# Patient Record
Sex: MALE | Race: WHITE | NOT HISPANIC OR LATINO | Employment: OTHER | URBAN - METROPOLITAN AREA
[De-identification: names, ages, dates, MRNs, and addresses within clinical notes are randomized per-mention and may not be internally consistent; named-entity substitution may affect disease eponyms.]

---

## 2017-05-22 ENCOUNTER — RECORDS - HEALTHEAST (OUTPATIENT)
Dept: LAB | Facility: CLINIC | Age: 77
End: 2017-05-22

## 2017-05-22 LAB
CHOLEST SERPL-MCNC: 201 MG/DL
FASTING STATUS PATIENT QL REPORTED: ABNORMAL
HDLC SERPL-MCNC: 40 MG/DL
LDLC SERPL CALC-MCNC: 111 MG/DL
TRIGL SERPL-MCNC: 251 MG/DL

## 2017-08-10 ENCOUNTER — ANESTHESIA - HEALTHEAST (OUTPATIENT)
Dept: SURGERY | Facility: HOSPITAL | Age: 77
End: 2017-08-10

## 2017-08-10 ASSESSMENT — MIFFLIN-ST. JEOR: SCORE: 1750.45

## 2017-08-11 ENCOUNTER — SURGERY - HEALTHEAST (OUTPATIENT)
Dept: SURGERY | Facility: HOSPITAL | Age: 77
End: 2017-08-11

## 2017-09-18 ENCOUNTER — RECORDS - HEALTHEAST (OUTPATIENT)
Dept: ADMINISTRATIVE | Facility: OTHER | Age: 77
End: 2017-09-18

## 2017-09-25 ENCOUNTER — HOSPITAL ENCOUNTER (OUTPATIENT)
Dept: MRI IMAGING | Facility: CLINIC | Age: 77
Discharge: HOME | End: 2017-09-25
Attending: INTERNAL MEDICINE

## 2017-09-25 DIAGNOSIS — R10.13 EPIGASTRIC PAIN: ICD-10-CM

## 2018-01-29 ENCOUNTER — RECORDS - HEALTHEAST (OUTPATIENT)
Dept: LAB | Facility: CLINIC | Age: 78
End: 2018-01-29

## 2018-01-29 LAB
ANION GAP SERPL CALCULATED.3IONS-SCNC: 10 MMOL/L (ref 5–18)
BUN SERPL-MCNC: 26 MG/DL (ref 8–28)
CALCIUM SERPL-MCNC: 9.8 MG/DL (ref 8.5–10.5)
CHLORIDE BLD-SCNC: 98 MMOL/L (ref 98–107)
CO2 SERPL-SCNC: 26 MMOL/L (ref 22–31)
CREAT SERPL-MCNC: 1.03 MG/DL (ref 0.7–1.3)
GFR SERPL CREATININE-BSD FRML MDRD: >60 ML/MIN/1.73M2
GLUCOSE BLD-MCNC: 168 MG/DL (ref 70–125)
HGB BLD-MCNC: 13.7 G/DL (ref 14–18)
POTASSIUM BLD-SCNC: 4.5 MMOL/L (ref 3.5–5)
SODIUM SERPL-SCNC: 134 MMOL/L (ref 136–145)

## 2018-03-06 ENCOUNTER — RECORDS - HEALTHEAST (OUTPATIENT)
Dept: ADMINISTRATIVE | Facility: OTHER | Age: 78
End: 2018-03-06

## 2018-03-07 ENCOUNTER — HOSPITAL ENCOUNTER (OUTPATIENT)
Dept: RADIOLOGY | Facility: CLINIC | Age: 78
Discharge: HOME | End: 2018-03-07
Attending: INTERNAL MEDICINE

## 2018-03-07 DIAGNOSIS — K86.89 DILATED PANCREATIC DUCT: ICD-10-CM

## 2018-03-12 ENCOUNTER — RECORDS - HEALTHEAST (OUTPATIENT)
Dept: ADMINISTRATIVE | Facility: OTHER | Age: 78
End: 2018-03-12

## 2018-03-13 ENCOUNTER — HOSPITAL ENCOUNTER (OUTPATIENT)
Dept: RADIOLOGY | Facility: CLINIC | Age: 78
Discharge: HOME | End: 2018-03-13
Attending: INTERNAL MEDICINE

## 2018-03-13 DIAGNOSIS — K85.90 ACUTE PANCREATITIS: ICD-10-CM

## 2018-06-07 ENCOUNTER — AMBULATORY - HEALTHEAST (OUTPATIENT)
Dept: PALLIATIVE MEDICINE | Facility: OTHER | Age: 78
End: 2018-06-07

## 2018-06-07 DIAGNOSIS — G89.29 CHRONIC ABDOMINAL PAIN: ICD-10-CM

## 2018-06-07 DIAGNOSIS — R10.9 CHRONIC ABDOMINAL PAIN: ICD-10-CM

## 2018-07-10 ENCOUNTER — RECORDS - HEALTHEAST (OUTPATIENT)
Dept: ADMINISTRATIVE | Facility: OTHER | Age: 78
End: 2018-07-10

## 2018-07-10 ENCOUNTER — HOSPITAL ENCOUNTER (OUTPATIENT)
Dept: PALLIATIVE MEDICINE | Facility: OTHER | Age: 78
Discharge: HOME | End: 2018-07-10
Attending: INTERNAL MEDICINE

## 2018-07-10 DIAGNOSIS — G89.4 CHRONIC PAIN SYNDROME: ICD-10-CM

## 2018-07-10 DIAGNOSIS — R10.31 RLQ ABDOMINAL PAIN: ICD-10-CM

## 2018-07-10 ASSESSMENT — MIFFLIN-ST. JEOR: SCORE: 1750.45

## 2018-07-24 ENCOUNTER — HOSPITAL ENCOUNTER (OUTPATIENT)
Dept: PALLIATIVE MEDICINE | Facility: OTHER | Age: 78
Discharge: HOME | End: 2018-07-24
Attending: NURSE PRACTITIONER

## 2018-07-24 DIAGNOSIS — M54.41 CHRONIC BILATERAL LOW BACK PAIN WITH BILATERAL SCIATICA: ICD-10-CM

## 2018-07-24 DIAGNOSIS — R10.84 ABDOMINAL PAIN, GENERALIZED: ICD-10-CM

## 2018-07-24 DIAGNOSIS — G89.4 CHRONIC PAIN SYNDROME: ICD-10-CM

## 2018-07-24 DIAGNOSIS — M54.42 CHRONIC BILATERAL LOW BACK PAIN WITH BILATERAL SCIATICA: ICD-10-CM

## 2018-07-24 DIAGNOSIS — G89.29 CHRONIC BILATERAL LOW BACK PAIN WITH BILATERAL SCIATICA: ICD-10-CM

## 2018-07-24 ASSESSMENT — MIFFLIN-ST. JEOR: SCORE: 1750.45

## 2018-08-21 ENCOUNTER — COMMUNICATION - HEALTHEAST (OUTPATIENT)
Dept: PALLIATIVE MEDICINE | Facility: OTHER | Age: 78
End: 2018-08-21

## 2018-08-21 ENCOUNTER — HOSPITAL ENCOUNTER (OUTPATIENT)
Dept: PALLIATIVE MEDICINE | Facility: OTHER | Age: 78
Discharge: HOME | End: 2018-08-21
Attending: NURSE PRACTITIONER

## 2018-08-21 DIAGNOSIS — F11.20 CONTINUOUS OPIOID DEPENDENCE (H): ICD-10-CM

## 2018-08-21 DIAGNOSIS — G89.29 CHRONIC BILATERAL LOW BACK PAIN WITH BILATERAL SCIATICA: ICD-10-CM

## 2018-08-21 DIAGNOSIS — M54.42 CHRONIC BILATERAL LOW BACK PAIN WITH BILATERAL SCIATICA: ICD-10-CM

## 2018-08-21 DIAGNOSIS — M54.41 CHRONIC BILATERAL LOW BACK PAIN WITH BILATERAL SCIATICA: ICD-10-CM

## 2018-08-21 DIAGNOSIS — R10.84 ABDOMINAL PAIN, GENERALIZED: ICD-10-CM

## 2018-08-21 DIAGNOSIS — D49.0 INTRADUCTAL PAPILLARY MUCINOUS NEOPLASM OF PANCREAS: ICD-10-CM

## 2018-08-21 DIAGNOSIS — G89.4 CHRONIC PAIN SYNDROME: ICD-10-CM

## 2018-08-21 ASSESSMENT — MIFFLIN-ST. JEOR: SCORE: 1750.45

## 2018-08-24 ENCOUNTER — COMMUNICATION - HEALTHEAST (OUTPATIENT)
Dept: PALLIATIVE MEDICINE | Facility: OTHER | Age: 78
End: 2018-08-24

## 2018-09-19 ENCOUNTER — COMMUNICATION - HEALTHEAST (OUTPATIENT)
Dept: PALLIATIVE MEDICINE | Facility: OTHER | Age: 78
End: 2018-09-19

## 2018-09-19 DIAGNOSIS — R10.84 ABDOMINAL PAIN, GENERALIZED: ICD-10-CM

## 2018-09-19 DIAGNOSIS — M54.42 CHRONIC BILATERAL LOW BACK PAIN WITH BILATERAL SCIATICA: ICD-10-CM

## 2018-09-19 DIAGNOSIS — M54.41 CHRONIC BILATERAL LOW BACK PAIN WITH BILATERAL SCIATICA: ICD-10-CM

## 2018-09-19 DIAGNOSIS — G89.4 CHRONIC PAIN SYNDROME: ICD-10-CM

## 2018-09-19 DIAGNOSIS — G89.29 CHRONIC BILATERAL LOW BACK PAIN WITH BILATERAL SCIATICA: ICD-10-CM

## 2018-09-25 ENCOUNTER — HOSPITAL ENCOUNTER (OUTPATIENT)
Dept: PALLIATIVE MEDICINE | Facility: OTHER | Age: 78
Discharge: HOME | End: 2018-09-25
Attending: NURSE PRACTITIONER

## 2018-09-25 DIAGNOSIS — G89.4 CHRONIC PAIN SYNDROME: ICD-10-CM

## 2018-09-25 DIAGNOSIS — R10.84 ABDOMINAL PAIN, GENERALIZED: ICD-10-CM

## 2018-09-25 DIAGNOSIS — M54.42 CHRONIC BILATERAL LOW BACK PAIN WITH BILATERAL SCIATICA: ICD-10-CM

## 2018-09-25 DIAGNOSIS — G89.29 CHRONIC BILATERAL LOW BACK PAIN WITH BILATERAL SCIATICA: ICD-10-CM

## 2018-09-25 DIAGNOSIS — M54.41 CHRONIC BILATERAL LOW BACK PAIN WITH BILATERAL SCIATICA: ICD-10-CM

## 2018-09-25 ASSESSMENT — MIFFLIN-ST. JEOR: SCORE: 1750.45

## 2018-10-05 ENCOUNTER — COMMUNICATION - HEALTHEAST (OUTPATIENT)
Dept: PALLIATIVE MEDICINE | Facility: OTHER | Age: 78
End: 2018-10-05

## 2018-10-09 ENCOUNTER — HOSPITAL ENCOUNTER (OUTPATIENT)
Dept: PALLIATIVE MEDICINE | Facility: OTHER | Age: 78
Discharge: HOME | End: 2018-10-09
Attending: NURSE PRACTITIONER

## 2018-10-09 DIAGNOSIS — G89.29 CHRONIC BILATERAL LOW BACK PAIN WITH BILATERAL SCIATICA: ICD-10-CM

## 2018-10-09 DIAGNOSIS — M54.41 CHRONIC BILATERAL LOW BACK PAIN WITH BILATERAL SCIATICA: ICD-10-CM

## 2018-10-09 DIAGNOSIS — R10.84 ABDOMINAL PAIN, GENERALIZED: ICD-10-CM

## 2018-10-09 DIAGNOSIS — G89.4 CHRONIC PAIN SYNDROME: ICD-10-CM

## 2018-10-09 DIAGNOSIS — M54.42 CHRONIC BILATERAL LOW BACK PAIN WITH BILATERAL SCIATICA: ICD-10-CM

## 2018-10-09 ASSESSMENT — MIFFLIN-ST. JEOR: SCORE: 1750.45

## 2018-10-29 ENCOUNTER — COMMUNICATION - HEALTHEAST (OUTPATIENT)
Dept: PALLIATIVE MEDICINE | Facility: OTHER | Age: 78
End: 2018-10-29

## 2018-10-30 ENCOUNTER — COMMUNICATION - HEALTHEAST (OUTPATIENT)
Dept: PALLIATIVE MEDICINE | Facility: OTHER | Age: 78
End: 2018-10-30

## 2018-11-05 ENCOUNTER — COMMUNICATION - HEALTHEAST (OUTPATIENT)
Dept: PALLIATIVE MEDICINE | Facility: OTHER | Age: 78
End: 2018-11-05

## 2018-11-06 ENCOUNTER — COMMUNICATION - HEALTHEAST (OUTPATIENT)
Dept: PALLIATIVE MEDICINE | Facility: OTHER | Age: 78
End: 2018-11-06

## 2018-11-07 ENCOUNTER — HOSPITAL ENCOUNTER (OUTPATIENT)
Dept: PALLIATIVE MEDICINE | Facility: OTHER | Age: 78
Discharge: HOME | End: 2018-11-07
Attending: NURSE PRACTITIONER

## 2018-11-07 DIAGNOSIS — G89.4 CHRONIC PAIN SYNDROME: ICD-10-CM

## 2018-11-07 DIAGNOSIS — M54.41 CHRONIC BILATERAL LOW BACK PAIN WITH BILATERAL SCIATICA: ICD-10-CM

## 2018-11-07 DIAGNOSIS — R10.84 ABDOMINAL PAIN, GENERALIZED: ICD-10-CM

## 2018-11-07 DIAGNOSIS — G89.29 CHRONIC BILATERAL LOW BACK PAIN WITH BILATERAL SCIATICA: ICD-10-CM

## 2018-11-07 DIAGNOSIS — M54.42 CHRONIC BILATERAL LOW BACK PAIN WITH BILATERAL SCIATICA: ICD-10-CM

## 2018-11-07 ASSESSMENT — MIFFLIN-ST. JEOR: SCORE: 1750.45

## 2018-11-08 ENCOUNTER — COMMUNICATION - HEALTHEAST (OUTPATIENT)
Dept: PALLIATIVE MEDICINE | Facility: OTHER | Age: 78
End: 2018-11-08

## 2018-11-13 ENCOUNTER — COMMUNICATION - HEALTHEAST (OUTPATIENT)
Dept: PALLIATIVE MEDICINE | Facility: OTHER | Age: 78
End: 2018-11-13

## 2018-11-19 ENCOUNTER — COMMUNICATION - HEALTHEAST (OUTPATIENT)
Dept: PALLIATIVE MEDICINE | Facility: OTHER | Age: 78
End: 2018-11-19

## 2018-11-19 DIAGNOSIS — M54.42 CHRONIC BILATERAL LOW BACK PAIN WITH BILATERAL SCIATICA: ICD-10-CM

## 2018-11-19 DIAGNOSIS — R10.84 ABDOMINAL PAIN, GENERALIZED: ICD-10-CM

## 2018-11-19 DIAGNOSIS — M54.41 CHRONIC BILATERAL LOW BACK PAIN WITH BILATERAL SCIATICA: ICD-10-CM

## 2018-11-19 DIAGNOSIS — G89.4 CHRONIC PAIN SYNDROME: ICD-10-CM

## 2018-11-19 DIAGNOSIS — G89.29 CHRONIC BILATERAL LOW BACK PAIN WITH BILATERAL SCIATICA: ICD-10-CM

## 2018-11-29 ENCOUNTER — COMMUNICATION - HEALTHEAST (OUTPATIENT)
Dept: TELEHEALTH | Facility: CLINIC | Age: 78
End: 2018-11-29

## 2018-12-03 ENCOUNTER — COMMUNICATION - HEALTHEAST (OUTPATIENT)
Dept: PALLIATIVE MEDICINE | Facility: OTHER | Age: 78
End: 2018-12-03

## 2018-12-07 ENCOUNTER — HOSPITAL ENCOUNTER (OUTPATIENT)
Dept: PALLIATIVE MEDICINE | Facility: OTHER | Age: 78
Discharge: HOME | End: 2018-12-07
Attending: NURSE PRACTITIONER

## 2018-12-07 DIAGNOSIS — R11.0 NAUSEA: ICD-10-CM

## 2018-12-07 DIAGNOSIS — G89.29 CHRONIC BILATERAL LOW BACK PAIN WITH BILATERAL SCIATICA: ICD-10-CM

## 2018-12-07 DIAGNOSIS — M54.41 CHRONIC BILATERAL LOW BACK PAIN WITH BILATERAL SCIATICA: ICD-10-CM

## 2018-12-07 DIAGNOSIS — R10.84 ABDOMINAL PAIN, GENERALIZED: ICD-10-CM

## 2018-12-07 DIAGNOSIS — M54.42 CHRONIC BILATERAL LOW BACK PAIN WITH BILATERAL SCIATICA: ICD-10-CM

## 2018-12-07 DIAGNOSIS — G89.4 CHRONIC PAIN SYNDROME: ICD-10-CM

## 2018-12-07 ASSESSMENT — MIFFLIN-ST. JEOR: SCORE: 1750.45

## 2018-12-27 ENCOUNTER — COMMUNICATION - HEALTHEAST (OUTPATIENT)
Dept: TELEHEALTH | Facility: CLINIC | Age: 78
End: 2018-12-27

## 2018-12-28 ENCOUNTER — COMMUNICATION - HEALTHEAST (OUTPATIENT)
Dept: PALLIATIVE MEDICINE | Facility: OTHER | Age: 78
End: 2018-12-28

## 2019-01-15 ENCOUNTER — COMMUNICATION - HEALTHEAST (OUTPATIENT)
Dept: TELEHEALTH | Facility: CLINIC | Age: 79
End: 2019-01-15

## 2019-01-18 ENCOUNTER — COMMUNICATION - HEALTHEAST (OUTPATIENT)
Dept: TELEHEALTH | Facility: CLINIC | Age: 79
End: 2019-01-18

## 2019-01-21 ENCOUNTER — COMMUNICATION - HEALTHEAST (OUTPATIENT)
Dept: PALLIATIVE MEDICINE | Facility: OTHER | Age: 79
End: 2019-01-21

## 2019-01-23 ENCOUNTER — COMMUNICATION - HEALTHEAST (OUTPATIENT)
Dept: TELEHEALTH | Facility: CLINIC | Age: 79
End: 2019-01-23

## 2019-01-25 ENCOUNTER — COMMUNICATION - HEALTHEAST (OUTPATIENT)
Dept: PALLIATIVE MEDICINE | Facility: OTHER | Age: 79
End: 2019-01-25

## 2019-01-25 DIAGNOSIS — M54.41 CHRONIC BILATERAL LOW BACK PAIN WITH BILATERAL SCIATICA: ICD-10-CM

## 2019-01-25 DIAGNOSIS — G89.29 CHRONIC BILATERAL LOW BACK PAIN WITH BILATERAL SCIATICA: ICD-10-CM

## 2019-01-25 DIAGNOSIS — M54.42 CHRONIC BILATERAL LOW BACK PAIN WITH BILATERAL SCIATICA: ICD-10-CM

## 2019-01-25 DIAGNOSIS — R10.84 ABDOMINAL PAIN, GENERALIZED: ICD-10-CM

## 2019-01-25 DIAGNOSIS — G89.4 CHRONIC PAIN SYNDROME: ICD-10-CM

## 2019-01-30 ENCOUNTER — COMMUNICATION - HEALTHEAST (OUTPATIENT)
Dept: PALLIATIVE MEDICINE | Facility: OTHER | Age: 79
End: 2019-01-30

## 2019-01-30 DIAGNOSIS — G89.4 CHRONIC PAIN SYNDROME: ICD-10-CM

## 2019-01-31 ENCOUNTER — RECORDS - HEALTHEAST (OUTPATIENT)
Dept: LAB | Facility: CLINIC | Age: 79
End: 2019-01-31

## 2019-01-31 LAB
ALBUMIN SERPL-MCNC: 3.9 G/DL (ref 3.5–5)
ALP SERPL-CCNC: 68 U/L (ref 45–120)
ALT SERPL W P-5'-P-CCNC: 23 U/L (ref 0–45)
ANION GAP SERPL CALCULATED.3IONS-SCNC: 11 MMOL/L (ref 5–18)
AST SERPL W P-5'-P-CCNC: 18 U/L (ref 0–40)
BILIRUB SERPL-MCNC: 0.4 MG/DL (ref 0–1)
BUN SERPL-MCNC: 28 MG/DL (ref 8–28)
CALCIUM SERPL-MCNC: 9.8 MG/DL (ref 8.5–10.5)
CHLORIDE BLD-SCNC: 98 MMOL/L (ref 98–107)
CHOLEST SERPL-MCNC: 190 MG/DL
CO2 SERPL-SCNC: 26 MMOL/L (ref 22–31)
CREAT SERPL-MCNC: 1.15 MG/DL (ref 0.7–1.3)
FASTING STATUS PATIENT QL REPORTED: ABNORMAL
GFR SERPL CREATININE-BSD FRML MDRD: >60 ML/MIN/1.73M2
GLUCOSE BLD-MCNC: 99 MG/DL (ref 70–125)
HDLC SERPL-MCNC: 39 MG/DL
LDLC SERPL CALC-MCNC: 87 MG/DL
LDLC SERPL CALC-MCNC: ABNORMAL MG/DL
POTASSIUM BLD-SCNC: 5 MMOL/L (ref 3.5–5)
PROT SERPL-MCNC: 7.4 G/DL (ref 6–8)
SODIUM SERPL-SCNC: 135 MMOL/L (ref 136–145)
TRIGL SERPL-MCNC: 506 MG/DL

## 2019-02-01 ENCOUNTER — COMMUNICATION - HEALTHEAST (OUTPATIENT)
Dept: PALLIATIVE MEDICINE | Facility: OTHER | Age: 79
End: 2019-02-01

## 2019-02-04 ENCOUNTER — COMMUNICATION - HEALTHEAST (OUTPATIENT)
Dept: TELEHEALTH | Facility: CLINIC | Age: 79
End: 2019-02-04

## 2019-02-05 ENCOUNTER — RECORDS - HEALTHEAST (OUTPATIENT)
Dept: LAB | Facility: CLINIC | Age: 79
End: 2019-02-05

## 2019-02-05 ENCOUNTER — HOSPITAL ENCOUNTER (OUTPATIENT)
Dept: PALLIATIVE MEDICINE | Facility: OTHER | Age: 79
Discharge: HOME | End: 2019-02-05
Attending: NURSE PRACTITIONER

## 2019-02-05 DIAGNOSIS — M54.41 CHRONIC BILATERAL LOW BACK PAIN WITH BILATERAL SCIATICA: ICD-10-CM

## 2019-02-05 DIAGNOSIS — G89.29 CHRONIC BILATERAL LOW BACK PAIN WITH BILATERAL SCIATICA: ICD-10-CM

## 2019-02-05 DIAGNOSIS — R10.84 ABDOMINAL PAIN, GENERALIZED: ICD-10-CM

## 2019-02-05 DIAGNOSIS — G89.4 CHRONIC PAIN SYNDROME: ICD-10-CM

## 2019-02-05 DIAGNOSIS — M54.42 CHRONIC BILATERAL LOW BACK PAIN WITH BILATERAL SCIATICA: ICD-10-CM

## 2019-02-05 LAB — TSH SERPL DL<=0.005 MIU/L-ACNC: 1.06 UIU/ML (ref 0.3–5)

## 2019-02-05 ASSESSMENT — MIFFLIN-ST. JEOR: SCORE: 1750.45

## 2019-03-05 ENCOUNTER — COMMUNICATION - HEALTHEAST (OUTPATIENT)
Dept: PALLIATIVE MEDICINE | Facility: OTHER | Age: 79
End: 2019-03-05

## 2019-03-05 DIAGNOSIS — G89.29 CHRONIC BILATERAL LOW BACK PAIN WITH BILATERAL SCIATICA: ICD-10-CM

## 2019-03-05 DIAGNOSIS — R10.84 ABDOMINAL PAIN, GENERALIZED: ICD-10-CM

## 2019-03-05 DIAGNOSIS — M54.41 CHRONIC BILATERAL LOW BACK PAIN WITH BILATERAL SCIATICA: ICD-10-CM

## 2019-03-05 DIAGNOSIS — M54.42 CHRONIC BILATERAL LOW BACK PAIN WITH BILATERAL SCIATICA: ICD-10-CM

## 2019-03-05 DIAGNOSIS — G89.4 CHRONIC PAIN SYNDROME: ICD-10-CM

## 2019-03-06 ENCOUNTER — RECORDS - HEALTHEAST (OUTPATIENT)
Dept: LAB | Facility: CLINIC | Age: 79
End: 2019-03-06

## 2019-03-06 LAB
ANION GAP SERPL CALCULATED.3IONS-SCNC: 8 MMOL/L (ref 5–18)
BUN SERPL-MCNC: 28 MG/DL (ref 8–28)
CALCIUM SERPL-MCNC: 10.1 MG/DL (ref 8.5–10.5)
CHLORIDE BLD-SCNC: 100 MMOL/L (ref 98–107)
CO2 SERPL-SCNC: 28 MMOL/L (ref 22–31)
CREAT SERPL-MCNC: 1.25 MG/DL (ref 0.7–1.3)
GFR SERPL CREATININE-BSD FRML MDRD: 56 ML/MIN/1.73M2
GLUCOSE BLD-MCNC: 103 MG/DL (ref 70–125)
POTASSIUM BLD-SCNC: 4.2 MMOL/L (ref 3.5–5)
SODIUM SERPL-SCNC: 136 MMOL/L (ref 136–145)

## 2019-03-26 ENCOUNTER — HOSPITAL ENCOUNTER (OUTPATIENT)
Dept: PALLIATIVE MEDICINE | Facility: OTHER | Age: 79
Discharge: HOME | End: 2019-03-26
Attending: NURSE PRACTITIONER

## 2019-03-26 DIAGNOSIS — R10.84 ABDOMINAL PAIN, GENERALIZED: ICD-10-CM

## 2019-03-26 DIAGNOSIS — M54.42 CHRONIC BILATERAL LOW BACK PAIN WITH BILATERAL SCIATICA: ICD-10-CM

## 2019-03-26 DIAGNOSIS — M54.41 CHRONIC BILATERAL LOW BACK PAIN WITH BILATERAL SCIATICA: ICD-10-CM

## 2019-03-26 DIAGNOSIS — G89.29 CHRONIC BILATERAL LOW BACK PAIN WITH BILATERAL SCIATICA: ICD-10-CM

## 2019-03-26 DIAGNOSIS — G89.4 CHRONIC PAIN SYNDROME: ICD-10-CM

## 2019-03-26 ASSESSMENT — MIFFLIN-ST. JEOR: SCORE: 1750.45

## 2019-04-19 ENCOUNTER — COMMUNICATION - HEALTHEAST (OUTPATIENT)
Dept: PALLIATIVE MEDICINE | Facility: OTHER | Age: 79
End: 2019-04-19

## 2019-05-07 ENCOUNTER — HOSPITAL ENCOUNTER (OUTPATIENT)
Dept: PALLIATIVE MEDICINE | Facility: OTHER | Age: 79
Discharge: HOME | End: 2019-05-07
Attending: NURSE PRACTITIONER

## 2019-05-07 DIAGNOSIS — G89.4 CHRONIC PAIN SYNDROME: ICD-10-CM

## 2019-05-07 DIAGNOSIS — R10.84 ABDOMINAL PAIN, GENERALIZED: ICD-10-CM

## 2019-05-07 DIAGNOSIS — G89.29 CHRONIC BILATERAL LOW BACK PAIN WITH BILATERAL SCIATICA: ICD-10-CM

## 2019-05-07 DIAGNOSIS — M54.41 CHRONIC BILATERAL LOW BACK PAIN WITH BILATERAL SCIATICA: ICD-10-CM

## 2019-05-07 DIAGNOSIS — M54.42 CHRONIC BILATERAL LOW BACK PAIN WITH BILATERAL SCIATICA: ICD-10-CM

## 2019-05-07 DIAGNOSIS — K86.89 PANCREATIC PAIN: ICD-10-CM

## 2019-05-07 ASSESSMENT — MIFFLIN-ST. JEOR: SCORE: 1768.59

## 2019-05-20 ENCOUNTER — COMMUNICATION - HEALTHEAST (OUTPATIENT)
Dept: PALLIATIVE MEDICINE | Facility: OTHER | Age: 79
End: 2019-05-20

## 2019-05-21 ENCOUNTER — HOSPITAL ENCOUNTER (OUTPATIENT)
Dept: PALLIATIVE MEDICINE | Facility: OTHER | Age: 79
Discharge: HOME | End: 2019-05-21
Attending: PAIN MEDICINE | Admitting: PAIN MEDICINE

## 2019-05-21 DIAGNOSIS — G89.29 CHRONIC BILATERAL LOW BACK PAIN WITH BILATERAL SCIATICA: ICD-10-CM

## 2019-05-21 DIAGNOSIS — M54.41 CHRONIC BILATERAL LOW BACK PAIN WITH BILATERAL SCIATICA: ICD-10-CM

## 2019-05-21 DIAGNOSIS — G89.4 CHRONIC PAIN SYNDROME: ICD-10-CM

## 2019-05-21 DIAGNOSIS — K86.89 PANCREATIC PAIN: ICD-10-CM

## 2019-05-21 DIAGNOSIS — R10.84 ABDOMINAL PAIN, GENERALIZED: ICD-10-CM

## 2019-05-21 DIAGNOSIS — M54.42 CHRONIC BILATERAL LOW BACK PAIN WITH BILATERAL SCIATICA: ICD-10-CM

## 2019-05-21 ASSESSMENT — MIFFLIN-ST. JEOR: SCORE: 1768.59

## 2019-05-22 ENCOUNTER — COMMUNICATION - HEALTHEAST (OUTPATIENT)
Dept: PALLIATIVE MEDICINE | Facility: OTHER | Age: 79
End: 2019-05-22

## 2019-05-24 ENCOUNTER — HOSPITAL ENCOUNTER (OUTPATIENT)
Dept: PALLIATIVE MEDICINE | Facility: OTHER | Age: 79
Discharge: HOME | End: 2019-05-24
Attending: NURSE PRACTITIONER

## 2019-05-24 DIAGNOSIS — G89.4 CHRONIC PAIN SYNDROME: ICD-10-CM

## 2019-05-24 DIAGNOSIS — R10.84 ABDOMINAL PAIN, GENERALIZED: ICD-10-CM

## 2019-05-24 DIAGNOSIS — M54.42 CHRONIC BILATERAL LOW BACK PAIN WITH BILATERAL SCIATICA: ICD-10-CM

## 2019-05-24 DIAGNOSIS — G89.29 CHRONIC BILATERAL LOW BACK PAIN WITH BILATERAL SCIATICA: ICD-10-CM

## 2019-05-24 DIAGNOSIS — M54.41 CHRONIC BILATERAL LOW BACK PAIN WITH BILATERAL SCIATICA: ICD-10-CM

## 2019-05-24 ASSESSMENT — MIFFLIN-ST. JEOR: SCORE: 1768.59

## 2019-05-29 ENCOUNTER — COMMUNICATION - HEALTHEAST (OUTPATIENT)
Dept: PALLIATIVE MEDICINE | Facility: OTHER | Age: 79
End: 2019-05-29

## 2019-05-30 ENCOUNTER — COMMUNICATION - HEALTHEAST (OUTPATIENT)
Dept: PALLIATIVE MEDICINE | Facility: OTHER | Age: 79
End: 2019-05-30

## 2019-05-30 DIAGNOSIS — G89.4 CHRONIC PAIN SYNDROME: ICD-10-CM

## 2019-05-30 DIAGNOSIS — M54.41 CHRONIC BILATERAL LOW BACK PAIN WITH BILATERAL SCIATICA: ICD-10-CM

## 2019-05-30 DIAGNOSIS — R10.84 ABDOMINAL PAIN, GENERALIZED: ICD-10-CM

## 2019-05-30 DIAGNOSIS — M54.42 CHRONIC BILATERAL LOW BACK PAIN WITH BILATERAL SCIATICA: ICD-10-CM

## 2019-05-30 DIAGNOSIS — G89.29 CHRONIC BILATERAL LOW BACK PAIN WITH BILATERAL SCIATICA: ICD-10-CM

## 2019-06-06 ENCOUNTER — HOSPITAL ENCOUNTER (OUTPATIENT)
Dept: PALLIATIVE MEDICINE | Facility: OTHER | Age: 79
Discharge: HOME | End: 2019-06-06
Attending: SOCIAL WORKER

## 2019-06-06 DIAGNOSIS — F43.23 ADJUSTMENT DISORDER WITH MIXED ANXIETY AND DEPRESSED MOOD: ICD-10-CM

## 2019-06-06 DIAGNOSIS — G89.4 CHRONIC PAIN SYNDROME: ICD-10-CM

## 2019-06-12 ENCOUNTER — COMMUNICATION - HEALTHEAST (OUTPATIENT)
Dept: PALLIATIVE MEDICINE | Facility: OTHER | Age: 79
End: 2019-06-12

## 2019-06-21 ENCOUNTER — HOSPITAL ENCOUNTER (OUTPATIENT)
Dept: PALLIATIVE MEDICINE | Facility: OTHER | Age: 79
Discharge: HOME | End: 2019-06-21
Attending: NURSE PRACTITIONER

## 2019-06-21 DIAGNOSIS — M54.41 CHRONIC BILATERAL LOW BACK PAIN WITH BILATERAL SCIATICA: ICD-10-CM

## 2019-06-21 DIAGNOSIS — G89.4 CHRONIC PAIN SYNDROME: ICD-10-CM

## 2019-06-21 DIAGNOSIS — R10.84 ABDOMINAL PAIN, GENERALIZED: ICD-10-CM

## 2019-06-21 DIAGNOSIS — M54.42 CHRONIC BILATERAL LOW BACK PAIN WITH BILATERAL SCIATICA: ICD-10-CM

## 2019-06-21 DIAGNOSIS — G89.29 CHRONIC BILATERAL LOW BACK PAIN WITH BILATERAL SCIATICA: ICD-10-CM

## 2019-06-21 ASSESSMENT — MIFFLIN-ST. JEOR: SCORE: 1745.91

## 2019-06-24 ENCOUNTER — COMMUNICATION - HEALTHEAST (OUTPATIENT)
Dept: PALLIATIVE MEDICINE | Facility: OTHER | Age: 79
End: 2019-06-24

## 2019-06-27 ENCOUNTER — HOSPITAL ENCOUNTER (OUTPATIENT)
Dept: PALLIATIVE MEDICINE | Facility: OTHER | Age: 79
Discharge: HOME | End: 2019-06-27

## 2019-06-27 DIAGNOSIS — R10.11 RUQ ABDOMINAL PAIN: ICD-10-CM

## 2019-06-27 DIAGNOSIS — G89.4 CHRONIC PAIN SYNDROME: ICD-10-CM

## 2019-07-22 ENCOUNTER — HOSPITAL ENCOUNTER (OUTPATIENT)
Dept: PALLIATIVE MEDICINE | Facility: OTHER | Age: 79
Discharge: HOME | End: 2019-07-22
Attending: NURSE PRACTITIONER

## 2019-07-22 DIAGNOSIS — M54.42 CHRONIC BILATERAL LOW BACK PAIN WITH BILATERAL SCIATICA: ICD-10-CM

## 2019-07-22 DIAGNOSIS — G89.4 CHRONIC PAIN SYNDROME: ICD-10-CM

## 2019-07-22 DIAGNOSIS — M54.41 CHRONIC BILATERAL LOW BACK PAIN WITH BILATERAL SCIATICA: ICD-10-CM

## 2019-07-22 DIAGNOSIS — R10.84 ABDOMINAL PAIN, GENERALIZED: ICD-10-CM

## 2019-07-22 DIAGNOSIS — G89.29 CHRONIC BILATERAL LOW BACK PAIN WITH BILATERAL SCIATICA: ICD-10-CM

## 2019-07-22 ASSESSMENT — MIFFLIN-ST. JEOR: SCORE: 1745.91

## 2019-07-24 LAB

## 2019-08-01 ENCOUNTER — RECORDS - HEALTHEAST (OUTPATIENT)
Dept: LAB | Facility: CLINIC | Age: 79
End: 2019-08-01

## 2019-08-01 LAB
ANION GAP SERPL CALCULATED.3IONS-SCNC: 11 MMOL/L (ref 5–18)
BUN SERPL-MCNC: 28 MG/DL (ref 8–28)
CALCIUM SERPL-MCNC: 10.1 MG/DL (ref 8.5–10.5)
CHLORIDE BLD-SCNC: 102 MMOL/L (ref 98–107)
CO2 SERPL-SCNC: 26 MMOL/L (ref 22–31)
CREAT SERPL-MCNC: 1.46 MG/DL (ref 0.7–1.3)
GFR SERPL CREATININE-BSD FRML MDRD: 47 ML/MIN/1.73M2
GLUCOSE BLD-MCNC: 120 MG/DL (ref 70–125)
POTASSIUM BLD-SCNC: 4.3 MMOL/L (ref 3.5–5)
SODIUM SERPL-SCNC: 139 MMOL/L (ref 136–145)

## 2019-08-16 ENCOUNTER — COMMUNICATION - HEALTHEAST (OUTPATIENT)
Dept: PALLIATIVE MEDICINE | Facility: OTHER | Age: 79
End: 2019-08-16

## 2019-08-20 ENCOUNTER — COMMUNICATION - HEALTHEAST (OUTPATIENT)
Dept: PALLIATIVE MEDICINE | Facility: OTHER | Age: 79
End: 2019-08-20

## 2019-08-20 ENCOUNTER — COMMUNICATION - HEALTHEAST (OUTPATIENT)
Dept: PALLIATIVE MEDICINE | Facility: CLINIC | Age: 79
End: 2019-08-20

## 2019-08-20 ENCOUNTER — RECORDS - HEALTHEAST (OUTPATIENT)
Dept: LAB | Facility: CLINIC | Age: 79
End: 2019-08-20

## 2019-08-20 ENCOUNTER — HOSPITAL ENCOUNTER (OUTPATIENT)
Dept: PALLIATIVE MEDICINE | Facility: OTHER | Age: 79
Discharge: HOME | End: 2019-08-20

## 2019-08-20 DIAGNOSIS — M54.41 CHRONIC BILATERAL LOW BACK PAIN WITH BILATERAL SCIATICA: ICD-10-CM

## 2019-08-20 DIAGNOSIS — R10.84 ABDOMINAL PAIN, GENERALIZED: ICD-10-CM

## 2019-08-20 DIAGNOSIS — G89.4 CHRONIC PAIN SYNDROME: ICD-10-CM

## 2019-08-20 DIAGNOSIS — M54.42 CHRONIC BILATERAL LOW BACK PAIN WITH BILATERAL SCIATICA: ICD-10-CM

## 2019-08-20 DIAGNOSIS — G89.29 CHRONIC BILATERAL LOW BACK PAIN WITH BILATERAL SCIATICA: ICD-10-CM

## 2019-08-20 DIAGNOSIS — R10.11 RUQ ABDOMINAL PAIN: ICD-10-CM

## 2019-08-20 LAB
ANION GAP SERPL CALCULATED.3IONS-SCNC: 10 MMOL/L (ref 5–18)
BUN SERPL-MCNC: 26 MG/DL (ref 8–28)
CALCIUM SERPL-MCNC: 9.6 MG/DL (ref 8.5–10.5)
CHLORIDE BLD-SCNC: 101 MMOL/L (ref 98–107)
CO2 SERPL-SCNC: 26 MMOL/L (ref 22–31)
CREAT SERPL-MCNC: 1.22 MG/DL (ref 0.7–1.3)
GFR SERPL CREATININE-BSD FRML MDRD: 57 ML/MIN/1.73M2
GLUCOSE BLD-MCNC: 168 MG/DL (ref 70–125)
POTASSIUM BLD-SCNC: 4.4 MMOL/L (ref 3.5–5)
SODIUM SERPL-SCNC: 137 MMOL/L (ref 136–145)

## 2019-08-21 ENCOUNTER — HOSPITAL ENCOUNTER (OUTPATIENT)
Dept: PALLIATIVE MEDICINE | Facility: OTHER | Age: 79
Discharge: HOME | End: 2019-08-21
Attending: NURSE PRACTITIONER

## 2019-08-21 DIAGNOSIS — G89.4 CHRONIC PAIN SYNDROME: ICD-10-CM

## 2019-08-21 DIAGNOSIS — M54.42 CHRONIC BILATERAL LOW BACK PAIN WITH BILATERAL SCIATICA: ICD-10-CM

## 2019-08-21 DIAGNOSIS — G89.29 CHRONIC BILATERAL LOW BACK PAIN WITH BILATERAL SCIATICA: ICD-10-CM

## 2019-08-21 DIAGNOSIS — R10.84 ABDOMINAL PAIN, GENERALIZED: ICD-10-CM

## 2019-08-21 DIAGNOSIS — M54.41 CHRONIC BILATERAL LOW BACK PAIN WITH BILATERAL SCIATICA: ICD-10-CM

## 2019-08-21 ASSESSMENT — MIFFLIN-ST. JEOR: SCORE: 1745.91

## 2019-08-23 ENCOUNTER — RECORDS - HEALTHEAST (OUTPATIENT)
Dept: ADMINISTRATIVE | Facility: OTHER | Age: 79
End: 2019-08-23

## 2019-08-26 ENCOUNTER — HOSPITAL ENCOUNTER (OUTPATIENT)
Dept: CT IMAGING | Facility: CLINIC | Age: 79
Discharge: HOME | End: 2019-08-26
Attending: INTERNAL MEDICINE

## 2019-08-26 DIAGNOSIS — R10.11 POSTPRANDIAL ABDOMINAL PAIN IN RIGHT UPPER QUADRANT: ICD-10-CM

## 2019-08-28 ENCOUNTER — RECORDS - HEALTHEAST (OUTPATIENT)
Dept: LAB | Facility: CLINIC | Age: 79
End: 2019-08-28

## 2019-08-29 LAB — HBA1C MFR BLD: 6.6 % (ref 4.2–6.1)

## 2019-09-16 ENCOUNTER — COMMUNICATION - HEALTHEAST (OUTPATIENT)
Dept: PALLIATIVE MEDICINE | Facility: OTHER | Age: 79
End: 2019-09-16

## 2019-09-16 DIAGNOSIS — M54.42 CHRONIC BILATERAL LOW BACK PAIN WITH BILATERAL SCIATICA: ICD-10-CM

## 2019-09-16 DIAGNOSIS — M54.41 CHRONIC BILATERAL LOW BACK PAIN WITH BILATERAL SCIATICA: ICD-10-CM

## 2019-09-16 DIAGNOSIS — R10.84 ABDOMINAL PAIN, GENERALIZED: ICD-10-CM

## 2019-09-16 DIAGNOSIS — G89.4 CHRONIC PAIN SYNDROME: ICD-10-CM

## 2019-09-16 DIAGNOSIS — G89.29 CHRONIC BILATERAL LOW BACK PAIN WITH BILATERAL SCIATICA: ICD-10-CM

## 2019-09-26 ENCOUNTER — AMBULATORY - HEALTHEAST (OUTPATIENT)
Dept: PALLIATIVE MEDICINE | Facility: OTHER | Age: 79
End: 2019-09-26

## 2019-10-03 ENCOUNTER — HOSPITAL ENCOUNTER (OUTPATIENT)
Dept: PALLIATIVE MEDICINE | Facility: OTHER | Age: 79
Discharge: HOME | End: 2019-10-03
Attending: NURSE PRACTITIONER

## 2019-10-03 DIAGNOSIS — R10.84 ABDOMINAL PAIN, GENERALIZED: ICD-10-CM

## 2019-10-03 DIAGNOSIS — G89.4 CHRONIC PAIN SYNDROME: ICD-10-CM

## 2019-10-03 DIAGNOSIS — M54.42 CHRONIC BILATERAL LOW BACK PAIN WITH BILATERAL SCIATICA: ICD-10-CM

## 2019-10-03 DIAGNOSIS — M54.41 CHRONIC BILATERAL LOW BACK PAIN WITH BILATERAL SCIATICA: ICD-10-CM

## 2019-10-03 DIAGNOSIS — G89.29 CHRONIC BILATERAL LOW BACK PAIN WITH BILATERAL SCIATICA: ICD-10-CM

## 2019-10-03 ASSESSMENT — MIFFLIN-ST. JEOR: SCORE: 1774.04

## 2019-10-18 ENCOUNTER — COMMUNICATION - HEALTHEAST (OUTPATIENT)
Dept: PALLIATIVE MEDICINE | Facility: OTHER | Age: 79
End: 2019-10-18

## 2019-10-18 DIAGNOSIS — G89.29 CHRONIC BILATERAL LOW BACK PAIN WITH BILATERAL SCIATICA: ICD-10-CM

## 2019-10-18 DIAGNOSIS — G89.4 CHRONIC PAIN SYNDROME: ICD-10-CM

## 2019-10-18 DIAGNOSIS — R10.84 ABDOMINAL PAIN, GENERALIZED: ICD-10-CM

## 2019-10-18 DIAGNOSIS — M54.42 CHRONIC BILATERAL LOW BACK PAIN WITH BILATERAL SCIATICA: ICD-10-CM

## 2019-10-18 DIAGNOSIS — M54.41 CHRONIC BILATERAL LOW BACK PAIN WITH BILATERAL SCIATICA: ICD-10-CM

## 2019-10-21 ENCOUNTER — COMMUNICATION - HEALTHEAST (OUTPATIENT)
Dept: PALLIATIVE MEDICINE | Facility: OTHER | Age: 79
End: 2019-10-21

## 2019-10-21 DIAGNOSIS — M54.41 CHRONIC BILATERAL LOW BACK PAIN WITH BILATERAL SCIATICA: ICD-10-CM

## 2019-10-21 DIAGNOSIS — G89.29 CHRONIC BILATERAL LOW BACK PAIN WITH BILATERAL SCIATICA: ICD-10-CM

## 2019-10-21 DIAGNOSIS — G89.4 CHRONIC PAIN SYNDROME: ICD-10-CM

## 2019-10-21 DIAGNOSIS — M54.42 CHRONIC BILATERAL LOW BACK PAIN WITH BILATERAL SCIATICA: ICD-10-CM

## 2019-10-21 DIAGNOSIS — R10.84 ABDOMINAL PAIN, GENERALIZED: ICD-10-CM

## 2019-11-01 ENCOUNTER — COMMUNICATION - HEALTHEAST (OUTPATIENT)
Dept: PALLIATIVE MEDICINE | Facility: OTHER | Age: 79
End: 2019-11-01

## 2019-11-12 ENCOUNTER — COMMUNICATION - HEALTHEAST (OUTPATIENT)
Dept: PALLIATIVE MEDICINE | Facility: OTHER | Age: 79
End: 2019-11-12

## 2019-11-20 ENCOUNTER — HOSPITAL ENCOUNTER (OUTPATIENT)
Dept: PALLIATIVE MEDICINE | Facility: OTHER | Age: 79
Discharge: HOME | End: 2019-11-20
Attending: NURSE PRACTITIONER

## 2019-11-20 DIAGNOSIS — G89.29 CHRONIC BILATERAL LOW BACK PAIN WITH BILATERAL SCIATICA: ICD-10-CM

## 2019-11-20 DIAGNOSIS — G89.4 CHRONIC PAIN SYNDROME: ICD-10-CM

## 2019-11-20 DIAGNOSIS — R10.84 ABDOMINAL PAIN, GENERALIZED: ICD-10-CM

## 2019-11-20 DIAGNOSIS — M54.41 CHRONIC BILATERAL LOW BACK PAIN WITH BILATERAL SCIATICA: ICD-10-CM

## 2019-11-20 DIAGNOSIS — M54.42 CHRONIC BILATERAL LOW BACK PAIN WITH BILATERAL SCIATICA: ICD-10-CM

## 2019-11-20 ASSESSMENT — MIFFLIN-ST. JEOR: SCORE: 1779.93

## 2019-11-26 ENCOUNTER — TRANSFERRED RECORDS (OUTPATIENT)
Dept: HEALTH INFORMATION MANAGEMENT | Facility: CLINIC | Age: 79
End: 2019-11-26
Payer: COMMERCIAL

## 2019-12-09 ENCOUNTER — COMMUNICATION - HEALTHEAST (OUTPATIENT)
Dept: PALLIATIVE MEDICINE | Facility: OTHER | Age: 79
End: 2019-12-09

## 2019-12-09 DIAGNOSIS — G89.4 CHRONIC PAIN SYNDROME: ICD-10-CM

## 2019-12-09 DIAGNOSIS — M54.42 CHRONIC BILATERAL LOW BACK PAIN WITH BILATERAL SCIATICA: ICD-10-CM

## 2019-12-09 DIAGNOSIS — M54.41 CHRONIC BILATERAL LOW BACK PAIN WITH BILATERAL SCIATICA: ICD-10-CM

## 2019-12-09 DIAGNOSIS — G89.29 CHRONIC BILATERAL LOW BACK PAIN WITH BILATERAL SCIATICA: ICD-10-CM

## 2019-12-09 DIAGNOSIS — R10.84 ABDOMINAL PAIN, GENERALIZED: ICD-10-CM

## 2019-12-16 ENCOUNTER — RECORDS - HEALTHEAST (OUTPATIENT)
Dept: LAB | Facility: CLINIC | Age: 79
End: 2019-12-16

## 2019-12-16 LAB
ALBUMIN SERPL-MCNC: 3.9 G/DL (ref 3.5–5)
ALP SERPL-CCNC: 71 U/L (ref 45–120)
ALT SERPL W P-5'-P-CCNC: 18 U/L (ref 0–45)
ANION GAP SERPL CALCULATED.3IONS-SCNC: 9 MMOL/L (ref 5–18)
AST SERPL W P-5'-P-CCNC: 17 U/L (ref 0–40)
BILIRUB SERPL-MCNC: 0.5 MG/DL (ref 0–1)
BUN SERPL-MCNC: 28 MG/DL (ref 8–28)
CALCIUM SERPL-MCNC: 9.7 MG/DL (ref 8.5–10.5)
CHLORIDE BLD-SCNC: 101 MMOL/L (ref 98–107)
CHOLEST SERPL-MCNC: 178 MG/DL
CO2 SERPL-SCNC: 27 MMOL/L (ref 22–31)
CREAT SERPL-MCNC: 1.32 MG/DL (ref 0.7–1.3)
FASTING STATUS PATIENT QL REPORTED: ABNORMAL
GFR SERPL CREATININE-BSD FRML MDRD: 52 ML/MIN/1.73M2
GLUCOSE BLD-MCNC: 103 MG/DL (ref 70–125)
HDLC SERPL-MCNC: 39 MG/DL
LDLC SERPL CALC-MCNC: 93 MG/DL
POTASSIUM BLD-SCNC: 4.8 MMOL/L (ref 3.5–5)
PROT SERPL-MCNC: 7 G/DL (ref 6–8)
SODIUM SERPL-SCNC: 137 MMOL/L (ref 136–145)
TRIGL SERPL-MCNC: 231 MG/DL

## 2019-12-31 ENCOUNTER — COMMUNICATION - HEALTHEAST (OUTPATIENT)
Dept: PALLIATIVE MEDICINE | Facility: OTHER | Age: 79
End: 2019-12-31

## 2019-12-31 DIAGNOSIS — M54.41 CHRONIC BILATERAL LOW BACK PAIN WITH BILATERAL SCIATICA: ICD-10-CM

## 2019-12-31 DIAGNOSIS — R10.84 ABDOMINAL PAIN, GENERALIZED: ICD-10-CM

## 2019-12-31 DIAGNOSIS — M54.42 CHRONIC BILATERAL LOW BACK PAIN WITH BILATERAL SCIATICA: ICD-10-CM

## 2019-12-31 DIAGNOSIS — G89.4 CHRONIC PAIN SYNDROME: ICD-10-CM

## 2019-12-31 DIAGNOSIS — G89.29 CHRONIC BILATERAL LOW BACK PAIN WITH BILATERAL SCIATICA: ICD-10-CM

## 2020-01-13 ENCOUNTER — TRANSFERRED RECORDS (OUTPATIENT)
Dept: HEALTH INFORMATION MANAGEMENT | Facility: CLINIC | Age: 80
End: 2020-01-13
Payer: COMMERCIAL

## 2020-01-14 ENCOUNTER — TRANSFERRED RECORDS (OUTPATIENT)
Dept: HEALTH INFORMATION MANAGEMENT | Facility: CLINIC | Age: 80
End: 2020-01-14
Payer: COMMERCIAL

## 2020-01-15 ENCOUNTER — HOSPITAL ENCOUNTER (OUTPATIENT)
Dept: PALLIATIVE MEDICINE | Facility: OTHER | Age: 80
Discharge: HOME | End: 2020-01-15
Attending: NURSE PRACTITIONER

## 2020-01-15 DIAGNOSIS — G89.29 CHRONIC BILATERAL LOW BACK PAIN WITH BILATERAL SCIATICA: ICD-10-CM

## 2020-01-15 DIAGNOSIS — M54.41 CHRONIC BILATERAL LOW BACK PAIN WITH BILATERAL SCIATICA: ICD-10-CM

## 2020-01-15 DIAGNOSIS — M54.42 CHRONIC BILATERAL LOW BACK PAIN WITH BILATERAL SCIATICA: ICD-10-CM

## 2020-01-15 DIAGNOSIS — G89.4 CHRONIC PAIN SYNDROME: ICD-10-CM

## 2020-01-15 DIAGNOSIS — R10.84 ABDOMINAL PAIN, GENERALIZED: ICD-10-CM

## 2020-01-15 ASSESSMENT — MIFFLIN-ST. JEOR: SCORE: 1764.96

## 2020-01-24 ENCOUNTER — RECORDS - HEALTHEAST (OUTPATIENT)
Dept: LAB | Facility: CLINIC | Age: 80
End: 2020-01-24

## 2020-01-24 LAB
ANION GAP SERPL CALCULATED.3IONS-SCNC: 9 MMOL/L (ref 5–18)
BUN SERPL-MCNC: 22 MG/DL (ref 8–28)
CALCIUM SERPL-MCNC: 9.8 MG/DL (ref 8.5–10.5)
CHLORIDE BLD-SCNC: 101 MMOL/L (ref 98–107)
CO2 SERPL-SCNC: 26 MMOL/L (ref 22–31)
CREAT SERPL-MCNC: 1.1 MG/DL (ref 0.7–1.3)
GFR SERPL CREATININE-BSD FRML MDRD: >60 ML/MIN/1.73M2
GLUCOSE BLD-MCNC: 95 MG/DL (ref 70–125)
POTASSIUM BLD-SCNC: 4.7 MMOL/L (ref 3.5–5)
SODIUM SERPL-SCNC: 136 MMOL/L (ref 136–145)

## 2020-01-27 ENCOUNTER — COMMUNICATION - HEALTHEAST (OUTPATIENT)
Dept: PALLIATIVE MEDICINE | Facility: OTHER | Age: 80
End: 2020-01-27

## 2020-01-29 ENCOUNTER — COMMUNICATION - HEALTHEAST (OUTPATIENT)
Dept: PALLIATIVE MEDICINE | Facility: OTHER | Age: 80
End: 2020-01-29

## 2020-01-29 DIAGNOSIS — M54.41 CHRONIC BILATERAL LOW BACK PAIN WITH BILATERAL SCIATICA: ICD-10-CM

## 2020-01-29 DIAGNOSIS — G89.29 CHRONIC BILATERAL LOW BACK PAIN WITH BILATERAL SCIATICA: ICD-10-CM

## 2020-01-29 DIAGNOSIS — M54.42 CHRONIC BILATERAL LOW BACK PAIN WITH BILATERAL SCIATICA: ICD-10-CM

## 2020-01-29 DIAGNOSIS — G89.4 CHRONIC PAIN SYNDROME: ICD-10-CM

## 2020-01-29 DIAGNOSIS — R10.84 ABDOMINAL PAIN, GENERALIZED: ICD-10-CM

## 2020-02-24 ENCOUNTER — COMMUNICATION - HEALTHEAST (OUTPATIENT)
Dept: PALLIATIVE MEDICINE | Facility: OTHER | Age: 80
End: 2020-02-24

## 2020-02-25 ENCOUNTER — COMMUNICATION - HEALTHEAST (OUTPATIENT)
Dept: PALLIATIVE MEDICINE | Facility: OTHER | Age: 80
End: 2020-02-25

## 2020-02-25 DIAGNOSIS — M54.41 CHRONIC BILATERAL LOW BACK PAIN WITH BILATERAL SCIATICA: ICD-10-CM

## 2020-02-25 DIAGNOSIS — G89.29 CHRONIC BILATERAL LOW BACK PAIN WITH BILATERAL SCIATICA: ICD-10-CM

## 2020-02-25 DIAGNOSIS — G89.4 CHRONIC PAIN SYNDROME: ICD-10-CM

## 2020-02-25 DIAGNOSIS — M54.42 CHRONIC BILATERAL LOW BACK PAIN WITH BILATERAL SCIATICA: ICD-10-CM

## 2020-02-25 DIAGNOSIS — R10.84 ABDOMINAL PAIN, GENERALIZED: ICD-10-CM

## 2020-03-06 ENCOUNTER — COMMUNICATION - HEALTHEAST (OUTPATIENT)
Dept: PALLIATIVE MEDICINE | Facility: OTHER | Age: 80
End: 2020-03-06

## 2020-03-11 ENCOUNTER — HOSPITAL ENCOUNTER (OUTPATIENT)
Dept: PALLIATIVE MEDICINE | Facility: OTHER | Age: 80
Discharge: HOME | End: 2020-03-11
Attending: NURSE PRACTITIONER

## 2020-03-11 DIAGNOSIS — M54.42 CHRONIC BILATERAL LOW BACK PAIN WITH BILATERAL SCIATICA: ICD-10-CM

## 2020-03-11 DIAGNOSIS — G89.29 CHRONIC BILATERAL LOW BACK PAIN WITH BILATERAL SCIATICA: ICD-10-CM

## 2020-03-11 DIAGNOSIS — G89.4 CHRONIC PAIN SYNDROME: ICD-10-CM

## 2020-03-11 DIAGNOSIS — R10.84 ABDOMINAL PAIN, GENERALIZED: ICD-10-CM

## 2020-03-11 DIAGNOSIS — M54.41 CHRONIC BILATERAL LOW BACK PAIN WITH BILATERAL SCIATICA: ICD-10-CM

## 2020-03-11 ASSESSMENT — MIFFLIN-ST. JEOR: SCORE: 1777.67

## 2020-04-06 ENCOUNTER — COMMUNICATION - HEALTHEAST (OUTPATIENT)
Dept: PALLIATIVE MEDICINE | Facility: OTHER | Age: 80
End: 2020-04-06

## 2020-04-06 DIAGNOSIS — G89.4 CHRONIC PAIN SYNDROME: ICD-10-CM

## 2020-04-06 DIAGNOSIS — M54.41 CHRONIC BILATERAL LOW BACK PAIN WITH BILATERAL SCIATICA: ICD-10-CM

## 2020-04-06 DIAGNOSIS — G89.29 CHRONIC BILATERAL LOW BACK PAIN WITH BILATERAL SCIATICA: ICD-10-CM

## 2020-04-06 DIAGNOSIS — M54.42 CHRONIC BILATERAL LOW BACK PAIN WITH BILATERAL SCIATICA: ICD-10-CM

## 2020-04-06 DIAGNOSIS — R10.84 ABDOMINAL PAIN, GENERALIZED: ICD-10-CM

## 2020-04-20 ENCOUNTER — COMMUNICATION - HEALTHEAST (OUTPATIENT)
Dept: PALLIATIVE MEDICINE | Facility: OTHER | Age: 80
End: 2020-04-20

## 2020-04-20 DIAGNOSIS — M54.42 CHRONIC BILATERAL LOW BACK PAIN WITH BILATERAL SCIATICA: ICD-10-CM

## 2020-04-20 DIAGNOSIS — R10.84 ABDOMINAL PAIN, GENERALIZED: ICD-10-CM

## 2020-04-20 DIAGNOSIS — M54.41 CHRONIC BILATERAL LOW BACK PAIN WITH BILATERAL SCIATICA: ICD-10-CM

## 2020-04-20 DIAGNOSIS — G89.4 CHRONIC PAIN SYNDROME: ICD-10-CM

## 2020-04-20 DIAGNOSIS — G89.29 CHRONIC BILATERAL LOW BACK PAIN WITH BILATERAL SCIATICA: ICD-10-CM

## 2020-04-21 ENCOUNTER — COMMUNICATION - HEALTHEAST (OUTPATIENT)
Dept: PALLIATIVE MEDICINE | Facility: OTHER | Age: 80
End: 2020-04-21

## 2020-04-21 DIAGNOSIS — M54.42 CHRONIC BILATERAL LOW BACK PAIN WITH BILATERAL SCIATICA: ICD-10-CM

## 2020-04-21 DIAGNOSIS — G89.4 CHRONIC PAIN SYNDROME: ICD-10-CM

## 2020-04-21 DIAGNOSIS — M54.41 CHRONIC BILATERAL LOW BACK PAIN WITH BILATERAL SCIATICA: ICD-10-CM

## 2020-04-21 DIAGNOSIS — R10.84 ABDOMINAL PAIN, GENERALIZED: ICD-10-CM

## 2020-04-21 DIAGNOSIS — G89.29 CHRONIC BILATERAL LOW BACK PAIN WITH BILATERAL SCIATICA: ICD-10-CM

## 2020-04-22 ENCOUNTER — TRANSFERRED RECORDS (OUTPATIENT)
Dept: HEALTH INFORMATION MANAGEMENT | Facility: CLINIC | Age: 80
End: 2020-04-22
Payer: COMMERCIAL

## 2020-04-23 ENCOUNTER — TRANSFERRED RECORDS (OUTPATIENT)
Dept: HEALTH INFORMATION MANAGEMENT | Facility: CLINIC | Age: 80
End: 2020-04-23
Payer: COMMERCIAL

## 2020-04-28 ENCOUNTER — COMMUNICATION - HEALTHEAST (OUTPATIENT)
Dept: PALLIATIVE MEDICINE | Facility: OTHER | Age: 80
End: 2020-04-28

## 2020-04-29 ENCOUNTER — TRANSFERRED RECORDS (OUTPATIENT)
Dept: HEALTH INFORMATION MANAGEMENT | Facility: CLINIC | Age: 80
End: 2020-04-29
Payer: COMMERCIAL

## 2020-04-29 ENCOUNTER — AMBULATORY - HEALTHEAST (OUTPATIENT)
Dept: PALLIATIVE MEDICINE | Facility: OTHER | Age: 80
End: 2020-04-29

## 2020-04-29 DIAGNOSIS — R10.84 ABDOMINAL PAIN, GENERALIZED: ICD-10-CM

## 2020-04-29 DIAGNOSIS — M54.42 CHRONIC BILATERAL LOW BACK PAIN WITH BILATERAL SCIATICA: ICD-10-CM

## 2020-04-29 DIAGNOSIS — M54.41 CHRONIC BILATERAL LOW BACK PAIN WITH BILATERAL SCIATICA: ICD-10-CM

## 2020-04-29 DIAGNOSIS — G89.29 CHRONIC BILATERAL LOW BACK PAIN WITH BILATERAL SCIATICA: ICD-10-CM

## 2020-04-29 DIAGNOSIS — G89.4 CHRONIC PAIN SYNDROME: ICD-10-CM

## 2020-04-30 ENCOUNTER — HOSPITAL ENCOUNTER (OUTPATIENT)
Dept: PALLIATIVE MEDICINE | Facility: OTHER | Age: 80
Discharge: HOME | End: 2020-04-30
Attending: NURSE PRACTITIONER

## 2020-04-30 ENCOUNTER — TRANSFERRED RECORDS (OUTPATIENT)
Dept: HEALTH INFORMATION MANAGEMENT | Facility: CLINIC | Age: 80
End: 2020-04-30
Payer: COMMERCIAL

## 2020-04-30 DIAGNOSIS — M54.41 CHRONIC BILATERAL LOW BACK PAIN WITH BILATERAL SCIATICA: ICD-10-CM

## 2020-04-30 DIAGNOSIS — R10.84 ABDOMINAL PAIN, GENERALIZED: ICD-10-CM

## 2020-04-30 DIAGNOSIS — G89.4 CHRONIC PAIN SYNDROME: ICD-10-CM

## 2020-04-30 DIAGNOSIS — G89.29 CHRONIC BILATERAL LOW BACK PAIN WITH BILATERAL SCIATICA: ICD-10-CM

## 2020-04-30 DIAGNOSIS — M54.42 CHRONIC BILATERAL LOW BACK PAIN WITH BILATERAL SCIATICA: ICD-10-CM

## 2020-05-29 ENCOUNTER — RECORDS - HEALTHEAST (OUTPATIENT)
Dept: RADIOLOGY | Facility: CLINIC | Age: 80
End: 2020-05-29

## 2020-05-29 ENCOUNTER — COMMUNICATION - HEALTHEAST (OUTPATIENT)
Dept: PALLIATIVE MEDICINE | Facility: OTHER | Age: 80
End: 2020-05-29

## 2020-05-29 ENCOUNTER — RECORDS - HEALTHEAST (OUTPATIENT)
Dept: ADMINISTRATIVE | Facility: OTHER | Age: 80
End: 2020-05-29

## 2020-05-29 ENCOUNTER — AMBULATORY - HEALTHEAST (OUTPATIENT)
Dept: NEUROSURGERY | Facility: CLINIC | Age: 80
End: 2020-05-29

## 2020-05-29 DIAGNOSIS — R10.84 ABDOMINAL PAIN, GENERALIZED: ICD-10-CM

## 2020-05-29 DIAGNOSIS — M54.41 CHRONIC BILATERAL LOW BACK PAIN WITH BILATERAL SCIATICA: ICD-10-CM

## 2020-05-29 DIAGNOSIS — M54.42 CHRONIC BILATERAL LOW BACK PAIN WITH BILATERAL SCIATICA: ICD-10-CM

## 2020-05-29 DIAGNOSIS — G89.29 CHRONIC BILATERAL LOW BACK PAIN WITH BILATERAL SCIATICA: ICD-10-CM

## 2020-05-29 DIAGNOSIS — G89.4 CHRONIC PAIN SYNDROME: ICD-10-CM

## 2020-06-01 ENCOUNTER — OFFICE VISIT - HEALTHEAST (OUTPATIENT)
Dept: NEUROSURGERY | Facility: CLINIC | Age: 80
End: 2020-06-01

## 2020-06-01 DIAGNOSIS — G89.29 CHRONIC BILATERAL LOW BACK PAIN WITHOUT SCIATICA: ICD-10-CM

## 2020-06-01 DIAGNOSIS — M47.24 OSTEOARTHRITIS OF SPINE WITH RADICULOPATHY, THORACIC REGION: ICD-10-CM

## 2020-06-01 DIAGNOSIS — M54.50 CHRONIC BILATERAL LOW BACK PAIN WITHOUT SCIATICA: ICD-10-CM

## 2020-06-01 ASSESSMENT — MIFFLIN-ST. JEOR: SCORE: 1745.91

## 2020-06-05 ENCOUNTER — OFFICE VISIT - HEALTHEAST (OUTPATIENT)
Dept: PHYSICAL THERAPY | Facility: REHABILITATION | Age: 80
End: 2020-06-05

## 2020-06-05 DIAGNOSIS — M54.42 CHRONIC MIDLINE LOW BACK PAIN WITH BILATERAL SCIATICA: ICD-10-CM

## 2020-06-05 DIAGNOSIS — M53.86 DECREASED ROM OF LUMBAR SPINE: ICD-10-CM

## 2020-06-05 DIAGNOSIS — G89.29 CHRONIC MIDLINE LOW BACK PAIN WITH BILATERAL SCIATICA: ICD-10-CM

## 2020-06-05 DIAGNOSIS — M54.41 CHRONIC MIDLINE LOW BACK PAIN WITH BILATERAL SCIATICA: ICD-10-CM

## 2020-06-10 ENCOUNTER — HOSPITAL ENCOUNTER (OUTPATIENT)
Dept: RADIOLOGY | Facility: CLINIC | Age: 80
Discharge: HOME | End: 2020-06-10
Attending: SURGERY

## 2020-06-10 ENCOUNTER — HOSPITAL ENCOUNTER (OUTPATIENT)
Dept: CT IMAGING | Facility: CLINIC | Age: 80
Discharge: HOME | End: 2020-06-10
Attending: SURGERY

## 2020-06-10 DIAGNOSIS — M54.50 CHRONIC BILATERAL LOW BACK PAIN WITHOUT SCIATICA: ICD-10-CM

## 2020-06-10 DIAGNOSIS — G89.29 CHRONIC BILATERAL LOW BACK PAIN WITHOUT SCIATICA: ICD-10-CM

## 2020-06-10 DIAGNOSIS — M47.24 OSTEOARTHRITIS OF SPINE WITH RADICULOPATHY, THORACIC REGION: ICD-10-CM

## 2020-06-11 ENCOUNTER — OFFICE VISIT - HEALTHEAST (OUTPATIENT)
Dept: PHYSICAL THERAPY | Facility: REHABILITATION | Age: 80
End: 2020-06-11

## 2020-06-11 DIAGNOSIS — M53.86 DECREASED ROM OF LUMBAR SPINE: ICD-10-CM

## 2020-06-11 DIAGNOSIS — M54.41 CHRONIC MIDLINE LOW BACK PAIN WITH BILATERAL SCIATICA: ICD-10-CM

## 2020-06-11 DIAGNOSIS — G89.29 CHRONIC MIDLINE LOW BACK PAIN WITH BILATERAL SCIATICA: ICD-10-CM

## 2020-06-11 DIAGNOSIS — M54.42 CHRONIC MIDLINE LOW BACK PAIN WITH BILATERAL SCIATICA: ICD-10-CM

## 2020-06-15 ENCOUNTER — OFFICE VISIT - HEALTHEAST (OUTPATIENT)
Dept: NEUROSURGERY | Facility: CLINIC | Age: 80
End: 2020-06-15

## 2020-06-15 ENCOUNTER — COMMUNICATION - HEALTHEAST (OUTPATIENT)
Dept: PALLIATIVE MEDICINE | Facility: OTHER | Age: 80
End: 2020-06-15

## 2020-06-15 DIAGNOSIS — M54.14 THORACIC RADICULOPATHY: ICD-10-CM

## 2020-06-15 DIAGNOSIS — R25.2 LEG CRAMPING: ICD-10-CM

## 2020-06-15 ASSESSMENT — MIFFLIN-ST. JEOR: SCORE: 1754.99

## 2020-06-22 ENCOUNTER — RECORDS - HEALTHEAST (OUTPATIENT)
Dept: ADMINISTRATIVE | Facility: OTHER | Age: 80
End: 2020-06-22

## 2020-06-25 ENCOUNTER — OFFICE VISIT - HEALTHEAST (OUTPATIENT)
Dept: PHYSICAL THERAPY | Facility: REHABILITATION | Age: 80
End: 2020-06-25

## 2020-06-25 DIAGNOSIS — M54.41 CHRONIC MIDLINE LOW BACK PAIN WITH BILATERAL SCIATICA: ICD-10-CM

## 2020-06-25 DIAGNOSIS — M54.42 CHRONIC MIDLINE LOW BACK PAIN WITH BILATERAL SCIATICA: ICD-10-CM

## 2020-06-25 DIAGNOSIS — M53.86 DECREASED ROM OF LUMBAR SPINE: ICD-10-CM

## 2020-06-25 DIAGNOSIS — G89.29 CHRONIC MIDLINE LOW BACK PAIN WITH BILATERAL SCIATICA: ICD-10-CM

## 2020-06-29 ENCOUNTER — RECORDS - HEALTHEAST (OUTPATIENT)
Dept: VASCULAR ULTRASOUND | Facility: CLINIC | Age: 80
End: 2020-06-29

## 2020-06-29 DIAGNOSIS — M41.9 SCOLIOSIS, UNSPECIFIED: ICD-10-CM

## 2020-06-29 DIAGNOSIS — I73.9 PERIPHERAL VASCULAR DISEASE, UNSPECIFIED (H): ICD-10-CM

## 2020-06-30 ENCOUNTER — HOSPITAL ENCOUNTER (OUTPATIENT)
Dept: PALLIATIVE MEDICINE | Facility: OTHER | Age: 80
Discharge: HOME | End: 2020-06-30
Attending: NURSE PRACTITIONER

## 2020-06-30 DIAGNOSIS — G89.29 CHRONIC BILATERAL LOW BACK PAIN WITH BILATERAL SCIATICA: ICD-10-CM

## 2020-06-30 DIAGNOSIS — G89.4 CHRONIC PAIN SYNDROME: ICD-10-CM

## 2020-06-30 DIAGNOSIS — M54.42 CHRONIC BILATERAL LOW BACK PAIN WITH BILATERAL SCIATICA: ICD-10-CM

## 2020-06-30 DIAGNOSIS — R10.84 ABDOMINAL PAIN, GENERALIZED: ICD-10-CM

## 2020-06-30 DIAGNOSIS — M54.41 CHRONIC BILATERAL LOW BACK PAIN WITH BILATERAL SCIATICA: ICD-10-CM

## 2020-07-02 ENCOUNTER — OFFICE VISIT - HEALTHEAST (OUTPATIENT)
Dept: VASCULAR SURGERY | Facility: CLINIC | Age: 80
End: 2020-07-02

## 2020-07-02 DIAGNOSIS — I73.9 PAD (PERIPHERAL ARTERY DISEASE) (H): ICD-10-CM

## 2020-07-02 DIAGNOSIS — I70.213 ATHEROSCLEROSIS OF NATIVE ARTERIES OF EXTREMITIES WITH INTERMITTENT CLAUDICATION, BILATERAL LEGS (H): ICD-10-CM

## 2020-07-02 DIAGNOSIS — I73.9 CLAUDICATION (H): ICD-10-CM

## 2020-07-09 ENCOUNTER — OFFICE VISIT - HEALTHEAST (OUTPATIENT)
Dept: NEUROSURGERY | Facility: CLINIC | Age: 80
End: 2020-07-09

## 2020-07-09 DIAGNOSIS — R52 PAIN: ICD-10-CM

## 2020-07-09 ASSESSMENT — MIFFLIN-ST. JEOR: SCORE: 1754.99

## 2020-07-13 ENCOUNTER — AMBULATORY - HEALTHEAST (OUTPATIENT)
Dept: NEUROSURGERY | Facility: CLINIC | Age: 80
End: 2020-07-13

## 2020-07-13 DIAGNOSIS — M79.661 PAIN OF RIGHT LOWER LEG: ICD-10-CM

## 2020-07-15 ENCOUNTER — COMMUNICATION - HEALTHEAST (OUTPATIENT)
Dept: VASCULAR SURGERY | Facility: CLINIC | Age: 80
End: 2020-07-15

## 2020-07-15 ENCOUNTER — HOSPITAL ENCOUNTER (OUTPATIENT)
Dept: CT IMAGING | Facility: CLINIC | Age: 80
Discharge: HOME | End: 2020-07-15
Attending: INTERNAL MEDICINE

## 2020-07-15 DIAGNOSIS — I73.9 CLAUDICATION (H): ICD-10-CM

## 2020-07-15 DIAGNOSIS — I73.9 PAD (PERIPHERAL ARTERY DISEASE) (H): ICD-10-CM

## 2020-07-15 LAB
CREAT BLD-MCNC: 1.3 MG/DL (ref 0.7–1.3)
GFR SERPL CREATININE-BSD FRML MDRD: 53 ML/MIN/1.73M2

## 2020-07-17 ENCOUNTER — OFFICE VISIT - HEALTHEAST (OUTPATIENT)
Dept: VASCULAR SURGERY | Facility: CLINIC | Age: 80
End: 2020-07-17

## 2020-07-17 ENCOUNTER — AMBULATORY - HEALTHEAST (OUTPATIENT)
Dept: LAB | Facility: CLINIC | Age: 80
End: 2020-07-17

## 2020-07-17 DIAGNOSIS — I73.9 PAD (PERIPHERAL ARTERY DISEASE) (H): ICD-10-CM

## 2020-07-17 DIAGNOSIS — I70.213 ATHEROSCLEROSIS OF NATIVE ARTERIES OF EXTREMITIES WITH INTERMITTENT CLAUDICATION, BILATERAL LEGS (H): ICD-10-CM

## 2020-07-17 DIAGNOSIS — I73.9 CLAUDICATION (H): ICD-10-CM

## 2020-07-17 LAB
CHOLEST SERPL-MCNC: 192 MG/DL
FASTING STATUS PATIENT QL REPORTED: YES
HDLC SERPL-MCNC: 38 MG/DL
LDLC SERPL CALC-MCNC: 89 MG/DL
TRIGL SERPL-MCNC: 326 MG/DL

## 2020-07-17 ASSESSMENT — MIFFLIN-ST. JEOR: SCORE: 1750.9

## 2020-07-20 ENCOUNTER — HOSPITAL ENCOUNTER (OUTPATIENT)
Dept: PHYSICAL MEDICINE AND REHAB | Facility: CLINIC | Age: 80
Discharge: HOME | End: 2020-07-20
Attending: SURGERY

## 2020-07-20 DIAGNOSIS — M47.817 LUMBOSACRAL SPONDYLOSIS WITHOUT MYELOPATHY: ICD-10-CM

## 2020-07-20 DIAGNOSIS — I73.9 PERIPHERAL VASCULAR DISEASE (H): ICD-10-CM

## 2020-07-20 DIAGNOSIS — G89.29 ABDOMINAL PAIN, CHRONIC, RIGHT UPPER QUADRANT: ICD-10-CM

## 2020-07-20 DIAGNOSIS — M79.18 MYOFASCIAL PAIN: ICD-10-CM

## 2020-07-20 DIAGNOSIS — M99.77: ICD-10-CM

## 2020-07-20 DIAGNOSIS — R10.11 ABDOMINAL PAIN, CHRONIC, RIGHT UPPER QUADRANT: ICD-10-CM

## 2020-07-20 ASSESSMENT — MIFFLIN-ST. JEOR: SCORE: 1773.13

## 2020-07-21 ENCOUNTER — AMBULATORY - HEALTHEAST (OUTPATIENT)
Dept: PALLIATIVE MEDICINE | Facility: OTHER | Age: 80
End: 2020-07-21

## 2020-07-21 DIAGNOSIS — R10.84 ABDOMINAL PAIN, GENERALIZED: ICD-10-CM

## 2020-07-21 DIAGNOSIS — R10.11 RUQ ABDOMINAL PAIN: ICD-10-CM

## 2020-07-28 ENCOUNTER — HOSPITAL ENCOUNTER (OUTPATIENT)
Dept: PALLIATIVE MEDICINE | Facility: OTHER | Age: 80
Discharge: HOME | End: 2020-07-28
Attending: NURSE PRACTITIONER

## 2020-07-28 ENCOUNTER — COMMUNICATION - HEALTHEAST (OUTPATIENT)
Dept: PALLIATIVE MEDICINE | Facility: OTHER | Age: 80
End: 2020-07-28

## 2020-07-28 DIAGNOSIS — G89.29 CHRONIC BILATERAL LOW BACK PAIN WITH BILATERAL SCIATICA: ICD-10-CM

## 2020-07-28 DIAGNOSIS — G89.4 CHRONIC PAIN SYNDROME: ICD-10-CM

## 2020-07-28 DIAGNOSIS — M54.41 CHRONIC BILATERAL LOW BACK PAIN WITH BILATERAL SCIATICA: ICD-10-CM

## 2020-07-28 DIAGNOSIS — M54.42 CHRONIC BILATERAL LOW BACK PAIN WITH BILATERAL SCIATICA: ICD-10-CM

## 2020-07-28 DIAGNOSIS — R10.84 ABDOMINAL PAIN, GENERALIZED: ICD-10-CM

## 2020-07-29 ENCOUNTER — AMBULATORY - HEALTHEAST (OUTPATIENT)
Dept: CARDIAC REHAB | Facility: CLINIC | Age: 80
End: 2020-07-29

## 2020-07-29 DIAGNOSIS — I70.213 ATHEROSCLEROSIS OF NATIVE ARTERY OF BOTH LOWER EXTREMITIES WITH INTERMITTENT CLAUDICATION (H): ICD-10-CM

## 2020-08-03 ENCOUNTER — COMMUNICATION - HEALTHEAST (OUTPATIENT)
Dept: VASCULAR SURGERY | Facility: CLINIC | Age: 80
End: 2020-08-03

## 2020-08-04 ENCOUNTER — AMBULATORY - HEALTHEAST (OUTPATIENT)
Dept: CARDIAC REHAB | Facility: CLINIC | Age: 80
End: 2020-08-04

## 2020-08-04 ENCOUNTER — AMBULATORY - HEALTHEAST (OUTPATIENT)
Dept: PALLIATIVE MEDICINE | Facility: OTHER | Age: 80
End: 2020-08-04

## 2020-08-04 DIAGNOSIS — I70.213 ATHEROSCLEROSIS OF NATIVE ARTERY OF BOTH LOWER EXTREMITIES WITH INTERMITTENT CLAUDICATION (H): ICD-10-CM

## 2020-08-04 DIAGNOSIS — M47.24 OSTEOARTHRITIS OF SPINE WITH RADICULOPATHY, THORACIC REGION: ICD-10-CM

## 2020-08-06 ENCOUNTER — RECORDS - HEALTHEAST (OUTPATIENT)
Dept: LAB | Facility: CLINIC | Age: 80
End: 2020-08-06

## 2020-08-06 ENCOUNTER — COMMUNICATION - HEALTHEAST (OUTPATIENT)
Dept: PALLIATIVE MEDICINE | Facility: OTHER | Age: 80
End: 2020-08-06

## 2020-08-07 LAB
ALBUMIN SERPL-MCNC: 3.8 G/DL (ref 3.5–5)
ALP SERPL-CCNC: 71 U/L (ref 45–120)
ALT SERPL W P-5'-P-CCNC: 20 U/L (ref 0–45)
ANION GAP SERPL CALCULATED.3IONS-SCNC: 9 MMOL/L (ref 5–18)
AST SERPL W P-5'-P-CCNC: 28 U/L (ref 0–40)
BILIRUB SERPL-MCNC: 0.3 MG/DL (ref 0–1)
BUN SERPL-MCNC: 25 MG/DL (ref 8–28)
CALCIUM SERPL-MCNC: 9.4 MG/DL (ref 8.5–10.5)
CHLORIDE BLD-SCNC: 100 MMOL/L (ref 98–107)
CO2 SERPL-SCNC: 27 MMOL/L (ref 22–31)
CREAT SERPL-MCNC: 1.2 MG/DL (ref 0.7–1.3)
GFR SERPL CREATININE-BSD FRML MDRD: 58 ML/MIN/1.73M2
GLUCOSE BLD-MCNC: 95 MG/DL (ref 70–125)
LIPASE SERPL-CCNC: 50 U/L (ref 0–52)
POTASSIUM BLD-SCNC: 4.3 MMOL/L (ref 3.5–5)
PROT SERPL-MCNC: 7.1 G/DL (ref 6–8)
SODIUM SERPL-SCNC: 136 MMOL/L (ref 136–145)

## 2020-08-11 ENCOUNTER — AMBULATORY - HEALTHEAST (OUTPATIENT)
Dept: CARDIAC REHAB | Facility: CLINIC | Age: 80
End: 2020-08-11

## 2020-08-11 DIAGNOSIS — I70.213 ATHEROSCLEROSIS OF NATIVE ARTERY OF BOTH LOWER EXTREMITIES WITH INTERMITTENT CLAUDICATION (H): ICD-10-CM

## 2020-08-18 ENCOUNTER — COMMUNICATION - HEALTHEAST (OUTPATIENT)
Dept: VASCULAR SURGERY | Facility: CLINIC | Age: 80
End: 2020-08-18

## 2020-08-24 ENCOUNTER — COMMUNICATION - HEALTHEAST (OUTPATIENT)
Dept: PALLIATIVE MEDICINE | Facility: OTHER | Age: 80
End: 2020-08-24

## 2020-08-25 ENCOUNTER — HOSPITAL ENCOUNTER (OUTPATIENT)
Dept: PALLIATIVE MEDICINE | Facility: OTHER | Age: 80
Discharge: HOME | End: 2020-08-25
Attending: PAIN MEDICINE | Admitting: PAIN MEDICINE

## 2020-08-25 DIAGNOSIS — M47.24 OSTEOARTHRITIS OF SPINE WITH RADICULOPATHY, THORACIC REGION: ICD-10-CM

## 2020-08-25 ASSESSMENT — MIFFLIN-ST. JEOR: SCORE: 1773.13

## 2020-08-26 ENCOUNTER — COMMUNICATION - HEALTHEAST (OUTPATIENT)
Dept: PALLIATIVE MEDICINE | Facility: OTHER | Age: 80
End: 2020-08-26

## 2020-08-31 ENCOUNTER — COMMUNICATION - HEALTHEAST (OUTPATIENT)
Dept: PALLIATIVE MEDICINE | Facility: OTHER | Age: 80
End: 2020-08-31

## 2020-09-01 ENCOUNTER — COMMUNICATION - HEALTHEAST (OUTPATIENT)
Dept: PALLIATIVE MEDICINE | Facility: OTHER | Age: 80
End: 2020-09-01

## 2020-09-01 DIAGNOSIS — M54.42 CHRONIC BILATERAL LOW BACK PAIN WITH BILATERAL SCIATICA: ICD-10-CM

## 2020-09-01 DIAGNOSIS — R10.84 ABDOMINAL PAIN, GENERALIZED: ICD-10-CM

## 2020-09-01 DIAGNOSIS — G89.29 CHRONIC BILATERAL LOW BACK PAIN WITH BILATERAL SCIATICA: ICD-10-CM

## 2020-09-01 DIAGNOSIS — G89.4 CHRONIC PAIN SYNDROME: ICD-10-CM

## 2020-09-01 DIAGNOSIS — M54.41 CHRONIC BILATERAL LOW BACK PAIN WITH BILATERAL SCIATICA: ICD-10-CM

## 2020-09-17 ENCOUNTER — HOSPITAL ENCOUNTER (OUTPATIENT)
Dept: PALLIATIVE MEDICINE | Facility: OTHER | Age: 80
Discharge: HOME | End: 2020-09-17
Attending: NURSE PRACTITIONER

## 2020-09-17 DIAGNOSIS — G89.29 CHRONIC BILATERAL LOW BACK PAIN WITH BILATERAL SCIATICA: ICD-10-CM

## 2020-09-17 DIAGNOSIS — R10.84 ABDOMINAL PAIN, GENERALIZED: ICD-10-CM

## 2020-09-17 DIAGNOSIS — M54.41 CHRONIC BILATERAL LOW BACK PAIN WITH BILATERAL SCIATICA: ICD-10-CM

## 2020-09-17 DIAGNOSIS — G89.4 CHRONIC PAIN SYNDROME: ICD-10-CM

## 2020-09-17 DIAGNOSIS — M54.42 CHRONIC BILATERAL LOW BACK PAIN WITH BILATERAL SCIATICA: ICD-10-CM

## 2020-09-18 ENCOUNTER — RECORDS - HEALTHEAST (OUTPATIENT)
Dept: VASCULAR ULTRASOUND | Facility: CLINIC | Age: 80
End: 2020-09-18

## 2020-09-18 ENCOUNTER — OFFICE VISIT - HEALTHEAST (OUTPATIENT)
Dept: VASCULAR SURGERY | Facility: CLINIC | Age: 80
End: 2020-09-18

## 2020-09-18 DIAGNOSIS — I73.9 CLAUDICATION (H): ICD-10-CM

## 2020-09-18 DIAGNOSIS — I70.213 ATHEROSCLEROSIS OF NATIVE ARTERIES OF EXTREMITIES WITH INTERMITTENT CLAUDICATION, BILATERAL LEGS (H): ICD-10-CM

## 2020-09-18 DIAGNOSIS — Z20.822 COVID-19 RULED OUT: ICD-10-CM

## 2020-09-18 DIAGNOSIS — I73.9 PERIPHERAL VASCULAR DISEASE, UNSPECIFIED (H): ICD-10-CM

## 2020-09-18 DIAGNOSIS — I73.9 PAD (PERIPHERAL ARTERY DISEASE) (H): ICD-10-CM

## 2020-09-20 LAB

## 2020-09-29 ENCOUNTER — RECORDS - HEALTHEAST (OUTPATIENT)
Dept: LAB | Facility: CLINIC | Age: 80
End: 2020-09-29

## 2020-09-29 ENCOUNTER — RECORDS - HEALTHEAST (OUTPATIENT)
Dept: ADMINISTRATIVE | Facility: OTHER | Age: 80
End: 2020-09-29

## 2020-09-29 ENCOUNTER — TRANSFERRED RECORDS (OUTPATIENT)
Dept: HEALTH INFORMATION MANAGEMENT | Facility: CLINIC | Age: 80
End: 2020-09-29
Payer: COMMERCIAL

## 2020-09-29 LAB
ALBUMIN SERPL-MCNC: 3.6 G/DL (ref 3.5–5)
ALP SERPL-CCNC: 69 U/L (ref 45–120)
ALT SERPL W P-5'-P-CCNC: 19 U/L (ref 0–45)
ANION GAP SERPL CALCULATED.3IONS-SCNC: 8 MMOL/L (ref 5–18)
AST SERPL W P-5'-P-CCNC: 18 U/L (ref 0–40)
BILIRUB SERPL-MCNC: 0.3 MG/DL (ref 0–1)
BUN SERPL-MCNC: 27 MG/DL (ref 8–28)
CALCIUM SERPL-MCNC: 9.7 MG/DL (ref 8.5–10.5)
CHLORIDE BLD-SCNC: 102 MMOL/L (ref 98–107)
CHOLEST SERPL-MCNC: 177 MG/DL
CO2 SERPL-SCNC: 27 MMOL/L (ref 22–31)
CREAT SERPL-MCNC: 1.22 MG/DL (ref 0.7–1.3)
FASTING STATUS PATIENT QL REPORTED: ABNORMAL
GFR SERPL CREATININE-BSD FRML MDRD: 57 ML/MIN/1.73M2
GLUCOSE BLD-MCNC: 110 MG/DL (ref 70–125)
HDLC SERPL-MCNC: 35 MG/DL
LDLC SERPL CALC-MCNC: 78 MG/DL
POTASSIUM BLD-SCNC: 4.4 MMOL/L (ref 3.5–5)
PROT SERPL-MCNC: 6.8 G/DL (ref 6–8)
SODIUM SERPL-SCNC: 137 MMOL/L (ref 136–145)
TRIGL SERPL-MCNC: 320 MG/DL

## 2020-10-03 ENCOUNTER — AMBULATORY - HEALTHEAST (OUTPATIENT)
Dept: FAMILY MEDICINE | Facility: CLINIC | Age: 80
End: 2020-10-03

## 2020-10-03 DIAGNOSIS — Z20.822 COVID-19 RULED OUT: ICD-10-CM

## 2020-10-05 ENCOUNTER — COMMUNICATION - HEALTHEAST (OUTPATIENT)
Dept: SCHEDULING | Facility: CLINIC | Age: 80
End: 2020-10-05

## 2020-10-07 ENCOUNTER — HOSPITAL ENCOUNTER (OUTPATIENT)
Dept: INTERVENTIONAL RADIOLOGY/VASCULAR | Facility: CLINIC | Age: 80
Discharge: HOME | End: 2020-10-07
Attending: SURGERY | Admitting: SURGERY

## 2020-10-07 DIAGNOSIS — I73.9 PAD (PERIPHERAL ARTERY DISEASE) (H): ICD-10-CM

## 2020-10-07 DIAGNOSIS — I73.9 CLAUDICATION (H): ICD-10-CM

## 2020-10-07 LAB
CREAT SERPL-MCNC: 1.42 MG/DL (ref 0.7–1.3)
GFR SERPL CREATININE-BSD FRML MDRD: 48 ML/MIN/1.73M2
HGB BLD-MCNC: 12.6 G/DL (ref 14–18)
INR PPP: 1.03 (ref 0.9–1.1)
PLATELET # BLD AUTO: 268 THOU/UL (ref 140–440)
POTASSIUM BLD-SCNC: 4.2 MMOL/L (ref 3.5–5)

## 2020-10-07 ASSESSMENT — MIFFLIN-ST. JEOR: SCORE: 1751.81

## 2020-10-17 ENCOUNTER — COMMUNICATION - HEALTHEAST (OUTPATIENT)
Dept: PALLIATIVE MEDICINE | Facility: OTHER | Age: 80
End: 2020-10-17

## 2020-10-17 DIAGNOSIS — G89.4 CHRONIC PAIN SYNDROME: ICD-10-CM

## 2020-10-17 DIAGNOSIS — M54.41 CHRONIC BILATERAL LOW BACK PAIN WITH BILATERAL SCIATICA: ICD-10-CM

## 2020-10-17 DIAGNOSIS — G89.29 CHRONIC BILATERAL LOW BACK PAIN WITH BILATERAL SCIATICA: ICD-10-CM

## 2020-10-17 DIAGNOSIS — M54.42 CHRONIC BILATERAL LOW BACK PAIN WITH BILATERAL SCIATICA: ICD-10-CM

## 2020-10-17 DIAGNOSIS — R10.84 ABDOMINAL PAIN, GENERALIZED: ICD-10-CM

## 2020-10-26 ENCOUNTER — COMMUNICATION - HEALTHEAST (OUTPATIENT)
Dept: PALLIATIVE MEDICINE | Facility: OTHER | Age: 80
End: 2020-10-26

## 2020-10-26 DIAGNOSIS — M54.42 CHRONIC BILATERAL LOW BACK PAIN WITH BILATERAL SCIATICA: ICD-10-CM

## 2020-10-26 DIAGNOSIS — M54.41 CHRONIC BILATERAL LOW BACK PAIN WITH BILATERAL SCIATICA: ICD-10-CM

## 2020-10-26 DIAGNOSIS — G89.4 CHRONIC PAIN SYNDROME: ICD-10-CM

## 2020-10-26 DIAGNOSIS — G89.29 CHRONIC BILATERAL LOW BACK PAIN WITH BILATERAL SCIATICA: ICD-10-CM

## 2020-10-26 DIAGNOSIS — R10.84 ABDOMINAL PAIN, GENERALIZED: ICD-10-CM

## 2020-10-27 ENCOUNTER — COMMUNICATION - HEALTHEAST (OUTPATIENT)
Dept: PALLIATIVE MEDICINE | Facility: OTHER | Age: 80
End: 2020-10-27

## 2020-10-28 ENCOUNTER — HOSPITAL ENCOUNTER (OUTPATIENT)
Dept: PALLIATIVE MEDICINE | Facility: OTHER | Age: 80
Discharge: HOME | End: 2020-10-28
Attending: PAIN MEDICINE | Admitting: PAIN MEDICINE

## 2020-10-28 DIAGNOSIS — M54.14 THORACIC RADICULOPATHY: ICD-10-CM

## 2020-10-28 DIAGNOSIS — R10.11 RUQ ABDOMINAL PAIN: ICD-10-CM

## 2020-10-28 ASSESSMENT — MIFFLIN-ST. JEOR: SCORE: 1750.45

## 2020-10-29 ENCOUNTER — COMMUNICATION - HEALTHEAST (OUTPATIENT)
Dept: PALLIATIVE MEDICINE | Facility: OTHER | Age: 80
End: 2020-10-29

## 2020-10-30 ENCOUNTER — RECORDS - HEALTHEAST (OUTPATIENT)
Dept: VASCULAR ULTRASOUND | Facility: CLINIC | Age: 80
End: 2020-10-30

## 2020-10-30 ENCOUNTER — OFFICE VISIT - HEALTHEAST (OUTPATIENT)
Dept: VASCULAR SURGERY | Facility: CLINIC | Age: 80
End: 2020-10-30

## 2020-10-30 DIAGNOSIS — I73.9 PAD (PERIPHERAL ARTERY DISEASE) (H): ICD-10-CM

## 2020-10-30 DIAGNOSIS — I73.9 PERIPHERAL VASCULAR DISEASE, UNSPECIFIED (H): ICD-10-CM

## 2020-11-13 ENCOUNTER — HOSPITAL ENCOUNTER (OUTPATIENT)
Dept: PALLIATIVE MEDICINE | Facility: OTHER | Age: 80
Discharge: HOME | End: 2020-11-13
Attending: NURSE PRACTITIONER

## 2020-11-13 DIAGNOSIS — M54.42 CHRONIC BILATERAL LOW BACK PAIN WITH BILATERAL SCIATICA: ICD-10-CM

## 2020-11-13 DIAGNOSIS — G89.4 CHRONIC PAIN SYNDROME: ICD-10-CM

## 2020-11-13 DIAGNOSIS — R10.84 ABDOMINAL PAIN, GENERALIZED: ICD-10-CM

## 2020-11-13 DIAGNOSIS — G89.29 CHRONIC BILATERAL LOW BACK PAIN WITH BILATERAL SCIATICA: ICD-10-CM

## 2020-11-13 DIAGNOSIS — M54.41 CHRONIC BILATERAL LOW BACK PAIN WITH BILATERAL SCIATICA: ICD-10-CM

## 2020-11-13 ASSESSMENT — MIFFLIN-ST. JEOR: SCORE: 1745.91

## 2020-11-15 ENCOUNTER — COMMUNICATION - HEALTHEAST (OUTPATIENT)
Dept: PALLIATIVE MEDICINE | Facility: OTHER | Age: 80
End: 2020-11-15

## 2020-12-14 ENCOUNTER — COMMUNICATION - HEALTHEAST (OUTPATIENT)
Dept: PALLIATIVE MEDICINE | Facility: OTHER | Age: 80
End: 2020-12-14

## 2020-12-22 ENCOUNTER — COMMUNICATION - HEALTHEAST (OUTPATIENT)
Dept: PALLIATIVE MEDICINE | Facility: OTHER | Age: 80
End: 2020-12-22

## 2021-01-11 ENCOUNTER — HOSPITAL ENCOUNTER (OUTPATIENT)
Dept: PALLIATIVE MEDICINE | Facility: OTHER | Age: 81
Discharge: HOME | End: 2021-01-11
Attending: NURSE PRACTITIONER

## 2021-01-11 DIAGNOSIS — M54.42 CHRONIC BILATERAL LOW BACK PAIN WITH BILATERAL SCIATICA: ICD-10-CM

## 2021-01-11 DIAGNOSIS — M54.41 CHRONIC BILATERAL LOW BACK PAIN WITH BILATERAL SCIATICA: ICD-10-CM

## 2021-01-11 DIAGNOSIS — R10.84 ABDOMINAL PAIN, GENERALIZED: ICD-10-CM

## 2021-01-11 DIAGNOSIS — M12.88 OTHER SPECIFIC ARTHROPATHIES, NOT ELSEWHERE CLASSIFIED, OTHER SPECIFIED SITE: ICD-10-CM

## 2021-01-11 DIAGNOSIS — G89.29 CHRONIC BILATERAL LOW BACK PAIN WITH BILATERAL SCIATICA: ICD-10-CM

## 2021-01-11 DIAGNOSIS — G89.4 CHRONIC PAIN SYNDROME: ICD-10-CM

## 2021-01-22 ENCOUNTER — RECORDS - HEALTHEAST (OUTPATIENT)
Dept: LAB | Facility: CLINIC | Age: 81
End: 2021-01-22

## 2021-01-22 LAB
ALBUMIN SERPL-MCNC: 3.9 G/DL (ref 3.5–5)
ALP SERPL-CCNC: 65 U/L (ref 45–120)
ALT SERPL W P-5'-P-CCNC: 19 U/L (ref 0–45)
ANION GAP SERPL CALCULATED.3IONS-SCNC: 10 MMOL/L (ref 5–18)
AST SERPL W P-5'-P-CCNC: 21 U/L (ref 0–40)
BILIRUB SERPL-MCNC: 0.4 MG/DL (ref 0–1)
BUN SERPL-MCNC: 27 MG/DL (ref 8–28)
CALCIUM SERPL-MCNC: 9.4 MG/DL (ref 8.5–10.5)
CHLORIDE BLD-SCNC: 99 MMOL/L (ref 98–107)
CHOLEST SERPL-MCNC: 100 MG/DL
CO2 SERPL-SCNC: 29 MMOL/L (ref 22–31)
CREAT SERPL-MCNC: 1.39 MG/DL (ref 0.7–1.3)
FASTING STATUS PATIENT QL REPORTED: ABNORMAL
GFR SERPL CREATININE-BSD FRML MDRD: 49 ML/MIN/1.73M2
GLUCOSE BLD-MCNC: 118 MG/DL (ref 70–125)
HDLC SERPL-MCNC: 39 MG/DL
LDLC SERPL CALC-MCNC: 27 MG/DL
POTASSIUM BLD-SCNC: 4.1 MMOL/L (ref 3.5–5)
PROT SERPL-MCNC: 6.8 G/DL (ref 6–8)
SODIUM SERPL-SCNC: 138 MMOL/L (ref 136–145)
TRIGL SERPL-MCNC: 171 MG/DL

## 2021-01-29 ENCOUNTER — OFFICE VISIT - HEALTHEAST (OUTPATIENT)
Dept: VASCULAR SURGERY | Facility: CLINIC | Age: 81
End: 2021-01-29

## 2021-01-29 ENCOUNTER — RECORDS - HEALTHEAST (OUTPATIENT)
Dept: VASCULAR ULTRASOUND | Facility: CLINIC | Age: 81
End: 2021-01-29

## 2021-01-29 DIAGNOSIS — I73.9 PAD (PERIPHERAL ARTERY DISEASE) (H): ICD-10-CM

## 2021-01-29 DIAGNOSIS — I73.9 PERIPHERAL VASCULAR DISEASE, UNSPECIFIED (H): ICD-10-CM

## 2021-01-29 DIAGNOSIS — I73.9 CLAUDICATION (H): ICD-10-CM

## 2021-02-09 ENCOUNTER — COMMUNICATION - HEALTHEAST (OUTPATIENT)
Dept: PALLIATIVE MEDICINE | Facility: OTHER | Age: 81
End: 2021-02-09

## 2021-02-09 DIAGNOSIS — M54.42 CHRONIC BILATERAL LOW BACK PAIN WITH BILATERAL SCIATICA: ICD-10-CM

## 2021-02-09 DIAGNOSIS — G89.29 CHRONIC BILATERAL LOW BACK PAIN WITH BILATERAL SCIATICA: ICD-10-CM

## 2021-02-09 DIAGNOSIS — G89.4 CHRONIC PAIN SYNDROME: ICD-10-CM

## 2021-02-09 DIAGNOSIS — R10.84 ABDOMINAL PAIN, GENERALIZED: ICD-10-CM

## 2021-02-09 DIAGNOSIS — M54.41 CHRONIC BILATERAL LOW BACK PAIN WITH BILATERAL SCIATICA: ICD-10-CM

## 2021-02-12 ENCOUNTER — COMMUNICATION - HEALTHEAST (OUTPATIENT)
Dept: PALLIATIVE MEDICINE | Facility: OTHER | Age: 81
End: 2021-02-12

## 2021-02-16 ENCOUNTER — TRANSFERRED RECORDS (OUTPATIENT)
Dept: HEALTH INFORMATION MANAGEMENT | Facility: CLINIC | Age: 81
End: 2021-02-16
Payer: COMMERCIAL

## 2021-02-17 ENCOUNTER — TRANSFERRED RECORDS (OUTPATIENT)
Dept: HEALTH INFORMATION MANAGEMENT | Facility: CLINIC | Age: 81
End: 2021-02-17
Payer: COMMERCIAL

## 2021-02-18 ENCOUNTER — COMMUNICATION - HEALTHEAST (OUTPATIENT)
Dept: PALLIATIVE MEDICINE | Facility: OTHER | Age: 81
End: 2021-02-18

## 2021-02-18 DIAGNOSIS — G89.4 CHRONIC PAIN SYNDROME: ICD-10-CM

## 2021-03-04 ENCOUNTER — COMMUNICATION - HEALTHEAST (OUTPATIENT)
Dept: PALLIATIVE MEDICINE | Facility: OTHER | Age: 81
End: 2021-03-04

## 2021-03-08 ENCOUNTER — COMMUNICATION - HEALTHEAST (OUTPATIENT)
Dept: PALLIATIVE MEDICINE | Facility: OTHER | Age: 81
End: 2021-03-08

## 2021-03-09 ENCOUNTER — HOSPITAL ENCOUNTER (OUTPATIENT)
Dept: PALLIATIVE MEDICINE | Facility: OTHER | Age: 81
Discharge: HOME | End: 2021-03-09
Attending: PAIN MEDICINE | Admitting: PAIN MEDICINE

## 2021-03-09 DIAGNOSIS — M12.88 OTHER SPECIFIC ARTHROPATHIES, NOT ELSEWHERE CLASSIFIED, OTHER SPECIFIED SITE: ICD-10-CM

## 2021-03-09 DIAGNOSIS — G89.4 CHRONIC PAIN SYNDROME: ICD-10-CM

## 2021-03-09 ASSESSMENT — MIFFLIN-ST. JEOR: SCORE: 1745.91

## 2021-03-10 ENCOUNTER — COMMUNICATION - HEALTHEAST (OUTPATIENT)
Dept: PALLIATIVE MEDICINE | Facility: OTHER | Age: 81
End: 2021-03-10

## 2021-03-12 ENCOUNTER — COMMUNICATION - HEALTHEAST (OUTPATIENT)
Dept: PALLIATIVE MEDICINE | Facility: OTHER | Age: 81
End: 2021-03-12

## 2021-03-12 ENCOUNTER — HOSPITAL ENCOUNTER (OUTPATIENT)
Dept: PALLIATIVE MEDICINE | Facility: OTHER | Age: 81
Discharge: HOME | End: 2021-03-12
Attending: NURSE PRACTITIONER

## 2021-03-12 DIAGNOSIS — K90.89 OTHER INTESTINAL MALABSORPTION: ICD-10-CM

## 2021-03-12 DIAGNOSIS — M54.41 CHRONIC BILATERAL LOW BACK PAIN WITH BILATERAL SCIATICA: ICD-10-CM

## 2021-03-12 DIAGNOSIS — R10.84 ABDOMINAL PAIN, GENERALIZED: ICD-10-CM

## 2021-03-12 DIAGNOSIS — M54.42 CHRONIC BILATERAL LOW BACK PAIN WITH BILATERAL SCIATICA: ICD-10-CM

## 2021-03-12 DIAGNOSIS — G89.4 CHRONIC PAIN SYNDROME: ICD-10-CM

## 2021-03-12 DIAGNOSIS — G89.29 CHRONIC BILATERAL LOW BACK PAIN WITH BILATERAL SCIATICA: ICD-10-CM

## 2021-03-18 ENCOUNTER — AMBULATORY - HEALTHEAST (OUTPATIENT)
Dept: LAB | Facility: HOSPITAL | Age: 81
End: 2021-03-18

## 2021-03-18 DIAGNOSIS — E56.9 VITAMIN DEFICIENCY: ICD-10-CM

## 2021-03-18 DIAGNOSIS — G89.4 CHRONIC PAIN SYNDROME: ICD-10-CM

## 2021-03-18 DIAGNOSIS — R10.84 ABDOMINAL PAIN, GENERALIZED: ICD-10-CM

## 2021-03-18 DIAGNOSIS — K90.89 OTHER INTESTINAL MALABSORPTION: ICD-10-CM

## 2021-03-19 LAB — 25(OH)D3 SERPL-MCNC: 25.6 NG/ML (ref 30–80)

## 2021-03-23 ENCOUNTER — COMMUNICATION - HEALTHEAST (OUTPATIENT)
Dept: PALLIATIVE MEDICINE | Facility: OTHER | Age: 81
End: 2021-03-23

## 2021-03-23 LAB
DQA1*: NORMAL
DQA1*LOCUS: NORMAL
DQB1* LOCUS: NORMAL
DQB1*: NORMAL
DRSSO COMMENTS: NORMAL
DRSSO TEST METHOD: NORMAL
GLIADIN IGA SER-ACNC: 1.6 U/ML
GLIADIN IGG SER-ACNC: <0.4 U/ML
IGA SERPL-MCNC: 418 MG/DL (ref 65–400)
TTG IGA SER-ACNC: 0.7 U/ML
TTG IGG SER-ACNC: <0.6 U/ML

## 2021-04-13 ENCOUNTER — HOSPITAL ENCOUNTER (OUTPATIENT)
Dept: PALLIATIVE MEDICINE | Facility: OTHER | Age: 81
Discharge: HOME | End: 2021-04-13
Attending: NURSE PRACTITIONER

## 2021-04-13 DIAGNOSIS — M54.42 CHRONIC BILATERAL LOW BACK PAIN WITH BILATERAL SCIATICA: ICD-10-CM

## 2021-04-13 DIAGNOSIS — G89.29 CHRONIC BILATERAL LOW BACK PAIN WITH BILATERAL SCIATICA: ICD-10-CM

## 2021-04-13 DIAGNOSIS — G89.4 CHRONIC PAIN SYNDROME: ICD-10-CM

## 2021-04-13 DIAGNOSIS — R10.84 ABDOMINAL PAIN, GENERALIZED: ICD-10-CM

## 2021-04-13 DIAGNOSIS — M54.41 CHRONIC BILATERAL LOW BACK PAIN WITH BILATERAL SCIATICA: ICD-10-CM

## 2021-04-23 ENCOUNTER — COMMUNICATION - HEALTHEAST (OUTPATIENT)
Dept: PALLIATIVE MEDICINE | Facility: OTHER | Age: 81
End: 2021-04-23

## 2021-04-26 ENCOUNTER — RECORDS - HEALTHEAST (OUTPATIENT)
Dept: VASCULAR ULTRASOUND | Facility: CLINIC | Age: 81
End: 2021-04-26

## 2021-04-26 ENCOUNTER — OFFICE VISIT - HEALTHEAST (OUTPATIENT)
Dept: VASCULAR SURGERY | Facility: CLINIC | Age: 81
End: 2021-04-26

## 2021-04-26 DIAGNOSIS — I73.9 PERIPHERAL VASCULAR DISEASE, UNSPECIFIED (H): ICD-10-CM

## 2021-04-26 DIAGNOSIS — I73.9 CLAUDICATION (H): ICD-10-CM

## 2021-04-26 DIAGNOSIS — I73.9 PAD (PERIPHERAL ARTERY DISEASE) (H): ICD-10-CM

## 2021-04-26 DIAGNOSIS — I70.213 ATHEROSCLEROSIS OF NATIVE ARTERIES OF EXTREMITIES WITH INTERMITTENT CLAUDICATION, BILATERAL LEGS (H): ICD-10-CM

## 2021-05-10 ENCOUNTER — HOSPITAL ENCOUNTER (OUTPATIENT)
Dept: PALLIATIVE MEDICINE | Facility: OTHER | Age: 81
Discharge: HOME | End: 2021-05-10
Attending: NURSE PRACTITIONER
Payer: COMMERCIAL

## 2021-05-10 DIAGNOSIS — R10.84 ABDOMINAL PAIN, GENERALIZED: ICD-10-CM

## 2021-05-10 DIAGNOSIS — M54.41 CHRONIC BILATERAL LOW BACK PAIN WITH BILATERAL SCIATICA: ICD-10-CM

## 2021-05-10 DIAGNOSIS — M54.42 CHRONIC BILATERAL LOW BACK PAIN WITH BILATERAL SCIATICA: ICD-10-CM

## 2021-05-10 DIAGNOSIS — G89.4 CHRONIC PAIN SYNDROME: ICD-10-CM

## 2021-05-10 DIAGNOSIS — G89.29 CHRONIC BILATERAL LOW BACK PAIN WITH BILATERAL SCIATICA: ICD-10-CM

## 2021-05-11 ENCOUNTER — COMMUNICATION - HEALTHEAST (OUTPATIENT)
Dept: PALLIATIVE MEDICINE | Facility: OTHER | Age: 81
End: 2021-05-11

## 2021-05-11 DIAGNOSIS — G89.4 CHRONIC PAIN SYNDROME: ICD-10-CM

## 2021-05-11 DIAGNOSIS — G89.29 CHRONIC BILATERAL LOW BACK PAIN WITH BILATERAL SCIATICA: ICD-10-CM

## 2021-05-11 DIAGNOSIS — R10.84 ABDOMINAL PAIN, GENERALIZED: ICD-10-CM

## 2021-05-11 DIAGNOSIS — M54.41 CHRONIC BILATERAL LOW BACK PAIN WITH BILATERAL SCIATICA: ICD-10-CM

## 2021-05-11 DIAGNOSIS — M54.42 CHRONIC BILATERAL LOW BACK PAIN WITH BILATERAL SCIATICA: ICD-10-CM

## 2021-05-26 ENCOUNTER — RECORDS - HEALTHEAST (OUTPATIENT)
Dept: ADMINISTRATIVE | Facility: CLINIC | Age: 81
End: 2021-05-26

## 2021-05-26 VITALS
TEMPERATURE: 98 F | RESPIRATION RATE: 18 BRPM | SYSTOLIC BLOOD PRESSURE: 122 MMHG | DIASTOLIC BLOOD PRESSURE: 64 MMHG | HEART RATE: 76 BPM

## 2021-05-27 VITALS — HEART RATE: 64 BPM | SYSTOLIC BLOOD PRESSURE: 140 MMHG | DIASTOLIC BLOOD PRESSURE: 72 MMHG | TEMPERATURE: 97.9 F

## 2021-05-27 VITALS — SYSTOLIC BLOOD PRESSURE: 144 MMHG | HEART RATE: 64 BPM | TEMPERATURE: 97.9 F | DIASTOLIC BLOOD PRESSURE: 70 MMHG

## 2021-05-27 VITALS — TEMPERATURE: 97.7 F | SYSTOLIC BLOOD PRESSURE: 142 MMHG | HEART RATE: 64 BPM | DIASTOLIC BLOOD PRESSURE: 82 MMHG

## 2021-05-27 NOTE — PROGRESS NOTES
Patient presents to the clinic today for a follow up with Nuvia Smith CNP regarding 5-abdominal pain. Patient describes their pain as dull, burning and booring. Her/His function score is 3.

## 2021-05-27 NOTE — PATIENT INSTRUCTIONS - HE
Plan:   Interventions-    Follow up in 4-8 weeks to evaluate the effectiveness of the treatment interventions ordered today.     Always OVL     Refills have been sent to the pharmacy today as discussed for the oxycontin as well as the hydrocodone.     Agree with the 2 day program at the [facility]     Ok to make an appointment for the suboxone with Dr. Colby if you are interested     Ok to make an appointment with Marta or tammy here at the clinic for any concerns about mindfulness techniques or chemical dependency concerns.     Prescription Drug Management will be continued by the [facility]  A narcotic contract was signed by the patient and  Patient is unable to get narcotics from other providers. They will be subject to random UAs.      Orders placed today  Medications that were ordered today   Requested Prescriptions     Signed Prescriptions Disp Refills     HYDROcodone-acetaminophen 5-325 mg per tablet 180 tablet 0     Sig: Take 1-2 tablets by mouth 4 (four) times a day as needed for pain (max of 6 per day).     oxyCODONE (OXYCONTIN) 15 mg 12 hr tablet 60 tablet 0     Sig: Take 1 tablet (15 mg total) by mouth every 12 (twelve) hours.     No orders of the defined types were placed in this encounter.      UDT/SWAB:  Patient required a random Urine Drug Testing, due to the need to comply with [place] Model Policy Guidelines and CDC Guideline for the use of any controlled substances. This is to ensure that patient is compliant with treatment, and monitor for risks such as diversion, abuse, or any other aberrant behaviors. Patient is either being considered for or taking a controlled substance. Unexpected findings will be discussed and treatment decision may be adjusted. Testing is being implemented across the board randomly w/o bias related to age, race, gender, socioeconomic status or Vood[employer] affiliation.    The patient understand todays plan and has their questions answered in regards to  expectations and current treatment plan.     SAFETY REMINDERS  No alcohol while taking controlled substances. Alcohol is not an illegal substance, it is unsafe to use in combination. It is a build up of substances in the body that can be extremely hazardous and may cause respirations to slow to a dangerous rate resulting in hospitalization, brain damage, or death.    Opioid medications have been associated with sharp rise in unintentional overdose and death.  Overdose is a condition characterized by the consumption in excess of a particular drug causing adverse effects. This can happen b/c you are sick, accidentally or intentionally took an extra dose, are on multiple medication that can interact. Someone took your medication and they are not use to the medication.  Symptoms of overdose include:   !breathing slow and shallow, erratic or not at all  !pinpoint pupils, hallucinations  !confusion  !muscle jerks, slack muscles   !extreme sleepiness or loss of alertness   !awake but not able to talk   !face pale or clammy, vomiting, for lighter skinned people, the skin tone turns bluish purple, for darker skinned people, it turns grayish or ashen   If in a situation where overdose is a concern engage the emergency response system (dial 911).    In one study it was noted that 80% of unintentional overdoses occurred in people who were taking a combination of opioids and benzodiazepines.    Do not sell, loan, borrow or share your opioid medication with anyone. Deaths have occurred as a result of this practice. It is illegal and patients are being prosecuted.     Prevent unexpected access/loss of medication: Keep medication locked. Only carry what you need with you.    Nuvia Smith, [facility]  [address]  [place] [address]  Ph: [phone]  Fax: [phone]

## 2021-05-27 NOTE — PROGRESS NOTES
PAIN CLINIC FOLLOW UP PROGRESS NOTE    CC:  Chief Complaint   Patient presents with     Follow-up     abdominal pain       HPI  Vernon Lemus is a 78 y.o. male who presents for evaluation   Chief Complaint   Patient presents with     Follow-up     abdominal pain    that is causing continued pain. Since the last visit the patient denies any trips to the urgent care or ED specifically for their pain. The patient denies any new medications, diagnoses since the last visit. Specific questions indicated the patient wanted addressed today include: how to proceed forward now that his scope is cleared by Dr. Alva       Major issues:  1. Chronic pain syndrome    2. Chronic bilateral low back pain with bilateral sciatica    3. Abdominal pain, generalized        Alleviating factors: Include- medications   Aggravating factors: activity including bending, standing, laying down or lifting  The patient denies using any assistive devices to help with mobilization.  Pain level today: On a scale of 1-10, the patient rates their pain at a 5/10 described as a burning, dull and constant   Function Rating:This seems to affect his life on a daily bases as well as sleep.         Previous Medical History  Social History     Substance and Sexual Activity   Alcohol Use No     Social History     Substance and Sexual Activity   Drug Use No     Social History     Tobacco Use   Smoking Status Former Smoker     Last attempt to quit: 8/10/1986     Years since quittin.6   Smokeless Tobacco Never Used       Pertinent Pain Medications/interventions:  He is currently taking norco up to 6 per day as needed, oxycontin ER 15 mg two times a day.     Previously has tried morphine which led to confusion   Belbuca up to 600 mcg two times a day which helped a lot but unaffordable.   Failed medical cannabis  Percocet- too strong.     Feels that he has tried anti depressants but is unsure of the names. cymbalta is noted to be administered at the ED x  1 tablet    Reports having tried and failed pregablin      Review of Systems:  12 point systems were reviewed with pt as documented on pt health form and the patient denies any new diagnosis or changes in 12 point system review since the last visit.     Physical Exam  Vitals:    03/26/19 1323   BP: 175/85   Patient Site: Left Arm   Patient Position: Sitting   Cuff Size: Adult Regular   Pulse: 66   Weight: 221 lb (100.2 kg)   Height: 6' (1.829 m)     General- patient is alert and oriented, in NAD, well-groomed, well-nourished  Psych- Judgment and insight normal, AOx4, recent and remote memory normal, mood and affect normal  Eyes- pupils are equal and reactive, conjunctiva is clear bilaterally, no ptosis is noted.   Respiratory- breathing is non-labored  Cardiovascular- extremities warm and well perfused, no peripheral edema or varicosities.  Musculoskeletal- gait is normal, extremities with no joint swelling, erythema, or warmth.  Neuro- normal strength, no gait abnormalities, normal sensation to pain, temperature, light touch.  Integumentary- no rashes, dermatitis or discolorations noted throughout, no open wounds noted. Abdominal pain that is generalized    Medications    Current Outpatient Medications:      [START ON 4/12/2019] HYDROcodone-acetaminophen 5-325 mg per tablet, Take 1-2 tablets by mouth 4 (four) times a day as needed for pain (max of 6 per day)., Disp: 180 tablet, Rfl: 0     [START ON 4/7/2019] oxyCODONE (OXYCONTIN) 15 mg 12 hr tablet, Take 1 tablet (15 mg total) by mouth every 12 (twelve) hours., Disp: 60 tablet, Rfl: 0     amLODIPine (NORVASC) 5 MG tablet, Take 5 mg by mouth daily., Disp: , Rfl:      aspirin 81 mg chewable tablet, Chew 81 mg daily., Disp: , Rfl:      latanoprost (XALATAN) 0.005 % ophthalmic solution, Administer 1 drop to the right eye at bedtime., Disp: , Rfl:      MULTIVITAMIN (MULTIPLE VITAMIN ORAL), Take 1 tablet by mouth daily., Disp: , Rfl:      omega-3/dha/epa/fish oil (FISH  OIL-OMEGA-3 FATTY ACIDS) 300-1,000 mg capsule, Take 2 g by mouth daily., Disp: , Rfl:      oxybutynin (DITROPAN) 5 MG tablet, , Disp: , Rfl:      pantoprazole (PROTONIX) 40 MG tablet, Take 40 mg by mouth daily., Disp: , Rfl:      polyethylene glycol (MIRALAX) 17 gram packet, Take 17 g by mouth daily as needed., Disp: , Rfl:      triamterene-hydrochlorothiazide (DYAZIDE) 37.5-25 mg per capsule, Take 1 capsule by mouth every morning., Disp: , Rfl:     Lab:  Last UDS on 7/18 had expected results. Any abnormal results have been discussed with the patient and may change the plan of care. Please see the scanned document from the outside lab under the media tab. This was reviewed with the patient.      Imaging:  No new imaging.      Recent   Dated 3/26/2019 was reviewed with the patient today.   Paper copy reviewed    Assessment:   Vernon Lemus is a 78 y.o. male seen in clinic today for   Chief Complaint   Patient presents with     Follow-up     abdominal pain       Patient presents the clinic today with his spouse in regards to his ongoing care at the pain center.  Patient does note his scope by Dr. Alva was negative for any type of cancer.  At this time the patient presents with his spouse who wants to discuss if the patient still needs opioids and feels that when he takes the opioids he has an altered presentation and is less interactive.  Patient feels at this point that his lack of interaction is due to increased pain and he reports that  Today's discussion did last longer than the allotted clinic time approximately 40 minutes.  Patients spouse feels that the opioids may be increase in the patient's pain I did discuss with her the definition of hyperalgesia that is opioid induced  and currently I do not feel that the patient is exhibiting symptoms related to this. Previously his has responded well to a reduction in opioids by substituting a long acting Belbuca but this was unaffordable. Patient also notes  that as he tries to go without his pain medication his pain gets worse, when he takes the pain medication it gets better. Currently his MME is 75 previously he had gotten down to 31.     Today the extended conversation revolved around the differences between addiction and chronic pain.  Patient has not had any apparent behavior and has been compliant with his plan of care.  Patient spouse would just like him to not be on opioids.  However it has been difficult for the patient to wean himself down without experiencing an increase in his abdominal pain.     I did discuss with the patient the option of Suboxone therapy has a chronic pain treatment for which he would see Dr. Colby for versus following through with Marisela or Carolyn here at the pain center if he feels that he needs to attend chemical dependency treatment.  Currently is a provider I do not see the patient exhibiting addiction type behaviors.     Patient also requested information regards the [facility] for their chemical dependency treatment plan versus the chronic pain treatment plan which is a 2-day course of encourage the patient to follow-up on this with his primary who is already involved with his care in regards to this.     Currently we will continue the current plan of care with the patient in regards to his long-acting opioid and short acting for breakthrough.     Unfortunately due to the multiple failures with antidepressants, pregabalin and other medications in regards to opioid use as well as medical cannabis patient has had a very difficult time ethically struggling with the use of opioids for his chronic pain his family also seems to be struggling in this regard.     Patients current MME is 75  Patient to call clinic for next month's prescription 5 days in advance. Based on the patients response to their previous narcotic therapy and quality of life, which includes their participation in ADLs, I recommend that the narcotics therapy  continue, however the changes listed below will be incorporated into their plan of care.     Patient set goals to   1.Patient set goals to To treat his ongoing chronic pain so he can remain independent. Would like to wean off opioids but pain is escalating when attempts are made.      Plan:   Interventions-    Follow up in 4-8 weeks to evaluate the effectiveness of the treatment interventions ordered today.     Always OVL     Refills have been sent to the pharmacy today as discussed for the OxyContin as well as the hydrocodone. Call for a refill when needed.     Noted the last intervention by Dr. Alva reportedly was normal    Agree with the 2 day program at the [facility] for pain managment    Ok to make an appointment for the suboxone with Dr. Colby if you are interested, this has discussed in the past with Dr. Colby but at the time you were unsure if this is the path you wanted to pursue.     Ok to make an appointment with Marta or Juany here at the clinic for any concerns about mindfulness techniques or chemical dependency concerns.     Prescription Drug Management will be continued by the [facility] center  A narcotic contract was signed by the patient and  Patient is unable to get narcotics from other providers. They will be subject to random UAs.      Orders placed today  Medications that were ordered today   Requested Prescriptions     Signed Prescriptions Disp Refills     HYDROcodone-acetaminophen 5-325 mg per tablet 180 tablet 0     Sig: Take 1-2 tablets by mouth 4 (four) times a day as needed for pain (max of 6 per day).     oxyCODONE (OXYCONTIN) 15 mg 12 hr tablet 60 tablet 0     Sig: Take 1 tablet (15 mg total) by mouth every 12 (twelve) hours.     No orders of the defined types were placed in this encounter.      UDT/SWAB:  Patient required a random Urine Drug Testing, due to the need to comply with [facility] Model Policy Guidelines and CDC Guideline for the use of any controlled substances.  This is to ensure that patient is compliant with treatment, and monitor for risks such as diversion, abuse, or any other aberrant behaviors. Patient is either being considered for or taking a controlled substance. Unexpected findings will be discussed and treatment decision may be adjusted. Testing is being implemented across the board randomly w/o bias related to age, race, gender, socioeconomic status or Church affiliation.    The patient understand todays plan and has their questions answered in regards to expectations and current treatment plan.     SAFETY REMINDERS  No alcohol while taking controlled substances. Alcohol is not an illegal substance, it is unsafe to use in combination. It is a build up of substances in the body that can be extremely hazardous and may cause respirations to slow to a dangerous rate resulting in hospitalization, brain damage, or death.    Opioid medications have been associated with sharp rise in unintentional overdose and death.  Overdose is a condition characterized by the consumption in excess of a particular drug causing adverse effects. This can happen b/c you are sick, accidentally or intentionally took an extra dose, are on multiple medication that can interact. Someone took your medication and they are not use to the medication.  Symptoms of overdose include:   !breathing slow and shallow, erratic or not at all  !pinpoint pupils, hallucinations  !confusion  !muscle jerks, slack muscles   !extreme sleepiness or loss of alertness   !awake but not able to talk   !face pale or clammy, vomiting, for lighter skinned people, the skin tone turns bluish purple, for darker skinned people, it turns grayish or ashen   If in a situation where overdose is a concern engage the emergency response system (dial 911).    In one study it was noted that 80% of unintentional overdoses occurred in people who were taking a combination of opioids and benzodiazepines.    Do not sell, loan, borrow  or share your opioid medication with anyone. Deaths have occurred as a result of this practice. It is illegal and patients are being prosecuted.     Prevent unexpected access/loss of medication: Keep medication locked. Only carry what you need with you.    Approximately 40 minutes was spent providing education in regards to addiction vs chronic pain with the patient and his family.     Nuvia Smith, [facility]  [address]  [place] [address]  Ph: [phone]  Fax: [phone]

## 2021-05-28 NOTE — PROGRESS NOTES
PAIN CLINIC FOLLOW UP PROGRESS NOTE    CC:  Chief Complaint   Patient presents with     Back Pain       HPI  Vernon Lemus is a 78 y.o. male who presents for evaluation   Chief Complaint   Patient presents with     Back Pain    that is causing continued pain. Since the last visit the patient denies any trips to the urgent care or ED specifically for their pain. The patient denies any new medications, diagnoses since the last visit. Specific questions indicated the patient wanted addressed today include: follow up on his ongoing chronic abdominal pain. Patient presents with his wife and daughter and notes that he has attended the [facility] program 2-day.       Major issues:  1. Pancreatic pain    2. Chronic pain syndrome    3. Chronic bilateral low back pain with bilateral sciatica    4. Abdominal pain, generalized        Alleviating factors: Include-  medications, breathing techniques, distracting   Aggravating factors: activity including bending, standing, laying down or lifting,   The patient denies using any assistive devices to help with mobilization.  Pain level today: On a scale of 1-10, the patient rates their pain at a 5/10 described as a burning, dull and constant   Function Rating:This seems to affect his life on a daily bases as well as sleep.     Previous Medical History  Social History     Substance and Sexual Activity   Alcohol Use No     Social History     Substance and Sexual Activity   Drug Use No     Social History     Tobacco Use   Smoking Status Former Smoker     Last attempt to quit: 8/10/1986     Years since quittin.7   Smokeless Tobacco Never Used       Pertinent Pain Medications/interventions:  He is currently taking norco up to 5 per day as needed, oxycontin ER 15 mg two times a day.      Previously has tried morphine which led to confusion   Belbuca up to 600 mcg two times a day which helped a lot but unaffordable.   Failed medical cannabis  Percocet- too strong.      Feels that he has  tried anti depressants but is unsure of the names. cymbalta is noted to be administered at the ED x 1 tablet and did try cymbalta 20 mg two times a day for a short time.     Reports having tried and failed pregablin      Review of Systems:  12 point systems were reviewed with pt as documented on pt health form and the patient denies any new diagnosis or changes in 12 point system review since the last visit. Continued chronic abdominal pain     Physical Exam  Vitals:    05/07/19 0804   BP: 156/76   Patient Site: Right Arm   Patient Position: Sitting   Pulse: 72   Resp: 16   Weight: (!) 225 lb (102.1 kg)   Height: 6' (1.829 m)     General- patient is alert and oriented, in NAD, well-groomed, well-nourished  Psych- Judgment and insight normal, AOx4, recent and remote memory normal, mood and affect normal  Eyes- pupils are equal and reactive, conjunctiva is clear bilaterally, no ptosis is noted.   Respiratory- breathing is non-labored  Cardiovascular- extremities warm and well perfused, no peripheral edema or varicosities.  Musculoskeletal- gait is normal, extremities with no joint swelling, erythema, or warmth.  Neuro- normal strength, no gait abnormalities, normal sensation to pain, temperature, light touch.  Integumentary- no rashes, dermatitis or discolorations noted throughout, no open wounds noted. Continued chronic abdominal pain    Medications    Current Outpatient Medications:      amLODIPine (NORVASC) 5 MG tablet, Take 5 mg by mouth daily., Disp: , Rfl:      aspirin 81 mg chewable tablet, Chew 81 mg daily., Disp: , Rfl:      latanoprost (XALATAN) 0.005 % ophthalmic solution, Administer 1 drop to the right eye at bedtime., Disp: , Rfl:      MULTIVITAMIN (MULTIPLE VITAMIN ORAL), Take 1 tablet by mouth daily., Disp: , Rfl:      omega-3/dha/epa/fish oil (FISH OIL-OMEGA-3 FATTY ACIDS) 300-1,000 mg capsule, Take 2 g by mouth daily., Disp: , Rfl:      oxybutynin (DITROPAN) 5 MG tablet, , Disp: , Rfl:       pantoprazole (PROTONIX) 40 MG tablet, Take 40 mg by mouth daily., Disp: , Rfl:      polyethylene glycol (MIRALAX) 17 gram packet, Take 17 g by mouth daily as needed., Disp: , Rfl:      triamterene-hydrochlorothiazide (DYAZIDE) 37.5-25 mg per capsule, Take 1 capsule by mouth every morning., Disp: , Rfl:      [START ON 5/12/2019] HYDROcodone-acetaminophen 5-325 mg per tablet, Take 1-2 tablets by mouth 4 (four) times a day as needed for pain (max of 5 per day for 2 weeks, 4 per day for 1 week)., Disp: 98 tablet, Rfl: 0     hydrOXYzine HCl (ATARAX) 25 MG tablet, Take 1 tablet (25 mg total) by mouth 3 (three) times a day as needed for anxiety (for withdrawal symptoms)., Disp: 90 tablet, Rfl: 0     oxyCODONE (OXYCONTIN) 15 mg 12 hr tablet, Take 1 tablet (15 mg total) by mouth daily for 7 days., Disp: 7 tablet, Rfl: 0     senna-docusate (SENNOSIDES-DOCUSATE SODIUM) 8.6-50 mg tablet, Take 1 tablet by mouth daily., Disp: , Rfl: 0     tiZANidine (ZANAFLEX) 2 MG tablet, Take 1-2 tablets (2-4 mg total) by mouth every 6 (six) hours as needed (up to 6 per day if needed for withdrawal)., Disp: 180 tablet, Rfl: 0    Lab:  Last UDS on 7/24/2018 had expected results. Any abnormal results have been discussed with the patient and may change the plan of care. Please see the scanned document from the outside lab under the media tab. This was reviewed with the patient.      Imaging:  No new imaging.  3/12/2019- at [facility]  ENDOSONOGRAPHIC FINDING: :  There was no sign of significant endosonographic abnormality in the   ampulla.  There was no sign of significant endosonographic abnormality in the   common bile duct, in the common hepatic duct and in the gallbladder. The   maximum diameter of the ducts were 5 mm.  There was abnormal echogenicity in the visualized portion of the liver.   This area was hyperechoic.  The pancreas showed incomplete divisum, unchanged from previous   examination. The duct of Santorini was 7 mm at the  head. The duct of   Wirsung was 4 mm at the head. The PD in the body was 4 mm 1 mm at the   tail. No nodularity or masses. The pancreatic parenchyma showed   scattered hyperechoic strands, but no other stigmata of chronic   pancreatitis.  No lymphadenopathy seen.    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 No findings of nodularity or mass. Unchanged from previous   examination.    Recommendation:  - Discharge patient to home.  - Resume diet.  - Follow up with Dr. Alva as scheduled.      Recent   Dated 5/7/2019 was reviewed with the patient today.     Paper copy reviewed    Assessment:   Vernon Lemus is a 78 y.o. male seen in clinic today for   Chief Complaint   Patient presents with     Back Pain       Patient presents today for follow up in regards to his ongoing chronic pain, he has recently attended the [place] 2 day pain program and found that breathing techniques have helped him with some of the pain, he is still anxious about living in pain without opioids. Patients current workup is negative for pancreatic duct cancer. Previously he had been given an option for surgical intervention but this was later recanted. His last Endoscopy did reveal a chronic inflammation process but no signs of pancreatitis. He did fail medical cannabis.    Patient is here with his daughter and wife and note that they want him off of the opioids due to a change in his personality, patient reports that it is the pain that makes him defensive. Patient is willing to try to taper but has reservations as he has tried this in the past and his pain increased. Patient does note that the distractions during the day do seem to help him, He does note that the pain is worse at night. Today we discussed a way forward to reduce his use of opioids while incorporating behavioral therapy for anxiety, injections- celiac plexus block for the pancreatic pain and withdrawal  pain medications. Patient agrees to taper starting with the long acting and then the short acting, he would like to go 2 weeks without the use of opioids and then re-evaluate.     Due to the anxiety of being in pain without a treatment is very concerning to the patient. Rightly so. The daughter does report that she feels that the opioids are causing his abdominal pain that started 6 years ago. However patient is on a low dose and likely the opioids are not contributing to this as his pain increases when the opioids are decreased. Opioid hyperalgesia discussed previously with symptoms, education provided.     Today family discussion ensued surrounding details of what is driving the anxiety as well as concerns from patient and daughter. Daughter works in psychology at [employer] and is opposed to opioids for chronic pain, today daughter verbalized she would support the patient in the use of opioids only if it was recommended by the [facility] and his pain was unbearable despite being off of it for 2 weeks completely and if all other non opioid forms failed. There seems to be a deep divide in the family.     Will set forth a tapering schedule today and have him scheudle injections as well as follow up with me and     Patients current MME is 42-62  Patient to call clinic for next month's prescription 5 days in advance. Based on the patients response to their previous narcotic therapy and quality of life, which includes their participation in ADLs, I recommend that the narcotics therapy continue, however the changes listed below will be incorporated into their plan of care.     Patient set goals to   1. To reduce pain and wean off opioids      Plan:   Interventions-    Follow up in 2-3 weeks to evaluate the effectiveness of the treatment interventions ordered today. Scheduling your appointment prior to leaving assists in reduction of running out of medication prior to the next appointment.     Patient taper  schedule   Will have you scheduled with Marta or Juany today prior to leaving to assist with the tapering and anxiety     Will order a celiac plexus block for the pancreas pain- schedule this on your way out.     Will reduce the opioids by reducing a long acting medication by 1 per day for 7 days at a time then short acting    Week 1  1 long acting OxyContin ER 15 mg for 7 days + Norco 5 per day   Week 2- No long acting= continue the Norco 5 per day   Week 3- Norco down to 4 per day- you have enough medication to get to this point- please see Crystal this week.   Week 4- Norco down to 3 per day   Week 5- Norco down to 2 per day   Week 6- norco down to 1 per day   Week 7-Norco down to 1/2 per day for 4 per day, down to 1/2 per day for 2 days. Then stop.     Plan B-  Injections as needed every 2 weeks, behavioral therapy for anxiety. Start with cymbalta 20 mg if needed. We may incorporate visceral massage if needed by PT.     Prescription Drug Management will be continued by the [facility] center  A narcotic contract was signed by the patient and  Patient is unable to get narcotics from other providers. They will be subject to random UAs.     As a reminder- chronic pain is generally stable, if you experience a new injury or new different pain it is expected that you present to the ED or urgent care for evaluation. DO NOT use your chronic pain medications to treat any new or different pain other then what it is intended for. We do not replace any lost or stolen prescriptions or provide early refills for overtaking your medications.       Orders placed today  Medications that were ordered today   Requested Prescriptions     Signed Prescriptions Disp Refills     oxyCODONE (OXYCONTIN) 15 mg 12 hr tablet 7 tablet 0     Sig: Take 1 tablet (15 mg total) by mouth daily for 7 days.     tiZANidine (ZANAFLEX) 2 MG tablet 180 tablet 0     Sig: Take 1-2 tablets (2-4 mg total) by mouth every 6 (six) hours as needed (up to 6  per day if needed for withdrawal).     senna-docusate (SENNOSIDES-DOCUSATE SODIUM) 8.6-50 mg tablet  0     Sig: Take 1 tablet by mouth daily.     hydrOXYzine HCl (ATARAX) 25 MG tablet 90 tablet 0     Sig: Take 1 tablet (25 mg total) by mouth 3 (three) times a day as needed for anxiety (for withdrawal symptoms).     HYDROcodone-acetaminophen 5-325 mg per tablet 98 tablet 0     Sig: Take 1-2 tablets by mouth 4 (four) times a day as needed for pain (max of 5 per day for 2 weeks, 4 per day for 1 week).     No orders of the defined types were placed in this encounter.      UDT/SWAB:  Patient required a random Urine Drug Testing, due to the need to comply with [facility] Model Policy Guidelines and CDC Guideline for the use of any controlled substances. This is to ensure that patient is compliant with treatment, and monitor for risks such as diversion, abuse, or any other aberrant behaviors. Patient is either being considered for or taking a controlled substance. Unexpected findings will be discussed and treatment decision may be adjusted. Testing is being implemented across the board randomly w/o bias related to age, race, gender, socioeconomic status or Muslim affiliation.    The patient understand todays plan and has their questions answered in regards to expectations and current treatment plan.     SAFETY REMINDERS  No alcohol while taking controlled substances. Alcohol is not an illegal substance, it is unsafe to use in combination. It is a build up of substances in the body that can be extremely hazardous and may cause respirations to slow to a dangerous rate resulting in hospitalization, brain damage, or death.    Opioid medications have been associated with sharp rise in unintentional overdose and death.  Overdose is a condition characterized by the consumption in excess of a particular drug causing adverse effects. This can happen b/c you are sick, accidentally or intentionally took an extra dose, are on  multiple medication that can interact. Someone took your medication and they are not use to the medication.  Symptoms of overdose include:   !breathing slow and shallow, erratic or not at all  !pinpoint pupils, hallucinations  !confusion  !muscle jerks, slack muscles   !extreme sleepiness or loss of alertness   !awake but not able to talk   !face pale or clammy, vomiting, for lighter skinned people, the skin tone turns bluish purple, for darker skinned people, it turns grayish or ashen   If in a situation where overdose is a concern engage the emergency response system (dial 911).    In one study it was noted that 80% of unintentional overdoses occurred in people who were taking a combination of opioids and benzodiazepines.    Do not sell, loan, borrow or share your opioid medication with anyone. Deaths have occurred as a result of this practice. It is illegal and patients are being prosecuted.     Prevent unexpected access/loss of medication: Keep medication locked. Only carry what you need with you.    Nuvia Smith, [facility]  [address]  [place] [address]  Ph: [phone]  Fax: [phone]

## 2021-05-28 NOTE — PATIENT INSTRUCTIONS - HE
Plan:   Interventions-    Follow up in 2-3 weeks to evaluate the effectiveness of the treatment interventions ordered today. Scheduling your appointment prior to leaving assists in reduction of running out of medication prior to the next appointment.     Patient taper schedule   Will have you scheduled with Marta or Juany today prior to leaving to assist with the tapering and anxiety     Will order a celiac plexus block for the pancreas pain- schedule this on your way out.     Will reduce the opioids by reducing a long acting medication by 1 per day for 7 days at a time then short acting    Week 1  1 long acting OxyContin ER 15 mg for 7 days + Norco 5 per day   Week 2- No long acting= continue the Norco 5 per day   Week 3- Norco down to 4 per day- you have enough medication to get to this point- please see Crystal this week.   Week 4- Norco down to 3 per day   Week 5- Norco down to 2 per day   Week 6- norco down to 1 per day   Week 7-Norco down to 1/2 per day for 4 per day, down to 1/2 per day for 2 days. Then stop.     Plan B-  Injections as needed every 2 weeks, behavioral therapy for anxiety. Start with cymbalta 20 mg if needed. We may incorporate visceral massage if needed by PT.     Prescription Drug Management will be continued by the [facility] center  A narcotic contract was signed by the patient and  Patient is unable to get narcotics from other providers. They will be subject to random UAs.     As a reminder- chronic pain is generally stable, if you experience a new injury or new different pain it is expected that you present to the ED or urgent care for evaluation. DO NOT use your chronic pain medications to treat any new or different pain other then what it is intended for. We do not replace any lost or stolen prescriptions or provide early refills for overtaking your medications.       Orders placed today  Medications that were ordered today   Requested Prescriptions     Signed Prescriptions  Disp Refills     oxyCODONE (OXYCONTIN) 15 mg 12 hr tablet 7 tablet 0     Sig: Take 1 tablet (15 mg total) by mouth daily for 7 days.     tiZANidine (ZANAFLEX) 2 MG tablet 180 tablet 0     Sig: Take 1-2 tablets (2-4 mg total) by mouth every 6 (six) hours as needed (up to 6 per day if needed for withdrawal).     senna-docusate (SENNOSIDES-DOCUSATE SODIUM) 8.6-50 mg tablet  0     Sig: Take 1 tablet by mouth daily.     hydrOXYzine HCl (ATARAX) 25 MG tablet 90 tablet 0     Sig: Take 1 tablet (25 mg total) by mouth 3 (three) times a day as needed for anxiety (for withdrawal symptoms).     HYDROcodone-acetaminophen 5-325 mg per tablet 98 tablet 0     Sig: Take 1-2 tablets by mouth 4 (four) times a day as needed for pain (max of 5 per day for 2 weeks, 4 per day for 1 week).     No orders of the defined types were placed in this encounter.      UDT/SWAB:  Patient required a random Urine Drug Testing, due to the need to comply with [place] Model Policy Guidelines and CDC Guideline for the use of any controlled substances. This is to ensure that patient is compliant with treatment, and monitor for risks such as diversion, abuse, or any other aberrant behaviors. Patient is either being considered for or taking a controlled substance. Unexpected findings will be discussed and treatment decision may be adjusted. Testing is being implemented across the board randomly w/o bias related to age, race, gender, socioeconomic status or Baptism affiliation.    The patient understand todays plan and has their questions answered in regards to expectations and current treatment plan.     SAFETY REMINDERS  No alcohol while taking controlled substances. Alcohol is not an illegal substance, it is unsafe to use in combination. It is a build up of substances in the body that can be extremely hazardous and may cause respirations to slow to a dangerous rate resulting in hospitalization, brain damage, or death.    Opioid medications have been  associated with sharp rise in unintentional overdose and death.  Overdose is a condition characterized by the consumption in excess of a particular drug causing adverse effects. This can happen b/c you are sick, accidentally or intentionally took an extra dose, are on multiple medication that can interact. Someone took your medication and they are not use to the medication.  Symptoms of overdose include:   !breathing slow and shallow, erratic or not at all  !pinpoint pupils, hallucinations  !confusion  !muscle jerks, slack muscles   !extreme sleepiness or loss of alertness   !awake but not able to talk   !face pale or clammy, vomiting, for lighter skinned people, the skin tone turns bluish purple, for darker skinned people, it turns grayish or ashen   If in a situation where overdose is a concern engage the emergency response system (dial 911).    In one study it was noted that 80% of unintentional overdoses occurred in people who were taking a combination of opioids and benzodiazepines.    Do not sell, loan, borrow or share your opioid medication with anyone. Deaths have occurred as a result of this practice. It is illegal and patients are being prosecuted.     Prevent unexpected access/loss of medication: Keep medication locked. Only carry what you need with you.    Nuvia Smith, [facility]  [address]  [place] [address]  Ph: [phone]  Fax: [phone]

## 2021-05-29 NOTE — PROGRESS NOTES
Brief Diagnostic Assessment    Patient Name: Vernon Lemus  Age:  79 y.o.,    1940    Date: 2019Start Time: 900    Stop Time: 1000    Referring Provider: Nuvia Smith CNP                                                                                    Persons Present: PT, Therapist     Session Type: Patient is presenting for an Individual session.    Recipient's description of symptoms (including reason for referral):    Pt reports stress related to chronic pain and tapering off of opioids medications.  Pt reports family does not understand chronic pain and how he struggles on a regular basis.  Pressure from family to get off of opioids, patient feels that medications allows him to be more active and have a better quality of life.  Reports chronic pain impacts all areas of his life and been trying to cope with it for several years.  Pt interested in psychotherapy to have support and help family understand what he is going through.     Mental Health History (Include review of records, or HEDY to obtain, previous outpatient psychotherapy, hospitalizations, commitments, psychiatry, etc):   Pt reports no hx of mental health.  No hx of psychotherapy or psychiatry.  Pt is not on psychotropic medications.  Pt reports that chronic pain greatly impacts his mood and at times can be very difficult to cope.  PHQ-9 score was 5 indicating mild depression symptoms included (loss of interest, feeling down, low energy, difficulty sleeping, appetite changes).  Reports symptoms are related to pain. Denies suicidal ideation.  PANSI and C-SSRS did not indicate concern.  GEOVANNA-7 score was 3 indicating mild anxiety.  Symptoms included; anxious feeling, worry about many things and irritable.  Reports its somewhat difficult to cope at home and with others.  Reports when pain is high he is more irritable and worries about everything.  Pt has no hx of chemical dependency.  Reports he takes his medications as  prescribed.  CAGE-AID was 2/4. Pt reports he has been on opioids for pain management for several years but is working on tapering per request from his family.  SOAPP-R score was 21 indicating high risk for opioid misuse/abuse.      Mental Status Evaluation:    Grooming: Well groomed  Attire: Appropriate  Age: Appears Stated  Behavior Towards Examiner: Cooperative  Motor Activity: Within normal   Eye Contact: Appropriate  Mood: Anxious  Affect: Congruent w/content of speech  Speech/Language: Within normal  Attention: Within normal  Concentration: Within normal  Thought Process: Within normal  Thought Content: Within noramlWithin normal  Orientation: X 3No Evidence of Impairment  Memory: No Evidence of Impairment  Judgement: No Evidence of Impairment  Estimated Intelligence: Above Average  Demonstrated Insight: Adequate  Fund of Knowledge: adequate  Suicidal Ideation: NO    Cultural influences and impact:(This is more than race or ethnicity , see manual for definition)  Pt is a 79 year old, ,  male  Has 3 adult daughters and 1 adult son  Wife is supportive  Pt is retired- previous work in [email or web address]   Has supportive friend  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      Pt reports stress related to chronic pain and tapering off of opioids medications.  Pt reports family does not understand chronic pain and how he struggles on a regular basis.  Pressure from family to get off of opioids, patient feels that medications allows him to be more active and have a better quality of life.  Reports chronic pain impacts all areas of his life and been trying to cope with it for several years.  Pt interested in psychotherapy to have support and help family  understand what he is going through. He lives with his wife.  He is retired.  He has 3 adult daughters and 1 adult son.  Has supportive friends. No legal issues. Worship background.     Pt reports no hx of mental health.  No hx of psychotherapy or psychiatry.  Pt is not on psychotropic medications.  Pt reports that chronic pain greatly impacts his mood and at times can be very difficult to cope.  PHQ-9 score was 5 indicating mild depression symptoms included (loss of interest, feeling down, low energy, difficulty sleeping, appetite changes).  Reports symptoms are related to pain. Denies suicidal ideation.  PANSI and C-SSRS did not indicate concern.  GEOVANNA-7 score was 3 indicating mild anxiety.  Symptoms included; anxious feeling, worry about many things and irritable.  Reports its somewhat difficult to cope at home and with others.  Reports when pain is high he is more irritable and worries about everything.  Pt has no hx of chemical dependency.  Reports he takes his medications as prescribed.  CAGE-AID was 2/4. Pt reports he has been on opioids for pain management for several years but is working on tapering per request from his family.  SOAPP-R score was 21 indicating high risk for opioid misuse/abuse.      Recommendations (treatment, referrals, services needed).   Pt would benefit from biweekly to monthly psychotherapy to address symptoms of depression/anxiety.  Welcomed his family to attend session as well.  Pt reports he is interested in engaging in psychotherapy.  Pt's age, race, gender, sexual orientation, cultural background and ethnicity was taken into consideration throughout assessment.     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

## 2021-05-29 NOTE — TELEPHONE ENCOUNTER
Cancelled RX at [facility]. Informed patient new RX sent to [facility].

## 2021-05-29 NOTE — PATIENT INSTRUCTIONS - HE
Plan:   Interventions-    Follow up in 4 weeks to evaluate the effectiveness of the treatment interventions ordered today. Scheduling your appointment prior to leaving assists in reduction of running out of medication prior to the next appointment.     If you are receiving medications from the pain clinic, it is your responsibility to call 3-5 days in advance for a refill. The clinic has up to five days to provide a refill for you.     Please bring any opioids or controlled substances in that are prescribed by the clinic for pill counts for every visit.     Ok to resume the use of Norco up to 6 per day at this time     You have made progress on stopping the OxyContin ER at this time     Will stop the tizanidine at this time it increases your neuropathy at night but does help your abdominal pain     Will start baclofen for the abdominal pain start at 1/2 tab at a time as needed up to 3 per day     Will refer you to acupuncture for abdominal pain     Agree with continued appointment with Behavioral health appointment     Previously failed lyrica, amitriptyline and cymbalta, trigger points     You just had the celiac plexus done this week ok to give it a little more time for re-evaluation. It may indicate that it is not nerve pain we are dealing with if it provides you with no relief.     Will touch bases with Dr. Gaviria in regards to medication he may have suggested.     Prescription Drug Management will be continued by the [facility]  A narcotic contract was signed by the patient and  Patient is unable to get narcotics from other providers. They will be subject to random UAs.     As a reminder- chronic pain is generally stable, if you experience a new injury or new different pain it is expected that you present to the ED or urgent care for evaluation. DO NOT use your chronic pain medications to treat any new or different pain other then what it is intended for. We do not replace any lost or stolen  prescriptions or provide early refills for overtaking your medications.        Orders placed today  Medications that were ordered today   Requested Prescriptions     Signed Prescriptions Disp Refills     HYDROcodone-acetaminophen 5-325 mg per tablet 180 tablet 0     Sig: Take 1-2 tablets by mouth 4 (four) times a day as needed for pain (up to 6 per day).     lidocaine (LIDODERM) 5 % 90 patch 1     Sig: Remove & Discard patch within 12 hours or as directed by MD apply to painful areas at this time     baclofen (LIORESAL) 10 MG tablet 90 tablet 0     Sig: Take 0.5-1 tablets (5-10 mg total) by mouth 3 (three) times a day.     Orders Placed This Encounter   Procedures     Ambulatory referral to Acupuncture     Referral Priority:   Routine     Referral Type:   Acupuncture     Referral Reason:   Evaluation and Treatment     Referral Location:    [facility]     Number of Visits Requested:   1       UDT/SWAB:  Patient required a random Urine Drug Testing, due to the need to comply with Federation Model Policy Guidelines and CDC Guideline for the use of any controlled substances. This is to ensure that patient is compliant with treatment, and monitor for risks such as diversion, abuse, or any other aberrant behaviors. Patient is either being considered for or taking a controlled substance. Unexpected findings will be discussed and treatment decision may be adjusted. Testing is being implemented across the board randomly w/o bias related to age, race, gender, socioeconomic status or Sikh affiliation.    The patient understand todays plan and has their questions answered in regards to expectations and current treatment plan.     SAFETY REMINDERS  No alcohol while taking controlled substances. Alcohol is not an illegal substance, it is unsafe to use in combination. It is a build up of substances in the body that can be extremely hazardous and may cause respirations to slow to a dangerous rate  resulting in hospitalization, brain damage, or death.    Opioid medications have been associated with sharp rise in unintentional overdose and death.  Overdose is a condition characterized by the consumption in excess of a particular drug causing adverse effects. This can happen b/c you are sick, accidentally or intentionally took an extra dose, are on multiple medication that can interact. Someone took your medication and they are not use to the medication.  Symptoms of overdose include:   !breathing slow and shallow, erratic or not at all  !pinpoint pupils, hallucinations  !confusion  !muscle jerks, slack muscles   !extreme sleepiness or loss of alertness   !awake but not able to talk   !face pale or clammy, vomiting, for lighter skinned people, the skin tone turns bluish purple, for darker skinned people, it turns grayish or ashen   If in a situation where overdose is a concern engage the emergency response system (dial 911).    In one study it was noted that 80% of unintentional overdoses occurred in people who were taking a combination of opioids and benzodiazepines.    Do not sell, loan, borrow or share your opioid medication with anyone. Deaths have occurred as a result of this practice. It is illegal and patients are being prosecuted.     Prevent unexpected access/loss of medication: Keep medication locked. Only carry what you need with you.    Nuvia Smith, [facility]  [address]  [place] [address]  Ph: [phone]  Fax: [phone]

## 2021-05-29 NOTE — TELEPHONE ENCOUNTER
"Call from [facility]. Wants clarification of Hydrocodone directions. Is supposed to be tapering, but rx does not reflect that. I called the pharmacist back and tried to explain that on last visit.    Ok to resume the use of Norco up to 6 per day at this time, unable to taper due to pain.     Pharmacist was not agreeable to that. She said, \" I can read,the RX,but he is supposed to taper.\"  She also said, that I did not understand, and wanted the provider to call. I told her no, that I am a RN, calling to clarify the current RX. She then abruptly put me on hold. I held for a short time, and then I hung up.     Patient left message this afternoon, that still not able to get Norco filled. He said the Pharmacist is giving him a hard time, and is going to report her, when this is all over. She is a replacement and not easy to deal with.     I spoke again with the Pharmacist, Christine. She is disputing that patient is not due for his refill based on previous rxs that were to take 5 a day and was tapering. I again explained that he had a visit on 5/24, and ok for 6 a day, since unable to taper due to pain. Should have had rx filled on 5/28, so now have withheld rx for 2 days. Christine said she speaks 4 languages and to resume does not make sense. I again asked for her to fill rx based on provider directions. She said needed more documentation explaining increase. I told her would contact patient to find a new pharmacy.     Patient would like sent to [facility] in [place], will cancel at [facility]     "

## 2021-05-29 NOTE — TELEPHONE ENCOUNTER
Prior Authorization Request  Who s requesting:  Nuvia Smith CNP/ Kiana Goff CMA  Pharmacy Name and Location: [facility]  Medication Name: [facility]  Insurance Plan: Appointuit  Insurance Member ID Number:  [id number]  Informed patient that prior authorizations can take up to 10 business days for response:   Yes  Okay to leave a detailed message: Yes

## 2021-05-29 NOTE — PATIENT INSTRUCTIONS - HE
POST PROCEDURE INSTRUCTIONS  PROCEDURE DONE TODAY:CELIAC PLEXUS NERVE BLOCK    Rest today. Resume activity tomorrow as able.  Patient demonstrates the ability to ambulate independently with a steady gait at the time of discharge.      It is not unusual to have a temporary increase in your pain after a procedure. You can ice the area 24-48 hrs. 15 min at a time.      You received a steroid injection. This medication can take 2-7 days to take effect. Some temporary side effects include:    Heartburn    Flushed-red face    Insomnia-trouble sleeping-jitters    Diabetics may see a rise in blood sugar for a few days    Low back or SI joint (sacroiliac) injection: you may experience numbness in one or both legs. Please walk with help. This is temporary and will wear off in a few hours. Do not drive if you are tingling, numbness or weakness in your hands/arms or legs/feet.    Headache:  If you experience a headache after a lumbar epidural injection, lie down and drink fluids (especially caffeine). The headache will go away gradually. If it persists notify our clinic.    Fever: call if fever 100 or over, redness/warmth/swelling over the injection site.    No public hot tubs/pools for a couple days    Physical therapy: check with pain center provider regarding resuming therapy.    Monitor your response to the injection and report this at your next visit.    If you have been referred for a procedure only, please call your referring provider for a follow up.    IF YOU PLAN TO GET A FLU SHOT YOU MUST WAIT 2 WEEKS AFTER THIS INJECTION.      CALL IF ANY QUESTIONS [phone]

## 2021-05-29 NOTE — TELEPHONE ENCOUNTER
Central PA team  [phone]  Pool: HE PA MED ([id number])          PA has been initiated.       PA form completed and faxed insurance via Cover My Meds     Key:  [id number] - PA Case ID: [id number]     Medication:  Lidocaine 5% patches    Insurance:  [employer]        Response will be received via fax and may take up to 5-10 business days depending on plan

## 2021-05-29 NOTE — PATIENT INSTRUCTIONS - HE
Plan:   Interventions-    Follow up in 4-6 weeks to evaluate the effectiveness of the treatment interventions ordered today. Scheduling your appointment prior to leaving assists in reduction of running out of medication prior to the next appointment.     If you are receiving medications from the pain clinic, it is your responsibility to call 3-5 days in advance for a refill. The clinic has up to five days to provide a refill for you.     OVL     Please call the [facility] for refills.    Please bring any opioids or controlled substances in that are prescribed by the clinic for pill counts for every visit.     Add a long acting butrans patch- apply weekly. 5 mcg/ hr    Continue the opioid of norco as you are Up to 6 tabs per day fill date is 6/28/2019    Debo is here for any future appointments for support.     Agree with the acupuncture as you are with Dianna     Stop the baclofen as it is not helping your abdominal pain- indicats that this is not muscle related.     If this does not help will try visceral massage for your internal organs.     Prescription Drug Management will be continued by the [facility]  A narcotic contract was signed by the patient and  Patient is unable to get narcotics from other providers. They will be subject to random UAs.     As a reminder- chronic pain is generally stable, if you experience a new injury or new different pain it is expected that you present to the ED or urgent care for evaluation. DO NOT use your chronic pain medications to treat any new or different pain other then what it is intended for. We do not replace any lost or stolen prescriptions or provide early refills for overtaking your medications.       Orders placed today  Medications that were ordered today   Requested Prescriptions     Signed Prescriptions Disp Refills     senna-docusate (SENNOSIDES-DOCUSATE SODIUM) 8.6-50 mg tablet 30 tablet 2     Sig: Take 1 tablet by mouth daily.      HYDROcodone-acetaminophen 5-325 mg per tablet 168 tablet 0     Sig: Take 1-2 tablets by mouth 4 (four) times a day as needed for pain (up to 6 per day).     buprenorphine (BUTRANS) 5 mcg/hour PTWK patch 4 patch 0     Sig: Place 1 patch on the skin every 7 days.     No orders of the defined types were placed in this encounter.      UDT/SWAB:  Patient required a random Urine Drug Testing, due to the need to comply with Federation Model Policy Guidelines and CDC Guideline for the use of any controlled substances. This is to ensure that patient is compliant with treatment, and monitor for risks such as diversion, abuse, or any other aberrant behaviors. Patient is either being considered for or taking a controlled substance. Unexpected findings will be discussed and treatment decision may be adjusted. Testing is being implemented across the board randomly w/o bias related to age, race, gender, socioeconomic status or Mormon affiliation.    The patient understand todays plan and has their questions answered in regards to expectations and current treatment plan.     SAFETY REMINDERS  No alcohol while taking controlled substances. Alcohol is not an illegal substance, it is unsafe to use in combination. It is a build up of substances in the body that can be extremely hazardous and may cause respirations to slow to a dangerous rate resulting in hospitalization, brain damage, or death.    Opioid medications have been associated with sharp rise in unintentional overdose and death.  Overdose is a condition characterized by the consumption in excess of a particular drug causing adverse effects. This can happen b/c you are sick, accidentally or intentionally took an extra dose, are on multiple medication that can interact. Someone took your medication and they are not use to the medication.  Symptoms of overdose include:   !breathing slow and shallow, erratic or not at all  !pinpoint pupils, hallucinations  !confusion  !muscle  jerks, slack muscles   !extreme sleepiness or loss of alertness   !awake but not able to talk   !face pale or clammy, vomiting, for lighter skinned people, the skin tone turns bluish purple, for darker skinned people, it turns grayish or ashen   If in a situation where overdose is a concern engage the emergency response system (dial 911).    In one study it was noted that 80% of unintentional overdoses occurred in people who were taking a combination of opioids and benzodiazepines.    Do not sell, loan, borrow or share your opioid medication with anyone. Deaths have occurred as a result of this practice. It is illegal and patients are being prosecuted.     Prevent unexpected access/loss of medication: Keep medication locked. Only carry what you need with you.    Nuvia Smith, [facility]  [address]  [place] [address]  Ph: [phone]  Fax: [phone]

## 2021-05-29 NOTE — TELEPHONE ENCOUNTER
New rx for Lidocaine had been sent to [facility], but would not go through, since too early. I had cancelled the lidocaine at [facility] ,per patient request. Will call [facility] AGAIN to make sure cancelled. [facility] will try to run again.  Patient notified of this delay.

## 2021-05-29 NOTE — PROGRESS NOTES
PAIN CLINIC FOLLOW UP PROGRESS NOTE    CC:  Chief Complaint   Patient presents with     Back Pain     Intermittently     Abdominal Pain     Upper       HPI  Vernon Lemus is a 78 y.o. male who presents for evaluation   Chief Complaint   Patient presents with     Back Pain     Intermittently     Abdominal Pain     Upper    that is causing continued pain. Since the last visit the patient denies any trips to the urgent care or ED specifically for their pain. The patient denies any new medications, diagnoses since the last visit. Specific questions indicated the patient wanted addressed today include: to follow up on his ongoing chronica abdominal pain       Major issues:  1. Chronic pain syndrome    2. Chronic bilateral low back pain with bilateral sciatica    3. Abdominal pain, generalized         Alleviating factors: Include-  medications, breathing techniques, distracting   Aggravating factors: activity including bending, standing, laying down or lifting,   The patient denies using any assistive devices to help with mobilization.  Pain level today: On a scale of 1-10, the patient rates their pain at a 5/10 described as a burning, dull and constant  described as boring  Function Rating:This seems to affect his life on a daily bases as well as sleep.       Previous Medical History  Social History     Substance and Sexual Activity   Alcohol Use No     Social History     Substance and Sexual Activity   Drug Use No     Social History     Tobacco Use   Smoking Status Former Smoker     Last attempt to quit: 8/10/1986     Years since quittin.8   Smokeless Tobacco Never Used       Pertinent Pain Medications/interventions:  He is currently taking norco up to 6 per day as needed, he recently repeated the celiac plexus block with Dr. Gaviria and reports no relief as of yet.     Recently stopped oxycontin ER 15 mg two times a day. 2019     Previously has tried morphine which led to confusion   Belbuca up to 600  "mcg two times a day which helped a lot but unaffordable.     Failed medical cannabis  Percocet- too strong.      Reports having tried and failed pregablin- lyrica    TRIED AND FAILED MEDICATIONS:  Cymbalta - hallucinations at 60 mg, no issues with 40 mg  Lyrica - very depressed mood  Nortriptyline- \"spacing out\" and anxiety as well as jitteriness in the morning- however, it did help with sleep and decreased his shooting pains at night.  oxymorphone 5mg ER twice daily ,OxyContin 10 mg twice a day.    Percocet 5-325 mg, max of 3 a day, Protonix 40 mg daily,  Aspirin 81 mg daily,  Maalox 17 grams for constipation, Flaxseed.    Reviewing the records it is noted patient has tried the celiac plexus blocks x 2 and trigger point injections to the local area.      Review of Systems:  12 point systems were reviewed with pt as documented on pt health form and the patient denies any new diagnosis or changes in 12 point system review since the last visit.     Physical Exam  Vitals:    05/24/19 0829   BP: 144/75   Patient Site: Right Arm   Patient Position: Sitting   Pulse: 77   Resp: 16   Weight: (!) 225 lb (102.1 kg)   Height: 6' (1.829 m)     Physical Exam    General- patient is alert and oriented, in NAD, well-groomed, well-nourished  Psych- Judgment and insight normal, AOx4, recent and remote memory normal, mood and affect normal  Eyes- pupils are equal and reactive, conjunctiva is clear bilaterally, no ptosis is noted.   Respiratory- breathing is non-labored  Cardiovascular- extremities warm and well perfused, no peripheral edema or varicosities.  Musculoskeletal- gait is normal, extremities with no joint swelling, erythema, or warmth.  Neuro- normal strength, no gait abnormalities, normal sensation to pain, temperature, light touch.  Integumentary- no rashes, dermatitis or discolorations noted throughout, no open wounds noted. Continued chronic abdominal pain- epigastric and superior the to the " umbilicus      Medications    Current Outpatient Medications:      amLODIPine (NORVASC) 5 MG tablet, Take 5 mg by mouth daily., Disp: , Rfl:      aspirin 81 mg chewable tablet, Chew 81 mg daily., Disp: , Rfl:      latanoprost (XALATAN) 0.005 % ophthalmic solution, Administer 1 drop to the right eye at bedtime., Disp: , Rfl:      MULTIVITAMIN (MULTIPLE VITAMIN ORAL), Take 1 tablet by mouth daily., Disp: , Rfl:      omega-3/dha/epa/fish oil (FISH OIL-OMEGA-3 FATTY ACIDS) 300-1,000 mg capsule, Take 2 g by mouth daily., Disp: , Rfl:      oxybutynin (DITROPAN) 5 MG tablet, , Disp: , Rfl:      pantoprazole (PROTONIX) 40 MG tablet, Take 40 mg by mouth daily., Disp: , Rfl:      polyethylene glycol (MIRALAX) 17 gram packet, Take 17 g by mouth daily as needed., Disp: , Rfl:      senna-docusate (SENNOSIDES-DOCUSATE SODIUM) 8.6-50 mg tablet, Take 1 tablet by mouth daily., Disp: , Rfl: 0     tiZANidine (ZANAFLEX) 2 MG tablet, Take 1-2 tablets (2-4 mg total) by mouth every 6 (six) hours as needed (up to 6 per day if needed for withdrawal)., Disp: 180 tablet, Rfl: 0     triamterene-hydrochlorothiazide (DYAZIDE) 37.5-25 mg per capsule, Take 1 capsule by mouth every morning., Disp: , Rfl:      baclofen (LIORESAL) 10 MG tablet, Take 0.5-1 tablets (5-10 mg total) by mouth 3 (three) times a day., Disp: 90 tablet, Rfl: 0     [START ON 5/28/2019] HYDROcodone-acetaminophen 5-325 mg per tablet, Take 1-2 tablets by mouth 4 (four) times a day as needed for pain (up to 6 per day)., Disp: 180 tablet, Rfl: 0     lidocaine (LIDODERM) 5 %, Remove & Discard patch within 12 hours or as directed by MD apply to painful areas at this time, Disp: 90 patch, Rfl: 1    Lab:  Last UDS on 7/24/2018 had expected results. Any abnormal results have been discussed with the patient and may change the plan of care. Please see the scanned document from the outside lab under the media tab. This was reviewed with the patient.      Imaging:  No new  imaging.      Recent   Dated 5/24/2019 was reviewed with the patient today.   Paper copy reviewed    Assessment:   Vernon Lemus is a 78 y.o. male seen in clinic today for   Chief Complaint   Patient presents with     Back Pain     Intermittently     Abdominal Pain     Upper     They are here for follow up and continued medical management of their pain. Today we reviewed the lab work of the UDT test and the results are expected because of the prescribed narcotics found in the urine drug screen.     I have also reviewed the information obtained from the last visit encounter after the HEDY was signed and have asked any pertintent questions needed from other healthcare providers/family/patient to continue care and formulate a treatment plan.     Patient reports that he has successfully discontinued the use of the OxyContin ER at this time and denies any withdrawal symptoms. Patient also notes that the celiac block did not really help as of yet. He does have an appointment with  coming up. He is looking forward to finding new ideas to assist with managing pain.     Reviewing his EHR extensively he has been dealing with his ongoing chronic abdominal pain that he has not been able to identify the causality for and struggling with taking opioids. Will try acupuncture- referral placed. Currently he would like to stay on his regimen of 6 norco per day, he reports he has been unsuccessful in reducing further at this time. He notes the help him with nighttime pain in his abdomen but does make his neuropathy in his feet and hands worse.  We will try him on baclofen as he has reported the use of Robaxin in the past without success.    Patient does note he has to wear loose pants because the rubbing of his jeans on his abdomen does hurt will trial Lidoderm patches for him to apply as a trial    Future directions may include the use of Vistaril manipulation or physical therapy, the use of Lamictal if needed, returning to  the Belbuca products as this helped him in the past but was unaffordable, continue to taper the Norco as tolerated    Patients current MME is 20  Patient to call clinic for next month's prescription 5 days in advance. Based on the patients response to their previous narcotic therapy and quality of life, which includes their participation in ADLs, I recommend that the narcotics therapy continue, however the changes listed below will be incorporated into their plan of care.     Patient set goals to   1. To treat his ongoing chronic pain without the use of the opioids.     Plan:   Interventions-    Follow up in 4 weeks to evaluate the effectiveness of the treatment interventions ordered today. Scheduling your appointment prior to leaving assists in reduction of running out of medication prior to the next appointment.     If you are receiving medications from the pain clinic, it is your responsibility to call 3-5 days in advance for a refill. The clinic has up to five days to provide a refill for you.     Please bring any opioids or controlled substances in that are prescribed by the clinic for pill counts for every visit.     Ok to resume the use of Norco up to 6 per day at this time     You have made progress on stopping the OxyContin ER at this time     Will stop the tizanidine at this time it increases your neuropathy at night but does help your abdominal pain     Will start baclofen for the abdominal pain start at 1/2 tab at a time as needed up to 3 per day     Will refer you to acupuncture for abdominal pain     Agree with continued appointment with Behavioral health appointment     Previously failed lyrica, amitriptyline and cymbalta- helped only for a few days, trigger points to localized abdominal pain      You just had the celiac plexus done this week ok to give it a little more time for re-evaluation. It may indicate that it is not nerve pain we are dealing with if it provides you with no relief.     Will  touch bases with Dr. Gaviria in regards to medication he may have suggested.     Prescription Drug Management will be continued by the [facility]  A narcotic contract was signed by the patient and  Patient is unable to get narcotics from other providers. They will be subject to random UAs.     As a reminder- chronic pain is generally stable, if you experience a new injury or new different pain it is expected that you present to the ED or urgent care for evaluation. DO NOT use your chronic pain medications to treat any new or different pain other then what it is intended for. We do not replace any lost or stolen prescriptions or provide early refills for overtaking your medications.            Orders placed today  Medications that were ordered today   Requested Prescriptions     Signed Prescriptions Disp Refills     HYDROcodone-acetaminophen 5-325 mg per tablet 180 tablet 0     Sig: Take 1-2 tablets by mouth 4 (four) times a day as needed for pain (up to 6 per day).     lidocaine (LIDODERM) 5 % 90 patch 1     Sig: Remove & Discard patch within 12 hours or as directed by MD apply to painful areas at this time     baclofen (LIORESAL) 10 MG tablet 90 tablet 0     Sig: Take 0.5-1 tablets (5-10 mg total) by mouth 3 (three) times a day.     Orders Placed This Encounter   Procedures     Ambulatory referral to Acupuncture     Referral Priority:   Routine     Referral Type:   Acupuncture     Referral Reason:   Evaluation and Treatment     Referral Location:    [facility] SERV[facility]     Number of Visits Requested:   1       UDT/SWAB:  Patient required a random Urine Drug Testing, due to the need to comply with [place] Model Policy Guidelines and CDC Guideline for the use of any controlled substances. This is to ensure that patient is compliant with treatment, and monitor for risks such as diversion, abuse, or any other aberrant behaviors. Patient is either being considered for or taking a  controlled substance. Unexpected findings will be discussed and treatment decision may be adjusted. Testing is being implemented across the board randomly w/o bias related to age, race, gender, socioeconomic status or Hoah[employer]ism affiliation.    The patient understand todays plan and has their questions answered in regards to expectations and current treatment plan.     SAFETY REMINDERS  No alcohol while taking controlled substances. Alcohol is not an illegal substance, it is unsafe to use in combination. It is a build up of substances in the body that can be extremely hazardous and may cause respirations to slow to a dangerous rate resulting in hospitalization, brain damage, or death.    Opioid medications have been associated with sharp rise in unintentional overdose and death.  Overdose is a condition characterized by the consumption in excess of a particular drug causing adverse effects. This can happen b/c you are sick, accidentally or intentionally took an extra dose, are on multiple medication that can interact. Someone took your medication and they are not use to the medication.  Symptoms of overdose include:   !breathing slow and shallow, erratic or not at all  !pinpoint pupils, hallucinations  !confusion  !muscle jerks, slack muscles   !extreme sleepiness or loss of alertness   !awake but not able to talk   !face pale or clammy, vomiting, for lighter skinned people, the skin tone turns bluish purple, for darker skinned people, it turns grayish or ashen   If in a situation where overdose is a concern engage the emergency response system (dial 911).    In one study it was noted that 80% of unintentional overdoses occurred in people who were taking a combination of opioids and benzodiazepines.    Do not sell, loan, borrow or share your opioid medication with anyone. Deaths have occurred as a result of this practice. It is illegal and patients are being prosecuted.     Prevent unexpected access/loss of  medication: Keep medication locked. Only carry what you need with you.    Nuvia Smith, [facility]  [address]  [place] [address]  Ph: [phone]  Fax: [phone]

## 2021-05-29 NOTE — TELEPHONE ENCOUNTER
Patient would like his Lidocaine 5 % patch sent to [facility]. Does not want to deal with [facility].  Cued for new pharmacy. PA has been done and approved for this med. Nuvia not in clinic today, will send to another provider.

## 2021-05-29 NOTE — TELEPHONE ENCOUNTER
Prior Authorization Request  Who s requesting:  Nuvia Smith CNP/ Kiana Goff CMA  Pharmacy Name and Location: [facility]  Medication Name: Lidocaine 5% patch  Insurance Plan: [employer]  Insurance Member ID Number:  [id number]  Informed patient that prior authorizations can take up to 10 business days for response:   Yes  Okay to leave a detailed message: Yes

## 2021-05-29 NOTE — TELEPHONE ENCOUNTER
Central PA team  [phone]  Pool: RIN PA MED ([id number])          PA has been initiated.       PA form completed and faxed insurance via Cover My Meds     Key:  [id number]      Medication:  Buprenorphine 5MCG/HR weekly patches    Insurance: [employer]         Response will be received via fax and may take up to 5-10 business days depending on plan

## 2021-05-29 NOTE — PROGRESS NOTES
PAIN CLINIC FOLLOW UP PROGRESS NOTE    CC:  Chief Complaint   Patient presents with     Back Pain     Abdominal Pain       HPI  Vernon Lemus is a 79 y.o. male who presents for evaluation   Chief Complaint   Patient presents with     Back Pain     Abdominal Pain    that is causing continued pain. Since the last visit the patient denies any trips to the urgent care or ED specifically for their pain. The patient denies any new medications, diagnoses since the last visit. Specific questions indicated the patient wanted addressed today include: to follow up his ongoing right abdominal pain with any applied pressure, he notes that his night-time pain is worse and wakes him, he notes that he has taken an extra 6 pills since his last appointment due to this.       Major issues:  1. Chronic pain syndrome    2. Chronic bilateral low back pain with bilateral sciatica    3. Abdominal pain, generalized         Alleviating factors: Include-  medications, breathing techniques, distracting   Aggravating factors: activity including bending, standing, laying down or lifting,   The patient denies using any assistive devices to help with mobilization.  Pain level today: On a scale of 1-10, the patient rates their pain at a 5/10 described as a burning, dull and constant  described as boring  Function Rating:This seems to affect his life on a daily bases as well as sleep.     Previous Medical History  Social History     Substance and Sexual Activity   Alcohol Use No     Social History     Substance and Sexual Activity   Drug Use No     Social History     Tobacco Use   Smoking Status Former Smoker     Last attempt to quit: 8/10/1986     Years since quittin.8   Smokeless Tobacco Never Used       Pertinent Pain Medications/interventions:  He is currently taking norco up to 6 per day as needed, he recently repeated the celiac plexus block with Dr. Gaviria and reports no relief as of yet.     Baclofen was not helpful     Recently  "stopped oxycontin ER 15 mg two times a day. 5/24/2019     Previously has tried morphine which led to confusion   Belbuca up to 600 mcg two times a day which helped a lot but unaffordable.      Failed medical cannabis  Percocet- too strong.       Reports having tried and failed pregablin- lyrica    TRIED AND FAILED MEDICATIONS:  Cymbalta - hallucinations at 60 mg, no issues with 40 mg  Lyrica - very depressed mood  Nortriptyline- \"spacing out\" and anxiety as well as jitteriness in the morning- however, it did help with sleep and decreased his shooting pains at night.  oxymorphone 5mg ER twice daily ,OxyContin 10 mg twice a day.    Percocet 5-325 mg, max of 3 a day, Protonix 40 mg daily,  Aspirin 81 mg daily,  Maalox 17 grams for constipation, Flaxseed.     Reviewing the records it is noted patient has tried the celiac plexus blocks x 2 and trigger point injections to the local area.  Review of Systems:  12 point systems were reviewed with pt as documented on pt health form and the patient denies any new diagnosis or changes in 12 point system review since the last visit.     Physical Exam  Vitals:    06/21/19 0921   BP: (!) 152/96   Patient Site: Right Arm   Patient Position: Sitting   Pulse: 71   Resp: 16   Weight: 220 lb (99.8 kg)   Height: 6' (1.829 m)     Physical Exam     General- patient is alert and oriented, in NAD, well-groomed, well-nourished  Psych- Judgment and insight normal, AOx4, recent and remote memory normal, mood and affect normal  Eyes- pupils are equal and reactive, conjunctiva is clear bilaterally, no ptosis is noted.   Respiratory- breathing is non-labored  Cardiovascular- extremities warm and well perfused, no peripheral edema or varicosities.  Musculoskeletal- gait is normal, extremities with no joint swelling, erythema, or warmth.  Neuro- normal strength, no gait abnormalities, normal sensation to pain, temperature, light touch.  Integumentary- no rashes, dermatitis or discolorations noted " throughout, no open wounds noted. Continued chronic abdominal pain- epigastric and superior the to the umbilicus to the right    Medications    Current Outpatient Medications:      amLODIPine (NORVASC) 5 MG tablet, Take 5 mg by mouth daily., Disp: , Rfl:      aspirin 81 mg chewable tablet, Chew 81 mg daily., Disp: , Rfl:      latanoprost (XALATAN) 0.005 % ophthalmic solution, Administer 1 drop to the right eye at bedtime., Disp: , Rfl:      lidocaine (LIDODERM) 5 %, Remove & Discard patch within 12 hours or as directed by MD apply to painful areas at this time, Disp: 90 patch, Rfl: 1     MULTIVITAMIN (MULTIPLE VITAMIN ORAL), Take 1 tablet by mouth daily., Disp: , Rfl:      omega-3/dha/epa/fish oil (FISH OIL-OMEGA-3 FATTY ACIDS) 300-1,000 mg capsule, Take 2 g by mouth daily., Disp: , Rfl:      oxybutynin (DITROPAN) 5 MG tablet, , Disp: , Rfl:      pantoprazole (PROTONIX) 40 MG tablet, Take 40 mg by mouth daily., Disp: , Rfl:      polyethylene glycol (MIRALAX) 17 gram packet, Take 17 g by mouth daily as needed., Disp: , Rfl:      triamterene-hydrochlorothiazide (DYAZIDE) 37.5-25 mg per capsule, Take 1 capsule by mouth every morning., Disp: , Rfl:      buprenorphine (BUTRANS) 5 mcg/hour PTWK patch, Place 1 patch on the skin every 7 days., Disp: 4 patch, Rfl: 0     [START ON 6/28/2019] HYDROcodone-acetaminophen 5-325 mg per tablet, Take 1-2 tablets by mouth 4 (four) times a day as needed for pain (up to 6 per day)., Disp: 168 tablet, Rfl: 0     senna-docusate (SENNOSIDES-DOCUSATE SODIUM) 8.6-50 mg tablet, Take 1 tablet by mouth daily., Disp: 30 tablet, Rfl: 2    Lab:  Last UDS on 7/24/2018 had expected results. Any abnormal results have been discussed with the patient and may change the plan of care. Please see the scanned document from the outside lab under the media tab. This was reviewed with the patient.      Imaging:  No new imaging.      Recent   Dated 6/21/2019 was reviewed with the patient today.     Paper  copy reviewed    Assessment:   Vernon Lemus is a 79 y.o. male seen in clinic today for   Chief Complaint   Patient presents with     Back Pain     Abdominal Pain   . They are here for follow up and continued medical management of their pain. Today we reviewed the lab work of the UDT test and the results are expected because of the prescribed narcotics found in the urine drug screen.     I have also reviewed the information obtained from the last visit encounter after the HEDY was signed and have asked any pertintent questions needed from other healthcare providers/family/patient to continue care and formulate a treatment plan.     Patient presents the pain started follow-up his chronic pain treatment plan.  He does report he took approximately 6 pills over his allotted amount due to the nighttime pain.  Patient has been trying to follow through with recommendations he recently did see Marisela for behavioral health he felt with their meeting was helpful but did not feel that he needed to continue therapy.  Patient does note that his family may benefit from coming in speaking with Marisela at some point however they have not to take them up on his offer as of yet.    Did discuss with the patient today ongoing plans of care he would prefer to be off opioids if possible however every time he tries to wean his pain increases to the point that he is unable to complete any activity other than lying in bed or sleeping.  Abdominal pain gets worse with any pressure when it is applied to his stomach patient does not wear a belt and does not close his pants.  Previously the patient did well on Belbuca however over time the cost incurred too much financial hardship patient was then transition to a long-acting OxyContin.  At this time we will re-introduce a longer acting medication such as Butrans which be applied weekly, due to the need for nighttime coverage.  Patient is reporting the 6 Norco helps but sometimes it is just  not enough.  We will allow the patient to  1 day early for his next refill.  It is also scheduled for acupuncture try to obtain abdominal relief future directions may include visceral massage with physical therapy.    Patient has failed and tried muscle relaxers including baclofen which patient reports does not do much for him aside from making him tired.  Patient is also failed celiac plexus blocks and local trigger point injections, also failed lidocaine topically.     Patient reports that Dr. Alva is looking at ordering a blood flow study to monitor if there is any local flow issues that could be contributing to his pain.    Patients current MME is 24-49  Patient to call clinic for next month's prescription 5 days in advance. Based on the patients response to their previous narcotic therapy and quality of life, which includes their participation in ADLs, I recommend that the narcotics therapy continue, however the changes listed below will be incorporated into their plan of care.     Patient set goals to   1. To reduce his pain or eliminate the need for opioids if possible.     Plan:   Interventions-    Follow up in 4-6 weeks to evaluate the effectiveness of the treatment interventions ordered today. Scheduling your appointment prior to leaving assists in reduction of running out of medication prior to the next appointment.     OVL     If you are receiving medications from the pain clinic, it is your responsibility to call 3-5 days in advance for a refill. The clinic has up to five days to provide a refill for you.     Please call the [facility] for refills.    Please bring any opioids or controlled substances in that are prescribed by the clinic for pill counts for every visit.     Add a long acting butrans patch- apply weekly. 5 mcg/ hr    Continue the opioid of norco as you are Up to 6 tabs per day fill date is 6/28/2019    Debo is here for any future appointments for support.     Agree  with the acupuncture as you are with Dianna     Stop the baclofen as it is not helping your abdominal pain- indicats that this is not muscle related.     If this does not help will try visceral massage for your internal organs.     Prescription Drug Management will be continued by the [facility]  A narcotic contract was signed by the patient and  Patient is unable to get narcotics from other providers. They will be subject to random UAs.     As a reminder- chronic pain is generally stable, if you experience a new injury or new different pain it is expected that you present to the ED or urgent care for evaluation. DO NOT use your chronic pain medications to treat any new or different pain other then what it is intended for. We do not replace any lost or stolen prescriptions or provide early refills for overtaking your medications.       Orders placed today  Medications that were ordered today   Requested Prescriptions     Signed Prescriptions Disp Refills     senna-docusate (SENNOSIDES-DOCUSATE SODIUM) 8.6-50 mg tablet 30 tablet 2     Sig: Take 1 tablet by mouth daily.     HYDROcodone-acetaminophen 5-325 mg per tablet 168 tablet 0     Sig: Take 1-2 tablets by mouth 4 (four) times a day as needed for pain (up to 6 per day).     buprenorphine (BUTRANS) 5 mcg/hour PTWK patch 4 patch 0     Sig: Place 1 patch on the skin every 7 days.     No orders of the defined types were placed in this encounter.      UDT/SWAB:  Patient required a random Urine Drug Testing, due to the need to comply with [place] Model Policy Guidelines and CDC Guideline for the use of any controlled substances. This is to ensure that patient is compliant with treatment, and monitor for risks such as diversion, abuse, or any other aberrant behaviors. Patient is either being considered for or taking a controlled substance. Unexpected findings will be discussed and treatment decision may be adjusted. Testing is being implemented across  the board randomly w/o bias related to age, race, gender, socioeconomic status or [employer] affiliation.    The patient understand todays plan and has their questions answered in regards to expectations and current treatment plan.     SAFETY REMINDERS  No alcohol while taking controlled substances. Alcohol is not an illegal substance, it is unsafe to use in combination. It is a build up of substances in the body that can be extremely hazardous and may cause respirations to slow to a dangerous rate resulting in hospitalization, brain damage, or death.    Opioid medications have been associated with sharp rise in unintentional overdose and death.  Overdose is a condition characterized by the consumption in excess of a particular drug causing adverse effects. This can happen b/c you are sick, accidentally or intentionally took an extra dose, are on multiple medication that can interact. Someone took your medication and they are not use to the medication.  Symptoms of overdose include:   !breathing slow and shallow, erratic or not at all  !pinpoint pupils, hallucinations  !confusion  !muscle jerks, slack muscles   !extreme sleepiness or loss of alertness   !awake but not able to talk   !face pale or clammy, vomiting, for lighter skinned people, the skin tone turns bluish purple, for darker skinned people, it turns grayish or ashen   If in a situation where overdose is a concern engage the emergency response system (dial 911).    In one study it was noted that 80% of unintentional overdoses occurred in people who were taking a combination of opioids and benzodiazepines.    Do not sell, loan, borrow or share your opioid medication with anyone. Deaths have occurred as a result of this practice. It is illegal and patients are being prosecuted.     Prevent unexpected access/loss of medication: Keep medication locked. Only carry what you need with you.    Approximately 40 minutes was spent with the patient in the pain center,  extending clinical visit beyond their scheduled time. Greater then 60% was face to face discussing overall issues related to their ongoing chronic pain as well as the treatments that may benefit them. The plan of care will be adjusted to accommodate the issues discussed, discussing management of their care and follow up that is recommended. 6/21/2019    Nuvia Smith, [facility]  [address]  [place] [address]  Ph: [phone]  Fax: [phone]

## 2021-05-30 NOTE — PROGRESS NOTES
"ACUPUNCTURIST TREATMENT NOTE    Name: Vernon Lemus  :  1940  MRN:  [id number]      Acupuncture Treatment  Patient Type: JN Pain  Intervention Reason: Pain  Pre-session Pain Ratin  Post-session Pain Ratin  Patient complaint:: Patient would like to address chronic abdominal pain. Pain began 6 years ago and has been very steady ever since. He has been told this is caused by chronic pancreatitis. Pain is in RUQ and is tender to the touch. He denies diet affecting his pain level, although reports if he over eats it will increase pain. He is constipated from meds and says this can increase pain as well, but he manages on his own with stool softeners and fruits/veggies in diet. He has daily BMs. Nausea that comes and goes with no apparent trigger. Acid reflux history, on daily meds. Reports if he misses meds he will have acid reflux. He reports the pain gets worse with certain exercises and movements, as well as pressure around his waist. 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 Some low back pain that he manages well with exercise. He sleeps well and naps each afternoon.  Acupuncture (Points):: Liv3, St44, Gb41, Ub62, Ki3, Sp6, Gb34, St36, Si3, Tb5, Li11, Ren14, Ren12, Ren10, Ren6, St25, Sp15, (R) Liv13, Baihui, Du24, Yintang  TCM Pulse: wiry  TCM Tongue: red, yellow coating, bare edges and tip  TCM Diagnosis: Kidney yin deficiency, liver invading stomach, blood stasis w/deficiency  Practitioner Observed: 30 minute treatment. Infrared heat lamp over feet.  Treatment Plan/Follow up: Patient to schedule 4 weekly visits as available according to provider schedule.  Checklist: Progress Note Completed;Consent Reveiwed  Follow up comments: Patient relaxed well during treatment. Stated he felt \"very relaxed\" post treatment.    \"Risks and benefits of acupuncture were discussed with patient. Consent for treatment was given. We thank you for the referral.\"     Dianna Cortés " L.Ac.    Date:  6/27/2019  Time:  1:47 PM

## 2021-05-30 NOTE — PROGRESS NOTES
Patient presents to the clinic today for a follow up with Nuvia Smith CNP regarding UDS/CSA and abdominal pain. Patient describes their pain as 3-constant, dull and burning. Her/His function score is 4.

## 2021-05-30 NOTE — PATIENT INSTRUCTIONS - HE
Plan:   Interventions-    Follow up in 4 weeks to evaluate the effectiveness of the treatment interventions ordered today. Scheduling your appointment prior to leaving assists in reduction of running out of medication prior to the next appointment.     If you are receiving medications from the pain clinic, it is your responsibility to call 3-5 days in advance for a refill. The clinic has up to five days to provide a refill for you.     Please call the [facility] for refills.    Please bring any opioids or controlled substances in that are prescribed by the clinic for pill counts for every visit.     Therapy- PT/OTmay need visceral massage if acupuncture fails.     Behavioral Health has been completed with Marta and is PRN at this time.     Acupuncture- continue this as you are     Will increase the Butrans patch to 10 mcg/ hr- 30 day script   Norco ok to continue up to 6 tabs per day. Ok to  on 7/24/2019    UDT today     Prescription Drug Management will be continued by the [facility]  A narcotic contract was signed by the patient and  Patient is unable to get narcotics from other providers. They will be subject to random UAs.     As a reminder- chronic pain is generally stable, if you experience a new injury or new different pain it is expected that you present to the ED or urgent care for evaluation. DO NOT use your chronic pain medications to treat any new or different pain other then what it is intended for. We do not replace any lost or stolen prescriptions or provide early refills for overtaking your medications.      Orders placed today  Medications that were ordered today   Requested Prescriptions     Signed Prescriptions Disp Refills     HYDROcodone-acetaminophen 5-325 mg per tablet 168 tablet 0     Sig: Take 1-2 tablets by mouth 4 (four) times a day as needed for pain (up to 6 per day).     buprenorphine (BUTRANS) 10 mcg/hour PTWK patch 4 patch 0     Sig: Place 1 patch on  the skin every 7 days.     Orders Placed This Encounter   Procedures     Pain Management Drug Panel, Urine       UDT/SWAB:  Patient required a random Urine Drug Testing, due to the need to comply with [facility] Model Policy Guidelines and CDC Guideline for the use of any controlled substances. This is to ensure that patient is compliant with treatment, and monitor for risks such as diversion, abuse, or any other aberrant behaviors. Patient is either being considered for or taking a controlled substance. Unexpected findings will be discussed and treatment decision may be adjusted. Testing is being implemented across the board randomly w/o bias related to age, race, gender, socioeconomic status or Mormon affiliation.    The patient understand todays plan and has their questions answered in regards to expectations and current treatment plan.     SAFETY REMINDERS  No alcohol while taking controlled substances. Alcohol is not an illegal substance, it is unsafe to use in combination. It is a build up of substances in the body that can be extremely hazardous and may cause respirations to slow to a dangerous rate resulting in hospitalization, brain damage, or death.    Opioid medications have been associated with sharp rise in unintentional overdose and death.  Overdose is a condition characterized by the consumption in excess of a particular drug causing adverse effects. This can happen b/c you are sick, accidentally or intentionally took an extra dose, are on multiple medication that can interact. Someone took your medication and they are not use to the medication.  Symptoms of overdose include:   !breathing slow and shallow, erratic or not at all  !pinpoint pupils, hallucinations  !confusion  !muscle jerks, slack muscles   !extreme sleepiness or loss of alertness   !awake but not able to talk   !face pale or clammy, vomiting, for lighter skinned people, the skin tone turns bluish purple, for darker skinned people, it  turns grayish or ashen   If in a situation where overdose is a concern engage the emergency response system (dial 911).    In one study it was noted that 80% of unintentional overdoses occurred in people who were taking a combination of opioids and benzodiazepines.    Do not sell, loan, borrow or share your opioid medication with anyone. Deaths have occurred as a result of this practice. It is illegal and patients are being prosecuted.     Prevent unexpected access/loss of medication: Keep medication locked. Only carry what you need with you.    Nuvia Smith, [facility]  [address]  [place] [address]  Ph: [phone]  Fax: [phone]

## 2021-05-30 NOTE — PROGRESS NOTES
PAIN CLINIC FOLLOW UP PROGRESS NOTE    CC:  Chief Complaint   Patient presents with     Follow-up     RUQ abdominal pain       HPI  Vernon Lemus is a 79 y.o. male who presents for evaluation   Chief Complaint   Patient presents with     Follow-up     RUQ abdominal pain    that is causing continued pain. Since the last visit the patient denies any trips to the urgent care or ED specifically for their pain. The patient denies any new medications, diagnoses since the last visit. Specific questions indicated the patient wanted addressed today include: to follow up on his ongoing chronic pain as well as discuss his current treatment.       Major issues:  1. Chronic pain syndrome    2. Chronic bilateral low back pain with bilateral sciatica    3. Abdominal pain, generalized        Alleviating factors: Include-  medications, breathing techniques, distracting   Aggravating factors: activity including bending, standing, laying down or lifting,   The patient denies using any assistive devices to help with mobilization.  Pain level today: On a scale of 1-10, the patient rates their pain at a 5/10 described as a burning, dull and constant  described as boring  Function Rating:This seems to affect his life on a daily bases as well as sleep.       Previous Medical History  Social History     Substance and Sexual Activity   Alcohol Use No     Social History     Substance and Sexual Activity   Drug Use No     Social History     Tobacco Use   Smoking Status Former Smoker     Last attempt to quit: 8/10/1986     Years since quittin.9   Smokeless Tobacco Never Used       Pertinent Pain Medications/interventions:  Currently he is taking norco 5/325 mg up to 6 tabs per day as well as Butrans 5 mcg/ hr      He is currently taking norco up to 6 per day as needed, he recently repeated the celiac plexus block with Dr. Gaviria and reports no relief as of yet.      Baclofen was not helpful     Recently stopped oxycontin ER 15 mg  "two times a day. 5/24/2019     Previously has tried morphine which led to confusion   Belbuca up to 600 mcg two times a day which helped a lot but unaffordable.      Failed medical cannabis  Percocet- too strong.       Reports having tried and failed pregablin- lyrica    TRIED AND FAILED MEDICATIONS:  Cymbalta - hallucinations at 60 mg, no issues with 40 mg  Lyrica - very depressed mood  Nortriptyline- \"spacing out\" and anxiety as well as jitteriness in the morning- however, it did help with sleep and decreased his shooting pains at night.  oxymorphone 5mg ER twice daily ,OxyContin 10 mg twice a day.    Percocet 5-325 mg, max of 3 a day, Protonix 40 mg daily,  Aspirin 81 mg daily,  Maalox 17 grams for constipation, Flaxseed.     Reviewing the records it is noted patient has tried the celiac plexus blocks x 2 and trigger point injections to the local area.    Review of Systems:  12 point systems were reviewed with pt as documented on pt health form and the patient denies any new diagnosis or changes in 12 point system review since the last visit.     Physical Exam  Vitals:    07/22/19 0915   BP: 144/71   Patient Site: Left Arm   Patient Position: Sitting   Cuff Size: Adult Large   Pulse: 67   Weight: 220 lb (99.8 kg)   Height: 6' (1.829 m)     General- patient is alert and oriented, in NAD, well-groomed, well-nourished  Psych- Judgment and insight normal, AOx4, recent and remote memory normal, mood and affect normal  Eyes- pupils are equal and reactive, conjunctiva is clear bilaterally, no ptosis is noted.   Respiratory- breathing is non-labored  Cardiovascular- extremities warm and well perfused, no peripheral edema or varicosities.  Musculoskeletal- gait is normal, extremities with no joint swelling, erythema, or warmth.  Neuro- normal strength, no gait abnormalities, normal sensation to pain, temperature, light touch.  Integumentary- no rashes, dermatitis or discolorations noted throughout, no open wounds " noted. Continued chronic abdominal pain- epigastric and superior  to the right of the umbilicus     Medications    Current Outpatient Medications:      [START ON 7/26/2019] HYDROcodone-acetaminophen 5-325 mg per tablet, Take 1-2 tablets by mouth 4 (four) times a day as needed for pain (up to 6 per day)., Disp: 168 tablet, Rfl: 0     amLODIPine (NORVASC) 5 MG tablet, Take 5 mg by mouth daily., Disp: , Rfl:      aspirin 81 mg chewable tablet, Chew 81 mg daily., Disp: , Rfl:      buprenorphine (BUTRANS) 10 mcg/hour PTWK patch, Place 1 patch on the skin every 7 days., Disp: 4 patch, Rfl: 0     latanoprost (XALATAN) 0.005 % ophthalmic solution, Administer 1 drop to the right eye at bedtime., Disp: , Rfl:      lidocaine (LIDODERM) 5 %, Remove & Discard patch within 12 hours or as directed by MD apply to painful areas at this time, Disp: 90 patch, Rfl: 1     MULTIVITAMIN (MULTIPLE VITAMIN ORAL), Take 1 tablet by mouth daily., Disp: , Rfl:      omega-3/dha/epa/fish oil (FISH OIL-OMEGA-3 FATTY ACIDS) 300-1,000 mg capsule, Take 2 g by mouth daily., Disp: , Rfl:      oxybutynin (DITROPAN) 5 MG tablet, , Disp: , Rfl:      pantoprazole (PROTONIX) 40 MG tablet, Take 40 mg by mouth daily., Disp: , Rfl:      polyethylene glycol (MIRALAX) 17 gram packet, Take 17 g by mouth daily as needed., Disp: , Rfl:      senna-docusate (SENNOSIDES-DOCUSATE SODIUM) 8.6-50 mg tablet, Take 1 tablet by mouth daily., Disp: 30 tablet, Rfl: 2     triamterene-hydrochlorothiazide (DYAZIDE) 37.5-25 mg per capsule, Take 1 capsule by mouth every morning., Disp: , Rfl:     Lab:  Last UDS on 7/24/2018 had expected results. Any abnormal results have been discussed with the patient and may change the plan of care. Please see the scanned document from the outside lab under the media tab. This was reviewed with the patient.      Imaging:  No new imaging.      Recent   Dated 7/22/2019 was reviewed with the patient today.   Paper copy reviewed     Assessment:    Vernon Lemus is a 79 y.o. male seen in clinic today for   Chief Complaint   Patient presents with     Follow-up     RUQ abdominal pain   . They are here for follow up and continued medical management of their pain. Today we reviewed the lab work of the UDT test and the results are expected because of the prescribed narcotics found in the urine drug screen.     I have also reviewed the information obtained from the last visit encounter after the HEDY was signed and have asked any pertintent questions needed from other healthcare providers/family/patient to continue care and formulate a treatment plan.     Chronic abdominal pain- patient notes that the acupuncture has helped him be pain free for 1/2 a day after he had the first session.  Recurrent schedule appointments     Opioids- he is currently taking the butrans patch 5 mcg/hr and denies any reactions- right deltoid as well as the short acting norco 5/325 mg up to 6 per day.     Behavioral health- previously visited, notes his family may want to attend at some point, they have not yet done so.     Patient feels supported here at the [facility].   Patients current MME is 20-40  Patient to call clinic for next month's prescription 5 days in advance. Based on the patients response to their previous narcotic therapy and quality of life, which includes their participation in ADLs, I recommend that the narcotics therapy continue, however the changes listed below will be incorporated into their plan of care.     Patient set goals to   1.to continue to treat his ongoing chronic abdominal pian.     Plan:   Interventions-    Follow up in 4 weeks to evaluate the effectiveness of the treatment interventions ordered today. Scheduling your appointment prior to leaving assists in reduction of running out of medication prior to the next appointment.     If you are receiving medications from the pain clinic, it is your responsibility to call 3-5 days in advance  for a refill. The clinic has up to five days to provide a refill for you.     Please call the [facility] for refills.    Please bring any opioids or controlled substances in that are prescribed by the clinic for pill counts for every visit.     Therapy- PT/OTmay need visceral massage if acupuncture fails.     Behavioral Health has been completed with Marta and is PRN at this time.     Acupuncture- continue this as you are     Will increase the Butrans patch to 10 mcg/ hr- 30 day script   Norco ok to continue up to 6 tabs per day. Ok to  on 7/24/2019    UDT today     Prescription Drug Management will be continued by the [facility]  A narcotic contract was signed by the patient and  Patient is unable to get narcotics from other providers. They will be subject to random UAs.     As a reminder- chronic pain is generally stable, if you experience a new injury or new different pain it is expected that you present to the ED or urgent care for evaluation. DO NOT use your chronic pain medications to treat any new or different pain other then what it is intended for. We do not replace any lost or stolen prescriptions or provide early refills for overtaking your medications.      Orders placed today  Medications that were ordered today   Requested Prescriptions     Signed Prescriptions Disp Refills     HYDROcodone-acetaminophen 5-325 mg per tablet 168 tablet 0     Sig: Take 1-2 tablets by mouth 4 (four) times a day as needed for pain (up to 6 per day).     buprenorphine (BUTRANS) 10 mcg/hour PTWK patch 4 patch 0     Sig: Place 1 patch on the skin every 7 days.     Orders Placed This Encounter   Procedures     Pain Management Drug Panel, Urine       UDT/SWAB:  Patient required a random Urine Drug Testing, due to the need to comply with [place] [employer] Policy Guidelines and CDC Guideline for the use of any controlled substances. This is to ensure that patient is compliant with treatment, and  monitor for risks such as diversion, abuse, or any other aberrant behaviors. Patient is either being considered for or taking a controlled substance. Unexpected findings will be discussed and treatment decision may be adjusted. Testing is being implemented across the board randomly w/o bias related to age, race, gender, socioeconomic status or Jain affiliation.    The patient understand todays plan and has their questions answered in regards to expectations and current treatment plan.     SAFETY REMINDERS  No alcohol while taking controlled substances. Alcohol is not an illegal substance, it is unsafe to use in combination. It is a build up of substances in the body that can be extremely hazardous and may cause respirations to slow to a dangerous rate resulting in hospitalization, brain damage, or death.    Opioid medications have been associated with sharp rise in unintentional overdose and death.  Overdose is a condition characterized by the consumption in excess of a particular drug causing adverse effects. This can happen b/c you are sick, accidentally or intentionally took an extra dose, are on multiple medication that can interact. Someone took your medication and they are not use to the medication.  Symptoms of overdose include:   !breathing slow and shallow, erratic or not at all  !pinpoint pupils, hallucinations  !confusion  !muscle jerks, slack muscles   !extreme sleepiness or loss of alertness   !awake but not able to talk   !face pale or clammy, vomiting, for lighter skinned people, the skin tone turns bluish purple, for darker skinned people, it turns grayish or ashen   If in a situation where overdose is a concern engage the emergency response system (dial 911).    In one study it was noted that 80% of unintentional overdoses occurred in people who were taking a combination of opioids and benzodiazepines.    Do not sell, loan, borrow or share your opioid medication with anyone. Deaths have  occurred as a result of this practice. It is illegal and patients are being prosecuted.     Prevent unexpected access/loss of medication: Keep medication locked. Only carry what you need with you.    Nuvia Smith, [facility]  [address]  [place] [address]  Ph: [phone]  Fax: [phone]

## 2021-05-30 NOTE — TELEPHONE ENCOUNTER
Call from board of pharmacy as a follow up to patient complaint from several weeks ago.  Patient had contacted the board of pharmacy regarding an issue with a pharmacist not filling medication according to provider directions.  Reviewed with Qiana at the board of pharmacy, what writer knew to have taken place.  Patient had been out of medications due to pharmacist not feeling the new order was appropriate or in line with previous directions to try to taper.  Reviewed the note from RN who handled this phone call at the time.  Please see previous note regarding this issue .  Board of pharmacy will continue to investigate this issue and call back with any further questions.

## 2021-05-31 VITALS — BODY MASS INDEX: 29.93 KG/M2 | HEIGHT: 72 IN | WEIGHT: 221 LBS

## 2021-05-31 NOTE — PROGRESS NOTES
PAIN CLINIC FOLLOW UP PROGRESS NOTE    CC:  Chief Complaint   Patient presents with     Back Pain     Abdominal Pain       HPI  Vernon Lemus is a 79 y.o. male who presents for evaluation   Chief Complaint   Patient presents with     Back Pain     Abdominal Pain    that is causing continued pain. Since the last visit the patient denies any trips to the urgent care or ED specifically for their pain. The patient denies any new medications, diagnoses since the last visit. Specific questions indicated the patient wanted addressed today include: to follow up on his ongoing chronic pain as well as discuss his plan of care. He notes that the butrans patch works great for about 3-4 days instead of 7 days     Major issues:  1. Chronic pain syndrome    2. Chronic bilateral low back pain with bilateral sciatica    3. Abdominal pain, generalized      Alleviating factors: Include-  medications, breathing techniques, distracting   Aggravating factors: activity including bending, standing, laying down or lifting,   The patient denies using any assistive devices to help with mobilization.  Pain level today: On a scale of 1-10, the patient rates their pain at a 5/10 described as a burning, dull and constant  described as boring  Function Rating:This seems to affect his life on a daily bases as well as sleep.      Previous Medical History  Social History     Substance and Sexual Activity   Alcohol Use No     Social History     Substance and Sexual Activity   Drug Use No     Social History     Tobacco Use   Smoking Status Former Smoker     Last attempt to quit: 8/10/1986     Years since quittin.0   Smokeless Tobacco Never Used       Pertinent Pain Medications/interventions:  Currently he is taking norco 5/325 mg up to 6 tabs per day as well as Butrans 15 mcg/ hr       He is currently taking norco up to 6 per day as needed, he recently repeated the celiac plexus block with Dr. Gaviria and reports no relief as of  "yet.     Acupuncture was helpful initially but is now not effective     Baclofen was not helpful     Recently stopped oxycontin ER 15 mg two times a day. 5/24/2019     Previously has tried morphine which led to confusion   Belbuca up to 600 mcg two times a day which helped a lot but unaffordable.      Failed medical cannabis  Percocet- too strong.       Reports having tried and failed pregablin- lyrica    TRIED AND FAILED MEDICATIONS:  Cymbalta - hallucinations at 60 mg, no issues with 40 mg  Lyrica - very depressed mood  Nortriptyline- \"spacing out\" and anxiety as well as jitteriness in the morning- however, it did help with sleep and decreased his shooting pains at night.  oxymorphone 5mg ER twice daily ,OxyContin 10 mg twice a day.    Percocet 5-325 mg, max of 3 a day, Protonix 40 mg daily,  Aspirin 81 mg daily,  Maalox 17 grams for constipation, Flaxseed.     Reviewing the records it is noted patient has tried the celiac plexus blocks x 2 and trigger point injections to the local area.    Review of Systems:  12 point systems were reviewed with pt as documented on pt health form and the patient denies any new diagnosis or changes in 12 point system review since the last visit.     Physical Exam  Vitals:    08/21/19 0843   BP: 143/74   Patient Site: Right Arm   Patient Position: Sitting   Pulse: 67   Resp: 18   Weight: 220 lb (99.8 kg)   Height: 6' (1.829 m)     General- patient is alert and oriented, in NAD, well-groomed, well-nourished  Psych- Judgment and insight normal, AOx4, recent and remote memory normal, mood and affect normal  Eyes- pupils are equal and reactive, conjunctiva is clear bilaterally, no ptosis is noted.   Respiratory- breathing is non-labored  Cardiovascular- extremities warm and well perfused, no peripheral edema or varicosities.  Musculoskeletal- gait is normal, extremities with no joint swelling, erythema, or warmth.  Neuro- normal strength, no gait abnormalities, normal sensation to pain, " temperature, light touch.  Integumentary- no rashes, dermatitis or discolorations noted throughout, no open wounds noted. Continued chronic abdominal pain- epigastric and superior  to the right of the umbilicus       Medications    Current Outpatient Medications:      amLODIPine (NORVASC) 5 MG tablet, Take 5 mg by mouth daily., Disp: , Rfl:      aspirin 81 mg chewable tablet, Chew 81 mg daily., Disp: , Rfl:      HYDROcodone-acetaminophen 5-325 mg per tablet, Take 1-2 tablets by mouth 4 (four) times a day as needed for pain (up to 6 per day)., Disp: 168 tablet, Rfl: 0     latanoprost (XALATAN) 0.005 % ophthalmic solution, Administer 1 drop to the right eye at bedtime., Disp: , Rfl:      metoprolol tartrate (LOPRESSOR) 100 MG tablet, Take 150 mg by mouth daily., Disp: , Rfl:      MULTIVITAMIN (MULTIPLE VITAMIN ORAL), Take 1 tablet by mouth daily., Disp: , Rfl:      omega-3/dha/epa/fish oil (FISH OIL-OMEGA-3 FATTY ACIDS) 300-1,000 mg capsule, Take 2 g by mouth daily., Disp: , Rfl:      oxybutynin (DITROPAN) 5 MG tablet, , Disp: , Rfl:      pantoprazole (PROTONIX) 40 MG tablet, Take 40 mg by mouth daily., Disp: , Rfl:      triamterene-hydrochlorothiazide (DYAZIDE) 37.5-25 mg per capsule, Take 1 capsule by mouth every morning., Disp: , Rfl:      buprenorphine (BUTRANS) 15 mcg/hour PTWK patch, Place 1 patch on the skin every 7 days., Disp: 4 patch, Rfl: 0     polyethylene glycol (MIRALAX) 17 gram packet, Take 17 g by mouth daily as needed., Disp: , Rfl:      senna-docusate (SENNOSIDES-DOCUSATE SODIUM) 8.6-50 mg tablet, Take 1 tablet by mouth daily., Disp: 30 tablet, Rfl: 2    Lab:  Last UDS on 7/24/2019 had expected results. Any abnormal results have been discussed with the patient and may change the plan of care. Please see the scanned document from the outside lab under the media tab. This was reviewed with the patient.      Imaging:  No new imaging.      Recent   Dated 8/21/2019 was reviewed with the patient today.    Paper copy reviewed     Assessment:   Vernon Lemus is a 79 y.o. male seen in clinic today for   Chief Complaint   Patient presents with     Back Pain     Abdominal Pain   They are here for follow up and continued medical management of their pain. Today we reviewed the lab work of the UDT test and the results are expected because of the prescribed narcotics found in the urine drug screen.     I have also reviewed the information obtained from the last visit encounter after the HEDY was signed and have asked any pertintent questions needed from other healthcare providers/family/patient to continue care and formulate a treatment plan.     Chronic abdominal pain- he has tried acupuncture and notes that Butrans wears off after 3-4 days and he needs to rely more on the norco. He notes the acupuncure is no longer as helpful as it was initially, discussed microcurrent frequency as a consideration with patient, he will do some research on this. Will increase the Butrans today to 15 mcg/hr, patient has reduced his use on the Norco when he does not need it and has 12 extra pills today. His goal is to have 24 remaining pills next month. Will monitor this.     Patients current MME is 38-58  Patient to call clinic for next month's prescription 5 days in advance. Based on the patients response to their previous narcotic therapy and quality of life, which includes their participation in ADLs, I recommend that the narcotics therapy continue, however the changes listed below will be incorporated into their plan of care.     Patient set goals to   1. To continue to treat his ongoing chronic abdominal pain.     Plan:   Interventions-    Follow up in 4-8 weeks to evaluate the effectiveness of the treatment interventions ordered today. Scheduling your appointment prior to leaving assists in reduction of running out of medication prior to the next appointment.     If you are receiving medications from the pain clinic, it is your  responsibility to call 3-5 days in advance for a refill. The clinic has up to five days to provide a refill for you.     Please call the [facility] for refills.    Please bring any opioids or controlled substances in that are prescribed by the clinic for pill counts for every visit.     Will increase the dose of the butrans patch to 15 mcg/ hr     Continue the use of the norco up to 6 per day, you had 12 remaining and have a goal to reduce use of the norco if the pain is better controlled with the patch.     Good luck on the blood flow study.     Acupuncture- ok to continue this as you see fit    Frequency Specific Microcurrent may be beneficial to you. Would have to refer you out for this treatment     As a reminder- chronic pain is generally stable, if you experience a new injury or new different pain it is expected that you present to the ED or urgent care for evaluation. DO NOT use your chronic pain medications to treat any new or different pain other then what it is intended for. We do not replace any lost or stolen prescriptions or provide early refills for overtaking your medications.       Orders placed today  Medications that were ordered today   Requested Prescriptions     Signed Prescriptions Disp Refills     HYDROcodone-acetaminophen 5-325 mg per tablet 168 tablet 0     Sig: Take 1-2 tablets by mouth 4 (four) times a day as needed for pain (up to 6 per day).     buprenorphine (BUTRANS) 15 mcg/hour PTWK patch 4 patch 0     Sig: Place 1 patch on the skin every 7 days.     No orders of the defined types were placed in this encounter.      UDT/SWAB:  Patient required a random Urine Drug Testing, due to the need to comply with [place] Model Policy Guidelines and CDC Guideline for the use of any controlled substances. This is to ensure that patient is compliant with treatment, and monitor for risks such as diversion, abuse, or any other aberrant behaviors. Patient is either being considered for  or taking a controlled substance. Unexpected findings will be discussed and treatment decision may be adjusted. Testing is being implemented across the board randomly w/o bias related to age, race, gender, socioeconomic status or Orthodoxy affiliation.    The patient understand todays plan and has their questions answered in regards to expectations and current treatment plan.     SAFETY REMINDERS  No alcohol while taking controlled substances. Alcohol is not an illegal substance, it is unsafe to use in combination. It is a build up of substances in the body that can be extremely hazardous and may cause respirations to slow to a dangerous rate resulting in hospitalization, brain damage, or death.    Opioid medications have been associated with sharp rise in unintentional overdose and death.  Overdose is a condition characterized by the consumption in excess of a particular drug causing adverse effects. This can happen b/c you are sick, accidentally or intentionally took an extra dose, are on multiple medication that can interact. Someone took your medication and they are not use to the medication.  Symptoms of overdose include:   !breathing slow and shallow, erratic or not at all  !pinpoint pupils, hallucinations  !confusion  !muscle jerks, slack muscles   !extreme sleepiness or loss of alertness   !awake but not able to talk   !face pale or clammy, vomiting, for lighter skinned people, the skin tone turns bluish purple, for darker skinned people, it turns grayish or ashen   If in a situation where overdose is a concern engage the emergency response system (dial 911).    In one study it was noted that 80% of unintentional overdoses occurred in people who were taking a combination of opioids and benzodiazepines.    Do not sell, loan, borrow or share your opioid medication with anyone. Deaths have occurred as a result of this practice. It is illegal and patients are being prosecuted.     Prevent unexpected access/loss  of medication: Keep medication locked. Only carry what you need with you.    Nuvia Smith, [facility]  [address]  [place] [address]  Ph: [phone]  Fax: [phone]

## 2021-05-31 NOTE — PATIENT INSTRUCTIONS - HE
Plan:   Interventions-    Follow up in 4-8 weeks to evaluate the effectiveness of the treatment interventions ordered today. Scheduling your appointment prior to leaving assists in reduction of running out of medication prior to the next appointment.     If you are receiving medications from the pain clinic, it is your responsibility to call 3-5 days in advance for a refill. The clinic has up to five days to provide a refill for you.     Please call the [facility] for refills.    Please bring any opioids or controlled substances in that are prescribed by the clinic for pill counts for every visit.     Will increase the dose of the butrans patch to 15 mcg/ hr     Continue the use of the norco up to 6 per day, you had 12 remaining and have a goal to reduce use of the norco if the pain is better controlled with the patch.     Good luck on the blood flow study.     Acupuncture- ok to continue this as you see fit    Frequency Specific Microcurrent may be beneficial to you. Would have to refer you out for this treatment     As a reminder- chronic pain is generally stable, if you experience a new injury or new different pain it is expected that you present to the ED or urgent care for evaluation. DO NOT use your chronic pain medications to treat any new or different pain other then what it is intended for. We do not replace any lost or stolen prescriptions or provide early refills for overtaking your medications.       Orders placed today  Medications that were ordered today   Requested Prescriptions     Signed Prescriptions Disp Refills     HYDROcodone-acetaminophen 5-325 mg per tablet 168 tablet 0     Sig: Take 1-2 tablets by mouth 4 (four) times a day as needed for pain (up to 6 per day).     buprenorphine (BUTRANS) 15 mcg/hour PTWK patch 4 patch 0     Sig: Place 1 patch on the skin every 7 days.     No orders of the defined types were placed in this encounter.      UDT/SWAB:  Patient required a random  Urine Drug Testing, due to the need to comply with [facility] Model Policy Guidelines and CDC Guideline for the use of any controlled substances. This is to ensure that patient is compliant with treatment, and monitor for risks such as diversion, abuse, or any other aberrant behaviors. Patient is either being considered for or taking a controlled substance. Unexpected findings will be discussed and treatment decision may be adjusted. Testing is being implemented across the board randomly w/o bias related to age, race, gender, socioeconomic status or Christian affiliation.    The patient understand todays plan and has their questions answered in regards to expectations and current treatment plan.     SAFETY REMINDERS  No alcohol while taking controlled substances. Alcohol is not an illegal substance, it is unsafe to use in combination. It is a build up of substances in the body that can be extremely hazardous and may cause respirations to slow to a dangerous rate resulting in hospitalization, brain damage, or death.    Opioid medications have been associated with sharp rise in unintentional overdose and death.  Overdose is a condition characterized by the consumption in excess of a particular drug causing adverse effects. This can happen b/c you are sick, accidentally or intentionally took an extra dose, are on multiple medication that can interact. Someone took your medication and they are not use to the medication.  Symptoms of overdose include:   !breathing slow and shallow, erratic or not at all  !pinpoint pupils, hallucinations  !confusion  !muscle jerks, slack muscles   !extreme sleepiness or loss of alertness   !awake but not able to talk   !face pale or clammy, vomiting, for lighter skinned people, the skin tone turns bluish purple, for darker skinned people, it turns grayish or ashen   If in a situation where overdose is a concern engage the emergency response system (dial 911).    In one study it was noted  that 80% of unintentional overdoses occurred in people who were taking a combination of opioids and benzodiazepines.    Do not sell, loan, borrow or share your opioid medication with anyone. Deaths have occurred as a result of this practice. It is illegal and patients are being prosecuted.     Prevent unexpected access/loss of medication: Keep medication locked. Only carry what you need with you.    Nuvia Smith, [facility]  [address]  [place] [address]  Ph: [phone]  Fax: [phone]

## 2021-05-31 NOTE — TELEPHONE ENCOUNTER
Spoke with patient on the phone.  He will need the butrans patch refill prior to apt tomorrow.  This is due for fill today 8/20.  This RX is dated with end date of 8/21, however patient would be due to change this today.  Hydrocodone 5/325 mg will be addressed at tomorrows visit and patient is aware of this.  Requested Prescriptions     Pending Prescriptions Disp Refills     buprenorphine (BUTRANS) 10 mcg/hour PTWK patch 4 patch 0     Sig: Place 1 patch on the skin every 7 days.     aracelis YE

## 2021-05-31 NOTE — PROGRESS NOTES
Patient presents to the clinic today for a follow up with Nuvia Smith CNP regarding back and neck pain. Patient describes their pain as dull and boring. Her/His function score is 6.

## 2021-05-31 NOTE — PROGRESS NOTES
"ACUPUNCTURIST TREATMENT NOTE    Name: Vernon Lemus  :  1940  MRN:  [id number]      Acupuncture Treatment  Patient Type: JN Pain  Intervention Reason: Pain  Pre-session Pain Ratin  Post-session Pain Ratin  Patient complaint:: Patient reports after last treatment he felt \"significant relief\" the day following acupuncture. RUQ pain 5/10 today. Reports no other changes. He says he feels his butrans patch seems to give him good relief for 3-4 days, but after that \"seems to quit working.\"  Acupuncture (Points):: Liv2, St44, Gb41, Ki3, Sp6, Gb34, St36, Li11, Li4, Tb5, Si6, Baihui, Du24, Yintang, Ren14, Ren12, Ren10, Ren6, St25, Sp15, (R) Liv13  TCM Diagnosis: Kidney yin deficiency, liver invading stomach, blood stasis w/deficiency  Practitioner Observed: 30 minute treatment. Infrared heat lamp over abdomen.  Checklist: Progress Note Completed;Consent Reveiwed  Follow up comments: Patient relaxed well during treatment.    \"Risks and benefits of acupuncture were discussed with patient. Consent for treatment was given. We thank you for the referral.\"     Dianna Cortés L.Ac.    Date:  2019  Time:  12:00 PM    "

## 2021-06-01 VITALS — HEIGHT: 72 IN | WEIGHT: 221 LBS | BODY MASS INDEX: 29.93 KG/M2

## 2021-06-01 VITALS — BODY MASS INDEX: 29.93 KG/M2 | WEIGHT: 221 LBS | HEIGHT: 72 IN

## 2021-06-01 VITALS — WEIGHT: 221 LBS | BODY MASS INDEX: 29.93 KG/M2 | HEIGHT: 72 IN

## 2021-06-01 NOTE — PROGRESS NOTES
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  Pt's file is being closed due to no contact in 3 months.       Reason for discharge:did not schedule follow up     Prognosis at discharge:unable to assess    Recommendations at DA: Pt would benefit from biweekly to monthly psychotherapy to address symptoms of depression/anxiety.  Welcomed his family to attend session as well.  Pt reports he is interested in engaging in psychotherapy.           Marta Beth      9/26/2019  9:04 AM

## 2021-06-02 ENCOUNTER — COMMUNICATION - HEALTHEAST (OUTPATIENT)
Dept: PALLIATIVE MEDICINE | Facility: OTHER | Age: 81
End: 2021-06-02

## 2021-06-02 VITALS — BODY MASS INDEX: 29.93 KG/M2 | WEIGHT: 221 LBS | HEIGHT: 72 IN

## 2021-06-02 VITALS — HEIGHT: 72 IN | WEIGHT: 221 LBS | BODY MASS INDEX: 29.93 KG/M2

## 2021-06-02 VITALS — WEIGHT: 221 LBS | HEIGHT: 72 IN | BODY MASS INDEX: 29.93 KG/M2

## 2021-06-02 DIAGNOSIS — G89.4 CHRONIC PAIN SYNDROME: ICD-10-CM

## 2021-06-02 DIAGNOSIS — M54.42 CHRONIC BILATERAL LOW BACK PAIN WITH BILATERAL SCIATICA: ICD-10-CM

## 2021-06-02 DIAGNOSIS — G89.29 CHRONIC BILATERAL LOW BACK PAIN WITH BILATERAL SCIATICA: ICD-10-CM

## 2021-06-02 DIAGNOSIS — M54.41 CHRONIC BILATERAL LOW BACK PAIN WITH BILATERAL SCIATICA: ICD-10-CM

## 2021-06-02 DIAGNOSIS — R10.84 ABDOMINAL PAIN, GENERALIZED: ICD-10-CM

## 2021-06-02 NOTE — PROGRESS NOTES
PAIN CLINIC FOLLOW UP PROGRESS NOTE    CC:  Chief Complaint   Patient presents with     Abdominal Pain       HPI  Vernon Lemus is a 79 y.o. male who presents for evaluation   Chief Complaint   Patient presents with     Abdominal Pain    that is causing continued pain. Since the last visit the patient denies any trips to the urgent care or ED specifically for their pain. The patient denies any new medications, diagnoses since the last visit. Specific questions indicated the patient wanted addressed today include: right abdominal pain currently he is reporting a mixed emotions due to his sister in law that passed unexpectedly.     Major issues:  1. Chronic pain syndrome    2. Chronic bilateral low back pain with bilateral sciatica    3. Abdominal pain, generalized      Alleviating factors: Include-  medications, breathing techniques, distracting   Aggravating factors: activity including bending, standing, laying down or lifting,   The patient denies using any assistive devices to help with mobilization.  Pain level today: On a scale of 1-10, the patient rates their pain at a 3-4/10 described as a burning, dull and constant  described as boring  Function Rating:This seems to affect his life on a daily bases as well as sleep.    Previous Medical History  Social History     Substance and Sexual Activity   Alcohol Use No     Social History     Substance and Sexual Activity   Drug Use No     Social History     Tobacco Use   Smoking Status Former Smoker     Last attempt to quit: 8/10/1986     Years since quittin.1   Smokeless Tobacco Never Used       Pertinent Pain Medications/interventions:  Currently he is taking norco 5/325 mg up to 6 tabs per day as well as Butrans 15 mcg/ hr       He is currently taking norco up to 6 per day as needed, he recently repeated the celiac plexus block with Dr. Gaviria and reports no relief as of yet.      Acupuncture was helpful initially but is now not effective     Baclofen  "was not helpful     Recently stopped oxycontin ER 15 mg two times a day. 5/24/2019     Previously has tried morphine which led to confusion   Belbuca up to 600 mcg two times a day which helped a lot but unaffordable.      Failed medical cannabis  Percocet- too strong.       Reports having tried and failed pregablin- lyrica    TRIED AND FAILED MEDICATIONS:  Cymbalta - hallucinations at 60 mg, no issues with 40 mg  Lyrica - very depressed mood  Nortriptyline- \"spacing out\" and anxiety as well as jitteriness in the morning- however, it did help with sleep and decreased his shooting pains at night.  oxymorphone 5mg ER twice daily ,OxyContin 10 mg twice a day.    Percocet 5-325 mg, max of 3 a day, Protonix 40 mg daily,  Aspirin 81 mg daily,  Maalox 17 grams for constipation, Flaxseed.     Reviewing the records it is noted patient has tried the celiac plexus blocks x 2 and trigger point injections to the local area.    Review of Systems:  12 point systems were reviewed with pt as documented on pt health form and the patient denies any new diagnosis or changes in 12 point system review since the last visit.     Physical Exam  Vitals:    10/03/19 1250   BP: 117/66   Patient Site: Right Arm   Patient Position: Sitting   Pulse: 65   Resp: 16   Weight: (!) 226 lb 3.2 oz (102.6 kg)   Height: 6' (1.829 m)     General- patient is alert and oriented, in NAD, well-groomed, well-nourished  Psych- Judgment and insight normal, AOx4, recent and remote memory normal, mood and affect normal  Eyes- pupils are equal and reactive, conjunctiva is clear bilaterally, no ptosis is noted.   Respiratory- breathing is non-labored  Cardiovascular- extremities warm and well perfused, no peripheral edema or varicosities.  Musculoskeletal- gait is normal, extremities with no joint swelling, erythema, or warmth.  Neuro- normal strength, no gait abnormalities, normal sensation to pain, temperature, light touch.  Integumentary- no rashes, dermatitis or " discolorations noted throughout, no open wounds noted. Continued chronic abdominal pain- epigastric and superior  to the right of the umbilicus     Medications    Current Outpatient Medications:      amLODIPine (NORVASC) 5 MG tablet, Take 5 mg by mouth daily., Disp: , Rfl:      aspirin 81 mg chewable tablet, Chew 81 mg daily., Disp: , Rfl:      latanoprost (XALATAN) 0.005 % ophthalmic solution, Administer 1 drop to the right eye at bedtime., Disp: , Rfl:      metoprolol tartrate (LOPRESSOR) 100 MG tablet, Take 150 mg by mouth daily., Disp: , Rfl:      MULTIVITAMIN (MULTIPLE VITAMIN ORAL), Take 1 tablet by mouth daily., Disp: , Rfl:      omega-3/dha/epa/fish oil (FISH OIL-OMEGA-3 FATTY ACIDS) 300-1,000 mg capsule, Take 2 g by mouth daily., Disp: , Rfl:      oxybutynin (DITROPAN) 5 MG tablet, , Disp: , Rfl:      pantoprazole (PROTONIX) 40 MG tablet, Take 40 mg by mouth daily., Disp: , Rfl:      polyethylene glycol (MIRALAX) 17 gram packet, Take 17 g by mouth daily as needed., Disp: , Rfl:      triamterene-hydrochlorothiazide (DYAZIDE) 37.5-25 mg per capsule, Take 1 capsule by mouth every morning., Disp: , Rfl:      amoxicillin (AMOXIL) 500 MG capsule, Take 500 mg by mouth 3 (three) times a day., Disp: , Rfl: 1     [START ON 10/17/2019] buprenorphine (BUTRANS) 15 mcg/hour PTWK patch, Place 1 patch on the skin every 7 days., Disp: 4 patch, Rfl: 0     [START ON 10/16/2019] HYDROcodone-acetaminophen 5-325 mg per tablet, Take 1-2 tablets by mouth 4 (four) times a day as needed for pain (up to 6 per day)., Disp: 168 tablet, Rfl: 0     senna-docusate (SENNOSIDES-DOCUSATE SODIUM) 8.6-50 mg tablet, Take 1 tablet by mouth daily., Disp: 30 tablet, Rfl: 2    Lab:  Last UDS on 7/2019 had expected results. Any abnormal results have been discussed with the patient and may change the plan of care. Please see the scanned document from the outside lab under the media tab. This was reviewed with the patient.      Imaging:  LOCATION:  [facility]  DATE/TIME: 8/26/2019 9:42 AM     INDICATION: Postprandial abdominal pain in right upper quadrant. Eval for mesenteric ischemia.  COMPARISON: None.  TECHNIQUE: Helical acquisition through the abdomen and pelvis was performed during the arterial phase of contrast enhancement. 2D and 3D reconstructions were performed by the CT technologist. Dose reduction techniques were used.  CONTRAST: Iohexol (Omni) 75mL     FINDINGS:  ANGIOGRAM ABDOMEN/PELVIS: There is atherosclerotic disease with mixed soft and calcified atheromatous plaque. There is coronary artery disease in the field-of-view. There is estimated less than 50% vessel diameter narrowing at the origin of the celiac   axis related to soft plaque. The splenic artery, common hepatic artery, proper hepatic artery, SMA, FANTA, and renal arteries are normal in caliber and are patent.  The portal venous system is normal in caliber. The larger portal venous system vessels are   patent; the smaller vessels cannot be assessed. The systemic venous structures are normal in caliber.     LUNG BASES: There is a 4 mm left lower lobe nodule (series 7 image 38). There is a 5 mm x 3 mm right lower lobe nodule (image 55).  The right hemidiaphragm is minimally elevated.     ABDOMEN: Normal spleen, pancreas, adrenal glands, liver, gallbladder, kidneys, and ureters. Normal stomach. Normal caliber of the small bowel. Irregularity in the duodenum and pancreatic uncinate process is likely from a duodenal diverticulum. There is   no abdominal lymphadenopathy.      PELVIS: Nondistended urinary bladder. Status post prostatectomy. There is colonic diverticulosis. No diverticulitis. Normal appendix. A penile prosthesis is partially included in the field-of-view. Pelvic surgical clips are noted. There is no pelvic   lymphadenopathy.      MUSCULOSKELETAL: Degenerative osseous changes are present.      IMPRESSION:   CONCLUSION:  1.  There is atherosclerotic disease with soft and  calcified atheromatous plaque.   2.  The major vascular structures of the abdomen and pelvis are normal in caliber and are patent.      Recent   Dated 10/3/2019 was reviewed with the patient today.     Paper copy reviewed     Assessment:   Vernon Lemus is a 79 y.o. male seen in clinic today for   Chief Complaint   Patient presents with     Abdominal Pain     They are here for follow up and continued medical management of their pain. Today we reviewed the lab work of the UDT test and the results are expected because of the prescribed narcotics found in the urine drug screen.     I have also reviewed the information obtained from the last visit encounter after the HEDY was signed and have asked any pertintent questions needed from other healthcare providers/family/patient to continue care and formulate a treatment plan.     Patient notes that he has lost a significant provider in his life and this is causing him more stress and pain, he notes that the acupuncture is note helpful any longer, he has stopped going. He notes that he is doing ok but not able to wean down on his opioids as he had hoped. He does have a new CTA of the abdominal area and vasculature. There is some soft plaque noted in the celiac axis. Currently he is working with Dr. Alva. Lungs also have nodules, patient updated, refer to his PCP and Dr. Alva.     Will continue the pan of care at this time, patient has a fairly large amount of stool and questions of diverticulitis. Bowel regimen discussed, he is scheduled for a colonoscopy.     Patients current MME is 47-67  Patient to call clinic for next month's prescription 5 days in advance. Based on the patients response to their previous narcotic therapy and quality of life, which includes their participation in ADLs, I recommend that the narcotics therapy continue, however the changes listed below will be incorporated into their plan of care.     Patient set goals to   1. To treat his  ongoing chronic pain as well as help him in maintaining his independence.     Plan:   Interventions-    Follow up in 8 weeks to evaluate the effectiveness of the treatment interventions ordered today. Scheduling your appointment prior to leaving assists in reduction of running out of medication prior to the next appointment.     If you are receiving medications from the pain clinic, it is your responsibility to call 3-5 days in advance for a refill. The clinic has up to five days to provide a refill for you.     Please call the [facility] for refills.    Please bring any opioids or controlled substances in that are prescribed by the clinic for pill counts for every visit.     Bowel regimen- 400 mg at bedtime with a glass of water.     Will continue the use of the butrans 15 mcg/hr- ok to fill on 10/17 this will last until 11/16  Continue the use of the norco ok to fill on 10/16 this will last until 11/13    Look into vitamin K2 MKU 4,7 this helps with calcifications.      Ok to continue use of good fats- avocado, olive oil, walnuts. 3 small servings per day.     As a reminder- chronic pain is generally stable, if you experience a new injury or new different pain it is expected that you present to the ED or urgent care for evaluation. DO NOT use your chronic pain medications to treat any new or different pain other then what it is intended for. We do not replace any lost or stolen prescriptions or provide early refills for overtaking your medications.      Orders placed today  Medications that were ordered today   Requested Prescriptions     Signed Prescriptions Disp Refills     buprenorphine (BUTRANS) 15 mcg/hour PTWK patch 4 patch 0     Sig: Place 1 patch on the skin every 7 days.     HYDROcodone-acetaminophen 5-325 mg per tablet 168 tablet 0     Sig: Take 1-2 tablets by mouth 4 (four) times a day as needed for pain (up to 6 per day).     No orders of the defined types were placed in this  encounter.      UDT/SWAB:  Patient required a random Urine Drug Testing, due to the need to comply with [place] Model Policy Guidelines and CDC Guideline for the use of any controlled substances. This is to ensure that patient is compliant with treatment, and monitor for risks such as diversion, abuse, or any other aberrant behaviors. Patient is either being considered for or taking a controlled substance. Unexpected findings will be discussed and treatment decision may be adjusted. Testing is being implemented across the board randomly w/o bias related to age, race, gender, socioeconomic status or Mormonism affiliation.    The patient understand todays plan and has their questions answered in regards to expectations and current treatment plan.     SAFETY REMINDERS  No alcohol while taking controlled substances. Alcohol is not an illegal substance, it is unsafe to use in combination. It is a build up of substances in the body that can be extremely hazardous and may cause respirations to slow to a dangerous rate resulting in hospitalization, brain damage, or death.    Opioid medications have been associated with sharp rise in unintentional overdose and death.  Overdose is a condition characterized by the consumption in excess of a particular drug causing adverse effects. This can happen b/c you are sick, accidentally or intentionally took an extra dose, are on multiple medication that can interact. Someone took your medication and they are not use to the medication.  Symptoms of overdose include:   !breathing slow and shallow, erratic or not at all  !pinpoint pupils, hallucinations  !confusion  !muscle jerks, slack muscles   !extreme sleepiness or loss of alertness   !awake but not able to talk   !face pale or clammy, vomiting, for lighter skinned people, the skin tone turns bluish purple, for darker skinned people, it turns grayish or ashen   If in a situation where overdose is a concern engage the emergency  response system (dial 911).    In one study it was noted that 80% of unintentional overdoses occurred in people who were taking a combination of opioids and benzodiazepines.    Do not sell, loan, borrow or share your opioid medication with anyone. Deaths have occurred as a result of this practice. It is illegal and patients are being prosecuted.     Prevent unexpected access/loss of medication: Keep medication locked. Only carry what you need with you.    Nuvia Smith, [facility]  [address]  [place] [address]  Ph: [phone]  Fax: [phone]

## 2021-06-02 NOTE — TELEPHONE ENCOUNTER
Patient left message on triage line stating that his last batch of Butrans patches were falling off. He contacted the  and they replaced the patches however he needs a new Rx.

## 2021-06-02 NOTE — PROGRESS NOTES
Patient presents to the clinic today for a follow up with Nuvia Smith CNP regarding abdominal pain. Patient describes their pain as dull ache. Her/His function score is 6.

## 2021-06-02 NOTE — TELEPHONE ENCOUNTER
I spoke with the  [facility] Pharmacist Zonia. The patient had filled Butrans on 10/16. The first patch that he applied on 10/17 fell off. He had to use another patch. Patient called the Pharmacy and the . The  told patient would replace a box of 4. The pharmacy would need a new Butrans rx.     By losing one patch. Would need refill only one week early. Pharmacist asked that we send new rx reflecting that early refill. The last rx would have lasted 10/17 until 11/14. 28 days.  Will cue next rx to fill on 11/7, one week early . pharmacy will notify .     It is the decision of the provider if this is ok  to replace early.

## 2021-06-02 NOTE — PATIENT INSTRUCTIONS - HE
Plan:   Interventions-    Follow up in 8 weeks to evaluate the effectiveness of the treatment interventions ordered today. Scheduling your appointment prior to leaving assists in reduction of running out of medication prior to the next appointment.     If you are receiving medications from the pain clinic, it is your responsibility to call 3-5 days in advance for a refill. The clinic has up to five days to provide a refill for you.     Please call the [facility] for refills.    Please bring any opioids or controlled substances in that are prescribed by the clinic for pill counts for every visit.     Bowel regimen- 400 mg at bedtime with a glass of water.     Will continue the use of the butrans 15 mcg/hr- ok to fill on 10/17 this will last until 11/16  Continue the use of the norco ok to fill on 10/16 this will last until 11/13    Look into vitamin K2 MKU 4,7 this helps with calcifications.      Ok to continue use of good fats- avocado, olive oil, walnuts. 3 small servings per day.     As a reminder- chronic pain is generally stable, if you experience a new injury or new different pain it is expected that you present to the ED or urgent care for evaluation. DO NOT use your chronic pain medications to treat any new or different pain other then what it is intended for. We do not replace any lost or stolen prescriptions or provide early refills for overtaking your medications.      Orders placed today  Medications that were ordered today   Requested Prescriptions     Pending Prescriptions Disp Refills     buprenorphine (BUTRANS) 15 mcg/hour PTWK patch 4 patch 0     Sig: Place 1 patch on the skin every 7 days.     HYDROcodone-acetaminophen 5-325 mg per tablet 168 tablet 0     Sig: Take 1-2 tablets by mouth 4 (four) times a day as needed for pain (up to 6 per day).     No orders of the defined types were placed in this encounter.      UDT/SWAB:  Patient required a random Urine Drug Testing, due to the need  to comply with [place] Model Policy Guidelines and CDC Guideline for the use of any controlled substances. This is to ensure that patient is compliant with treatment, and monitor for risks such as diversion, abuse, or any other aberrant behaviors. Patient is either being considered for or taking a controlled substance. Unexpected findings will be discussed and treatment decision may be adjusted. Testing is being implemented across the board randomly w/o bias related to age, race, gender, socioeconomic status or Spiritism affiliation.    The patient understand todays plan and has their questions answered in regards to expectations and current treatment plan.     SAFETY REMINDERS  No alcohol while taking controlled substances. Alcohol is not an illegal substance, it is unsafe to use in combination. It is a build up of substances in the body that can be extremely hazardous and may cause respirations to slow to a dangerous rate resulting in hospitalization, brain damage, or death.    Opioid medications have been associated with sharp rise in unintentional overdose and death.  Overdose is a condition characterized by the consumption in excess of a particular drug causing adverse effects. This can happen b/c you are sick, accidentally or intentionally took an extra dose, are on multiple medication that can interact. Someone took your medication and they are not use to the medication.  Symptoms of overdose include:   !breathing slow and shallow, erratic or not at all  !pinpoint pupils, hallucinations  !confusion  !muscle jerks, slack muscles   !extreme sleepiness or loss of alertness   !awake but not able to talk   !face pale or clammy, vomiting, for lighter skinned people, the skin tone turns bluish purple, for darker skinned people, it turns grayish or ashen   If in a situation where overdose is a concern engage the emergency response system (dial 911).    In one study it was noted that 80% of unintentional overdoses  occurred in people who were taking a combination of opioids and benzodiazepines.    Do not sell, loan, borrow or share your opioid medication with anyone. Deaths have occurred as a result of this practice. It is illegal and patients are being prosecuted.     Prevent unexpected access/loss of medication: Keep medication locked. Only carry what you need with you.    Nuvia Smith, [facility]  [address]  [place] [address]  Ph: [phone]  Fax: [phone]

## 2021-06-03 VITALS — HEIGHT: 72 IN | BODY MASS INDEX: 29.8 KG/M2 | WEIGHT: 220 LBS

## 2021-06-03 VITALS — BODY MASS INDEX: 30.48 KG/M2 | HEIGHT: 72 IN | WEIGHT: 225 LBS

## 2021-06-03 VITALS — BODY MASS INDEX: 30.81 KG/M2 | HEIGHT: 72 IN | WEIGHT: 227.5 LBS

## 2021-06-03 VITALS — WEIGHT: 226.2 LBS | BODY MASS INDEX: 30.64 KG/M2 | HEIGHT: 72 IN

## 2021-06-03 VITALS — HEIGHT: 72 IN | BODY MASS INDEX: 30.48 KG/M2 | WEIGHT: 225 LBS

## 2021-06-03 VITALS — BODY MASS INDEX: 29.8 KG/M2 | WEIGHT: 220 LBS | HEIGHT: 72 IN

## 2021-06-03 NOTE — PROGRESS NOTES
Patient presents to the clinic today for a follow up with Nuvia Smith CNP regarding back and abdominal. Patient describes their pain as dull throb, and boring. Her/His function score is 6.

## 2021-06-03 NOTE — PROGRESS NOTES
PAIN CLINIC FOLLOW UP PROGRESS NOTE    CC:  Chief Complaint   Patient presents with     Back Pain     Abdominal Pain       HPI  Vernon Lemus is a 79 y.o. male who presents for evaluation   Chief Complaint   Patient presents with     Back Pain     Abdominal Pain    that is causing continued pain. Since the last visit the patient denies any trips to the urgent care or ED specifically for their pain. The patient denies any new medications, diagnoses since the last visit. Specific questions indicated the patient wanted addressed today include: to follow up on his ongoing chronic pain as well as medication management.     Major issues:  1. Chronic pain syndrome    2. Chronic bilateral low back pain with bilateral sciatica    3. Abdominal pain, generalized        Alleviating factors: Include-  medications, breathing techniques, distracting   Aggravating factors: activity including bending, standing, laying down or lifting,   The patient denies using any assistive devices to help with mobilization.  Pain level today: On a scale of 1-10, the patient rates their pain at a 3-4/10 described as a burning, dull and constant  described as boring  Function Rating:This seems to affect his life on a daily bases as well as sleep.    Previous Medical History  Social History     Substance and Sexual Activity   Alcohol Use No     Social History     Substance and Sexual Activity   Drug Use No     Social History     Tobacco Use   Smoking Status Former Smoker     Last attempt to quit: 8/10/1986     Years since quittin.3   Smokeless Tobacco Never Used       Pertinent Pain Medications/interventions:  Currently he is taking norco 5/325 mg up to 6 tabs per day as well as Butrans 15 mcg/ hr       He is currently taking norco up to 6 per day as needed, he recently repeated the celiac plexus block with Dr. Gaviria and reports no relief as of yet.      Acupuncture was helpful initially but is now not effective     Baclofen was not  "helpful     Recently stopped oxycontin ER 15 mg two times a day. 5/24/2019     Previously has tried morphine which led to confusion   Belbuca up to 600 mcg two times a day which helped a lot but unaffordable.      Failed medical cannabis  Percocet- too strong.       Reports having tried and failed pregablin- lyrica    TRIED AND FAILED MEDICATIONS:  Cymbalta - hallucinations at 60 mg, no issues with 40 mg  Lyrica - very depressed mood  Nortriptyline- \"spacing out\" and anxiety as well as jitteriness in the morning- however, it did help with sleep and decreased his shooting pains at night.  oxymorphone 5mg ER twice daily ,OxyContin 10 mg twice a day.    Percocet 5-325 mg, max of 3 a day, Protonix 40 mg daily,  Aspirin 81 mg daily,  Maalox 17 grams for constipation, Flaxseed.     Reviewing the records it is noted patient has tried the celiac plexus blocks x 2 and trigger point injections to the local area.       Review of Systems:  12 point systems were reviewed with pt as documented on pt health form and the patient denies any new diagnosis or changes in 12 point system review since the last visit.     Physical Exam  Vitals:    11/20/19 1333   BP: 144/71   Patient Site: Right Arm   Patient Position: Sitting   Pulse: 74   Resp: 16   Weight: (!) 227 lb 8 oz (103.2 kg)   Height: 6' (1.829 m)     General- patient is alert and oriented, in NAD, well-groomed, well-nourished  Psych- Judgment and insight normal, AOx4, recent and remote memory normal, mood and affect normal  Eyes- pupils are equal and reactive, conjunctiva is clear bilaterally, no ptosis is noted.   Respiratory- breathing is non-labored  Cardiovascular- extremities warm and well perfused, no peripheral edema or varicosities.  Musculoskeletal- gait is normal, extremities with no joint swelling, erythema, or warmth.  Neuro- normal strength, no gait abnormalities, normal sensation to pain, temperature, light touch.  Integumentary- no rashes, dermatitis or " discolorations noted throughout, no open wounds noted. Continued chronic abdominal pain- epigastric and superior  to the right of the umbilicus     Medications    Current Outpatient Medications:      amLODIPine (NORVASC) 5 MG tablet, Take 5 mg by mouth daily., Disp: , Rfl:      aspirin 81 mg chewable tablet, Chew 81 mg daily., Disp: , Rfl:      latanoprost (XALATAN) 0.005 % ophthalmic solution, Administer 1 drop to the right eye at bedtime., Disp: , Rfl:      metoprolol tartrate (LOPRESSOR) 100 MG tablet, Take 150 mg by mouth daily., Disp: , Rfl:      MULTIVITAMIN (MULTIPLE VITAMIN ORAL), Take 1 tablet by mouth daily., Disp: , Rfl:      omega-3/dha/epa/fish oil (FISH OIL-OMEGA-3 FATTY ACIDS) 300-1,000 mg capsule, Take 2 g by mouth daily., Disp: , Rfl:      oxybutynin (DITROPAN) 5 MG tablet, , Disp: , Rfl:      pantoprazole (PROTONIX) 40 MG tablet, Take 40 mg by mouth daily., Disp: , Rfl:      polyethylene glycol (MIRALAX) 17 gram packet, Take 17 g by mouth daily as needed., Disp: , Rfl:      triamterene-hydrochlorothiazide (DYAZIDE) 37.5-25 mg per capsule, Take 1 capsule by mouth every morning., Disp: , Rfl:      VITAMIN K2 ORAL, Take 100 mg by mouth daily., Disp: , Rfl:      amoxicillin (AMOXIL) 500 MG capsule, Take 500 mg by mouth 3 (three) times a day., Disp: , Rfl: 1     [START ON 12/4/2019] buprenorphine (BUTRANS) 15 mcg/hour PTWK patch, Place 1 patch on the skin every 7 days., Disp: 4 patch, Rfl: 0     [START ON 12/12/2019] HYDROcodone-acetaminophen 5-325 mg per tablet, Take 1-2 tablets by mouth 4 (four) times a day as needed for pain (up to 6 per day)., Disp: 168 tablet, Rfl: 0     senna-docusate (SENNOSIDES-DOCUSATE SODIUM) 8.6-50 mg tablet, Take 1 tablet by mouth daily., Disp: 30 tablet, Rfl: 2    Lab:  Last UDS on 7/2019 had expected results. Any abnormal results have been discussed with the patient and may change the plan of care. Please see the scanned document from the outside lab under the media tab.  This was reviewed with the patient.      Imaging:  No new imaging.      Recent   Dated 11/20/2019 was reviewed with the patient today.   Paper copy reviewed     Assessment:   Vernon Lemus is a 79 y.o. male seen in clinic today for   Chief Complaint   Patient presents with     Back Pain     Abdominal Pain   . They are here for follow up and continued medical management of their pain. Today we reviewed the lab work of the UDT test and the results are expected because of the prescribed narcotics found in the urine drug screen.     I have also reviewed the information obtained from the last visit encounter after the HEDY was signed and have asked any pertintent questions needed from other healthcare providers/family/patient to continue care and formulate a treatment plan.     Ongoing chronic abdominal pain he notes that the butrans patch sometimes does not stick or work as well, a refill was sent in to the pharmacy for him to fill for which he did he also notes that his abdominal pain is is persistent, he notes that he is trying to increase good fats in his diet.  He does note that he has had some issues with the patch sticking or providing good relief at times it seems that the patch quality is in question, requested that he document issues with lot # and report to the pharmacy.      Patient reports that he is interested in taking vitamin K2 and notes that he has done a fair amount of research. He is working with Dr. Alva in regards to calcification concerns and is curious if he has calcifications in other places.     Patients current MME is 47-67  Patient to call clinic for next month's prescription 5 days in advance. Based on the patients response to their previous narcotic therapy and quality of life, which includes their participation in ADLs, I recommend that the narcotics therapy continue, however the changes listed below will be incorporated into their plan of care.     Patient set goals to   1. To  treat his abdominal pain     Plan:   Interventions-    Follow up in 8 weeks to evaluate the effectiveness of the treatment interventions ordered today. Scheduling your appointment prior to leaving assists in reduction of running out of medication prior to the next appointment.     Bowel regimen- magnesium oxide or malate chelated version ok for 400 mg at bedtime for bowel regularity.     Continue the butrans patch-  12/4-1/1    Continue the Norco - next fill date is 12/12    Agree with the vitmain K2 MKU 4,7 PURE brand comes with the vitamin d 3 in it.     As a reminder- chronic pain is generally stable, if you experience a new injury or new different pain it is expected that you present to the ED or urgent care for evaluation. DO NOT use your chronic pain medications to treat any new or different pain other then what it is intended for. We do not replace any lost or stolen prescriptions or provide early refills for overtaking your medications.      Orders placed today  Medications that were ordered today   Requested Prescriptions     Signed Prescriptions Disp Refills     buprenorphine (BUTRANS) 15 mcg/hour PTWK patch 4 patch 0     Sig: Place 1 patch on the skin every 7 days.     HYDROcodone-acetaminophen 5-325 mg per tablet 168 tablet 0     Sig: Take 1-2 tablets by mouth 4 (four) times a day as needed for pain (up to 6 per day).     No orders of the defined types were placed in this encounter.      UDT/SWAB:  Patient required a random Urine Drug Testing, due to the need to comply with [place] Model Policy Guidelines and CDC Guideline for the use of any controlled substances. This is to ensure that patient is compliant with treatment, and monitor for risks such as diversion, abuse, or any other aberrant behaviors. Patient is either being considered for or taking a controlled substance. Unexpected findings will be discussed and treatment decision may be adjusted. Testing is being implemented across the board  randomly w/o bias related to age, race, gender, socioeconomic status or [employer] affiliation.    The patient understand todays plan and has their questions answered in regards to expectations and current treatment plan.     SAFETY REMINDERS  No alcohol while taking controlled substances. Alcohol is not an illegal substance, it is unsafe to use in combination. It is a build up of substances in the body that can be extremely hazardous and may cause respirations to slow to a dangerous rate resulting in hospitalization, brain damage, or death.    Opioid medications have been associated with sharp rise in unintentional overdose and death.  Overdose is a condition characterized by the consumption in excess of a particular drug causing adverse effects. This can happen b/c you are sick, accidentally or intentionally took an extra dose, are on multiple medication that can interact. Someone took your medication and they are not use to the medication.  Symptoms of overdose include:   !breathing slow and shallow, erratic or not at all  !pinpoint pupils, hallucinations  !confusion  !muscle jerks, slack muscles   !extreme sleepiness or loss of alertness   !awake but not able to talk   !face pale or clammy, vomiting, for lighter skinned people, the skin tone turns bluish purple, for darker skinned people, it turns grayish or ashen   If in a situation where overdose is a concern engage the emergency response system (dial 911).    In one study it was noted that 80% of unintentional overdoses occurred in people who were taking a combination of opioids and benzodiazepines.    Do not sell, loan, borrow or share your opioid medication with anyone. Deaths have occurred as a result of this practice. It is illegal and patients are being prosecuted.     Prevent unexpected access/loss of medication: Keep medication locked. Only carry what you need with you.    Nuvia Smith, [facility]  [address]  [place] [address]  Ph: [phone]  Fax: [phone]

## 2021-06-03 NOTE — TELEPHONE ENCOUNTER
Patient called for refill of Hydrocodone, but order was already sent to pharmacy for a fill no earlier than 11/13/2019.

## 2021-06-03 NOTE — PATIENT INSTRUCTIONS - HE
Plan:   Interventions-    Follow up in 8 weeks to evaluate the effectiveness of the treatment interventions ordered today. Scheduling your appointment prior to leaving assists in reduction of running out of medication prior to the next appointment.     Bowel regimen- magnesium oxide or malate chelated version ok for 400 mg at bedtime for bowel regularity.     Continue the butrans patch-  12/4-1/1    Continue the Norco - next fill date is 12/12    Agree with the vitmain K2 MKU 4,7 PURE brand comes with the vitamin d 3 in it.     As a reminder- chronic pain is generally stable, if you experience a new injury or new different pain it is expected that you present to the ED or urgent care for evaluation. DO NOT use your chronic pain medications to treat any new or different pain other then what it is intended for. We do not replace any lost or stolen prescriptions or provide early refills for overtaking your medications.      Orders placed today  Medications that were ordered today   Requested Prescriptions     Signed Prescriptions Disp Refills     buprenorphine (BUTRANS) 15 mcg/hour PTWK patch 4 patch 0     Sig: Place 1 patch on the skin every 7 days.     HYDROcodone-acetaminophen 5-325 mg per tablet 168 tablet 0     Sig: Take 1-2 tablets by mouth 4 (four) times a day as needed for pain (up to 6 per day).     No orders of the defined types were placed in this encounter.      UDT/SWAB:  Patient required a random Urine Drug Testing, due to the need to comply with [employer] Model Policy Guidelines and CDC Guideline for the use of any controlled substances. This is to ensure that patient is compliant with treatment, and monitor for risks such as diversion, abuse, or any other aberrant behaviors. Patient is either being considered for or taking a controlled substance. Unexpected findings will be discussed and treatment decision may be adjusted. Testing is being implemented across the board randomly w/o bias related to  age, race, gender, socioeconomic status or Judaism affiliation.    The patient understand todays plan and has their questions answered in regards to expectations and current treatment plan.     SAFETY REMINDERS  No alcohol while taking controlled substances. Alcohol is not an illegal substance, it is unsafe to use in combination. It is a build up of substances in the body that can be extremely hazardous and may cause respirations to slow to a dangerous rate resulting in hospitalization, brain damage, or death.    Opioid medications have been associated with sharp rise in unintentional overdose and death.  Overdose is a condition characterized by the consumption in excess of a particular drug causing adverse effects. This can happen b/c you are sick, accidentally or intentionally took an extra dose, are on multiple medication that can interact. Someone took your medication and they are not use to the medication.  Symptoms of overdose include:   !breathing slow and shallow, erratic or not at all  !pinpoint pupils, hallucinations  !confusion  !muscle jerks, slack muscles   !extreme sleepiness or loss of alertness   !awake but not able to talk   !face pale or clammy, vomiting, for lighter skinned people, the skin tone turns bluish purple, for darker skinned people, it turns grayish or ashen   If in a situation where overdose is a concern engage the emergency response system (dial 911).    In one study it was noted that 80% of unintentional overdoses occurred in people who were taking a combination of opioids and benzodiazepines.    Do not sell, loan, borrow or share your opioid medication with anyone. Deaths have occurred as a result of this practice. It is illegal and patients are being prosecuted.     Prevent unexpected access/loss of medication: Keep medication locked. Only carry what you need with you.    Nuvia Smith, [facility]  [address]  [place] [address]  Ph:  [phone]  Fax: [phone]

## 2021-06-04 VITALS
DIASTOLIC BLOOD PRESSURE: 62 MMHG | HEIGHT: 72 IN | OXYGEN SATURATION: 96 % | SYSTOLIC BLOOD PRESSURE: 160 MMHG | HEART RATE: 69 BPM | WEIGHT: 222 LBS | BODY MASS INDEX: 30.07 KG/M2

## 2021-06-04 VITALS
RESPIRATION RATE: 18 BRPM | TEMPERATURE: 97.8 F | SYSTOLIC BLOOD PRESSURE: 146 MMHG | BODY MASS INDEX: 29.95 KG/M2 | HEART RATE: 60 BPM | DIASTOLIC BLOOD PRESSURE: 64 MMHG | WEIGHT: 221.1 LBS | HEIGHT: 72 IN

## 2021-06-04 VITALS
BODY MASS INDEX: 29.8 KG/M2 | WEIGHT: 220 LBS | SYSTOLIC BLOOD PRESSURE: 124 MMHG | DIASTOLIC BLOOD PRESSURE: 45 MMHG | HEART RATE: 65 BPM | HEIGHT: 72 IN | OXYGEN SATURATION: 96 %

## 2021-06-04 VITALS — BODY MASS INDEX: 30.75 KG/M2 | HEIGHT: 72 IN | WEIGHT: 227 LBS

## 2021-06-04 VITALS — BODY MASS INDEX: 30.61 KG/M2 | HEIGHT: 72 IN | WEIGHT: 226 LBS

## 2021-06-04 VITALS — BODY MASS INDEX: 30.37 KG/M2 | WEIGHT: 224.2 LBS | HEIGHT: 72 IN

## 2021-06-04 VITALS — BODY MASS INDEX: 30.61 KG/M2 | WEIGHT: 226 LBS | HEIGHT: 72 IN

## 2021-06-04 VITALS
BODY MASS INDEX: 30.07 KG/M2 | SYSTOLIC BLOOD PRESSURE: 120 MMHG | HEART RATE: 68 BPM | HEIGHT: 72 IN | WEIGHT: 222 LBS | OXYGEN SATURATION: 95 % | DIASTOLIC BLOOD PRESSURE: 56 MMHG

## 2021-06-04 VITALS — WEIGHT: 226 LBS | BODY MASS INDEX: 30.65 KG/M2

## 2021-06-04 VITALS — BODY MASS INDEX: 29.84 KG/M2 | WEIGHT: 220 LBS

## 2021-06-04 NOTE — TELEPHONE ENCOUNTER
Patient notes his pain has increased, he has been discussing this with his PCP, current evaluation for the ongoing pain is proceeding, looking into possible blood flow issues. will increase up to 8 per day continue the butrans patch.     Nuvia Smith, CNP

## 2021-06-04 NOTE — TELEPHONE ENCOUNTER
Approved refills.  Did not do a bridge of Butrans due to difficulty with breaking boxes at pharmacy.  Did do a bridge of Vicodin as there was a recent increase and provider will evaluate at follow-up visit.

## 2021-06-05 VITALS — HEIGHT: 72 IN | WEIGHT: 221.3 LBS | BODY MASS INDEX: 29.97 KG/M2

## 2021-06-05 VITALS — BODY MASS INDEX: 29.8 KG/M2 | HEIGHT: 72 IN | WEIGHT: 220 LBS

## 2021-06-05 VITALS — WEIGHT: 221 LBS | HEIGHT: 72 IN | BODY MASS INDEX: 29.93 KG/M2

## 2021-06-05 VITALS — WEIGHT: 220 LBS | HEIGHT: 72 IN | BODY MASS INDEX: 29.8 KG/M2

## 2021-06-05 NOTE — PROGRESS NOTES
PAIN CLINIC FOLLOW UP PROGRESS NOTE    CC:  Chief Complaint   Patient presents with     Back Pain     Abdominal Pain       HPI  Vernon Lemus is a 79 y.o. male who presents for evaluation   Chief Complaint   Patient presents with     Back Pain     Abdominal Pain    that is causing continued pain. Since the last visit the patient denies any trips to the urgent care or ED specifically for their pain. The patient denies any new medications, diagnoses since the last visit. Specific questions indicated the patient wanted addressed today include: to follow up on his current issues that are related to his abdominal pain. Awaiting his new ultrasound results with Dr. Alva    Major issues:  1. Chronic pain syndrome    2. Chronic bilateral low back pain with bilateral sciatica    3. Abdominal pain, generalized        Alleviating factors: Include-  medications, breathing techniques, distracting   Aggravating factors: activity including bending, standing, laying down or lifting,   The patient denies using any assistive devices to help with mobilization.  Pain level today: On a scale of 1-10, the patient rates their pain at a 5 /10 described as a burning, dull and constant  described as boring  Function Rating:This seems to affect his life on a daily bases as well as sleep.      Previous Medical History  Social History     Substance and Sexual Activity   Alcohol Use No     Social History     Substance and Sexual Activity   Drug Use No     Social History     Tobacco Use   Smoking Status Former Smoker     Last attempt to quit: 8/10/1986     Years since quittin.4   Smokeless Tobacco Never Used       Pertinent Pain Medications/interventions:  Currently he is taking norco 5/325 mg up to 6-7 tabs per day as well as Butrans 15 mcg/ hr       He is currently taking norco up to 6 per day as needed, he recently repeated the celiac plexus block with Dr. Gaviria and reports no relief as of yet.      Acupuncture was helpful  "initially but is now not effective     Baclofen was not helpful     Recently stopped oxycontin ER 15 mg two times a day. 5/24/2019     Previously has tried morphine which led to confusion   Belbuca up to 600 mcg two times a day which helped a lot but unaffordable.      Failed medical cannabis  Percocet- too strong.       Reports having tried and failed pregablin- lyrica    TRIED AND FAILED MEDICATIONS:  Cymbalta - hallucinations at 60 mg, no issues with 40 mg  Lyrica - very depressed mood  Nortriptyline- \"spacing out\" and anxiety as well as jitteriness in the morning- however, it did help with sleep and decreased his shooting pains at night.  oxymorphone 5mg ER twice daily ,OxyContin 10 mg twice a day.    Percocet 5-325 mg, max of 3 a day, Protonix 40 mg daily,  Aspirin 81 mg daily,  Maalox 17 grams for constipation, Flaxseed.     Reviewing the records it is noted patient has tried the celiac plexus blocks x 2 and trigger point injections to the local area.    Review of Systems:  12 point systems were reviewed with pt as documented on pt health form and the patient denies any new diagnosis or changes in 12 point system review since the last visit.     Physical Exam  Vitals:    01/15/20 1300   BP: 119/65   Patient Site: Right Arm   Patient Position: Sitting   Pulse: 70   Resp: 16   Weight: (!) 224 lb 3.2 oz (101.7 kg)   Height: 6' (1.829 m)     General- patient is alert and oriented, in NAD, well-groomed, well-nourished  Psych- Judgment and insight normal, AOx4, recent and remote memory normal, mood and affect normal  Eyes- pupils are equal and reactive, conjunctiva is clear bilaterally, no ptosis is noted.   Respiratory- breathing is non-labored  Cardiovascular- extremities warm and well perfused, no peripheral edema or varicosities.  Musculoskeletal- gait is normal, extremities with no joint swelling, erythema, or warmth.  Neuro- normal strength, no gait abnormalities, normal sensation to pain, temperature, light " touch.  Integumentary- no rashes, dermatitis or discolorations noted throughout, no open wounds noted. Continued chronic abdominal pain- epigastric and superior  to the right of the umbilicus     Medications    Current Outpatient Medications:      amLODIPine (NORVASC) 5 MG tablet, Take 5 mg by mouth daily., Disp: , Rfl:      aspirin 81 mg chewable tablet, Chew 81 mg daily., Disp: , Rfl:      latanoprost (XALATAN) 0.005 % ophthalmic solution, Administer 1 drop to the right eye at bedtime., Disp: , Rfl:      metoprolol tartrate (LOPRESSOR) 100 MG tablet, Take 150 mg by mouth daily., Disp: , Rfl:      MULTIVITAMIN (MULTIPLE VITAMIN ORAL), Take 1 tablet by mouth daily., Disp: , Rfl:      omega-3/dha/epa/fish oil (FISH OIL-OMEGA-3 FATTY ACIDS) 300-1,000 mg capsule, Take 2 g by mouth daily., Disp: , Rfl:      oxybutynin (DITROPAN) 5 MG tablet, , Disp: , Rfl:      pantoprazole (PROTONIX) 40 MG tablet, Take 40 mg by mouth daily., Disp: , Rfl:      polyethylene glycol (MIRALAX) 17 gram packet, Take 17 g by mouth daily as needed., Disp: , Rfl:      triamterene-hydrochlorothiazide (DYAZIDE) 37.5-25 mg per capsule, Take 1 capsule by mouth every morning., Disp: , Rfl:      VITAMIN K2 ORAL, Take 100 mg by mouth daily., Disp: , Rfl:      amoxicillin (AMOXIL) 500 MG capsule, Take 500 mg by mouth 3 (three) times a day., Disp: , Rfl: 1     [START ON 1/30/2020] buprenorphine (BUTRANS) 15 mcg/hour PTWK patch, Place 1 patch on the skin every 7 days., Disp: 4 patch, Rfl: 1     HYDROcodone-acetaminophen 5-325 mg per tablet, Take 1-2 tablets by mouth 4 (four) times a day as needed for pain (up tp 8 tabs per day)., Disp: 224 tablet, Rfl: 0     senna-docusate (SENNOSIDES-DOCUSATE SODIUM) 8.6-50 mg tablet, Take 1 tablet by mouth daily., Disp: 30 tablet, Rfl: 2    Lab:  Last UDS on 7/2019 had expected results. Any abnormal results have been discussed with the patient and may change the plan of care. Please see the scanned document from the  outside lab under the media tab. This was reviewed with the patient.      Imaging:  Any imaging viewed today was discussed with the patient on 1/15/2020     CTA from 8/23/2019  FINDINGS:  ANGIOGRAM ABDOMEN/PELVIS: There is atherosclerotic disease with mixed soft and calcified atheromatous plaque. There is coronary artery disease in the field-of-view. There is estimated less than 50% vessel diameter narrowing at the origin of the celiac   axis related to soft plaque. The splenic artery, common hepatic artery, proper hepatic artery, SMA, FANTA, and renal arteries are normal in caliber and are patent.  The portal venous system is normal in caliber. The larger portal venous system vessels are   patent; the smaller vessels cannot be assessed. The systemic venous structures are normal in caliber.    INDICATION: Pancreas Divisum, Epigastric Pain. History of pancreatitis.  COMPARISON: CTA abdomen 8/26/2019. MRI abdomen 9/5/2018, 4/15/2016, and  1/19/2015.  TECHNIQUE: Routine MR liver/pancreas protocol including axial and coronal MRCP  sequences. 2D and 3D reconstruction performed by MR technologist including MIP  reconstruction and slab cholangiograms. If performed with contrast, additional  dynamic T1 post IV contrast images.  CONTRAST: GADOBUTROL 1 MMOL/ML IV 10 ML VIAL: 10mL       MRI of the abdomen- 1/13/2020  FINDINGS:     MRCP: Near identical appearance of the pancreas and pancreatic duct since  9/5/2018 MRI. Incomplete pancreatic divisum with the main drainage extending to  the major papilla with mild dilatation of the main pancreatic duct measuring 6  to 7 mm. Accessory duct extending to the minor papilla with some cystic  dilatation in the head and uncinate process maximum dilatation of 13 mm  identical to previous. No acute inflammatory change.. Normal bile ducts and  gallbladder. No stones or strictures.    LIVER: Normal    PANCREAS: Otherwise normal. No masses. No inflammatory change.    ADDITIONAL FINDINGS:  None    IMPRESSION:  1.  Stable incomplete pancreatic divisum.    2.  Main drainage through the major papilla with mild dilatation of the main  pancreatic duct measuring 7 mm.    3.  Cystic dilatation of the accessory duct draining into the minor papilla  stable since previous.    4.  No new findings. Nothing for pancreatitis.    Recent   Dated 1/15/2020 was reviewed with the patient today.     Paper copy reviewed     Assessment:   Vernon Lemus is a 79 y.o. male seen in clinic today for   Chief Complaint   Patient presents with     Back Pain     Abdominal Pain   They are here for follow up and continued medical management of their pain. Today we reviewed the lab work of the UDT test and the results are expected because of the prescribed narcotics found in the urine drug screen.     I have also reviewed the information obtained from the last visit encounter after the HEDY was signed and have asked any pertintent questions needed from other healthcare providers/family/patient to continue care and formulate a treatment plan.     Ongoing abdominal pain- he notes that the butrans patch 15 mcg/ hr reduces his pain drastically. He did have a ultrasound this week and notes that he is still experiencing pain from the pressure of the ultrasound wand. Patient notes he will reach out to his provider in regards to this as it is abnormal to react this way to an ultrasound. Patient is currently following the plan of care, discussed alternative medicine, he does have some hepatic stenosis as well as a dilated duct.     Patients current MME is 47-67  Patient to call clinic for next month's prescription 5 days in advance. Based on the patients response to their previous narcotic therapy and quality of life, which includes their participation in ADLs, I recommend that the narcotics therapy continue, however the changes listed below will be incorporated into their plan of care.     Patient set goals to   1. To treat his  abdominal pain     Plan:   Interventions-    Follow up in 8 weeks to evaluate the effectiveness of the treatment interventions ordered today. Scheduling your appointment prior to leaving assists in reduction of running out of medication prior to the next appointment.     Bowel regimen- magnesium oxide or malate chelated version ok for 400 mg at bedtime for bowel regularity.      Continue the butrans patch- refills sent     Continue the Norco - next fill date is 1/15-2/12     Agree with the vitmain K2 MKU 4,7 PURE brand comes with the vitamin d 3 in it.     Look into the use of Ox bile as well to support your GI process    Research on micro current frequency for abdominal pain. [facility] does this and they take insurance.    As a reminder- chronic pain is generally stable, if you experience a new injury or new different pain it is expected that you present to the ED or urgent care for evaluation. DO NOT use your chronic pain medications to treat any new or different pain other then what it is intended for. We do not replace any lost or stolen prescriptions or provide early refills for overtaking your medications.     Orders placed today  Medications that were ordered today   Requested Prescriptions     Signed Prescriptions Disp Refills     HYDROcodone-acetaminophen 5-325 mg per tablet 224 tablet 0     Sig: Take 1-2 tablets by mouth 4 (four) times a day as needed for pain (up tp 8 tabs per day).     buprenorphine (BUTRANS) 15 mcg/hour PTWK patch 4 patch 1     Sig: Place 1 patch on the skin every 7 days.     No orders of the defined types were placed in this encounter.      UDT/SWAB:  Patient required a random Urine Drug Testing, due to the need to comply with [employer] Model Policy Guidelines and CDC Guideline for the use of any controlled substances. This is to ensure that patient is compliant with treatment, and monitor for risks such as diversion, abuse, or any other aberrant behaviors. Patient is either  being considered for or taking a controlled substance. Unexpected findings will be discussed and treatment decision may be adjusted. Testing is being implemented across the board randomly w/o bias related to age, race, gender, socioeconomic status or [employer] affiliation.    The patient understand todays plan and has their questions answered in regards to expectations and current treatment plan.     SAFETY REMINDERS  No alcohol while taking controlled substances. Alcohol is not an illegal substance, it is unsafe to use in combination. It is a build up of substances in the body that can be extremely hazardous and may cause respirations to slow to a dangerous rate resulting in hospitalization, brain damage, or death.    Opioid medications have been associated with sharp rise in unintentional overdose and death.  Overdose is a condition characterized by the consumption in excess of a particular drug causing adverse effects. This can happen b/c you are sick, accidentally or intentionally took an extra dose, are on multiple medication that can interact. Someone took your medication and they are not use to the medication.  Symptoms of overdose include:   !breathing slow and shallow, erratic or not at all  !pinpoint pupils, hallucinations  !confusion  !muscle jerks, slack muscles   !extreme sleepiness or loss of alertness   !awake but not able to talk   !face pale or clammy, vomiting, for lighter skinned people, the skin tone turns bluish purple, for darker skinned people, it turns grayish or ashen   If in a situation where overdose is a concern engage the emergency response system (dial 911).    In one study it was noted that 80% of unintentional overdoses occurred in people who were taking a combination of opioids and benzodiazepines.    Do not sell, loan, borrow or share your opioid medication with anyone. Deaths have occurred as a result of this practice. It is illegal and patients are being prosecuted.     Prevent  unexpected access/loss of medication: Keep medication locked. Only carry what you need with you.    Nuvia Smith CNP  [facility]  [address]  [place] [address]  Ph: [phone]  Fax: [phone]

## 2021-06-05 NOTE — PROGRESS NOTES
Patient presents to the clinic today for a follow up with Nuvia Smith CNP regarding back and abdominal. Patient describes their pain as dull ache . Her/His function score is 6.

## 2021-06-05 NOTE — PATIENT INSTRUCTIONS - HE
Plan:   Interventions-    Follow up in 8 weeks to evaluate the effectiveness of the treatment interventions ordered today. Scheduling your appointment prior to leaving assists in reduction of running out of medication prior to the next appointment.     Bowel regimen- magnesium oxide or malate chelated version ok for 400 mg at bedtime for bowel regularity.      Continue the butrans patch- refills sent     Continue the Norco - next fill date is 1/15-2/12     Agree with the vitmain K2 MKU 4,7 PURE brand comes with the vitamin d 3 in it.     Look into the use of Ox bile as well to support your GI process    Research on micro current frequency for abdominal pain. [facility] does this and they take insurance.    As a reminder- chronic pain is generally stable, if you experience a new injury or new different pain it is expected that you present to the ED or urgent care for evaluation. DO NOT use your chronic pain medications to treat any new or different pain other then what it is intended for. We do not replace any lost or stolen prescriptions or provide early refills for overtaking your medications.     Orders placed today  Medications that were ordered today   Requested Prescriptions     Signed Prescriptions Disp Refills     HYDROcodone-acetaminophen 5-325 mg per tablet 224 tablet 0     Sig: Take 1-2 tablets by mouth 4 (four) times a day as needed for pain (up tp 8 tabs per day).     buprenorphine (BUTRANS) 15 mcg/hour PTWK patch 4 patch 1     Sig: Place 1 patch on the skin every 7 days.     No orders of the defined types were placed in this encounter.      UDT/SWAB:  Patient required a random Urine Drug Testing, due to the need to comply with [employer] Model Policy Guidelines and CDC Guideline for the use of any controlled substances. This is to ensure that patient is compliant with treatment, and monitor for risks such as diversion, abuse, or any other aberrant behaviors. Patient is either being considered  for or taking a controlled substance. Unexpected findings will be discussed and treatment decision may be adjusted. Testing is being implemented across the board randomly w/o bias related to age, race, gender, socioeconomic status or [employer] affiliation.    The patient understand todays plan and has their questions answered in regards to expectations and current treatment plan.     SAFETY REMINDERS  No alcohol while taking controlled substances. Alcohol is not an illegal substance, it is unsafe to use in combination. It is a build up of substances in the body that can be extremely hazardous and may cause respirations to slow to a dangerous rate resulting in hospitalization, brain damage, or death.    Opioid medications have been associated with sharp rise in unintentional overdose and death.  Overdose is a condition characterized by the consumption in excess of a particular drug causing adverse effects. This can happen b/c you are sick, accidentally or intentionally took an extra dose, are on multiple medication that can interact. Someone took your medication and they are not use to the medication.  Symptoms of overdose include:   !breathing slow and shallow, erratic or not at all  !pinpoint pupils, hallucinations  !confusion  !muscle jerks, slack muscles   !extreme sleepiness or loss of alertness   !awake but not able to talk   !face pale or clammy, vomiting, for lighter skinned people, the skin tone turns bluish purple, for darker skinned people, it turns grayish or ashen   If in a situation where overdose is a concern engage the emergency response system (dial 911).    In one study it was noted that 80% of unintentional overdoses occurred in people who were taking a combination of opioids and benzodiazepines.    Do not sell, loan, borrow or share your opioid medication with anyone. Deaths have occurred as a result of this practice. It is illegal and patients are being prosecuted.     Prevent unexpected  access/loss of medication: Keep medication locked. Only carry what you need with you.    Nuvia Smith CNP  [facility]  [address]  [place] [address]  Ph: [phone]  Fax: [phone]

## 2021-06-06 NOTE — PATIENT INSTRUCTIONS - HE
Plan:   Interventions-    Follow up in 4-8 weeks to evaluate the effectiveness of the treatment interventions ordered today. Scheduling your appointment prior to leaving assists in reduction of running out of medication prior to the next appointment.     Continue the butrans patch- refills sent 3/25     Continue the Norco - next fill date is 3/11-4/8    Call for the second months refills.    Will consider the EMG test with Dr. Sue for the unilateal T 8,9,10 nerves that may be part of the right sided pain secondary to the scoliosis.      If this is part of the issue may implement a steroid injection to test the nerve at the T8,T9,T10     Agree with the amitriptyline if this does not work ok to think about namenda as an option.     As a reminder- chronic pain is generally stable, if you experience a new injury or new different pain it is expected that you present to the ED or urgent care for evaluation. DO NOT use your chronic pain medications to treat any new or different pain other then what it is intended for. We do not replace any lost or stolen prescriptions or provide early refills for overtaking your medications.     Orders placed today  Medications that were ordered today   Requested Prescriptions     Signed Prescriptions Disp Refills     buprenorphine (BUTRANS) 15 mcg/hour PTWK patch 4 patch 0     Sig: Place 1 patch on the skin every 7 days.     HYDROcodone-acetaminophen 5-325 mg per tablet 224 tablet 0     Sig: Take 1-2 tablets by mouth 4 (four) times a day as needed for pain (up tp 8 tabs per day).     No orders of the defined types were placed in this encounter.      UDT/SWAB:  Patient required a random Urine Drug Testing, due to the need to comply with [employer] Model Policy Guidelines and CDC Guideline for the use of any controlled substances. This is to ensure that patient is compliant with treatment, and monitor for risks such as diversion, abuse, or any other aberrant behaviors. Patient is  either being considered for or taking a controlled substance. Unexpected findings will be discussed and treatment decision may be adjusted. Testing is being implemented across the board randomly w/o bias related to age, race, gender, socioeconomic status or [employer] affiliation.    The patient understand todays plan and has their questions answered in regards to expectations and current treatment plan.     SAFETY REMINDERS  No alcohol while taking controlled substances. Alcohol is not an illegal substance, it is unsafe to use in combination. It is a build up of substances in the body that can be extremely hazardous and may cause respirations to slow to a dangerous rate resulting in hospitalization, brain damage, or death.    Opioid medications have been associated with sharp rise in unintentional overdose and death.  Overdose is a condition characterized by the consumption in excess of a particular drug causing adverse effects. This can happen b/c you are sick, accidentally or intentionally took an extra dose, are on multiple medication that can interact. Someone took your medication and they are not use to the medication.  Symptoms of overdose include:   !breathing slow and shallow, erratic or not at all  !pinpoint pupils, hallucinations  !confusion  !muscle jerks, slack muscles   !extreme sleepiness or loss of alertness   !awake but not able to talk   !face pale or clammy, vomiting, for lighter skinned people, the skin tone turns bluish purple, for darker skinned people, it turns grayish or ashen   If in a situation where overdose is a concern engage the emergency response system (dial 911).    In one study it was noted that 80% of unintentional overdoses occurred in people who were taking a combination of opioids and benzodiazepines.    Do not sell, loan, borrow or share your opioid medication with anyone. Deaths have occurred as a result of this practice. It is illegal and patients are being prosecuted.      Prevent unexpected access/loss of medication: Keep medication locked. Only carry what you need with you.    Nuvia Smith CNP  [facility]  [address]  [place] [address]  Ph: [phone]  Fax: [phone]

## 2021-06-06 NOTE — PROGRESS NOTES
Patient presents to the clinic today for a follow up with Nuvia Smith CNP regarding abdomen. Patient describes their pain as dull. Her/His function score is 7.

## 2021-06-06 NOTE — PROGRESS NOTES
PAIN CLINIC FOLLOW UP PROGRESS NOTE    CC:  Chief Complaint   Patient presents with     Back Pain     Abdominal Pain       HPI  Vernon Lemus is a 79 y.o. male who presents for evaluation   Chief Complaint   Patient presents with     Back Pain     Abdominal Pain    that is causing continued pain. Since the last visit the patient denies any trips to the urgent care or ED specifically for their pain. The patient denies any new medications, diagnoses since the last visit. Specific questions indicated the patient wanted addressed today include: to follow up on his current issues that are related to his abdominal pain. He notes that he is getting an updated MRI of his thoracic spine     Major issues:  1. Chronic pain syndrome    2. Chronic bilateral low back pain with bilateral sciatica    3. Abdominal pain, generalized        Alleviating factors: Include-  medications, breathing techniques, distracting   Aggravating factors: activity including bending, standing, laying down or lifting,   The patient denies using any assistive devices to help with mobilization.  Pain level today: On a scale of 1-10, the patient rates their pain at a 3 /10 described as a burning, dull and constant  described as boring  Function Rating:This seems to affect his life on a daily bases as well as sleep.      Previous Medical History  Social History     Substance and Sexual Activity   Alcohol Use No     Social History     Substance and Sexual Activity   Drug Use No     Social History     Tobacco Use   Smoking Status Former Smoker     Last attempt to quit: 8/10/1986     Years since quittin.6   Smokeless Tobacco Never Used       Pertinent Pain Medications/interventions:  Currently he is taking norco 5/325 mg up to 5-8 tabs per day as well as Butrans 15 mcg/ hr     He notes that the amitriptyline was started with Dr. Alva from [facility]       He is currently taking norco up to 6 per day as needed, he recently repeated the celiac plexus  "block with Dr. Gaviria and reports no relief as of yet.      Acupuncture was helpful initially but is now not effective     Baclofen was not helpful     Recently stopped oxycontin ER 15 mg two times a day. 5/24/2019     Previously has tried morphine which led to confusion   Belbuca up to 600 mcg two times a day which helped a lot but unaffordable.      Failed medical cannabis  Percocet- too strong.       Reports having tried and failed pregablin- lyrica    TRIED AND FAILED MEDICATIONS:  Cymbalta - hallucinations at 60 mg, no issues with 40 mg  Lyrica - very depressed mood  Nortriptyline- \"spacing out\" and anxiety as well as jitteriness in the morning- however, it did help with sleep and decreased his shooting pains at night.  oxymorphone 5mg ER twice daily ,OxyContin 10 mg twice a day.    Percocet 5-325 mg, max of 3 a day, Protonix 40 mg daily,  Aspirin 81 mg daily,  Maalox 17 grams for constipation, Flaxseed.     Reviewing the records it is noted patient has tried the celiac plexus blocks x 2 and trigger point injections to the local area.    Review of Systems:  12 point systems were reviewed with pt as documented on pt health form and the patient denies any new diagnosis or changes in 12 point system review since the last visit.     Physical Exam  Vitals:    03/11/20 1306   BP: (!) 160/96   Patient Site: Left Arm   Patient Position: Sitting   Pulse: 99   Resp: 18   Weight: (!) 227 lb (103 kg)   Height: 6' (1.829 m)     General- patient is alert and oriented, in NAD, well-groomed, well-nourished  Psych- Judgment and insight normal, AOx4, recent and remote memory normal, mood and affect normal  Eyes- pupils are equal and reactive, conjunctiva is clear bilaterally, no ptosis is noted.   Respiratory- breathing is non-labored  Cardiovascular- extremities warm and well perfused, no peripheral edema or varicosities.  Musculoskeletal- gait is normal, extremities with no joint swelling, erythema, or warmth.  Neuro- normal " strength, no gait abnormalities, normal sensation to pain, temperature, light touch. Right sided abdominal pain   Integumentary- no rashes, dermatitis or discolorations noted throughout, no open wounds noted. Continued chronic abdominal pain- epigastric and superior  to the right of the umbilicus     Medications    Current Outpatient Medications:      amLODIPine (NORVASC) 5 MG tablet, Take 5 mg by mouth daily., Disp: , Rfl:      amoxicillin (AMOXIL) 500 MG capsule, Take 500 mg by mouth 3 (three) times a day., Disp: , Rfl: 1     aspirin 81 mg chewable tablet, Chew 81 mg daily., Disp: , Rfl:      latanoprost (XALATAN) 0.005 % ophthalmic solution, Administer 1 drop to the right eye at bedtime., Disp: , Rfl:      LORazepam (ATIVAN) 1 MG tablet, TK 1 T PO 30 MINUTES BEFORE MRI, Disp: , Rfl:      metoprolol succinate (TOPROL-XL) 100 MG 24 hr tablet, TAKE ONE AND ONE-HALF TABLETS ONCE DAILY, Disp: , Rfl:      metoprolol tartrate (LOPRESSOR) 100 MG tablet, Take 150 mg by mouth daily., Disp: , Rfl:      MULTIVITAMIN (MULTIPLE VITAMIN ORAL), Take 1 tablet by mouth daily., Disp: , Rfl:      nortriptyline (PAMELOR) 50 MG capsule, TK ONE C PO QHS, Disp: , Rfl:      omega-3/dha/epa/fish oil (FISH OIL-OMEGA-3 FATTY ACIDS) 300-1,000 mg capsule, Take 2 g by mouth daily., Disp: , Rfl:      oxybutynin (DITROPAN) 5 MG tablet, , Disp: , Rfl:      pantoprazole (PROTONIX) 40 MG tablet, Take 40 mg by mouth daily., Disp: , Rfl:      polyethylene glycol (MIRALAX) 17 gram packet, Take 17 g by mouth daily as needed., Disp: , Rfl:      senna-docusate (SENNOSIDES-DOCUSATE SODIUM) 8.6-50 mg tablet, Take 1 tablet by mouth daily., Disp: 30 tablet, Rfl: 2     triamcinolone (KENALOG) 0.1 % ointment, 1 narinder, Disp: , Rfl:      triamterene-hydrochlorothiazide (DYAZIDE) 37.5-25 mg per capsule, Take 1 capsule by mouth every morning., Disp: , Rfl:      VITAMIN K2 ORAL, Take 100 mg by mouth daily., Disp: , Rfl:      [START ON 3/25/2020] buprenorphine  (BUTRANS) 15 mcg/hour PTWK patch, Place 1 patch on the skin every 7 days., Disp: 4 patch, Rfl: 0     HYDROcodone-acetaminophen 5-325 mg per tablet, Take 1-2 tablets by mouth 4 (four) times a day as needed for pain (up tp 8 tabs per day)., Disp: 224 tablet, Rfl: 0    Lab:  Last UDS on 7/2019 had expected results. Any abnormal results have been discussed with the patient and may change the plan of care. Please see the scanned document from the outside lab under the media tab. This was reviewed with the patient.      Imaging:  No new imaging.       Recent   Dated 3/11/2020 was reviewed with the patient today.     Paper copy reviewed     Assessment:   Vernon Lemus is a 79 y.o. male seen in clinic today for   Chief Complaint   Patient presents with     Back Pain     Abdominal Pain     Ongoing abdominal pain- he notes that the butrans patch 15 mcg/ hr reduces his pain drastically. He did have a ultrasound this week and notes that he is still experiencing pain from the pressure of the ultrasound wand. Patient notes he will reach out to his provider in regards to this as it is abnormal to react this way to an ultrasound. Patient has been started on amitriptyline and reports that he feels that Dr. Alva thinks it may be coming from his thoracic spine. His last thoracic MRI ordered does reveal scoliosis in the thoracolumbar levels. He reports that he has a new thoracic mri pending. If this is suspect he may need a EMG with Dr. Sue and injections likely at T8,9,10 levels on the right. Patient will update team as needed.     Patients current MME is 47-67  Patient to call clinic for next month's prescription 5 days in advance. Based on the patients response to their previous narcotic therapy and quality of life, which includes their participation in ADLs, I recommend that the narcotics therapy continue, however the changes listed below will be incorporated into their plan of care.     Patient set goals to   1. To  treat his abdominal pain     Plan:   Interventions-    Follow up in 4-8 weeks to evaluate the effectiveness of the treatment interventions ordered today. Scheduling your appointment prior to leaving assists in reduction of running out of medication prior to the next appointment.     Continue the butrans patch- refills sent 3/25     Continue the Norco - next fill date is 3/11-4/8    Call for the second months refills.    Will consider the EMG test with Dr. Sue for the unilateal T 8,9,10 nerves that may be part of the right sided pain secondary to the scoliosis.      If this is part of the issue may implement a steroid injection to test the nerve at the T8,T9,T10     Agree with the amitriptyline if this does not work ok to think about namenda as an option.     As a reminder- chronic pain is generally stable, if you experience a new injury or new different pain it is expected that you present to the ED or urgent care for evaluation. DO NOT use your chronic pain medications to treat any new or different pain other then what it is intended for. We do not replace any lost or stolen prescriptions or provide early refills for overtaking your medications.     Orders placed today  Medications that were ordered today   Requested Prescriptions     Signed Prescriptions Disp Refills     buprenorphine (BUTRANS) 15 mcg/hour PTWK patch 4 patch 0     Sig: Place 1 patch on the skin every 7 days.     HYDROcodone-acetaminophen 5-325 mg per tablet 224 tablet 0     Sig: Take 1-2 tablets by mouth 4 (four) times a day as needed for pain (up tp 8 tabs per day).     No orders of the defined types were placed in this encounter.      UDT/SWAB:  Patient required a random Urine Drug Testing, due to the need to comply with [place] [employer] Policy Guidelines and CDC Guideline for the use of any controlled substances. This is to ensure that patient is compliant with treatment, and monitor for risks such as diversion, abuse, or any other  aberrant behaviors. Patient is either being considered for or taking a controlled substance. Unexpected findings will be discussed and treatment decision may be adjusted. Testing is being implemented across the board randomly w/o bias related to age, race, gender, socioeconomic status or Rastafari affiliation.    The patient understand todays plan and has their questions answered in regards to expectations and current treatment plan.     SAFETY REMINDERS  No alcohol while taking controlled substances. Alcohol is not an illegal substance, it is unsafe to use in combination. It is a build up of substances in the body that can be extremely hazardous and may cause respirations to slow to a dangerous rate resulting in hospitalization, brain damage, or death.    Opioid medications have been associated with sharp rise in unintentional overdose and death.  Overdose is a condition characterized by the consumption in excess of a particular drug causing adverse effects. This can happen b/c you are sick, accidentally or intentionally took an extra dose, are on multiple medication that can interact. Someone took your medication and they are not use to the medication.  Symptoms of overdose include:   !breathing slow and shallow, erratic or not at all  !pinpoint pupils, hallucinations  !confusion  !muscle jerks, slack muscles   !extreme sleepiness or loss of alertness   !awake but not able to talk   !face pale or clammy, vomiting, for lighter skinned people, the skin tone turns bluish purple, for darker skinned people, it turns grayish or ashen   If in a situation where overdose is a concern engage the emergency response system (dial 911).    In one study it was noted that 80% of unintentional overdoses occurred in people who were taking a combination of opioids and benzodiazepines.    Do not sell, loan, borrow or share your opioid medication with anyone. Deaths have occurred as a result of this practice. It is illegal and  patients are being prosecuted.     Prevent unexpected access/loss of medication: Keep medication locked. Only carry what you need with you.    Nuvia Smith [facility]  [address]  [place] [address]  Ph: [phone]  Fax: [phone]

## 2021-06-06 NOTE — TELEPHONE ENCOUNTER
Medication being requested: hydrocodone 5/325 mg  Medication is due on the same day as appointment and patient can address at that time.

## 2021-06-07 ENCOUNTER — HOSPITAL ENCOUNTER (OUTPATIENT)
Dept: PALLIATIVE MEDICINE | Facility: OTHER | Age: 81
Discharge: HOME | End: 2021-06-07
Attending: NURSE PRACTITIONER
Payer: COMMERCIAL

## 2021-06-07 DIAGNOSIS — G89.4 CHRONIC PAIN SYNDROME: ICD-10-CM

## 2021-06-07 DIAGNOSIS — M54.42 CHRONIC BILATERAL LOW BACK PAIN WITH BILATERAL SCIATICA: ICD-10-CM

## 2021-06-07 DIAGNOSIS — R10.84 ABDOMINAL PAIN, GENERALIZED: ICD-10-CM

## 2021-06-07 DIAGNOSIS — G89.29 CHRONIC BILATERAL LOW BACK PAIN WITH BILATERAL SCIATICA: ICD-10-CM

## 2021-06-07 DIAGNOSIS — M54.41 CHRONIC BILATERAL LOW BACK PAIN WITH BILATERAL SCIATICA: ICD-10-CM

## 2021-06-07 NOTE — TELEPHONE ENCOUNTER
Patient call to report his [facility] pharmacy in [facility] is out of the butrans patches and told him that they have one box at [facility]. Requesting script send to [facility].  reQueued for provider to [facility].

## 2021-06-07 NOTE — PATIENT INSTRUCTIONS - HE
Plan:   Interventions-    Follow up in 8 weeks     Continue the use of the norco at this time refill dates are 5/6-6/3  continue the use of the butrans patches     Recommend a T9-10 injection- diagnostic       This limited telehealth encounter is due to the Covid 19,  as required by the governmental agencies at this time due to risk of transmission of the pandemic, therefore testing availability is limited as well as physical exams.      Prescription Drug Management will be continued by the [facility] center    Orders placed today  Medications that were ordered today   Requested Prescriptions     Pending Prescriptions Disp Refills     buprenorphine (BUTRANS) 15 mcg/hour PTWK patch 4 patch 0     Sig: Place 1 patch on the skin every 7 days.     HYDROcodone-acetaminophen 5-325 mg per tablet 224 tablet 0     Sig: Take 1-2 tablets by mouth 4 (four) times a day as needed for pain (up tp 8 tabs per day).     No orders of the defined types were placed in this encounter.        The patient understand todays plan and has their questions answered in regards to expectations and current treatment plan.     Prevent unexpected access/loss of medication: Keep medication locked. Only carry what you need with you    The plan of care will be adjusted to accommodate the issues discussed, discussing management of their care and follow up that is recommended. 4/30/2020         FABI Santamaria [facility]  [address]  [place] [address]  Ph: [phone]  Fax: [phone]

## 2021-06-08 NOTE — PROGRESS NOTES
NEUROSURGERY FOLLOWUP  NOTE    Vernon Lemus comes today in f/u after prior apt on 6/1/2020 for thoracic stenosis.  81 yo male who presents with right abdominal pain, back pain, cramping of his legs.  With regards to his right abdominal pain MRI thoracic spine shows thoracic 8-thoracic 9 moderate foraminal narrowing may correlate with his abdominal pain.  In regards to back and leg cramping appears to be claudicating in nature but patient on MRI lumbar spine does not seem to have any significant stenosis centrally in his canal.  Does however appear to have mild lumbar degenerative scoliosis.  Will obtain a CT of the thoracic spine without contrast and scoliosis x-rays.  Follow-up in clinic after these obtained.    Since our last visit he underwent a thoracic CT of scoliosis x-rays.  No new changes on exam.  Discussed the relative results of these imaging.  Noted to have high grade T8-9 right foraminal narrowing on MRI but did not seem to see high-grade narrowing on CT thoracic.  Given the unusual pathology and conflicting imaging I would like him to see my  Dr. Alan Lynch for his opinion.  We also discussed sending him to vascular clinic to rule out vascular claudication as a cause for his crampy leg pain.  Does not seem to have any high-grade stenosis centrally in the canal to cause neurogenic claudication.     I spent more than 25 minutes in this apt, examining the pt, reviewing the scans, reviewing notes from chart, discussing treatment options with risks and benefits and coordinating care. >50 % clinic time was spent in face to face counseling and coordinating care    Chelsey Moore      CC:     Vicente De Luna MD  [address] [address]

## 2021-06-08 NOTE — TELEPHONE ENCOUNTER
Medication being requested: hydrocodone 5/325 mg  Last visit date: 4/30  Provider: BRIANNE  Next visit date: 6/30  Provider: BRIANNE  Expected follow up: 8 weeks  MTM visit (Pain Center) date: no  UDT date: 7/2019  Agreement date: 7/2019   snipped in:  Fill Date ID Written Drug Qty Days Prescriber Rx # Pharmacy Refill Daily Dose * Pymt Type   05/17/2020 1 04/20/2020 Buprenorphine 15 Mcg/hr Patch  4.00 28 Cr Mon [id number] [id number] 0/0 0.36 mg [facility] [place]  05/11/2020 1 05/11/2020 Pregabalin 75 Mg Capsule  60.00 30 Ad Abby [id number] [facility] ([facility] 0/2 1.00 LME [facility] [place]  05/06/2020 1 04/30/2020 Hydrocodone-Acetamin 5-325 Mg  224.00 28 Cr Mon [id number] [facility] ([facility]) 0/0 40.00 MME [place]  Pertinent between visit information about requested medication (telephone, mychart, prior authorization, concerns, comments):NA  Script being sent to provider by nurse- dates and quantity:   Requested Prescriptions     Pending Prescriptions Disp Refills     HYDROcodone-acetaminophen 5-325 mg per tablet 224 tablet 0     Sig: Take 1-2 tablets by mouth 4 (four) times a day as needed for pain (up tp 8 tabs per day).     Pharmacy cued: [facility]  Standing orders for withdrawal protocol implemented: NA

## 2021-06-08 NOTE — PROGRESS NOTES
Vernon Lemus is a 80 y.o. male who is being seen via a billable video visit.  Patient has given verbal consent for video visit? Yes  Video Start Time: 08:06  Telehealth Visit Details:  Type of service: Telehealth  Video End Time (time video stopped): 08:45  Originating Location (Patient): Home  Additional Participants in Telehealth Visit: none  Distant Location (Provider Location): [facility]  Mode of Communication: Audio Visual    Shakeel López PT  6/11/2020    [facility] Daily Progress     Patient Name: Vernon Lemus  Date: 6/11/2020  Visit #: 2/8 through 9/30/20  PTA visit #:    PRECAUTIONS: none  Referral Diagnosis: Chronic bilateral low back pain without sciatica   Referring provider: Chelsey Moore MD  Visit Diagnosis:     ICD-10-CM    1. Chronic midline low back pain with bilateral sciatica  M54.41     M54.42     G89.29    2. Decreased ROM of lumbar spine  M53.86          Assessment:     Pt presents to PT with primary concerns of low back and bilat LE pain and weakness, most consistent with neurogenic claudication from lumbar stenosis, although MRI appears relatively negative.  Thus far, patient notes improving ambulation tolerance; able to complete his regular 3/4 mile walk yesterday without low back and LE pain/weakness.    HEP/POC compliance is  good .  Patient is benefitting from skilled physical therapy and is making steady progress toward functional goals.  Patient is appropriate to continue with skilled physical therapy intervention, as indicated by initial plan of care.    Goal Status:  Pt. will be independent with home exercise program in : 6 weeks. PROGRESSING  Pt will: be able to tolerate up to 1 mile ambulation for exercise and community mobility without increased back or LE pain in 8 weeks. PROGRESSING      Plan / Patient Education:      Continue per POC, progressing core/glute/multifidus strength and coordination training as  tolerated to decrease pain associated with prolonged ambulation.  Next visit: Review newly added ex for thoracic mobility/stability.    Subjective:     Pain Ratin/10 at rest, low back and bilat LE pain/weakness  Reports doing HEP regularly- 1-2x/day as able. Notes good difficulty with ex to the point that things get fatigued, but denies increased pain with ex.  Suprisingly, noticing an improvement in his walking already. Yesterday was able to make it the full 3/4 of a mile without pain or his legs feeling weak.  Pt does wonder what exercises he can do for his mid/upper back    Objective:     Reviewed HEP with min cueing to ensure correct performance of all ex.  Updated HEP per flow sheets.    Exercises:  Exercise #1: SKTC, LTR - H  Comment #1: 2x30 sec B; 10x2 sec B  Exercise #2: PPT - H  Comment #2: 10x5 sec  Exercise #3: Bridges - h  Comment #3: 10x3 sec  Exercise #4: 4ped ex - H  Comment #4: opposite UE/LE lift x10  Exercise #5: TB sidestepping - H  Comment #5: verbal review: Pt using OTB, but does have GTB as well. Pt has hallway with HR- encouraged to use for safety  Exercise #6: T lying over towel roll - H  Comment #6: demonstrated/instruction provided  Exercise #7: Scap retraction; Prone Is - H  Comment #7: demonstration/instruction provided      Treatment Today     TREATMENT MINUTES COMMENTS   Evaluation     Self-care/ Home management     Manual therapy     Neuromuscular Re-education     Therapeutic Activity     Therapeutic Exercises 39 Ex per flow sheet   Gait training     Modality__________________                Total 39    Blank areas are intentional and mean the treatment did not include these items.       Shakeel López  2020

## 2021-06-08 NOTE — PROGRESS NOTES
NEUROSURGERY CONSULTATION NOTE    6/1/2020     Vernon Lemus is a 80 y.o. male who is sent to us in consultation by Vicente De Luna for evaluation of thoracic radiculopathy.         CONSULTATION ASSESSMENT AND PLAN:     79 yo male who presents with right abdominal pain, back pain, cramping of his legs.  With regards to his right abdominal pain MRI thoracic spine shows thoracic 8-thoracic 9 moderate foraminal narrowing may correlate with his abdominal pain.  In regards to back and leg cramping appears to be claudicating in nature but patient on MRI lumbar spine does not seem to have any significant stenosis centrally in his canal.  Does however appear to have mild lumbar degenerative scoliosis.  Will obtain a CT of the thoracic spine without contrast and scoliosis x-rays.  Follow-up in clinic after these obtained.    I spent more than 45 minutes in this apt, examining the pt, reviewing the scans, reviewing notes from chart, discussing treatment options with risks and benefits and coordinating care. >50 % clinic time was spent in face to face counseling and coordinating care    Chelsey Moore     HPI:  79 yo male who presents with right abdominal pain, back pain, cramping of his legs.  The last 7 years patient has had right-sided abdominal pain which is slightly located above his umbilicus.  He states he cannot tolerate any pressure in this area.  Very bothersome to him.  He has been worked up  abdominal pathology which was negative.  He underwent a previous celiac plexus block on 5/21/2019 without any relief.    In the last 2 to 3 months he also noticed worsening low back pain.  He will also have leg pain and cramping in his legs with walking.  Symptoms resolve with rest.  Has numbness and tingling in his distal lower extremities only and has been diagnosed with peripheral neuropathy in the past.  He has not had any physical therapy or spinal injections for these new symptoms.    Prior Spine  Surgery:no    Past Medical History:   Diagnosis Date     Abdominal pain      Cancer (H)     prostate     Chronic pancreatitis (H)      GERD (gastroesophageal reflux disease)      History of basal cell cancer     left arm     Hypertension      Melanoma (H)     removed from neck     Polyneuropathy     both ankles and feet     Seasonal allergies      Shingles     right leg     Past Surgical History:   Procedure Laterality Date     CARPAL TUNNEL RELEASE Right      CATARACT EXTRACTION Bilateral      ERCP       ESOPHAGOGASTRODUODENOSCOPY N/A 8/11/2017    Procedure: ENDOSCOPIC ULTRASOUND;  Surgeon: Eleazar Verma MD;  Location: [facility];  Service:      INCONTINENCE SURGERY       PROSTATECTOMY       removal of melanoma       urinary sphincter transplant      with 2 subsequent revisions       REVIEW OF SYSTEMS:  ROS reviewed with pt as documented on pt health form of 6/1/2020.           MEDICATIONS:  Current Outpatient Medications   Medication Sig Dispense Refill     amLODIPine (NORVASC) 5 MG tablet Take 5 mg by mouth daily.       aspirin 81 mg chewable tablet Chew 81 mg daily.       [START ON 6/3/2020] HYDROcodone-acetaminophen 5-325 mg per tablet Take 1-2 tablets by mouth 4 (four) times a day as needed for pain (up tp 8 tabs per day). 224 tablet 0     latanoprost (XALATAN) 0.005 % ophthalmic solution Administer 1 drop to the right eye at bedtime.       LORazepam (ATIVAN) 1 MG tablet TK 1 T PO 30 MINUTES BEFORE MRI       metoprolol succinate (TOPROL-XL) 100 MG 24 hr tablet TAKE ONE AND ONE-HALF TABLETS ONCE DAILY       MULTIVITAMIN (MULTIPLE VITAMIN ORAL) Take 1 tablet by mouth daily.       omega-3/dha/epa/fish oil (FISH OIL-OMEGA-3 FATTY ACIDS) 300-1,000 mg capsule Take 2 g by mouth daily.       oxybutynin (DITROPAN) 5 MG tablet        pantoprazole (PROTONIX) 40 MG tablet Take 40 mg by mouth daily.       polyethylene glycol (MIRALAX) 17 gram packet Take 17 g by mouth daily as needed.       pregabalin (LYRICA) 75  MG capsule Take 75 mg by mouth 2 (two) times a day.       triamcinolone (KENALOG) 0.1 % ointment 1 narinder       triamterene-hydrochlorothiazide (DYAZIDE) 37.5-25 mg per capsule Take 1 capsule by mouth every morning.       buprenorphine (BUTRANS) 15 mcg/hour PTWK patch Place 1 patch on the skin every 7 days. 4 patch 1     senna-docusate (SENNOSIDES-DOCUSATE SODIUM) 8.6-50 mg tablet Take 1 tablet by mouth daily. 30 tablet 2     No current facility-administered medications for this visit.          ALLERGIES/SENSITIVITIES:     Allergies   Allergen Reactions     Cat Dander Itching     Sneezing     Morphine Other (See Comments)     Causes agitation     Pollen Extracts Itching     sneezing     Tizanidine Dizziness     At higher doses       PERTINENT SOCIAL HISTORY:   Social History     Socioeconomic History     Marital status:      Spouse name: None     Number of children: None     Years of education: None     Highest education level: None   Occupational History     None   Social Needs     Financial resource strain: None     Food insecurity     Worry: None     Inability: None     Transportation needs     Medical: None     Non-medical: None   Tobacco Use     Smoking status: Former Smoker     Last attempt to quit: 8/10/1986     Years since quittin.8     Smokeless tobacco: Never Used   Substance and Sexual Activity     Alcohol use: No     Drug use: No     Sexual activity: None   Lifestyle     Physical activity     Days per week: None     Minutes per session: None     Stress: None   Relationships     Social connections     Talks on phone: None     Gets together: None     Attends Alevism service: None     Active member of club or organization: None     Attends meetings of clubs or organizations: None     Relationship status: None     Intimate partner violence     Fear of current or ex partner: None     Emotionally abused: None     Physically abused: None     Forced sexual activity: None   Other Topics Concern      None   Social History Narrative     None         FAMILY HISTORY:  Family History   Family history unknown: Yes        PHYSICAL EXAM:   Constitutional: /45   Pulse 65   Ht 6' (1.829 m)   Wt 220 lb (99.8 kg)   SpO2 96%   BMI 29.84 kg/m       Mental Status: A & O in no acute distress.  Affect is appropriate.  Speech is fluent.  Recent and remote memory are intact.  Attention span and concentration are normal.     Cranial Nerves: CN1: grossly intact per patient recall. CN2: No funduscopic exam performed. CN3,4 & 6: Pupillary light response, lateral and vertical gaze normal.  No nystagmus.  Visual fields are full to confrontation. CN5: Intact to touch CN7: No facial weakness, smile, facial symmetry intact. CN8: Intact to spoken voice. CN9&10: Gag reflex, uvula midline, palate rises with phonation. CN11: Shoulder shrug 5/5 intact bilaterally. CN12: Tongue midline and moves freely from side to side.     Motor:  Normal bulk and tone all muscle groups of upper and lower extremities.      Sensory: Sensation intact bilaterally to light touch.      Coordination:  Heel/toe/  gait intact.   Imbalance w tandem     Reflexes; supinator, biceps, triceps, knee/ ankle jerk intact. no hoffmans/ no babinski/ clonus.    IMAGING: I personally reviewed all radiographic images      Cc:   Vicente De Luna MD  [address] [facility] [address]

## 2021-06-08 NOTE — PROGRESS NOTES
Neurosurgery consultation was requested by: Dr. Vicente De Luna  Pain: Low back pain   Radicular Pain is present: Pain in right upper abdomen. Calf pain on and off in both legs   Lhermitte sign: no   Motor complaints: Weakness in both legs  Sensory complaints: Pt states he has neuropathy in both legs   Gait and balance issues: yes   Bowel or bladder issues: Denies   Duration of SX is: Couple months   The symptoms are worse with: prolonged standing   The symptoms are better with: Medication   Injury: Denies   Severity is: Mild - moderate  Patient has tried the following conservative measures: Denies   DAVID score is: Did not complete  SELENE Jameson

## 2021-06-09 ENCOUNTER — RECORDS - HEALTHEAST (OUTPATIENT)
Dept: LAB | Facility: CLINIC | Age: 81
End: 2021-06-09

## 2021-06-09 LAB
ANION GAP SERPL CALCULATED.3IONS-SCNC: 13 MMOL/L (ref 5–18)
BUN SERPL-MCNC: 27 MG/DL (ref 8–28)
CALCIUM SERPL-MCNC: 9 MG/DL (ref 8.5–10.5)
CHLORIDE BLD-SCNC: 104 MMOL/L (ref 98–107)
CO2 SERPL-SCNC: 24 MMOL/L (ref 22–31)
CREAT SERPL-MCNC: 1.28 MG/DL (ref 0.7–1.3)
GFR SERPL CREATININE-BSD FRML MDRD: 54 ML/MIN/1.73M2
GLUCOSE BLD-MCNC: 99 MG/DL (ref 70–125)
POTASSIUM BLD-SCNC: 4.2 MMOL/L (ref 3.5–5)
PSA SERPL-MCNC: 0.9 NG/ML (ref 0–6.5)
SODIUM SERPL-SCNC: 141 MMOL/L (ref 136–145)

## 2021-06-09 NOTE — PATIENT INSTRUCTIONS - HE
I will be in touch with Nuvia Smith at the pain center to determine where the injection should be done.  You will get a call to schedule this.    I recommend he continue with your appointment at the pain clinic.    I recommend you continue doing your physical therapy exercises on a daily basis.    ~Please call Nurse Navigation line [phone] with any questions or concerns about your treatment plan, if symptoms worsen and you would like to be seen urgently, or if you have problems controlling bladder and bowel function.

## 2021-06-09 NOTE — PROGRESS NOTES
The patient is a 80-year-old male.  He complains of back pain.  He has pain in both legs worse on the left.  He also complains of right upper quadrant abdominal pain.  Says he has had a GI work-up which is negative.  We could try an injection at T8-9 on the right.  He is going to see a vascular doctor about his legs.  On ultrasound he has suggestion of vascular claudication.  His low back MRI is fairly unremarkable.  If we do not come up with a solution to his problem we could think about a neurology consult and EMG.  For now of visit at the spine center and trial injection at T8-9 on the right.  The patient is satisfied with the plan.  Total time 15 minutes, more than 50% spent counseling and/or coordinating care.

## 2021-06-09 NOTE — PROGRESS NOTES
Vernon Lemus is a 80 y.o. male who is being seen via a billable video visit.  Patient has given verbal consent for video visit? Yes  Video Start Time: 08:00  Telehealth Visit Details:  Type of service: Telehealth  Video End Time (time video stopped): 08:25  Originating Location (Patient): Home  Additional Participants in Telehealth Visit: none  Distant Location (Provider Location): [facility]  Mode of Communication: Audio Visual    Shakeel López, PT  6/25/2020    [facility] Daily Progress     Patient Name: Vernon Lemus  Date: 6/25/2020  Visit #: 3/8 through 9/30/20  PTA visit #:    PRECAUTIONS: none  Referral Diagnosis: Chronic bilateral low back pain without sciatica   Referring provider: Chelsey Moore MD  Visit Diagnosis:     ICD-10-CM    1. Chronic midline low back pain with bilateral sciatica  M54.41     M54.42     G89.29    2. Decreased ROM of lumbar spine  M53.86          Assessment:     Pt presents to PT with primary concerns of low back and bilat LE pain and weakness, most consistent with neurogenic claudication from lumbar stenosis with differential diagnosis for peripheral vascular occlusion.  Thus far, patient notes improving ambulation tolerance and decreased LBP with daily ADLs.  He is scheduled to have further vascular work-up completed, along with a visit with his Spine physician. PT/patient discussed will plan to follow-up PRN after these appts, as overall he demonstrates excellent HEP compliance and understanding from a PT perspective.    Goal Status:  Pt. will be independent with home exercise program in : 6 weeks. PROGRESSING  Pt will: be able to tolerate up to 1 mile ambulation for exercise and community mobility without increased back or LE pain in 8 weeks. PROGRESSING      Plan / Patient Education:     He is scheduled to have further vascular work-up completed, along with a visit with his Spine physician. PT/patient discussed  will plan to follow-up PRN after these appts, as overall he demonstrates excellent HEP compliance and understanding from a PT perspective.    Subjective:     Pain Ratin/10 at rest, low back and bilat LE pain/weakness  Reports doing HEP regularly- 1-2x/day as able.   Overall, reports PT has been helpful. Has noticed greater ease with day to day activities as far as the low back is concerned.  Walking tolerance is variable. Often able to make it his full regular 3/4 of a mile for his morning walk, but has needed to sit and rest sometimes because of calf tightness and tightness/discomfort in the low back.  Does note previous abdominal pain to be better since starting Lyrica about a month ago, which he is encouraged by, as he is not having to take as many opioids.  Will be seeing vascular physician next week.    Objective:     Verbally reviewed HEP with correction cues/cues for points of emphasis and to discuss any issues/barriers he has been having.    Exercises:  Exercise #1: SKTC, LTR - H  Comment #1: HEP  Exercise #2: PPT - H  Comment #2: HEP  Exercise #3: Bridges - h  Comment #3: verbal review  Exercise #4: 4ped ex - H  Comment #4: opposite UE/LE lift: verbal review  Exercise #5: TB sidestepping - H  Comment #5: verbal review: using OTB, discussed progression to using GTB as able. HR for safety  Exercise #6: T lying over towel roll - H  Comment #6: verbal review: modified to no towel roll, but lying on long ottoman with arms at T over the sides  Exercise #7: Scap retraction; Prone Is - H  Comment #7: verbal review      Treatment Today     TREATMENT MINUTES COMMENTS   Evaluation     Self-care/ Home management     Manual therapy     Neuromuscular Re-education     Therapeutic Activity     Therapeutic Exercises 25 Ex per flow sheet   Gait training     Modality__________________                Total 25    Blank areas are intentional and mean the treatment did not include these items.       Shakeel  Maribel  6/25/2020     [facility] Discharge Summary  Patient Name: Vernon Lemus  Date: 8/11/2020  Referral Diagnosis: Chronic bilateral low back pain without sciatica   Referring provider: Chelsey Moore MD  Visit Diagnosis:   1. Chronic midline low back pain with bilateral sciatica     2. Decreased ROM of lumbar spine         Goals:  Pt. will be independent with home exercise program in : 6 weeks. PROGRESSING  Pt will: be able to tolerate up to 1 mile ambulation for exercise and community mobility without increased back or LE pain in 8 weeks. PROGRESSING    Patient was seen for 3 visits from 6/5/20 to 6/25/20 with 0 missed appointments.  Please see above assessment and plan.  Pt has not called to schedule any further follow-up.    Therapy will be discontinued at this time.  The patient will need a new referral to resume.    Thank you for your referral.  Shakeel López  8/11/2020  5:02 PM

## 2021-06-09 NOTE — PATIENT INSTRUCTIONS - HE
Plan:   Interventions-    Follow up in 4 weeks     norco at 6 per day- next fill is 7/6-8/3  Agree with the use of the lyrica 100 mg two times a day   Increase in the butrans patch to 20 mcg/hr q 7 days.     Upcoming follow up with Dr. Lynch.     This limited telehealth encounter is due to the Covid 19,  as required by the governmental agencies at this time due to risk of transmission of the pandemic, therefore testing availability is limited as well as physical exams.      Prescription Drug Management will be continued by the [facility]    Orders placed today  Medications that were ordered today   Requested Prescriptions     Signed Prescriptions Disp Refills     HYDROcodone-acetaminophen 5-325 mg per tablet 168 tablet 0     Sig: Take 1-2 tablets by mouth 4 (four) times a day as needed for pain (up to 6 per day).     buprenorphine (BUTRANS) 20 mcg/hour PTWK patch 30 patch 0     Sig: Place 1 patch on the skin every 7 days.     No orders of the defined types were placed in this encounter.        The patient understand todays plan and has their questions answered in regards to expectations and current treatment plan.     Prevent unexpected access/loss of medication: Keep medication locked. Only carry what you need with you    The plan of care will be adjusted to accommodate the issues discussed, discussing management of their care and follow up that is recommended. 6/30/2020         FABI Santamaria [facility]  [address]  [place] [address]  Ph: [phone]  Fax: [phone]

## 2021-06-09 NOTE — TELEPHONE ENCOUNTER
Verified lab MD fisher ordered.  Lipid profile, patient stated understanding and had no additional questions.

## 2021-06-09 NOTE — PROGRESS NOTES
ASSESSMENT: Vernon Lemus is a 80 y.o. male who presents for consultation at the request of HE PCP Vicente De Luna MD, with a past medical history significant for nausea, recurrent abdominal pain, chronic pain syndrome, epigastric pain, essential hypertension, chronic pancreatitis, gastroesophageal reflux without esophagitis, history of cataract extraction, prostate cancer, mixed hyperlipidemia, hyponatremia, pain medication agreement signed, status post ERCP, squamous cell carcinoma of skin, urinary incontinence who presents today for new patient evaluation of chronic right abdominal pain and low back pain:    -Overall patient's physical exam is reassuring he has normal strength and reflexes in his lower extremities.  Low back pain is likely secondary to lumbar spondylosis without myelopathy.  His leg pain and weakness is likely secondary to peripheral vascular disease.  Right chronic abdominal pain possibly secondary to foraminal stenosis at T8-9.    Patient is neurologically intact on exam. No myelopathic or red flag symptoms.     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 Discussed diagnosis and treatment options with the patient today. A shared decision making model was used.  The patient's values and choices were respected. The following represents what was discussed and decided upon by the provider and the patient.      -DIAGNOSTIC TESTS:  Images were personally reviewed and interpreted and explained to patient today using spine model.   --CT of the thoracic spine dated 6/10/2020 is personally viewed images interpreted discussed the patient.  There is no acute aggressive osseous abnormality  in the thoracic spine.  There is multilevel thoracic spondylosis without high-grade spinal canal or neural foraminal stenosis.  --Ultrasound arterial pressures dated 6/29/2020 is suggestive of moderate peripheral vascular disease.  --MRI of the thoracic spine at [facility] dated 4/22/2020 is personally viewed images interpreted and discussed with the patient.  He does show foraminal stenosis at T8-9 with mild to moderate degenerative changes throughout the thoracic spine.  There is no evidence of osseous metastasis.  --MRI of the lumbar spine dated 4/22/2020 is personally viewed images interpreted and discussed with patient.  Does show moderate to advanced diffuse lumbar disc degeneration with a mild thoracolumbar scoliosis.  There is marked disc space narrowing and mild foraminal stenosis at the left at L5-S1.  There is mild foraminal stenosis mild facet arthropathy on the right at L4-5 and L3-4.  There is mild facet arthropathy and mild foraminal stenosis on the left at L1-L2.    -PHYSICAL THERAPY: Patient's had 3 sessions of physical therapy.  I recommend that he continue with his exercises at home on a daily basis.      -MEDICATIONS: Pain medications are currently being managed at the pain center by Nuvia Smith.  He is taking hydrocodone acetaminophen.  Pregabalin has also been ordered by his primary care provider he is taking 100 mg twice daily.  -  Discussed multiple medication options today with patient. Discussed risks, side effects, and proper use of medications. Patient verbalized understanding.    -INTERVENTIONS: Dr. Lynch had ordered a T8-9 transforaminal epidural steroid injection on the right.  I will contact Nuvia Smith and see if this should be done at the pain center as he has had procedures with Dr. Gaviria in the past.  If I were to do it here at the spine center I recommend a T8-9 interlaminar epidural steroid injection.  Patient was just charted on Pletal for peripheral vascular  disease.  Should the patient need injection he would need to stop this for 7 days prior to the injection and we would need clearance from his vascular surgeon.    --Could also consider right-sided tap block under ultrasound guidance should he not have relief with epidural steroid injection.  Discussed risks and benefits of injections with patient today.    -PATIENT EDUCATION: We discussed pain management in a multimodal fashion including physical therapy, medication management, possible future injections.    -FOLLOW-UP:   Patient will follow up with Nuvia Smith at his next scheduled appointment.    Advised patient to call the Spine Center if symptoms worsen or you have problems controlling bladder and bowel function.   ______________________________________________________________________    SUBJECTIVE:  HPI:  Vernon Lemus  Is a 80 y.o. male who presents today for new patient evaluation of mid and low back pain.  The patient was seen on 7/9/2020 by Dr. Lynch and referred to the spine center for further evaluation as well as recommendation for trialing an injection at T8-9 on the right.  Patient has a long history of chronic right abdominal pain.  He has had evaluation at many centers including [facility] in [facility] as well as [place].  He had a celiac plexus block done on 5/21/2019 with no long-lasting benefit.  He is also had ultrasound-guided trigger point injection done at the [facility] in 2018 and another trigger point injection landmark based at [facility] with no long-lasting relief.  He is seen at the pain clinic by Nuvia Smith and has been managed by her for quite some time now.  He is taking hydrocodone acetaminophen.  He finds it this is 1 of the only things is been helpful for the pain.  He was recently started on pregabalin 100 mg twice daily and finds it this is somewhat helpful.  He has not noted anything that increases the pain such as food intake or any sort of movement.   Hydrocodone acetaminophen and pregabalin have been the only things have been helpful for his pain.  He has had 3 sessions of physical therapy for low back pain and finds it this is helpful.  His low back pain is more of an ache and is occasional.  He notes that if it is worsening he does his exercises and he starts feeling improvement.  He also has peripheral vascular disease and is being seen by vascular surgery.  He was to started on Pletal.  He denies any bowel or bladder changes, fevers, chills, unintentional weight loss.  His pain today is 4/10 is worse is 8/10 is best is 0/10.  His evaluation has been for pancreas at the [facility] in the [facility] as well as [place].    -Treatment to Date: CT of the thoracic spine dated 6/10/2020.  MRI of the thoracic and lumbar spine dated 4/22/2020.  Ultrasound of articular lower extremity dated 6/29/2020.  Celiac plexus block on 5/21/2019.  Mesic based trigger point abdominal muscle injection from [facility].  Ultrasound-guided abdominal wall trigger point injection at the [facility] in 2018.  3 sessions of physical therapy.    -Medications:    Current Outpatient Medications on File Prior to Encounter   Medication Sig Dispense Refill     amLODIPine (NORVASC) 5 MG tablet Take 5 mg by mouth daily.       aspirin 81 mg chewable tablet Chew 81 mg daily.       buprenorphine (BUTRANS) 20 mcg/hour PTWK patch Place 1 patch on the skin every 7 days. 30 patch 0     cilostazoL (PLETAL) 100 MG tablet Take 1 tablet (100 mg total) by mouth 2 (two) times a day. 30 tablet 3     HYDROcodone-acetaminophen 5-325 mg per tablet Take 1-2 tablets by mouth 4 (four) times a day as needed for pain (up to 6 per day). 168 tablet 0     latanoprost (XALATAN) 0.005 % ophthalmic solution Administer 1 drop to the right eye at bedtime.       LORazepam (ATIVAN) 1 MG tablet TK 1 T PO 30 MINUTES BEFORE MRI       metoprolol succinate (TOPROL-XL) 100 MG 24 hr tablet TAKE ONE AND ONE-HALF TABLETS ONCE DAILY        MULTIVITAMIN (MULTIPLE VITAMIN ORAL) Take 1 tablet by mouth daily.       omega-3/dha/epa/fish oil (FISH OIL-OMEGA-3 FATTY ACIDS) 300-1,000 mg capsule Take 2 g by mouth daily.       oxybutynin (DITROPAN) 5 MG tablet        pantoprazole (PROTONIX) 40 MG tablet Take 40 mg by mouth daily.       polyethylene glycol (MIRALAX) 17 gram packet Take 17 g by mouth daily as needed.       pregabalin (LYRICA) 100 MG capsule Take 100 mg by mouth 2 (two) times a day.        triamcinolone (KENALOG) 0.1 % ointment 1 narinder       triamterene-hydrochlorothiazide (DYAZIDE) 37.5-25 mg per capsule Take 1 capsule by mouth every morning.       buprenorphine (BUTRANS) 15 mcg/hour PTWK patch Place 1 patch on the skin every 7 days. 4 patch 1     senna-docusate (SENNOSIDES-DOCUSATE SODIUM) 8.6-50 mg tablet Take 1 tablet by mouth daily. 30 tablet 2     No current facility-administered medications on file prior to encounter.        Allergies   Allergen Reactions     Cat Dander Itching     Sneezing     Morphine Other (See Comments)     Causes agitation     Pollen Extracts Itching     sneezing     Tizanidine Dizziness     At higher doses       Past Medical History:   Diagnosis Date     Abdominal pain      Cancer (H)     prostate     Chronic pancreatitis (H)      GERD (gastroesophageal reflux disease)      History of basal cell cancer     left arm     Hypertension      Melanoma (H)     removed from neck     Mixed hyperlipidemia 10/7/2013     Polyneuropathy     both ankles and feet     Seasonal allergies      Shingles     right leg        Patient Active Problem List   Diagnosis     Nausea     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, subsequent encounter     Recurrent abdominal pain     Recurrent pancreatitis     Epigastric pain     Essential hypertension     Gastroesophageal reflux disease without esophagitis     History of cataract extraction     History of malignant neoplasm of prostate     Mixed hyperlipidemia      Hyponatremia     Pain medication agreement signed     Pancreas divisum     S/P ERCP     Screening for malignant neoplasm of prostate     Squamous cell carcinoma of skin of trunk     Urinary incontinence       Past Surgical History:   Procedure Laterality Date     CARPAL TUNNEL RELEASE Right      CATARACT EXTRACTION Bilateral      ERCP       ESOPHAGOGASTRODUODENOSCOPY N/A 8/11/2017    Procedure: ENDOSCOPIC ULTRASOUND;  Surgeon: Eleazar Verma MD;  [place] [facility] [place]:      INCONTINENCE SURGERY       PROSTATECTOMY       removal of melanoma       urinary sphincter transplant      with 2 subsequent revisions       Family History   Problem Relation Age of Onset     Hypertension Mother      Heart attack Father      Hypertension Father        Reviewed past medical, surgical, and family history with patient found on new patient intake packet located in EMR Media tab.     SOCIAL HX: He denies smoking, drinking alcohol or using recreational drugs.    ROS:  Specifically negative for bowel/bladder dysfunction, balance changes, headache, dizziness, foot drop, fevers, chills, appetite changes, nausea/vomiting, unexplained weight loss. Otherwise 13 systems reviewed are negative. Please see the patient's intake questionnaire from today for details.    OBJECTIVE:  /56 (Patient Site: Right Arm, Patient Position: Sitting, Cuff Size: Adult Large)   Pulse 68   Temp 97.6  F (36.4  C) (Oral)   Resp 17   Ht 6' (1.829 m)   Wt (!) 226 lb (102.5 kg)   SpO2 96%   BMI 30.65 kg/m      PHYSICAL EXAMINATION:    --CONSTITUTIONAL:  Vital signs as above.  No acute distress.  The patient is well nourished and well groomed.  --PSYCHIATRIC:  Appropriate mood and affect. The patient is awake, alert, oriented to person, place, time and answering questions appropriately with clear speech.    --SKIN:  Skin over the face, bilateral lower extremities, and posterior torso is clean, dry, intact without rashes.    --RESPIRATORY:  Normal rhythm and effort. No abnormal accessory muscle breathing patterns noted.   --ABDOMINAL:  Soft, non-distended, tenderness palpation over the right upper quadrant.  Kernig sign is negative.  --STANDING EXAMINATION:  Normal lumbar lordosis noted, no lateral shift.  --MUSCULOSKELETAL: Thoracic and lumbar spine inspection reveals no evidence of deformity. Range of motion is mildly limited in lumbar flexion, extension, lateral rotation. No tenderness to palpation lumbar spine. Straight leg raising in the supine position is negative to radicular pain.  --SACROILIAC JOINT: Negative distraction.  Negative Neena's with reproduction of pain to affected extremity. One Finger point test negative.  --GROSS MOTOR: Gait is non-antalgic. Easily arises from a seated position. Toe walking and heel walking are normal without significant difficulty.    --LOWER EXTREMITY MOTOR TESTING:  Plantar flexion left 5/5, right 5/5   Dorsiflexion left 5/5, right 5/5   Great toe MTP extension left 5/5, right 5/5  Knee flexion left 5/5, right 5/5  Knee extension left 5/5, right 5/5   Hip flexion left 5/5, right 5/5  Hip abduction left 5/5, right 5/5  Hip adduction left 5/5, right 5/5   --HIPS: Full range of motion bilaterally.  Stinchfield test is negative bilaterally.  --NEUROLOGICAL:  2/4 patellar and Achilles reflexes bilaterally.  Decreased sensation to light touch in his feet. Babinski is negative. No clonus.   --VASCULAR:  2/4 dorsalis pedis and posterior tibialsi pulses bilaterally.  Bilateral lower extremities are warm.  There is mild edema of the bilateral lower extremities.    RESULTS: Prior medical records from [facility] neurosurgery, vascular surgery, pain medicine, notes from [facility] were reviewed today.    Imaging: Thoracic and lumbar spine Imaging was personally reviewed and interpreted today. The images were shown to the patient and the findings were explained using a spine model.      Cta Abdominal Aorta Iliofemoral  Runoff    Result Date: 7/15/2020  [facility] LOCATION: [facility] DATE: 7/15/2020 CTA ABDOMEN PELVIS LOWER EXTREMITY    INDICATION: PAD. Left-sided claudication. TECHNIQUE: Routine CTA abdomen, pelvis, and lower extremity. 2D and 3D reconstructions were performed by the CT technologist. Dose reduction techniques were used. CONTRAST: Iohexol (Omni) 75mL ml COMPARISON: MRI [facility] dated 1/13/2020 FINDINGS: LUNGS: Mild by basilar parenchymal scarring. Large bullae right base. ABDOMEN: The exam was optimized for CTA, and evaluation of solid organs is suboptimal. The liver, gallbladder, spleen, adrenal glands and kidneys are unremarkable. Pancreatic divisum with mild ectasia of the dorsal and ventral ducts.  PELVIS: Bladder unremarkable. Extensive sigmoid diverticulosis. Pelvic clips. Penile prosthesis with reservoir. CTA: Celiac artery patent. Superior mesenteric artery patent with replaced right hepatic artery. FANTA patent. Bilateral single renal arteries with no evidence of renal artery stenosis. Extensive atherosclerotic disease infrarenal abdominal aorta with no evidence of abdominal aortic aneurysm. Extensive calcified plaque common iliac arteries bilaterally. No significant stenosis identified. Internal iliac arteries patent bilaterally. Scattered calcified plaque bilateral external iliac arteries with no significant stenosis identified. Calcified plaque posterior wall of the right common femoral artery. Right profunda femoral artery patent. Scattered calcified plaque right superficial femoral artery with no significant stenosis identified. Dense calcified plaque right popliteal artery with associated 50-60% stenosis. Proximal occlusion of the right anterior tibial artery. Two-vessel runoff via the peroneal and posterior tibial arteries. Reconstitution of the dorsalis pedis artery at the ankle. Calcified plaque posterior wall of the left common femoral artery. Left profunda femoral artery  patent. Scattered calcified plaque left superficial femoral artery with 50% stenosis in the adductor canal. Focal severe stenosis of the left popliteal artery. Three-vessel runoff into the left foot.     CONCLUSION: 1.  DENSE CALCIFIED PLAQUE RIGHT POPLITEAL ARTERY WITH ASSOCIATED 50-60% STENOSIS. TWO-VESSEL RUNOFF VIA THE RIGHT PERONEAL AND POSTERIOR TIBIAL ARTERIES. 2.  FOCAL SEVERE STENOSIS OF THE LEFT POPLITEAL ARTERY WITH THREE-VESSEL RUNOFF INTO THE LEFT FOOT. 3.  PANCREATIC DIVISUM WITH ASSOCIATED ECTASIA OF THE DORSAL AND VENTRAL DUCTS. NO SIGNIFICANT CHANGE IN COMPARISON TO PREVIOUS STUDY.    Us Arterial Pressures With Exercise Legs Bilateral    Result Date: 6/30/2020  RESTING AND POSTEXERCISE ANKLE-BRACHIAL INDICES (ABIs) WITH SEGMENTAL ARTERIAL PRESSURES OF THE LOWER EXTREMITIES BILATERALLY Indication: Bilateral Leg pain/known lower lumbar stenosis/scoliosis Previous: None History: Previous Smoker, Hypertension and Claudication  SEGMENTAL ARTERIAL PRESSURE FINDINGS: Right:  mmHg  Index Brachial: 172   High Thigh: 212 1.23 Low Thigh: 210 1.22       Calf: 177 1.03 Ankle (PT): 165 0.96 Ankle (DP): 160 0.93    Digit: 92 0.53     Left: mmHg Index Brachial: 159  High Thigh: 165 0.96 Low Thigh: 118 0.69      Calf: 84 0.49 Ankle (PT): 96 0.56 Ankle (DP): 69 0.40   Digit: 45 0.26 Resting ankle-brachial index of 0.96 on the right. Toe Pressures of 92 mmHg and TBI of 0.53.  Resting ankle-brachial index of 0.56 on the left. Toe Pressures of 45 mmHg and TBI of 0.26. WAVEFORMS: The right dorsalis pedis and posterior tibial arteries show biphasic waveforms. The left dorsalis pedis and posterior tibial arteries show monophasic waveforms. Exercise Testing: Childs Bang- Time min Grade Level % Rate mph/hr Pain Level 1-10 Area of Pain 1 0 2 0 - 2 2 2 2 Lt Calf 3 2 2 3 Lt Calf 4 4 2 3 Lt Calf 5 4 2 2/4 Rt calf/Lt calf 6 6 2 2/4 Rt calf/Lt calf 7 6 2 4/6 Rt calf/Lt calf 8 8 2 4/6 Rt calf/Lt calf 9 8 2 4/6 Rt calf/Lt calf  10 10 2 4/6 Rt calf/Lt calf Post Exercise Pressures Location: Rest 1 Minute 3 Minute 5 Minute 7 Minute 10 Minute Right Ankle: PT:  165 165 151 158 152 156 Left Ankle: PT: 96 35 28 34 57 56 Right Brachial: 172 178 169 154 161 159 Right CELSA: 0.96 0.93 0.89 1.03 0.94 0.98 Left CELSA: 0.56 0.20 0.17 0.22 0.35 0.35 Impression: 1. RIGHT LOWER EXTREMITY: Normal ankle-brachial index of 0.96 with normal toe pressures of 92 mmHg. 2. LEFT LOWER EXTREMITY: Abnormal ankle-brachial index of 0.56 with abnormal toe pressures of 45 mmHg which is concerning for wound healing.  Segmental pressures suggest mid SFA disease. 3. CHILDS LIVE TESTING: Patient able to walk for 10 minutes on Childs Live testing with pain developing in the left calf starting from 2 minutes.  Significant drop in ABIs post exercise in the left leg suggest that his symptoms may well have a vascular component may be consistent with vascular claudication.

## 2021-06-09 NOTE — PROGRESS NOTES
Neurosurgery consultation was requested by: Dr. Moore.   Pain: Low back pain   Radicular Pain is present: Pain in right upper abdomen. Calf pain on and off in both legs   Lhermitte sign: no   Motor complaints: Weakness in both legs  Sensory complaints: Pt states he has neuropathy in both legs   Gait and balance issues: yes   Bowel or bladder issues: Denies   Duration of SX is: Couple months   The symptoms are worse with: prolonged standing   The symptoms are better with: Medication   Injury: Denies   Severity is: Mild - moderate  Patient has tried the following conservative measures: He is doing PT exercises anddoes help some    DAVID score is:  32%  SELENE Jameson

## 2021-06-09 NOTE — PROGRESS NOTES
VASCULAR SURGERY CLINIC CONSULTATION    VASCULAR SURGEON: Maria Elena Rodas MD, RPVI     LOCATION:  [facility]    Vernon Lemus   Medical Record #:  [id number]  YOB: 1940  Age:  80 y.o.     Date of Service: 7/17/2020    PRIMARY CARE PROVIDER: Vicente De Luna MD      Reason for visit: Left lower extremity claudication    IMPRESSION: Moderate peripheral vascular disease of the left lower extremity with underlying severe stenosis of the popliteal artery.  Mild to moderate stenosis of the right popliteal artery.  ABIs are suggestive for moderate peripheral vascular disease on the left side and mild on the right.    RECOMMENDATION/RISKS: We will initiate supervised exercise program first.  I will also order Pletal.  We will follow-up in 2 months to see the progress.  Should patient feel better I will probably continue to watch him.  If the symptoms still persistent and life limiting, I will probably start with an angiogram with possible intervention.    HPI:  Vernon Lemus is a 80 y.o. male who was seen today in consultation for severe claudication involving mostly left lower extremity.  Patient states that he used to walk more than a mile but now he can only walk for quarter of a mile.  He can push himself to walk for three quarters of a mile before stopping.  He denies any pain at rest.  He denies any nonhealing wounds.  Overall he states that he is doing okay from the claudication standpoint but would like to get back to normal routine.    REVIEW OF SYSTEMS:    A 12 point ROS was reviewed and is negative except for left lower extremity claudication    PHH:    Past Medical History:   Diagnosis Date     Abdominal pain      Cancer (H)     prostate     Chronic pancreatitis (H)      GERD (gastroesophageal reflux disease)      History of basal cell cancer     left arm     Hypertension      Melanoma (H)     removed from neck     Polyneuropathy     both ankles and feet     Seasonal  allergies      Shingles     right leg          Past Surgical History:   Procedure Laterality Date     CARPAL TUNNEL RELEASE Right      CATARACT EXTRACTION Bilateral      ERCP       ESOPHAGOGASTRODUODENOSCOPY N/A 8/11/2017    Procedure: ENDOSCOPIC ULTRASOUND;  Surgeon: Eleazar Verma MD;  Location: [facility];  Service:      INCONTINENCE SURGERY       PROSTATECTOMY       removal of melanoma       urinary sphincter transplant      with 2 subsequent revisions       ALLERGIES:  Cat dander; Morphine; Pollen extracts; and Tizanidine    MEDS:    Current Outpatient Medications:      amLODIPine (NORVASC) 5 MG tablet, Take 5 mg by mouth daily., Disp: , Rfl:      aspirin 81 mg chewable tablet, Chew 81 mg daily., Disp: , Rfl:      buprenorphine (BUTRANS) 20 mcg/hour PTWK patch, Place 1 patch on the skin every 7 days., Disp: 30 patch, Rfl: 0     HYDROcodone-acetaminophen 5-325 mg per tablet, Take 1-2 tablets by mouth 4 (four) times a day as needed for pain (up to 6 per day)., Disp: 168 tablet, Rfl: 0     latanoprost (XALATAN) 0.005 % ophthalmic solution, Administer 1 drop to the right eye at bedtime., Disp: , Rfl:      LORazepam (ATIVAN) 1 MG tablet, TK 1 T PO 30 MINUTES BEFORE MRI, Disp: , Rfl:      metoprolol succinate (TOPROL-XL) 100 MG 24 hr tablet, TAKE ONE AND ONE-HALF TABLETS ONCE DAILY, Disp: , Rfl:      MULTIVITAMIN (MULTIPLE VITAMIN ORAL), Take 1 tablet by mouth daily., Disp: , Rfl:      omega-3/dha/epa/fish oil (FISH OIL-OMEGA-3 FATTY ACIDS) 300-1,000 mg capsule, Take 2 g by mouth daily., Disp: , Rfl:      oxybutynin (DITROPAN) 5 MG tablet, , Disp: , Rfl:      pantoprazole (PROTONIX) 40 MG tablet, Take 40 mg by mouth daily., Disp: , Rfl:      polyethylene glycol (MIRALAX) 17 gram packet, Take 17 g by mouth daily as needed., Disp: , Rfl:      pregabalin (LYRICA) 100 MG capsule, Take 100 mg by mouth 2 (two) times a day. , Disp: , Rfl:      triamcinolone (KENALOG) 0.1 % ointment, 1 narinder, Disp: , Rfl:       triamterene-hydrochlorothiazide (DYAZIDE) 37.5-25 mg per capsule, Take 1 capsule by mouth every morning., Disp: , Rfl:      buprenorphine (BUTRANS) 15 mcg/hour PTWK patch, Place 1 patch on the skin every 7 days., Disp: 4 patch, Rfl: 1     cilostazoL (PLETAL) 100 MG tablet, Take 1 tablet (100 mg total) by mouth 2 (two) times a day., Disp: 30 tablet, Rfl: 3     senna-docusate (SENNOSIDES-DOCUSATE SODIUM) 8.6-50 mg tablet, Take 1 tablet by mouth daily., Disp: 30 tablet, Rfl: 2    SOCIAL HABITS:    Social History     Tobacco Use   Smoking Status Former Smoker     Last attempt to quit: 8/10/1986     Years since quittin.9   Smokeless Tobacco Never Used       Social History     Substance and Sexual Activity   Alcohol Use No       Social History     Substance and Sexual Activity   Drug Use No       FAMILY HISTORY:    Family History   Problem Relation Age of Onset     Hypertension Mother      Heart attack Father      Hypertension Father        PE:  /64 (Patient Site: Left Arm)   Pulse 60   Temp 97.8  F (36.6  C)   Resp 18   Ht 6' (1.829 m)   Wt 221 lb 1.6 oz (100.3 kg)   BMI 29.99 kg/m    Wt Readings from Last 1 Encounters:   20 221 lb 1.6 oz (100.3 kg)     Body mass index is 29.99 kg/m .    EXAM:  GENERAL: This is a well-developed 80 y.o. male who appears his stated age  EYES: Grossly normal.  MOUTH: Buccal mucosa normal   CARDIAC:  Normal S1 and S2, no Murmur  CHEST/LUNG:  Clear lung fields bilaterally  GASTROINTESINAL (ABDOMEN):Soft, non-tender, B/S present, no pulsatile mass   MUSCULOSKELETAL: Grossly normal and both lower extremities are intact.  HEME/LYMPH: No lymphedema  NEUROLOGIC: Focally intact, Alert and oriented x 3.   PSYCH: appropriate affect  INTEGUMENT: No open lesions or ulcers  Pulse Exam:   Monophasic signals present on the left.  Multiphasic signals present on the right.        DIAGNOSTIC STUDIES:     Images:  Cta Abdominal Aorta Iliofemoral Runoff    Result Date: 7/15/2020  [place]  RADIOLOGY LOCATION: [facility] DATE: 7/15/2020 CTA ABDOMEN PELVIS LOWER EXTREMITY    INDICATION: PAD. Left-sided claudication. TECHNIQUE: Routine CTA abdomen, pelvis, and lower extremity. 2D and 3D reconstructions were performed by the CT technologist. Dose reduction techniques were used. CONTRAST: Iohexol (Omni) 75mL ml COMPARISON: MRI [facility] dated 1/13/2020 FINDINGS: LUNGS: Mild by basilar parenchymal scarring. Large bullae right base. ABDOMEN: The exam was optimized for CTA, and evaluation of solid organs is suboptimal. The liver, gallbladder, spleen, adrenal glands and kidneys are unremarkable. Pancreatic divisum with mild ectasia of the dorsal and ventral ducts.  PELVIS: Bladder unremarkable. Extensive sigmoid diverticulosis. Pelvic clips. Penile prosthesis with reservoir. CTA: Celiac artery patent. Superior mesenteric artery patent with replaced right hepatic artery. FANTA patent. Bilateral single renal arteries with no evidence of renal artery stenosis. Extensive atherosclerotic disease infrarenal abdominal aorta with no evidence of abdominal aortic aneurysm. Extensive calcified plaque common iliac arteries bilaterally. No significant stenosis identified. Internal iliac arteries patent bilaterally. Scattered calcified plaque bilateral external iliac arteries with no significant stenosis identified. Calcified plaque posterior wall of the right common femoral artery. Right profunda femoral artery patent. Scattered calcified plaque right superficial femoral artery with no significant stenosis identified. Dense calcified plaque right popliteal artery with associated 50-60% stenosis. Proximal occlusion of the right anterior tibial artery. Two-vessel runoff via the peroneal and posterior tibial arteries. Reconstitution of the dorsalis pedis artery at the ankle. Calcified plaque posterior wall of the left common femoral artery. Left profunda femoral artery patent. Scattered calcified plaque left  superficial femoral artery with 50% stenosis in the adductor canal. Focal severe stenosis of the left popliteal artery. Three-vessel runoff into the left foot.     CONCLUSION: 1.  DENSE CALCIFIED PLAQUE RIGHT POPLITEAL ARTERY WITH ASSOCIATED 50-60% STENOSIS. TWO-VESSEL RUNOFF VIA THE RIGHT PERONEAL AND POSTERIOR TIBIAL ARTERIES. 2.  FOCAL SEVERE STENOSIS OF THE LEFT POPLITEAL ARTERY WITH THREE-VESSEL RUNOFF INTO THE LEFT FOOT. 3.  PANCREATIC DIVISUM WITH ASSOCIATED ECTASIA OF THE DORSAL AND VENTRAL DUCTS. NO SIGNIFICANT CHANGE IN COMPARISON TO PREVIOUS STUDY.    Us Arterial Pressures With Exercise Legs Bilateral    Result Date: 6/30/2020  RESTING AND POSTEXERCISE ANKLE-BRACHIAL INDICES (ABIs) WITH SEGMENTAL ARTERIAL PRESSURES OF THE LOWER EXTREMITIES BILATERALLY Indication: Bilateral Leg pain/known lower lumbar stenosis/scoliosis Previous: None History: Previous Smoker, Hypertension and Claudication  SEGMENTAL ARTERIAL PRESSURE FINDINGS: Right:  mmHg  Index Brachial: 172   High Thigh: 212 1.23 Low Thigh: 210 1.22       Calf: 177 1.03 Ankle (PT): 165 0.96 Ankle (DP): 160 0.93    Digit: 92 0.53     Left: mmHg Index Brachial: 159  High Thigh: 165 0.96 Low Thigh: 118 0.69      Calf: 84 0.49 Ankle (PT): 96 0.56 Ankle (DP): 69 0.40   Digit: 45 0.26 Resting ankle-brachial index of 0.96 on the right. Toe Pressures of 92 mmHg and TBI of 0.53.  Resting ankle-brachial index of 0.56 on the left. Toe Pressures of 45 mmHg and TBI of 0.26. WAVEFORMS: The right dorsalis pedis and posterior tibial arteries show biphasic waveforms. The left dorsalis pedis and posterior tibial arteries show monophasic waveforms. Exercise Testing: Childs Bang- Time min Grade Level % Rate mph/hr Pain Level 1-10 Area of Pain 1 0 2 0 - 2 2 2 2 Lt Calf 3 2 2 3 Lt Calf 4 4 2 3 Lt Calf 5 4 2 2/4 Rt calf/Lt calf 6 6 2 2/4 Rt calf/Lt calf 7 6 2 4/6 Rt calf/Lt calf 8 8 2 4/6 Rt calf/Lt calf 9 8 2 4/6 Rt calf/Lt calf 10 10 2 4/6 Rt calf/Lt calf Post Exercise  Pressures Location: Rest 1 Minute 3 Minute 5 Minute 7 Minute 10 Minute Right Ankle: PT:  165 165 151 158 152 156 Left Ankle: PT: 96 35 28 34 57 56 Right Brachial: 172 178 169 154 161 159 Right CELSA: 0.96 0.93 0.89 1.03 0.94 0.98 Left CELSA: 0.56 0.20 0.17 0.22 0.35 0.35 Impression: 1. RIGHT LOWER EXTREMITY: Normal ankle-brachial index of 0.96 with normal toe pressures of 92 mmHg. 2. LEFT LOWER EXTREMITY: Abnormal ankle-brachial index of 0.56 with abnormal toe pressures of 45 mmHg which is concerning for wound healing.  Segmental pressures suggest mid SFA disease. 3. CHILDS LIVE TESTING: Patient able to walk for 10 minutes on Childs Live testing with pain developing in the left calf starting from 2 minutes.  Significant drop in ABIs post exercise in the left leg suggest that his symptoms may well have a vascular component may be consistent with vascular claudication.      I personally reviewed the images and my interpretation is CTA showing severe stenosis of the left popliteal artery.  ABIs are suggestive of moderate disease..    LABS:      Sodium   Date Value Ref Range Status   01/24/2020 136 136 - 145 mmol/L Final   12/16/2019 137 136 - 145 mmol/L Final   08/20/2019 137 136 - 145 mmol/L Final     Potassium   Date Value Ref Range Status   01/24/2020 4.7 3.5 - 5.0 mmol/L Final   12/16/2019 4.8 3.5 - 5.0 mmol/L Final   08/20/2019 4.4 3.5 - 5.0 mmol/L Final     Chloride   Date Value Ref Range Status   01/24/2020 101 98 - 107 mmol/L Final   12/16/2019 101 98 - 107 mmol/L Final   08/20/2019 101 98 - 107 mmol/L Final     BUN   Date Value Ref Range Status   01/24/2020 22 8 - 28 mg/dL Final   12/16/2019 28 8 - 28 mg/dL Final   08/20/2019 26 8 - 28 mg/dL Final     Creatinine   Date Value Ref Range Status   01/24/2020 1.10 0.70 - 1.30 mg/dL Final   12/16/2019 1.32 (H) 0.70 - 1.30 mg/dL Final   08/20/2019 1.22 0.70 - 1.30 mg/dL Final     Hemoglobin   Date Value Ref Range Status   01/29/2018 13.7 (L) 14.0 - 18.0 g/dL  Final   10/05/2016 13.7 (L) 14.0 - 18.0 g/dL Final     Platelets   Date Value Ref Range Status   10/05/2016 305 140 - 440 thou/uL Final       Total time spent 30 minutes face to face with patient with more than 50% time spent in counseling and coordination of care.    Maria Elena Rodas MD, [facility]  VASCULAR SURGERY

## 2021-06-09 NOTE — PROGRESS NOTES
Getting CTA and lab 07/15. PAD, claudication. Previously seen by Blas. On asa. Bilateral leg claudication but left is worse.

## 2021-06-09 NOTE — PROGRESS NOTES
"Vernon Lemus is a 80 y.o. male who is being evaluated via a billable video visit.      The patient has been notified of following:     \"This video visit will be conducted via a call between you and your physician/provider. We have found that certain health care needs can be provided without the need for an in-person physical exam.  This service lets us provide the care you need with a video conversation.  If a prescription is necessary we can send it directly to your pharmacy.  If lab work is needed we can place an order for that and you can then stop by our lab to have the test done at a later time.    Video visits are billed at different rates depending on your insurance coverage. Please reach out to your insurance provider with any questions.    If during the course of the call the physician/provider feels a video visit is not appropriate, you will not be charged for this service.\"    Patient has given verbal consent to a Video visit? Yes  How would you like to obtain your AVS? AVS Preference: MyChart.  Patient would like the video invitation sent by: Send to e-mail at: [email or web address]  Will anyone else be joining your video visit? No              CONSULT, Dr. Woodard (neurosurg) referring.  leg cramping US 6/29. Does not seem to have any high-grade stenosis centrally in the canal to cause neurogenic claudication, here to rule out vascular claudication. Patient states he has pain in both legs. Left is worse. He states pain comes on after 0.25 mile of walking.     Video-Visit Details  Video Start Time: 11:21 AM  Type of service:  Video Visit    Video End Time (time video stopped): 11:21 AM  Originating Location (pt. Location): Home    Distant Location (provider location):  [facility] [place]      Platform used for Video Visit: Aaliyah"

## 2021-06-09 NOTE — PATIENT INSTRUCTIONS - HE
Dr. Lynch will order a T8-T9 injection and then he would like you to follow up 2 weeks after injection.

## 2021-06-09 NOTE — PROGRESS NOTES
Vascular Medicine Progress note    Dr Jesus Odonnell MD, Vascular Medicine      [facility] Lemus    Medical Record #:  [id number]    Date of Service: 07/02/2020     Date last seen:  Visit date not found    PRIMARY CARE PROVIDER: Vicente De Luna MD      IMPRESSION/PLAN: Patient was referred to our service because of claudication, patient was seen by neurosurgeon and the decision that his claudication is not neurogenic most possibly it is because of PAD, his CELSA done a week ago, CELSA resting right side was 0.96 left side 0.56 post exercise he went to 0.98 on the right side and 0.35 on the left side with typical pain below the knees and this is the kind of pain that he gets when he starts walking  Patient vascular risk factors includes age, patient is non-smoker, nondiabetic, and his hyperlipidemic    DISPOSITION:  1-PAD left worse than right, this was a video conferencing call could not assess his femoral pulses so I went ahead and ordered a CTA with bilateral runoffs  2-vascular risk factors modifications  3-we will see the patient once test results are available      ______________________________________________________________________    Subjective:    ALLERGIES:  Cat dander; Morphine; Pollen extracts; and Tizanidine    MEDS:    Current Outpatient Medications:      amLODIPine (NORVASC) 5 MG tablet, Take 5 mg by mouth daily., Disp: , Rfl:      aspirin 81 mg chewable tablet, Chew 81 mg daily., Disp: , Rfl:      buprenorphine (BUTRANS) 20 mcg/hour PTWK patch, Place 1 patch on the skin every 7 days., Disp: 30 patch, Rfl: 0     [START ON 7/6/2020] HYDROcodone-acetaminophen 5-325 mg per tablet, Take 1-2 tablets by mouth 4 (four) times a day as needed for pain (up to 6 per day)., Disp: 168 tablet, Rfl: 0     latanoprost (XALATAN) 0.005 % ophthalmic solution, Administer 1 drop to the right eye at bedtime., Disp: , Rfl:      LORazepam (ATIVAN) 1 MG tablet, TK 1 T PO 30 MINUTES BEFORE MRI, Disp: , Rfl:      metoprolol  succinate (TOPROL-XL) 100 MG 24 hr tablet, TAKE ONE AND ONE-HALF TABLETS ONCE DAILY, Disp: , Rfl:      MULTIVITAMIN (MULTIPLE VITAMIN ORAL), Take 1 tablet by mouth daily., Disp: , Rfl:      omega-3/dha/epa/fish oil (FISH OIL-OMEGA-3 FATTY ACIDS) 300-1,000 mg capsule, Take 2 g by mouth daily., Disp: , Rfl:      oxybutynin (DITROPAN) 5 MG tablet, , Disp: , Rfl:      pantoprazole (PROTONIX) 40 MG tablet, Take 40 mg by mouth daily., Disp: , Rfl:      polyethylene glycol (MIRALAX) 17 gram packet, Take 17 g by mouth daily as needed., Disp: , Rfl:      pregabalin (LYRICA) 100 MG capsule, Take 100 mg by mouth 2 (two) times a day. , Disp: , Rfl:      triamcinolone (KENALOG) 0.1 % ointment, 1 narinder, Disp: , Rfl:      triamterene-hydrochlorothiazide (DYAZIDE) 37.5-25 mg per capsule, Take 1 capsule by mouth every morning., Disp: , Rfl:      buprenorphine (BUTRANS) 15 mcg/hour PTWK patch, Place 1 patch on the skin every 7 days., Disp: 4 patch, Rfl: 1     senna-docusate (SENNOSIDES-DOCUSATE SODIUM) 8.6-50 mg tablet, Take 1 tablet by mouth daily., Disp: 30 tablet, Rfl: 2    REVIEW OF SYSTEMS:    A 12 point ROS was reviewed and except for what is listed in the HPI above, all others are negative    Objective:    PE:  There were no vitals taken for this visit.  Wt Readings from Last 1 Encounters:   06/15/20 222 lb (100.7 kg)     There is no height or weight on file to calculate BMI.    EXAM:  GENERAL: This is a well-developed 80 y.o. male who appears his stated age  EYES: Grossly normal.  MOUTH: Buccal mucosa normal   CARDIAC:  Not assessed  CHEST/LUNG:  Not Assessed  GASTROINTESINAL (ABDOMEN):Not Assessed     MUSCULOSKELETAL: Grossly normal and both lower extremities are intact.  HEME/LYMPH: No lymphedema  NEUROLOGIC: Focally intact, Alert and oriented x 3.   PSYCH: appropriate affect  INTEGUMENT: No open lesions or ulcers  Pulse Exam: Not applicable as this was a video conferencing call  Circumferential measures:  No flowsheet data  found.        DIAGNOSTIC STUDIES:     Ct Thoracic Spine Without Contrast    Result Date: 6/10/2020  EXAM: CT THORACIC SPINE WO CONTRAST LOCATION: [facility] DATE/TIME: 6/10/2020 11:06 AM INDICATION: Mid-back pain COMPARISON: 04/20/2020 MRI thoracic spine.. TECHNIQUE: Routine without IV contrast. Multiplanar reformats.  Dose reduction techniques were used. FINDINGS: VERTEBRA: 12 rib-bearing thoracic vertebrae. Mild S-shaped thoracolumbar scoliotic curvature. Mildly exaggerated thoracic kyphosis. No displaced fracture. Vertebral body heights are maintained. Intraosseous hemangioma within the T7 vertebral body. No aggressive sclerotic or lytic osseous lesion. Multilevel degenerative disc disease with vacuum phenomenon at T7-T8 through T9-T10. Multilevel facet arthropathy, up to moderate at T9-T10 CANAL/FORAMINA: No high-grade spinal canal no neural foraminal stenosis. PARASPINAL: No acute extraspinal abnormality. Right thyroid lobe nodule measures 3.1 cm.     1.  No acute or aggressive osseous abnormality. Multilevel thoracic spondylosis without high-grade spinal canal or neural foraminal stenosis. 2.  Right thyroid lobe nodule measures 2.1 cm. Consider further characterization with dedicated thyroid ultrasound, if not already performed.     Xr Scoliosis Ap And Lateral Standing    Result Date: 6/10/2020  EXAM: XR SCOLIOSIS AP OR PA AND LATERAL STANDING LOCATION: [facility] DATE/TIME: 6/10/2020 10:58 AM INDICATION: Low back pain COMPARISON: Thoracic spine CT 06/10/2020 and thoracic spine and lumbar spine MRI examinations 04/22/2020     12 thoracic rib-bearing and 5 lumbar type vertebrae.  Normal vertebral body heights. No lytic bony lesion. Clear lungs. Prior prostate surgery. Measurements: Major Curve: 20 degree dextroscoliosis apex L3-L4. No other significant curvature. Sagittal Balance: 12 cm positive. Lumbar Lordosis: 25 degrees: Pelvic Incidence: 50 degrees.     Us Arterial Pressures With Exercise  Legs Bilateral    Result Date: 6/30/2020  RESTING AND POSTEXERCISE ANKLE-BRACHIAL INDICES (ABIs) WITH SEGMENTAL ARTERIAL PRESSURES OF THE LOWER EXTREMITIES BILATERALLY Indication: Bilateral Leg pain/known lower lumbar stenosis/scoliosis Previous: None History: Previous Smoker, Hypertension and Claudication  SEGMENTAL ARTERIAL PRESSURE FINDINGS: Right:  mmHg  Index Brachial: 172   High Thigh: 212 1.23 Low Thigh: 210 1.22       Calf: 177 1.03 Ankle (PT): 165 0.96 Ankle (DP): 160 0.93    Digit: 92 0.53     Left: mmHg Index Brachial: 159  High Thigh: 165 0.96 Low Thigh: 118 0.69      Calf: 84 0.49 Ankle (PT): 96 0.56 Ankle (DP): 69 0.40   Digit: 45 0.26 Resting ankle-brachial index of 0.96 on the right. Toe Pressures of 92 mmHg and TBI of 0.53.  Resting ankle-brachial index of 0.56 on the left. Toe Pressures of 45 mmHg and TBI of 0.26. WAVEFORMS: The right dorsalis pedis and posterior tibial arteries show biphasic waveforms. The left dorsalis pedis and posterior tibial arteries show monophasic waveforms. Exercise Testing: Childs Bang- Time min Grade Level % Rate mph/hr Pain Level 1-10 Area of Pain 1 0 2 0 - 2 2 2 2 Lt Calf 3 2 2 3 Lt Calf 4 4 2 3 Lt Calf 5 4 2 2/4 Rt calf/Lt calf 6 6 2 2/4 Rt calf/Lt calf 7 6 2 4/6 Rt calf/Lt calf 8 8 2 4/6 Rt calf/Lt calf 9 8 2 4/6 Rt calf/Lt calf 10 10 2 4/6 Rt calf/Lt calf Post Exercise Pressures Location: Rest 1 Minute 3 Minute 5 Minute 7 Minute 10 Minute Right Ankle: PT:  165 165 151 158 152 156 Left Ankle: PT: 96 35 28 34 57 56 Right Brachial: 172 178 169 154 161 159 Right CELSA: 0.96 0.93 0.89 1.03 0.94 0.98 Left CELSA: 0.56 0.20 0.17 0.22 0.35 0.35 Impression: 1. RIGHT LOWER EXTREMITY: Normal ankle-brachial index of 0.96 with normal toe pressures of 92 mmHg. 2. LEFT LOWER EXTREMITY: Abnormal ankle-brachial index of 0.56 with abnormal toe pressures of 45 mmHg which is concerning for wound healing.  Segmental pressures suggest mid SFA disease. 3. CHILDS BANG TESTING: Patient  able to walk for 10 minutes on Childs Bang testing with pain developing in the left calf starting from 2 minutes.  Significant drop in ABIs post exercise in the left leg suggest that his symptoms may well have a vascular component may be consistent with vascular claudication.    Mr External Imaging Spine    Result Date: 5/29/2020  Images were obtained from an external facility.  Click PACS Images hyperlink to view images.  Textual results have been scanned into the media tab.    Mr External Imaging Spine    Result Date: 5/29/2020  Images were obtained from an external facility.  Click PACS Images hyperlink to view images.  Textual results have been scanned into the media tab.    I personally reviewed the following imaging today and those on care everywhere, if indicated    LABS:      Sodium   Date Value Ref Range Status   01/24/2020 136 136 - 145 mmol/L Final   12/16/2019 137 136 - 145 mmol/L Final   08/20/2019 137 136 - 145 mmol/L Final     Potassium   Date Value Ref Range Status   01/24/2020 4.7 3.5 - 5.0 mmol/L Final   12/16/2019 4.8 3.5 - 5.0 mmol/L Final   08/20/2019 4.4 3.5 - 5.0 mmol/L Final     Chloride   Date Value Ref Range Status   01/24/2020 101 98 - 107 mmol/L Final   12/16/2019 101 98 - 107 mmol/L Final   08/20/2019 101 98 - 107 mmol/L Final     BUN   Date Value Ref Range Status   01/24/2020 22 8 - 28 mg/dL Final   12/16/2019 28 8 - 28 mg/dL Final   08/20/2019 26 8 - 28 mg/dL Final     Creatinine   Date Value Ref Range Status   01/24/2020 1.10 0.70 - 1.30 mg/dL Final   12/16/2019 1.32 (H) 0.70 - 1.30 mg/dL Final   08/20/2019 1.22 0.70 - 1.30 mg/dL Final     Hemoglobin   Date Value Ref Range Status   01/29/2018 13.7 (L) 14.0 - 18.0 g/dL Final   10/05/2016 13.7 (L) 14.0 - 18.0 g/dL Final     Platelets   Date Value Ref Range Status   10/05/2016 305 140 - 440 thou/uL Final       Total time spent 20 (15,25,35) minutes face to face with patient with more than 50% time spent in counseling and coordination  of care.    Jesus Odonnell MD  VASCULAR MEDICINE

## 2021-06-10 NOTE — TELEPHONE ENCOUNTER
Patient calls, voicemail regarding having pain issues.  Call back to patient:  He reports having ongoing loose stools, 2-3 times daily since last Saturday. New blood thinner started the week before the onset of loose stools. He reports only other significant event was lifting some heavy branches on the Friday prior to onset of loose stools.  He denies fever, nausea, vomiting or any other significant changes.  He reports that yesterday he had an episode of abdominal pain worse than he has ever had, norco did assist with easing the pain. He then had another episode of this abdominal pain in the middle of the night.  Patient will go in to his primary clinic today at 2:45, [facility].  Patient will contact this clinic after this apt with updates.  Will contact patient if there are any recommendations at this time.

## 2021-06-10 NOTE — TELEPHONE ENCOUNTER
"Pt calls with update re: clinic visit yesterday. States \"my GP is testing me for an ulcer.\"  States his hgb is low and his GP said he should increase his pain med. States he has increased Hydrocodone from 6/day to \"about\" 10/day. States his GP has noted the need to increase med in his clinic visit note.  "

## 2021-06-10 NOTE — TELEPHONE ENCOUNTER
"Called pt to review Nuvia's response. He states he had taken some of the Hydrocodone he had saved as he had been \"trying to cut back.\"  Agrees to call his PMD and adds that  they had offered to order medication for him yesterday.  "

## 2021-06-10 NOTE — PATIENT INSTRUCTIONS - HE
POST PROCEDURE INSTRUCTIONS  PROCEDURE DONE TODAY: T 7-8 THORACIC EPIDURAL STEROID INJECTION    Rest today. Resume activity tomorrow as able.  Patient demonstrates the ability to ambulate independently with a steady gait at the time of discharge.      It is not unusual to have a temporary increase in your pain after a procedure. You can ice the area 24-48 hrs. 15 min at a time.      You received a steroid injection. This medication can take 2-7 days to take effect. Some temporary side effects include:    Heartburn    Flushed-red face    Insomnia-trouble sleeping-jitters    Diabetics may see a rise in blood sugar for a few days    Low back or SI joint (sacroiliac) injection: you may experience numbness in one or both legs. Please walk with help. This is temporary and will wear off in a few hours. Do not drive if you are tingling, numbness or weakness in your hands/arms or legs/feet.    Headache:  If you experience a headache after a lumbar epidural injection, lie down and drink fluids (especially caffeine). The headache will go away gradually. If it persists notify our clinic.    Fever: call if fever 100 or over, redness/warmth/swelling over the injection site.    No public hot tubs/pools for a couple days    Physical therapy: check with pain center provider regarding resuming therapy.    Monitor your response to the injection and report this at your next visit.    If you have been referred for a procedure only, please call your referring provider for a follow up.    IF YOU PLAN TO GET A FLU SHOT YOU MUST WAIT 2 WEEKS AFTER THIS INJECTION.      CALL IF ANY QUESTIONS [phone]

## 2021-06-10 NOTE — PATIENT INSTRUCTIONS - HE
Plan:   Interventions-    Follow up in 4  weeks     Continue the use of butrans at 20 mcg/hr- call when you need a refill     continue the use of the norco at this time as needed. Ok to take up to 6 per day.     Will send research link to patient for omega talk for cholesterol      Ok to schedule the injection with Dr. Gaviria for the right sided T8-9 injection purposed.     This limited telehealth/televideo encounter is due to the Covid 19,  as required by the governmental agencies at this time due to risk of transmission of the pandemic, therefore testing availability is limited as well as physical exams.      Prescription Drug Management will be continued by the [facility]    Orders placed today  Medications that were ordered today   Requested Prescriptions     Signed Prescriptions Disp Refills     HYDROcodone-acetaminophen 5-325 mg per tablet 168 tablet 0     Sig: Take 1-2 tablets by mouth 4 (four) times a day as needed for pain (up to 6 per day).     No orders of the defined types were placed in this encounter.        The patient understand todays plan and has their questions answered in regards to expectations and current treatment plan.     Prevent unexpected access/loss of medication: Keep medication locked. Only carry what you need with you    The plan of care will be adjusted to accommodate the issues discussed, discussing management of their care and follow up that is recommended. 7/28/2020         Nuvia Smith CNP  [facility]  [address]  [place] [address]  Ph: [phone]  Fax: [phone]

## 2021-06-10 NOTE — TELEPHONE ENCOUNTER
Called patient and scheduled his appt to September. Patient states he is walking at home. He was able to do 3 sessions of supervised walking program. Advised that if he wanted to do the SCD pumps he can. There is no harm in using it. Encouraged walking more and trying to build up his tolerance with that. Patient understands.

## 2021-06-10 NOTE — TELEPHONE ENCOUNTER
Patient called and stated after two weeks of taking cilostazol he developed diarrhea and feels it is related to the new medication.  He would like some direction on if he should stop medication.

## 2021-06-10 NOTE — PROGRESS NOTES
Left message with arrival time and COVID information for patient regarding 8/25/20 appointment with Dr. Gaviria.

## 2021-06-10 NOTE — TELEPHONE ENCOUNTER
LOV with Dr. Rodas on 7/17/20 for PAD.     He had to stop Pletal due to diarrhea. He also stopped the walking program due to back pain and he believes there was too much exposure to COVID-19.     His leg pain is worsening. L worse than the R. In the past he could walk 1 mile without pain, now he can walk an 1/8 mile. Next f/u is 10/2, wondering if he should be seen sooner or if the plan should change.     He also inherited AIROS 6 Sequential Compression Device. He was wondering if he should begin using it. He stated that he has not had venous issues in the past. Writer reviewed chart and advised he doesn't use it due to not having venous insufficieny. He requested that Dr. Rodas confirm not to use.     Please advise.

## 2021-06-11 NOTE — PROGRESS NOTES
Reviewed prep instructions for angiogram. Patient understands to get a pre-op physical and covid prior to procedure.

## 2021-06-11 NOTE — PROGRESS NOTES
PAIN CLINIC FOLLOW UP PROGRESS NOTE    CC:  Chief Complaint   Patient presents with     Follow-up     abdominal pain       HPI  Vernon Lemus is a 80 y.o. male who presents for evaluation   Chief Complaint   Patient presents with     Follow-up     abdominal pain    that is causing continued pain. Since the last visit the patient denies any trips to the urgent care or ED specifically for their pain. The patient denies any new medications, diagnoses since the last visit. Specific questions indicated the patient wanted addressed today include: medication management       Major issues:  1. Chronic pain syndrome    2. Chronic bilateral low back pain with bilateral sciatica    3. Abdominal pain, generalized      Pain score 4  Constant  What does your pain like feel during a flare? Dull achy  Does the pain interfere with:  Work ----- NA  Walking ------ no  Sleep ------- yes  Daily activities ------no  Relationships -------yes  F=5     UDT/CSA - 2019      Previous Medical History  Social History     Substance and Sexual Activity   Alcohol Use No     Social History     Substance and Sexual Activity   Drug Use No     Social History     Tobacco Use   Smoking Status Former Smoker     Last attempt to quit: 8/10/1986     Years since quittin.1   Smokeless Tobacco Never Used       Pertinent Pain Medications/interventions:    2020- increase in butrans to 20 mcg/hr, decrease norco to 6 tabs per day, he is taking lyrica 100 mg two times a day form his PCP      Currently he is taking norco 5/325 mg up to 5-8 tabs per day as well as Butrans 15 mcg/ hr      He notes that the amitriptyline was started with Dr. Alva from [facility]       He is currently taking norco up to 6 per day as needed, he recently repeated the celiac plexus block with Dr. Gaviria and reports no relief as of yet.      Acupuncture was helpful initially but is now not effective     Baclofen was not helpful     Recently stopped oxycontin ER 15 mg two  "times a day. 5/24/2019     Previously has tried morphine which led to confusion   Belbuca up to 600 mcg two times a day which helped a lot but unaffordable.      Failed medical cannabis  Percocet- too strong.       Reports having tried and failed pregablin- lyrica    TRIED AND FAILED MEDICATIONS:  Cymbalta - hallucinations at 60 mg, no issues with 40 mg  Lyrica - very depressed mood  Nortriptyline- \"spacing out\" and anxiety as well as jitteriness in the morning- however, it did help with sleep and decreased his shooting pains at night.  oxymorphone 5mg ER twice daily ,OxyContin 10 mg twice a day.    Percocet 5-325 mg, max of 3 a day, Protonix 40 mg daily,  Aspirin 81 mg daily,  Maalox 17 grams for constipation, Flaxseed.     Reviewing the records it is noted patient has tried the celiac plexus blocks x 2 and trigger point injections to the local area.    Review of Systems:  12 point systems were reviewed with pt as documented on pt health form and the patient denies any new diagnosis or changes in 12 point system review since the last visit.     Physical Exam  Vitals:    09/17/20 1414   BP: 150/70   Patient Site: Left Arm   Patient Position: Sitting   Cuff Size: Adult Regular   Pulse: 70   Weight: (!) 226 lb (102.5 kg)     General- patient is alert and oriented, in NAD, well-groomed, well-nourished  Psych- Judgment and insight normal, AOx4, recent and remote memory normal, mood and affect normal  Eyes- pupils are equal and reactive, conjunctiva is clear bilaterally, no ptosis is noted.   Respiratory- breathing is non-labored  Cardiovascular- extremities warm and well perfused, no peripheral edema or varicosities.  Musculoskeletal- gait is normal, extremities with no joint swelling, erythema, or warmth.  Neuro- normal strength, no gait abnormalities, normal sensation to pain, temperature, light touch.  Integumentary- no rashes, dermatitis or discolorations noted throughout, no open wounds noted. Ongoing abdominal " pain     Medications    Current Outpatient Medications:      amLODIPine (NORVASC) 5 MG tablet, Take 5 mg by mouth daily., Disp: , Rfl:      aspirin 81 mg chewable tablet, Chew 81 mg daily., Disp: , Rfl:      [START ON 9/30/2020] HYDROcodone-acetaminophen 5-325 mg per tablet, Take 1-2 tablets by mouth 4 (four) times a day as needed for pain (up to 6 per day with an extra 8 per month for (2 per-sunday) when changing patches)., Disp: 176 tablet, Rfl: 0     latanoprost (XALATAN) 0.005 % ophthalmic solution, Administer 1 drop to the right eye at bedtime., Disp: , Rfl:      metoprolol succinate (TOPROL-XL) 100 MG 24 hr tablet, TAKE ONE AND ONE-HALF TABLETS ONCE DAILY, Disp: , Rfl:      MULTIVITAMIN (MULTIPLE VITAMIN ORAL), Take 1 tablet by mouth daily., Disp: , Rfl:      omega-3/dha/epa/fish oil (FISH OIL-OMEGA-3 FATTY ACIDS) 300-1,000 mg capsule, Take 2 g by mouth daily., Disp: , Rfl:      oxybutynin (DITROPAN) 5 MG tablet, , Disp: , Rfl:      pantoprazole (PROTONIX) 40 MG tablet, Take 40 mg by mouth daily., Disp: , Rfl:      polyethylene glycol (MIRALAX) 17 gram packet, Take 17 g by mouth daily as needed., Disp: , Rfl:      pregabalin (LYRICA) 100 MG capsule, Take 100 mg by mouth 2 (two) times a day. , Disp: , Rfl:      senna-docusate (SENNOSIDES-DOCUSATE SODIUM) 8.6-50 mg tablet, Take 1 tablet by mouth daily., Disp: 30 tablet, Rfl: 2     triamterene-hydrochlorothiazide (DYAZIDE) 37.5-25 mg per capsule, Take 1 capsule by mouth every morning., Disp: , Rfl:      buprenorphine (BUTRANS) 20 mcg/hour PTWK patch, Place 1 patch on the skin every 7 days., Disp: 4 patch, Rfl: 0     LORazepam (ATIVAN) 1 MG tablet, TK 1 T PO 30 MINUTES BEFORE MRI, Disp: , Rfl:      triamcinolone (KENALOG) 0.1 % ointment, 1 narinder, Disp: , Rfl:     Lab:  Last UDS on 7/28/2020 had expected results. Any abnormal results have been discussed with the patient and may change the plan of care. Please see the scanned document from the outside lab under the  media tab. This was reviewed with the patient.      Imaging:  No new imaging.      Recent   Dated 9/17/2020 was reviewed with the patient today.   Paper copy reviewed     Assessment:   Vernon Lemus is a 80 y.o. male seen in clinic today for   Chief Complaint   Patient presents with     Follow-up     abdominal pain   . They are here for follow up and continued medical management of their pain. Today we reviewed the lab work of the UDT test and the results are expected because of the prescribed narcotics found in the urine drug screen.     I have also reviewed the information obtained from the last visit encounter after the HEDY was signed and have asked any pertintent questions needed from other healthcare providers/family/patient to continue care and formulate a treatment plan.     The T7-8 injection did not really help with the umbilical pain but he notes it declared it more and helped his back pain     The ongoing chronic pain- lyrica he is unsure if it helping will temporarily reduce at this time.  Will continue the use of the Butrans patch as well as provide 2 extra tabs per day for sundays as he notes that patch wears off 1 day early- this will help him bridge during the transition.     He is following along with GI, PCP and vascular    Patients current MME is 56-75    Patient to call clinic for next month's prescription 5 days in advance. Based on the patients response to their previous narcotic therapy and quality of life, which includes their participation in ADLs, I recommend that the narcotics therapy continue, however the changes listed below will be incorporated into their plan of care.       Plan:   Interventions-    Follow up in 8 weeks    Will continue the tommy of the butrans patches at this time.     Ok to add norco 2 extra tabs on Sunday for patch changes.- 8 total for the month.     Ok to wean down on the lyrica and re-evaluate.     Ok to repeat the injection at the T7-8 in the future if  desired. It did help with back pain     As a reminder- chronic pain is generally stable, if you experience a new injury or new different pain it is expected that you present to the ED or urgent care for evaluation. DO NOT use your chronic pain medications to treat any new or different pain other then what it is intended for. We do not replace any lost or stolen prescriptions or provide early refills for overtaking your medications.       Orders placed today  Medications that were ordered today   Requested Prescriptions     Signed Prescriptions Disp Refills     HYDROcodone-acetaminophen 5-325 mg per tablet 176 tablet 0     Sig: Take 1-2 tablets by mouth 4 (four) times a day as needed for pain (up to 6 per day with an extra 8 per month for (2 per-sunday) when changing patches).     buprenorphine (BUTRANS) 20 mcg/hour PTWK patch 4 patch 0     Sig: Place 1 patch on the skin every 7 days.     No orders of the defined types were placed in this encounter.      UDT/SWAB:  Patient required a random Urine Drug Testing, due to the need to comply with [facility] Model Policy Guidelines and CDC Guideline for the use of any controlled substances. This is to ensure that patient is compliant with treatment, and monitor for risks such as diversion, abuse, or any other aberrant behaviors. Patient is either being considered for or taking a controlled substance. Unexpected findings will be discussed and treatment decision may be adjusted. Testing is being implemented across the board randomly w/o bias related to age, race, gender, socioeconomic status or Sikhism affiliation.    The patient understand todays plan and has their questions answered in regards to expectations and current treatment plan.     SAFETY REMINDERS  No alcohol while taking controlled substances. Alcohol is not an illegal substance, it is unsafe to use in combination. It is a build up of substances in the body that can be extremely hazardous and may cause  respirations to slow to a dangerous rate resulting in hospitalization, brain damage, or death.    Opioid medications have been associated with sharp rise in unintentional overdose and death.  Overdose is a condition characterized by the consumption in excess of a particular drug causing adverse effects. This can happen b/c you are sick, accidentally or intentionally took an extra dose, are on multiple medication that can interact. Someone took your medication and they are not use to the medication.  Symptoms of overdose include:   !breathing slow and shallow, erratic or not at all  !pinpoint pupils, hallucinations  !confusion  !muscle jerks, slack muscles   !extreme sleepiness or loss of alertness   !awake but not able to talk   !face pale or clammy, vomiting, for lighter skinned people, the skin tone turns bluish purple, for darker skinned people, it turns grayish or ashen   If in a situation where overdose is a concern engage the emergency response system (dial 911).    In one study it was noted that 80% of unintentional overdoses occurred in people who were taking a combination of opioids and benzodiazepines.    Do not sell, loan, borrow or share your opioid medication with anyone. Deaths have occurred as a result of this practice. It is illegal and patients are being prosecuted.     Prevent unexpected access/loss of medication: Keep medication locked. Only carry what you need with you.    Nuvia Smith [facility]  [address]  [place] [address]  Ph: [phone]  Fax: [phone]

## 2021-06-11 NOTE — PROGRESS NOTES
VASCULAR SURGERY OUTPATIENT CONSULT OR VISIT   VASCULAR SURGEON: Madhu Rodas MD     LOCATION:  [facility]    Vernon Lemus   Medical Record #:  [id number]  YOB: 1940  Age:  80 y.o.     Date of Service: 9/18/2020    PRIMARY CARE PROVIDER: Vicente De Luna MD    Reason for consultation:  Left lower extremity rest pain     IMPRESSION:   79 yo gentleman with hx of HTN, HLD presents for follow-up for bilateral lower extremity claudication.  Patient was seen in 7/2020 for lifestyle limiting claudication. 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 It was recommended that patient undergo supervised exercise program. Patient today reports that he is continued to have cramping of bilateral lower extremities that is about the same or slightly improved from prior. He can walk about 0.5 miles with his dog but with a lot of difficulty.  Also mentions that his discomfort is very miserable.  He quit smoking back in the 1980s. However, he now reports cramping of his toes in the morning that he needs to massage that occurs on a daily basis and has been ongoing for the past month.     RECOMMENDATION:   - Will plan for Left lower extremity angiogram with possible intervention given patient's symptoms of left lower extremity rest pain  - Scheduled for Wednesday 10/7/2020    HPI:  Vernon Lemus is 79 yo gentleman with hx of HTN, HLD presents for follow-up for bilateral lower extremity claudication.  Patient was seen in 7/2020 for lifestyle limiting claudication. 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 It was recommended that patient undergo supervised exercise program. Patient today reports that he is continued to have cramping of bilateral lower extremities that is about the same  or slightly improved from prior. He can walk about 0.5 miles with his dog. He quit smoking back in the 1980s. However, he now reports cramping of his toes in the morning that he needs to massage that occurs on a daily basis and has been ongoing for the past month.     PHH:    Past Medical History:   Diagnosis Date     Abdominal pain      Cancer (H)     prostate     Chronic pancreatitis (H)      GERD (gastroesophageal reflux disease)      History of basal cell cancer     left arm     Hypertension      Melanoma (H)     removed from neck     Mixed hyperlipidemia 10/7/2013     Polyneuropathy     both ankles and feet     Seasonal allergies      Shingles     right leg        Past Surgical History:   Procedure Laterality Date     CARPAL TUNNEL RELEASE Right      CATARACT EXTRACTION Bilateral      ERCP       ESOPHAGOGASTRODUODENOSCOPY N/A 8/11/2017    Procedure: ENDOSCOPIC ULTRASOUND;  Surgeon: Eleazar Verma MD;  Location: [facility];  Service:      INCONTINENCE SURGERY       PROSTATECTOMY       removal of melanoma       urinary sphincter transplant      with 2 subsequent revisions       ALLERGIES:  Cat dander; Gabapentin; Morphine; Pollen extracts; and Tizanidine    MEDS:    Current Outpatient Medications:      amLODIPine (NORVASC) 5 MG tablet, Take 5 mg by mouth daily., Disp: , Rfl:      aspirin 81 mg chewable tablet, Chew 81 mg daily., Disp: , Rfl:      buprenorphine (BUTRANS) 20 mcg/hour PTWK patch, Place 1 patch on the skin every 7 days., Disp: 4 patch, Rfl: 0     [START ON 9/30/2020] HYDROcodone-acetaminophen 5-325 mg per tablet, Take 1-2 tablets by mouth 4 (four) times a day as needed for pain (up to 6 per day with an extra 8 per month for (2 per-sunday) when changing patches)., Disp: 176 tablet, Rfl: 0     latanoprost (XALATAN) 0.005 % ophthalmic solution, Administer 1 drop to the right eye at bedtime., Disp: , Rfl:      LORazepam (ATIVAN) 1 MG tablet, TK 1 T PO 30 MINUTES BEFORE MRI, Disp: , Rfl:       metoprolol succinate (TOPROL-XL) 100 MG 24 hr tablet, TAKE ONE AND ONE-HALF TABLETS ONCE DAILY, Disp: , Rfl:      MULTIVITAMIN (MULTIPLE VITAMIN ORAL), Take 1 tablet by mouth daily., Disp: , Rfl:      omega-3/dha/epa/fish oil (FISH OIL-OMEGA-3 FATTY ACIDS) 300-1,000 mg capsule, Take 2 g by mouth daily., Disp: , Rfl:      oxybutynin (DITROPAN) 5 MG tablet, , Disp: , Rfl:      pantoprazole (PROTONIX) 40 MG tablet, Take 40 mg by mouth daily., Disp: , Rfl:      polyethylene glycol (MIRALAX) 17 gram packet, Take 17 g by mouth daily as needed., Disp: , Rfl:      pregabalin (LYRICA) 100 MG capsule, Take 100 mg by mouth 2 (two) times a day. , Disp: , Rfl:      triamcinolone (KENALOG) 0.1 % ointment, 1 narinder, Disp: , Rfl:      triamterene-hydrochlorothiazide (DYAZIDE) 37.5-25 mg per capsule, Take 1 capsule by mouth every morning., Disp: , Rfl:      senna-docusate (SENNOSIDES-DOCUSATE SODIUM) 8.6-50 mg tablet, Take 1 tablet by mouth daily., Disp: 30 tablet, Rfl: 2    SOCIAL HABITS:    Social History     Tobacco Use   Smoking Status Former Smoker     Last attempt to quit: 8/10/1986     Years since quittin.1   Smokeless Tobacco Never Used       Social History     Substance and Sexual Activity   Alcohol Use No       Social History     Substance and Sexual Activity   Drug Use No       FAMILY HISTORY:    Family History   Problem Relation Age of Onset     Hypertension Mother      Heart attack Father      Hypertension Father        REVIEW OF SYSTEMS:    A 12 point ROS was reviewed and except for what is listed in the HPI above, all others are negative    PE:  /64   Pulse 76   Temp 98  F (36.7  C)   Resp 18   Wt Readings from Last 1 Encounters:   20 (!) 226 lb (102.5 kg)     There is no height or weight on file to calculate BMI.    EXAM:  Gen: Well-appearing; no acute distress  Cardio: Regular rate and rhythm  Ext: Palpable radial pulses bilateral; Palpable femoral pulses bilateral; Monophasic DP signals  bilaterally; Triphasic R PT signal; Biphasic L PT signal     DIAGNOSTIC STUDIES:     I personally reviewed the images    LABS:      Sodium   Date Value Ref Range Status   08/06/2020 136 136 - 145 mmol/L Final   01/24/2020 136 136 - 145 mmol/L Final   12/16/2019 137 136 - 145 mmol/L Final     Potassium   Date Value Ref Range Status   08/06/2020 4.3 3.5 - 5.0 mmol/L Final   01/24/2020 4.7 3.5 - 5.0 mmol/L Final   12/16/2019 4.8 3.5 - 5.0 mmol/L Final     Chloride   Date Value Ref Range Status   08/06/2020 100 98 - 107 mmol/L Final   01/24/2020 101 98 - 107 mmol/L Final   12/16/2019 101 98 - 107 mmol/L Final     BUN   Date Value Ref Range Status   08/06/2020 25 8 - 28 mg/dL Final   01/24/2020 22 8 - 28 mg/dL Final   12/16/2019 28 8 - 28 mg/dL Final     Creatinine   Date Value Ref Range Status   08/06/2020 1.20 0.70 - 1.30 mg/dL Final   01/24/2020 1.10 0.70 - 1.30 mg/dL Final   12/16/2019 1.32 (H) 0.70 - 1.30 mg/dL Final     Hemoglobin   Date Value Ref Range Status   01/29/2018 13.7 (L) 14.0 - 18.0 g/dL Final   10/05/2016 13.7 (L) 14.0 - 18.0 g/dL Final     Platelets   Date Value Ref Range Status   10/05/2016 305 140 - 440 thou/uL Final       Total time spent 30 minutes face to face with patient with more than 50% time spent in counseling and coordination of care.    Vivek Sanders MD  VASCULAR SURGERY     I have seen and evaluated this patient with Dr Sanders.  Past medical history, surgical history, past family history review of systems, HPI, and physical exam were reviewed by me personally.  I agree with all the above.        Maria Elena Rodas MD, [facility]  VASCULAR SURGERY

## 2021-06-11 NOTE — PROGRESS NOTES
Pt is being seen today by Nuvia Smith CNP, for refill, UDT/CSA and f/u of abdominal pain.    Pain score 4  Constant  What does your pain like feel during a flare? Dull achy  Does the pain interfere with:  Work ----- NA  Walking ------ no  Sleep ------- yes  Daily activities ------no  Relationships -------yes  F=5    UDT/CSA - 07/2019

## 2021-06-11 NOTE — PATIENT INSTRUCTIONS - HE
Plan:   Interventions-    Follow up in 8 weeks    Will continue the tommy of the butrans patches at this time.     Ok to add norco 2 extra tabs on Sunday for patch changes.- 8 total for the month.     Ok to wean down on the lyrica and re-evaluate.     Ok to repeat the injection at the T7-8 in the future if desired. It did help with back pain     As a reminder- chronic pain is generally stable, if you experience a new injury or new different pain it is expected that you present to the ED or urgent care for evaluation. DO NOT use your chronic pain medications to treat any new or different pain other then what it is intended for. We do not replace any lost or stolen prescriptions or provide early refills for overtaking your medications.       Orders placed today  Medications that were ordered today   Requested Prescriptions     Signed Prescriptions Disp Refills     HYDROcodone-acetaminophen 5-325 mg per tablet 176 tablet 0     Sig: Take 1-2 tablets by mouth 4 (four) times a day as needed for pain (up to 6 per day with an extra 8 per month for (2 per-sunday) when changing patches).     buprenorphine (BUTRANS) 20 mcg/hour PTWK patch 4 patch 0     Sig: Place 1 patch on the skin every 7 days.     No orders of the defined types were placed in this encounter.      UDT/SWAB:  Patient required a random Urine Drug Testing, due to the need to comply with [facility] Model Policy Guidelines and CDC Guideline for the use of any controlled substances. This is to ensure that patient is compliant with treatment, and monitor for risks such as diversion, abuse, or any other aberrant behaviors. Patient is either being considered for or taking a controlled substance. Unexpected findings will be discussed and treatment decision may be adjusted. Testing is being implemented across the board randomly w/o bias related to age, race, gender, socioeconomic status or Cheondoism affiliation.    The patient understand todays plan and has their  questions answered in regards to expectations and current treatment plan.     SAFETY REMINDERS  No alcohol while taking controlled substances. Alcohol is not an illegal substance, it is unsafe to use in combination. It is a build up of substances in the body that can be extremely hazardous and may cause respirations to slow to a dangerous rate resulting in hospitalization, brain damage, or death.    Opioid medications have been associated with sharp rise in unintentional overdose and death.  Overdose is a condition characterized by the consumption in excess of a particular drug causing adverse effects. This can happen b/c you are sick, accidentally or intentionally took an extra dose, are on multiple medication that can interact. Someone took your medication and they are not use to the medication.  Symptoms of overdose include:   !breathing slow and shallow, erratic or not at all  !pinpoint pupils, hallucinations  !confusion  !muscle jerks, slack muscles   !extreme sleepiness or loss of alertness   !awake but not able to talk   !face pale or clammy, vomiting, for lighter skinned people, the skin tone turns bluish purple, for darker skinned people, it turns grayish or ashen   If in a situation where overdose is a concern engage the emergency response system (dial 911).    In one study it was noted that 80% of unintentional overdoses occurred in people who were taking a combination of opioids and benzodiazepines.    Do not sell, loan, borrow or share your opioid medication with anyone. Deaths have occurred as a result of this practice. It is illegal and patients are being prosecuted.     Prevent unexpected access/loss of medication: Keep medication locked. Only carry what you need with you.    Nuvia Smith [facility]  [address]  [place] [address]  Ph: [phone]  Fax: [phone]

## 2021-06-11 NOTE — TELEPHONE ENCOUNTER
Medication being requested: hydrocodone 5/325 mg  Last visit date: 7/28.  Provider: BRIANNE  Next visit date: 9/17.  Provider: BRIANNE  Expected follow up: 4 weeks  MTM visit (Pain Center) date: no  UDT/CSA - 07/2019, deferred due to Covid 19   (Last fill date; name; strength; provider; MME; quantity):  8/5-28 days  Pertinent between visit information about requested medication (telephone, mychart, prior authorization, concerns, comments):   8/25-TESI with Dr. Gaviria  Script being sent to provider by nurse- dates and quantity:   Requested Prescriptions     Pending Prescriptions Disp Refills     HYDROcodone-acetaminophen 5-325 mg per tablet 168 tablet 0     Sig: Take 1-2 tablets by mouth 4 (four) times a day as needed for pain (up to 6 per day).     Pharmacy cued: [facility]  Standing orders for withdrawal protocol implemented: NA

## 2021-06-12 NOTE — PROGRESS NOTES
VASCULAR SURGERY OUTPATIENT    VASCULAR SURGEON: Kesha Murrell MD    LOCATION:  [facility]    Vernon Lemus   Medical Record #:  [id number]  YOB: 1940  Age:  80 y.o.     Date of Service: 10/30/2020    PRIMARY CARE PROVIDER: Vicente De Luna MD    Reason for consultation: Bilateral lifestyle limiting lower extremity claudication, left greater than right      IMPRESSION:   Status post left lower extremity angiogram with balloon angioplasty and drug-eluting balloon angioplasty of left popliteal artery.  Patient reports that his claudication symptoms have improved since his procedure-patient reports that he is able to walk a longer distance since the procedure.  Patient happy with results of procedure.    Vivek Sanders MD  Vascular Surgery     RECOMMENDATION:   - Follow-up in 3 months with repeat ABIs and Arterial Duplex  - Continue Aspirin  - Continue Plavix for 6 weeks total     HPI:  Vernon Lemus is a 80 y.o. gentleman with history of hypertension and hyperlipidemia who presented to clinic for evaluation of bilateral lower extremity claudication, left greater than right.  Patient reported that he has had lifestyle limiting claudication since July 2020.  Patient had CTA that showed severe stenosis of the left popliteal artery with CELSA suggestive of moderate peripheral vascular disease and significant drop of ABIs following exercise suggestive moderate peripheral vascular disease.  Patient underwent left lower extremity angiogram on October 7, 2020 with balloon angioplasty of the left popliteal artery followed by drug-eluting balloon angioplasty of left popliteal artery.  Patient reports that now he is able to ambulate a longer distance-reports that he can walk half a mile instead of quarter mile.  Patient happy with results.      PHH:    Past Medical History:   Diagnosis Date     Abdominal pain      Cancer (H)     prostate     Chronic pancreatitis (H)      GERD (gastroesophageal  reflux disease)      History of basal cell cancer     left arm     Hypertension      Melanoma (H)     removed from neck     Mixed hyperlipidemia 10/7/2013     Polyneuropathy     both ankles and feet     Seasonal allergies      Shingles     right leg        Past Surgical History:   Procedure Laterality Date     CARPAL TUNNEL RELEASE Right      CATARACT EXTRACTION Bilateral      ERCP       ESOPHAGOGASTRODUODENOSCOPY N/A 8/11/2017    Procedure: ENDOSCOPIC ULTRASOUND;  Surgeon: Eleazar Verma MD;  Location: [facility] [place];  Service:      INCONTINENCE SURGERY       IR EXTREMITY ANGIOGRAM LEFT  10/7/2020     PROSTATECTOMY       removal of melanoma       urinary sphincter transplant      with 2 subsequent revisions       ALLERGIES:  Cat dander, Cilostazol, Gabapentin, Morphine, Pollen extracts, and Tizanidine    MEDS:    Current Outpatient Medications:      amLODIPine (NORVASC) 5 MG tablet, Take 5 mg by mouth daily., Disp: , Rfl:      aspirin 81 mg chewable tablet, Chew 81 mg daily., Disp: , Rfl:      buprenorphine (BUTRANS) 20 mcg/hour PTWK patch, Place 1 patch on the skin every 7 days., Disp: 4 patch, Rfl: 0     clopidogreL (PLAVIX) 75 mg tablet, Take 1 tablet (75 mg total) by mouth daily., Disp: 75 tablet, Rfl: 3     HYDROcodone-acetaminophen 5-325 mg per tablet, Take 1-2 tablets by mouth 4 (four) times a day as needed for pain (up to 6 per day with an extra 8 per month for (2 per-sunday) when changing patches)., Disp: 176 tablet, Rfl: 0     latanoprost (XALATAN) 0.005 % ophthalmic solution, Administer 1 drop to the right eye at bedtime., Disp: , Rfl:      LORazepam (ATIVAN) 1 MG tablet, TK 1 T PO 30 MINUTES BEFORE MRI, Disp: , Rfl:      metoprolol succinate (TOPROL-XL) 100 MG 24 hr tablet, TAKE ONE AND ONE-HALF TABLETS ONCE DAILY, Disp: , Rfl:      MULTIVITAMIN (MULTIPLE VITAMIN ORAL), Take 1 tablet by mouth daily., Disp: , Rfl:      omega-3/dha/epa/fish oil (FISH OIL-OMEGA-3 FATTY ACIDS) 300-1,000 mg capsule, Take  2 g by mouth daily., Disp: , Rfl:      oxybutynin (DITROPAN) 5 MG tablet, Take by mouth 2 (two) times a day as needed. 5-10 mg, Disp: , Rfl:      pantoprazole (PROTONIX) 40 MG tablet, Take 40 mg by mouth daily., Disp: , Rfl:      polyethylene glycol (MIRALAX) 17 gram packet, Take 17 g by mouth daily as needed., Disp: , Rfl:      pregabalin (LYRICA) 100 MG capsule, Take 100 mg by mouth 2 (two) times a day. , Disp: , Rfl:      triamcinolone (KENALOG) 0.1 % ointment, 1 narinder, Disp: , Rfl:      triamterene-hydrochlorothiazide (DYAZIDE) 37.5-25 mg per capsule, Take 1 capsule by mouth every morning., Disp: , Rfl:      vit A/vit C/vit E/zinc/copper (ICAPS AREDS ORAL), Take 1 tablet by mouth daily., Disp: , Rfl:      senna-docusate (SENNOSIDES-DOCUSATE SODIUM) 8.6-50 mg tablet, Take 1 tablet by mouth daily., Disp: 30 tablet, Rfl: 2    SOCIAL HABITS:    Social History     Tobacco Use   Smoking Status Former Smoker     Quit date: 8/10/1986     Years since quittin.2   Smokeless Tobacco Never Used       Social History     Substance and Sexual Activity   Alcohol Use No       Social History     Substance and Sexual Activity   Drug Use No       FAMILY HISTORY:    Family History   Problem Relation Age of Onset     Hypertension Mother      Heart attack Father      Hypertension Father        REVIEW OF SYSTEMS:    A 12 point ROS was reviewed and except for what is listed in the HPI above, all others are negative    PE:  /72 (Patient Site: Left Arm, Patient Position: Sitting, Cuff Size: Adult Regular)   Pulse 64   Temp 97.9  F (36.6  C) (Oral)   Wt Readings from Last 1 Encounters:   10/28/20 221 lb (100.2 kg)     There is no height or weight on file to calculate BMI.    EXAM:  GENERAL: This is a well-developed 80 y.o. male who appears his stated age  EYES: Grossly normal.  MOUTH: Buccal mucosa normal   CARDIAC:  Not assessed  CHEST/LUNG:  Not Assessed  GASTROINTESINAL (ABDOMEN):Not Assessed   MUSCULOSKELETAL: Grossly normal  and both lower extremities are intact.  HEME/LYMPH: No lymphedema  NEUROLOGIC: Focally intact, Alert and oriented x 3.   PSYCH: appropriate affect  INTEGUMENT: No open lesions or ulcers      DIAGNOSTIC STUDIES:     Images:  Us Celsa Doppler No Exercise, 1-2 Levels, Bilateral    Result Date: 10/30/2020  BILATERAL RESTING ANKLE-BRACHIAL INDICES (CELSA'S) Indication: Status post left leg angioplasty.     Previous: 6/29/2020 Angioplasty: 10/07/2020   History: Previous Smoker, Hypertension, PAD and Claudication  Resting CELSA's          Right: mmHg Index     Brachial: 169  Ankle-(PT): 172 1.02 Ankle-(DP): 170 1.01           Digit: 127 0.75               Left: mmHg Index     Brachial: 163  Ankle-(PT): 152 0.90 Ankle-(DP): 158 0.93           Digit: 95 0.56 Resting ankle-brachial index of 1.02 on the right. Toe Pressures of 127 mmHg and TBI of 0.75  Resting ankle-brachial index of 0.93 on the left. Toe Pressures of 95 mmHg and TBI of 0.56  WAVEFORMS: The right dorsalis pedis and posterior tibial arteries show triphasic waveforms. The left dorsalis pedis and posterior tibial arteries show monophasic waveforms. Impression:  Right CELSA is  Normal.  Toe pressures and TBI's are normal. Left CELSA is Normal.  Toe pressures and TBI's are normal     Ir Lower Extremity Angiogram Left    Result Date: 10/7/2020  VASCULAR SURGERY ANGIOGRAM REPORT  [facility] DATE: October 7, 2020 PROCEDURES PERFORMED: 1. Ultrasound-guided access of the right common femoral artery 2.  Placement of catheter into aorta 3.  Aortogram 4.  Selective cannulation of left SFA and popliteal artery and TP trunk 5.  Balloon angioplasty of left popliteal artery using 5 x 150 mm balloon 6.  Drug-eluting balloon angioplasty of left popliteal artery using 5 x 80 mm balloon 7.  Left lower extremity angiogram 8.  Moderate sedation 9.  Interpretation of radiologic findings PROVIDER: Maria Elena Rodas INDICATION: 80-year-old gentleman with history of  hypertension hyperlipidemia who presented to our clinic for bilateral lower extremity claudication, left greater than right.  Patient reports that he has had lifestyle imaging claudication since July 2020.  Patient had CTA that showed severe stenosis of the left popliteal artery with ABIs suggestive of moderate peripheral vascular disease and significant drop in ABIs following exercise suggestive of moderate peripheral vascular disease.  Patient now presents for left lower extremity angiogram with possible intervention. SEDATION: The procedure was performed with administration of intravenous conscious sedation with appropriate preoperative, intraoperative, and postoperative evaluation. 120 minutes of supervised face to face intraservice time was provided by a radiology nurse under my direct supervision. 1 mg Versed and 50 mcg of fentanyl were administered. ANTIBIOTICS: None FLUOROSCOPIC TIME: 21 minutes RADIATION DOSE: 641 mGy CONTRAST: 100 cc  PROCEDURE: Ultrasound was used to evaluate the right common femoral artery. The selected vessel was patent. Ultrasound was then used for real-time ultrasound guided needle entry of the right common femoral artery. Permanent images were recorded and saved to the patient's medical record.  After accessing the right common femoral artery using micropuncture needle, micropuncture wire was advanced into the aorta.  Micropuncture sheath was then advanced.  The micropuncture wire was exchanged for a glide advantage 035 wire.  Omni Flush catheter was then advanced into the aorta just below the renal arteries.  Aortogram was performed.  A glide advantage wire was then advanced into the left common femoral artery.  The Omni Flush catheter was then advanced over the wire and placed into the left common femoral artery.  Left lower extremity angiogram was then performed.  035 glide advantage wire was then advanced into the left superficial femoral artery.  8000 units of heparin were  administered.  Omni Flush catheter was removed.  5 Gibraltarian sheath was exchanged for a 6 [place]tarian by 45 cm Ansell sheath.  Dowell cross catheter was introduced over a glide advantage wire into the distal left superficial femoral artery.  035 glide advantage wire was removed and exchanged for a 018V 18 wire.  V 18 wire was used to cross the left popliteal artery stenosis.  Dowell cross catheter was advanced over a V 18 wire.  Angiogram was performed to confirm intraluminal position of the catheter.  V 18 wire was then placed through the Dowell cross catheter.  The Dowell cross catheter was removed and balloon angioplasty of the left popliteal artery stenosis was performed using a 5 mm x 150 mm balloon.  The segment was treated again using a 5 mm x 80 mm drug-eluting balloon.  Repeat angiogram showed improved flow through the left popliteal artery and less than 30% residual stenosis.  V 18 wire was removed and 035 glide advantage wire was introduced.  Sheath was then withdrawn into the right external iliac artery.  Right iliofemoral angiogram was performed that showed no calcification and adequate for percutaneous closure.  Pro-glide closure was used for arteriotomy.  Adequate hemostasis was obtained.  Patient tolerated the procedure well with no complications. ? FINDINGS Patent aorta and iliacs bilaterally.  Patent left common femoral artery and profunda.  Patent left superficial femoral artery.  Short segment stenosis of the mid popliteal artery greater than 90%.  Two-vessel runoff into the foot via anterior tibial and posterior tibial arteries with multifocal disease.  IMPRESSION Resolution of short segment stenosis of mid popliteal artery following balloon angioplasty and drug-eluting balloon angioplasty treatment.  Maria Elena Rodas MD, [facility] VASCULAR SURGERY     Us Arterial Leg Left    Result Date: 10/30/2020  Arterial Duplex Ultrasound Lower Extremity Artery Evaluation Indication: Status post left leg angioplasty.      Previous: 6/29/2020 Angioplasty: 10/07/2020   History: Previous Smoker, Hypertension, PAD and Claudication Technique: Duplex imaging is performed utilizing gray-scale, two-dimensional images, and color-flow imaging. Doppler waveform analysis and spectral Doppler imaging is also performed. LOWER EXTREMITY ARTERIAL DUPLEX EXAM WITH WAVEFORMS Right Leg:(cm/s) Location: Velocities Waveforms PTA:   88   T JONNA:RETROGRADE FLOW   56   B DPA:   15   B Waveforms: T=Triphasic, M=Monophasic, B=Biphasic Left Leg:(cm/s) Location: Velocities Waveforms EIA:   160  B CFA:   177  B PFA:   140  B SFA Proximal:   168 B SFA Mid:   155  T SFA Distal:   94  B Popliteal Artery:   147/129 B/B PTA:   53   M JONNA:   50  M DPA:   49  M Waveforms: T=Triphasic, M=Monophasic, B=Biphasic Impression: Right Lower Extremity: Normal velocities in tibial vessels with biphasic waveforms. Left Lower Extremity: No evidence of hemodynamically significant stenosis.  Slightly elevated velocities in the common femoral artery.  Transition of waveforms from biphasic to monophasic at the level popliteal artery.  Significant improvement compared to previous ultrasound. Reference: Category Normal 1-19% 20-49% 50-99% Occluded PSV <160 cm/sec without spectral broadening <160 cm/sec with spectral broadening Increased Increased Absent Flow Ratio N/A N/A < 2.0 >2.0 N/A Post-Stenotic Turbulence No No No Yes N/A     Ops Interlaminar Epidural Steroid Injection Thoracic    Result Date: 10/28/2020  INTERLAMINAR THORACIC EPIDURAL STEROID INJECTION  WITH FLUOROSCOPIC GUIDANCE Performed on: 10/28/20 Mr. Lemus is a 80-year-old man who has pain around the right lower thoracic area.  He had a celiac plexus block for the abdominal pain, but this did not seem to help.  He had a thoracic epidural injection done which gave him some relief of the mid back pain, right thoracic pain and even some relief of his lower extremity discomfort.  It did not, however, seem to help the  "right upper quadrant abdominal symptoms.  He wishes to repeat the injection again for the back,  hip and leg pain. Pre Procedure Diagnosis:  foraminal stenosis with right sided abdominal pain, consider injecting T8-9 right side. TFESI approach verses carmen midline Vital Signs:  As per intra-procedure documentation Level Injected: T7-T8 and T8-T9 The procedure of epidural steroid injection was discussed with Vernon Lemus in detail along with the attendant risks, including but not limited to: back pain, subdural puncture with subsequent headache (possible need for epidural blood patch), nerve injury, infection, bleeding, epidural hematoma (with need for surgical evacuation), allergic reaction,  worsening of pain along with risk of stroke, paralysis, and death.  Informed consent was obtained and patient elected to proceed. The patient was placed in a prone position on the fluoroscopy table.  The thoracic area was prepped and draped sterilely in usual fashion. The T8-T9 interspace was identified on fluoroscopy.  After local anesthetic was used, a 3.5\",22 gauge spinal needle was advanced to the epidural space using a loss of resistance technique.  Upon entering the epidural space, appropriate placement was verified by injection of 3 ml of Omnipaque (300 mg/ml of iodine).  There was good spread seen in the posterior epidural space.  This was followed by injections of 80 mgs of Depomedrol in 3ml of .25% Marcaine.  The patient tolerated the procedure well.  The patient was taken to the recovery area after the injection.  There were no complications.  Pre Procedure Pain Scale: 3 Pain Score:   3 If Vernon Lemus has any complications after discharge, he was instructed to call us.        I personally reviewed the images and my interpretation is normal ABIs and toe pressures bilaterally-ABIs on the left were 0.93 with toe pressures of 95 on the left; ABIs on the left noted to be 0.58 previously with toe pressures of 53 " previously.    LABS:      Sodium   Date Value Ref Range Status   09/29/2020 137 136 - 145 mmol/L Final   08/06/2020 136 136 - 145 mmol/L Final   01/24/2020 136 136 - 145 mmol/L Final     Potassium   Date Value Ref Range Status   10/07/2020 4.2 3.5 - 5.0 mmol/L Final   09/29/2020 4.4 3.5 - 5.0 mmol/L Final   08/06/2020 4.3 3.5 - 5.0 mmol/L Final     Chloride   Date Value Ref Range Status   09/29/2020 102 98 - 107 mmol/L Final   08/06/2020 100 98 - 107 mmol/L Final   01/24/2020 101 98 - 107 mmol/L Final     BUN   Date Value Ref Range Status   09/29/2020 27 8 - 28 mg/dL Final   08/06/2020 25 8 - 28 mg/dL Final   01/24/2020 22 8 - 28 mg/dL Final     Creatinine   Date Value Ref Range Status   10/07/2020 1.42 (H) 0.70 - 1.30 mg/dL Final   09/29/2020 1.22 0.70 - 1.30 mg/dL Final   08/06/2020 1.20 0.70 - 1.30 mg/dL Final     Hemoglobin   Date Value Ref Range Status   10/07/2020 12.6 (L) 14.0 - 18.0 g/dL Final   01/29/2018 13.7 (L) 14.0 - 18.0 g/dL Final   10/05/2016 13.7 (L) 14.0 - 18.0 g/dL Final     Platelets   Date Value Ref Range Status   10/07/2020 268 140 - 440 thou/uL Final   10/05/2016 305 140 - 440 thou/uL Final     INR   Date Value Ref Range Status   10/07/2020 1.03 0.90 - 1.10 Final       Total time spent 30 minutes face to face with patient with more than 50% time spent in counseling and coordination of care.    Vivek Sanders MD  VASCULAR SURGERY     I have seen and evaluated this patient with Dr Sanders.  Past medical history, surgical history, past family history review of systems, HPI, and physical exam were reviewed by me personally.  I agree with all the above.        Maria Elena Rodas MD, [facility]  VASCULAR SURGERY

## 2021-06-12 NOTE — ANESTHESIA POSTPROCEDURE EVALUATION
Patient: Vernon Lemus  ENDOSCOPIC ULTRASOUND  Anesthesia type: MAC    Patient location: Phase II Recovery  Last vitals:   Vitals:    08/11/17 0900   BP: 145/67   Pulse: (!) 56   Resp:    Temp:    SpO2: 95%     Post vital signs: stable  Level of consciousness: awake and responds to simple questions  Post-anesthesia pain: pain controlled  Post-anesthesia nausea and vomiting: no  Pulmonary: unassisted, return to baseline  Cardiovascular: stable and blood pressure at baseline  Hydration: adequate  Anesthetic events: no    QCDR Measures:  ASA# 11 - Leny-op Cardiac Arrest: ASA11B - Patient did NOT experience unanticipated cardiac arrest  ASA# 12 - Leny-op Mortality Rate: ASA12B - Patient did NOT die  ASA# 13 - PACU Re-Intubation Rate: ASA13B - Patient did NOT require a new airway mgmt  ASA# 10 - Composite Anes Safety: ASA10A - No serious adverse event  ASA# 38 - New Corneal Injury: ASA38A - No new exposure keratitis or corneal abrasion in PACU    Additional Notes:

## 2021-06-12 NOTE — PRE-PROCEDURE
Verbal consent obtained?: Yes  Written consent obtained?: Yes  Risks and benefits: Risks, benefits and alternatives were discussed  Consent given by: patient  Expected level of sedation: moderate  ASA Class: Class 2- mild systemic disease, no acute problems, no functional limitations  Mallampati: Grade 2- soft palate, base of uvula, tonsillar pillars, and portion of posterior pharyngeal wall visible  Patient states understanding of procedure being performed: Yes  Patient's understanding of procedure matches consent: Yes  Procedure consent matches procedure scheduled: Yes  Appropriately NPO: yes  Heart: normal heart sounds and rate and RRR  History & Physical reviewed: History and physical reviewed and no updates needed  Statement of review: I have reviewed the lab findings, diagnostic data, medications, and the plan for sedation

## 2021-06-12 NOTE — TELEPHONE ENCOUNTER
Medication being requested: Norco  Last visit date: 9/17/20.  Provider: BRIANNE  Next visit date: 11/13/20.  Provider: BRIANNE  Expected follow up: 8 weeks  MTM visit (Pain Center) date: No  UDT date: 9/2020  Agreement date: 9/2020   (Last fill date; name; strength; provider; MME; quantity):  09/30/2020 Hydrocodone-Acetamin 5-325 Mg Qty: 176 for 30 days  Pertinent between visit information about requested medication (telephone, mychart, prior authorization, concerns, comments): No  Script being sent to provider by nurse- dates and quantity:   Requested Prescriptions     Pending Prescriptions Disp Refills     HYDROcodone-acetaminophen 5-325 mg per tablet 176 tablet 0     Sig: Take 1-2 tablets by mouth 4 (four) times a day as needed for pain (up to 6 per day with an extra 8 per month for (2 per-sunday) when changing patches).     Pharmacy cued: [facility]  Standing orders for withdrawal protocol implemented: EPHRAIM

## 2021-06-12 NOTE — ANESTHESIA CARE TRANSFER NOTE
Last vitals:   Vitals:    08/11/17 0805   BP: 135/61   Pulse: 63   Resp: 16   Temp: 36.1  C (97  F)   SpO2: 99%     Patient's level of consciousness is drowsy  Spontaneous respirations: yes  Maintains airway independently: yes  Dentition unchanged: yes  Oropharynx: oropharynx clear of all foreign objects    QCDR Measures:  ASA# 20 - Surgical Safety Checklist: WHO surgical safety checklist completed prior to induction  PQRS# 430 - Adult PONV Prevention: NA - Not adult patient, not GA or 3 or more risk factors NOT present  ASA# 8 - Peds PONV Prevention: NA - Not pediatric patient, not GA or 2 or more risk factors NOT present  PQRS# 424 - Leny-op Temp Management: NA - MAC anesthesia or case < 60 minutes  PQRS# 426 - PACU Transfer Protocol: - Transfer of care checklist used  ASA# 14 - Acute Post-op Pain: ASA14B - Patient did NOT experience pain >= 7 out of 10

## 2021-06-12 NOTE — PROGRESS NOTES
Patient had left clinic and then Dr. Sanders ordered a new medication. Left him a vm that patient has been prescribed Atorvastatin for cholesterol.

## 2021-06-12 NOTE — TELEPHONE ENCOUNTER
Medication being requested: Butrans  Last visit date: 9/17/20.  Provider: BRIANNE  Next visit date: 11/13/20.  Provider: BRIANNE  Expected follow up: 8 weeks  MTM visit (Pain Center) date: No  UDT date: 9/2020  Agreement date: 9/2020   (Last fill date; name; strength; provider; MME; quantity):  09/18/2020 Buprenorphine 20 Mcg/hr Patch Qty: 4 for 28 days  Pertinent between visit information about requested medication (telephone, mychart, prior authorization, concerns, comments):   10/28 scheduled with [facility]  Script being sent to provider by nurse- dates and quantity:   10/19/20-11/16/20  Requested Prescriptions     Pending Prescriptions Disp Refills     buprenorphine (BUTRANS) 20 mcg/hour PTWK patch 4 patch 0     Sig: Place 1 patch on the skin every 7 days.     Pharmacy cued: [facility]  Standing orders for withdrawal protocol implemented: NA

## 2021-06-12 NOTE — PATIENT INSTRUCTIONS - HE
POST PROCEDURE INSTRUCTIONS  PROCEDURE DONE TODAY: T 7-8 EPIDURAL STEROID INJECTION    Rest today. Resume activity tomorrow as able.  Patient demonstrates the ability to ambulate independently with a steady gait at the time of discharge.      It is not unusual to have a temporary increase in your pain after a procedure. You can ice the area 24-48 hrs. 15 min at a time.      You received a steroid injection. This medication can take 2-7 days to take effect. Some temporary side effects include:    Heartburn    Flushed-red face    Insomnia-trouble sleeping-jitters    Diabetics may see a rise in blood sugar for a few days    Low back or SI joint (sacroiliac) injection: you may experience numbness in one or both legs. Please walk with help. This is temporary and will wear off in a few hours. Do not drive if you are tingling, numbness or weakness in your hands/arms or legs/feet.    Headache:  If you experience a headache after a lumbar epidural injection, lie down and drink fluids (especially caffeine). The headache will go away gradually. If it persists notify our clinic.    Fever: call if fever 100 or over, redness/warmth/swelling over the injection site.    No public hot tubs/pools for a couple days    Physical therapy: check with pain center provider regarding resuming therapy.    Monitor your response to the injection and report this at your next visit.    If you have been referred for a procedure only, please call your referring provider for a follow up.    IF YOU PLAN TO GET A FLU SHOT YOU MUST WAIT 2 WEEKS AFTER THIS INJECTION.      CALL IF ANY QUESTIONS [phone]

## 2021-06-12 NOTE — OP NOTE
VASCULAR SURGERY ANGIOGRAM REPORT      [facility]     DATE:   October 7, 2020  PROCEDURES PERFORMED:   1. Ultrasound-guided access of the right common femoral artery  2.  Placement of catheter into aorta  3.  Aortogram  4.  Selective cannulation of left SFA and popliteal artery and TP trunk  5.  Balloon angioplasty of left popliteal artery using 5 x 150 mm balloon  6.  Drug-eluting balloon angioplasty of left popliteal artery using 5 x 80 mm balloon  7.  Left lower extremity angiogram  8.  Moderate sedation  9.  Interpretation of radiologic findings    PROVIDER:     Maria Elena Rodas    INDICATION:   80-year-old gentleman with history of hypertension hyperlipidemia who presented to our clinic for bilateral lower extremity claudication, left greater than right.  Patient reports that he has had lifestyle imaging claudication since July 2020.  Patient had CTA that showed severe stenosis of the left popliteal artery with ABIs suggestive of moderate peripheral vascular disease and significant drop in ABIs following exercise suggestive of moderate peripheral vascular disease.  Patient now presents for left lower extremity angiogram with possible intervention.    SEDATION:     The procedure was performed with administration of intravenous conscious sedation with appropriate preoperative, intraoperative, and postoperative evaluation.   120 minutes of supervised face to face intraservice time was provided by a radiology nurse under my direct supervision. 1 mg Versed and 50 mcg of fentanyl were administered.    ANTIBIOTICS: None  FLUOROSCOPIC TIME: 21 minutes   RADIATION DOSE: 641 mGy   CONTRAST: 100 cc       PROCEDURE:     Ultrasound was used to evaluate the right common femoral artery. The selected vessel was patent. Ultrasound was then used for real-time ultrasound guided needle entry of the right common femoral artery. Permanent images were recorded and saved to the patient's medical record.  After  accessing the right common femoral artery using micropuncture needle, micropuncture wire was advanced into the aorta.  Micropuncture sheath was then advanced.  The micropuncture wire was exchanged for a glide advantage 035 wire.  Omni Flush catheter was then advanced into the aorta just below the renal arteries.  Aortogram was performed.  A glide advantage wire was then advanced into the left common femoral artery.  The Omni Flush catheter was then advanced over the wire and placed into the left common femoral artery.  Left lower extremity angiogram was then performed.  035 glide advantage wire was then advanced into the left superficial femoral artery.  8000 units of heparin were administered.  Omni Flush catheter was removed.  5 [place] sheath was exchanged for a 6 [place] by 45 cm Ansell sheath.  Dowell cross catheter was introduced over a glide advantage wire into the distal left superficial femoral artery.  035 glide advantage wire was removed and exchanged for a 018V 18 wire.  V 18 wire was used to cross the left popliteal artery stenosis.  Dowell cross catheter was advanced over a V 18 wire.  Angiogram was performed to confirm intraluminal position of the catheter.  V 18 wire was then placed through the Dowell cross catheter.  The Dowell cross catheter was removed and balloon angioplasty of the left popliteal artery stenosis was performed using a 5 mm x 150 mm balloon.  The segment was treated again using a 5 mm x 80 mm drug-eluting balloon.  Repeat angiogram showed improved flow through the left popliteal artery and less than 30% residual stenosis.  V 18 wire was removed and 035 glide advantage wire was introduced.  Sheath was then withdrawn into the right external iliac artery.  Right iliofemoral angiogram was performed that showed no calcification and adequate for percutaneous closure.  Pro-glide closure was used for arteriotomy.  Adequate hemostasis was obtained.  Patient tolerated the procedure well with no  complications.      FINDINGS   Patent aorta and iliacs bilaterally.  Patent left common femoral artery and profunda.  Patent left superficial femoral artery.  Short segment stenosis of the mid popliteal artery greater than 90%.  Two-vessel runoff into the foot via anterior tibial and posterior tibial arteries with multifocal disease.      IMPRESSION   Resolution of short segment stenosis of mid popliteal artery following balloon angioplasty and drug-eluting balloon angioplasty treatment.      Maria Elena Rodas MD, VI  VASCULAR SURGERY

## 2021-06-12 NOTE — ANESTHESIA PREPROCEDURE EVALUATION
Anesthesia Evaluation      Patient summary reviewed     Airway   Mallampati: II   Pulmonary - negative ROS                          Cardiovascular - normal exam  (+) hypertension, ,     ECG reviewed (PVC's)        Neuro/Psych    (+) neuromuscular disease,      Endo/Other - negative ROS      GI/Hepatic/Renal    (+) GERD,        Other findings: K 4.4      Dental - normal exam                        Anesthesia Plan  Planned anesthetic: MAC    ASA 2     Anesthetic plan and risks discussed with: patient and spouse    Post-op plan: routine recovery

## 2021-06-13 ENCOUNTER — HEALTH MAINTENANCE LETTER (OUTPATIENT)
Age: 81
End: 2021-06-13

## 2021-06-13 NOTE — TELEPHONE ENCOUNTER
Review of  for date audit  10/28/2020 Hydrocodone-Acetamin 5-325 Mg Qty: 176 for 28 days (due 11/25)  09/30/2020 Hydrocodone-Acetamin 5-325 Mg Qty: 176 for 30 days (due 10/28 because this is 28 day supply not 30)  09/02/2020 Hydrocodone-Acetamin 5-325 Mg Qty: 168 for 28 days (due 9/30)    Conclusion: 11/25/20 is the true date that the medication is due.  No change needed.

## 2021-06-13 NOTE — TELEPHONE ENCOUNTER
RN spoke to pharmacist to approve patient picking up prescription today with the understanding that the medication is not due to start until tomorrow.  This is approved due to weather tomorrow.

## 2021-06-13 NOTE — PROGRESS NOTES
"Vernon Lemus is a 80 y.o. male who is being evaluated with [facility] or [employer] services- via video     \"This telephone/video visit will be conducted via a call between you and your physician/provider. We have found that certain health care needs can be provided without the need for a physical exam.  This service lets us provide the care you need with a short phone conversation.  If a prescription is necessary we can send it directly to your pharmacy.  If lab work is needed we can place an order for that and you can then stop by our lab to have the test done at a later time.    If during the course of the call the physician/provider feels a telephone/video visit is not appropriate, you will not be charged for this service.\"     Start time- 1:00 pm   End time- 1:19 am     Vernon Lemus complains of    Chief Complaint   Patient presents with     Abdominal Pain       I have reviewed and updated the patient's Past Medical History, Social History, Family History and Medication List as well as the Precharting workup by the [facility].       PAIN CLINIC FOLLOW UP PROGRESS NOTE    CC:  Chief Complaint   Patient presents with     Abdominal Pain       HPI  Vernon Lemus is a 80 y.o. male who presents for evaluation   Chief Complaint   Patient presents with     Abdominal Pain    that is causing continued pain. Specific questions indicated the patient wanted addressed today include: to continue medication management       Major issues:  1. Chronic pain syndrome    2. Chronic bilateral low back pain with bilateral sciatica    3. Abdominal pain, generalized        Pain score 3  Constant   What does your pain like feel during a flare? Dull and achy  Does the pain interfere with:  Work ----- n/a  Walking ------ no  Sleep ------- no  Daily activities ------no  Relationships -------yes  F=4    ALLERGIES  Cat dander, Cilostazol, Gabapentin, Morphine, Pollen extracts, and Tizanidine    Previous Medical History  Social History " "    Substance and Sexual Activity   Alcohol Use No     Social History     Substance and Sexual Activity   Drug Use No     Social History     Tobacco Use   Smoking Status Former Smoker     Quit date: 8/10/1986     Years since quittin.2   Smokeless Tobacco Never Used       Pertinent Pain Medications/interventions:    2020- increased hydrocodone to include 10 extra tabs per month for patch changes and bad nights.     2020- increase in butrans to 20 mcg/hr, decrease norco to 6 tabs per day, he is taking lyrica 100 mg two times a day form his PCP      Currently he is taking norco 5/325 mg up to 5-8 tabs per day as well as Butrans 15 mcg/ hr      He notes that the amitriptyline was started with Dr. Alva from [facility]       He is currently taking norco up to 6 per day as needed, he recently repeated the celiac plexus block with Dr. Gaviria and reports no relief as of yet.      Acupuncture was helpful initially but is now not effective     Baclofen was not helpful     Recently stopped oxycontin ER 15 mg two times a day. 2019     Previously has tried morphine which led to confusion   Belbuca up to 600 mcg two times a day which helped a lot but unaffordable.      Failed medical cannabis  Percocet- too strong.       Reports having tried and failed pregablin- lyrica    TRIED AND FAILED MEDICATIONS:  Cymbalta - hallucinations at 60 mg, no issues with 40 mg  Lyrica - very depressed mood  Nortriptyline- \"spacing out\" and anxiety as well as jitteriness in the morning- however, it did help with sleep and decreased his shooting pains at night.  oxymorphone 5mg ER twice daily ,OxyContin 10 mg twice a day.    Percocet 5-325 mg, max of 3 a day, Protonix 40 mg daily,  Aspirin 81 mg daily,  Maalox 17 grams for constipation, Flaxseed.     Reviewing the records it is noted patient has tried the celiac plexus blocks x 2 and trigger point injections to the local area.    Review of Systems:  12 point systems were reviewed " with pt as documented on on previous exams. Any new diagnosis or new medication is reviewed today.     Medications    Current Outpatient Medications:      amLODIPine (NORVASC) 5 MG tablet, Take 5 mg by mouth daily., Disp: , Rfl:      aspirin 81 mg chewable tablet, Chew 81 mg daily., Disp: , Rfl:      atorvastatin (LIPITOR) 10 MG tablet, Take 1 tablet (10 mg total) by mouth at bedtime., Disp: 90 tablet, Rfl: 1     buprenorphine (BUTRANS) 20 mcg/hour PTWK patch, Place 1 patch on the skin every 7 days., Disp: 4 patch, Rfl: 0     clopidogreL (PLAVIX) 75 mg tablet, Take 1 tablet (75 mg total) by mouth daily., Disp: 75 tablet, Rfl: 3     HYDROcodone-acetaminophen 5-325 mg per tablet, Take 1-2 tablets by mouth 4 (four) times a day as needed for pain (up to 6 per day with an extra 8 per month for (2 per-sunday) when changing patches)., Disp: 176 tablet, Rfl: 0     latanoprost (XALATAN) 0.005 % ophthalmic solution, Administer 1 drop to the right eye at bedtime., Disp: , Rfl:      LORazepam (ATIVAN) 1 MG tablet, TK 1 T PO 30 MINUTES BEFORE MRI, Disp: , Rfl:      metoprolol succinate (TOPROL-XL) 100 MG 24 hr tablet, TAKE ONE AND ONE-HALF TABLETS ONCE DAILY, Disp: , Rfl:      MULTIVITAMIN (MULTIPLE VITAMIN ORAL), Take 1 tablet by mouth daily., Disp: , Rfl:      omega-3/dha/epa/fish oil (FISH OIL-OMEGA-3 FATTY ACIDS) 300-1,000 mg capsule, Take 2 g by mouth daily., Disp: , Rfl:      oxybutynin (DITROPAN) 5 MG tablet, Take by mouth 2 (two) times a day as needed. 5-10 mg, Disp: , Rfl:      pantoprazole (PROTONIX) 40 MG tablet, Take 40 mg by mouth daily., Disp: , Rfl:      polyethylene glycol (MIRALAX) 17 gram packet, Take 17 g by mouth daily as needed., Disp: , Rfl:      pregabalin (LYRICA) 100 MG capsule, Take 100 mg by mouth 2 (two) times a day. , Disp: , Rfl:      rosuvastatin (CRESTOR) 10 MG tablet, , Disp: , Rfl:      senna-docusate (SENNOSIDES-DOCUSATE SODIUM) 8.6-50 mg tablet, Take 1 tablet by mouth daily., Disp: 30 tablet,  Rfl: 2     triamcinolone (KENALOG) 0.1 % ointment, 1 narinder, Disp: , Rfl:      triamterene-hydrochlorothiazide (DYAZIDE) 37.5-25 mg per capsule, Take 1 capsule by mouth every morning., Disp: , Rfl:      vit A/vit C/vit E/zinc/copper (ICAPS AREDS ORAL), Take 1 tablet by mouth daily., Disp: , Rfl:       Physical Exam- limited exam   General- patient is alert and oriented, in NAD, well-groomed, well-nourished  Psych- Judgment and insight normal, AOx4, recent and remote memory normal, mood and affect normal  Eyes- pupils are symmetrical, conjunctiva is clear bilaterally, no ptosis is noted.   Respiratory- Breathing appears non-labored  Integumentary- coloring of skin appears normal.   Musculoskeletal- patient is moving all extremities that are visible.     Lab:  Last UDS on 7/8/2020 had expected results. Last UDT on file was reviewed.    Imaging:  No new imaging reviewed with patient over the phone, no new orders placed       Recent   Dated 11/13/2020 was reviewed with the patient today.       Assessment:     Vernon Lemus is a 80 y.o. male seen in clinic today for   Chief Complaint   Patient presents with     Abdominal Pain       They are here for follow up and continued medical management of their pain. Today we reviewed the plan of care for their chronic pain and determined that the patient will need a refill of the current prescription.      Due to the Covid 19 precautions all visits are converted to telephone encounters until the government restrictions are lifted and the precautions have been lifted.     New concerns today- repeat thoracic SNRI which helps his back pain but does not help his abdominal pain that is localized.     Any new diagnosis since the last visit- angioplasty - successful feels that he can walk farther  Patient denies side effects from the current regimen    Plan of care going forward for ongoing pain control - no changes in the medication type will continue, Patient is doing well with  butrans and norco, his abdominal pain is not responding to the back injections. Will increase the norco to 10 extra tabs per month for bad days and patch changes.     Patients current MME is 56    Plan:   Interventions-    Follow up in 8 weeks     Hydrocodone- up to 6 per day on average, ok to take up to 8 on bad days and patch changes- refill sent to pharmacy- 11/25-12/23, 12/23-1/20    butrans patches- ok to continue use- refills sent to that pharmacy       This limited telehealth/televideo encounter is due to the Covid 19,  as required by the governmental agencies at this time due to risk of transmission of the pandemic, therefore testing availability is limited as well as physical exams.      Prescription Drug Management will be continued by the [facility]    Orders placed today  Medications that were ordered today   Requested Prescriptions     Pending Prescriptions Disp Refills     HYDROcodone-acetaminophen 5-325 mg per tablet 176 tablet 0     Sig: Take 1-2 tablets by mouth 4 (four) times a day as needed for pain (up to 6 per day with an extra 8 per month for (2 per-sunday) when changing patches).     buprenorphine (BUTRANS) 20 mcg/hour PTWK patch 4 patch 0     Sig: Place 1 patch on the skin every 7 days.     No orders of the defined types were placed in this encounter.        The patient understand todays plan and has their questions answered in regards to expectations and current treatment plan.     Prevent unexpected access/loss of medication: Keep medication locked. Only carry what you need with you    The plan of care will be adjusted to accommodate the issues discussed, discussing management of their care and follow up that is recommended. 11/13/2020         Nuvia Smith CNP  [facility]  [address]  [place] [address]  Ph: [phone]  Fax: [phone]

## 2021-06-13 NOTE — PROGRESS NOTES
"Vernon Lemus is a 80 y.o. male who is being evaluated via a billable video visit.      The patient has been notified of following:     \"This video visit will be conducted via a call between you and your physician/provider. We have found that certain health care needs can be provided without the need for an in-person physical exam.  This service lets us provide the care you need with a video conversation.  If a prescription is necessary we can send it directly to your pharmacy.  If lab work is needed we can place an order for that and you can then stop by our lab to have the test done at a later time.    Video visits are billed at different rates depending on your insurance coverage. Please reach out to your insurance provider with any questions.    If during the course of the call the physician/provider feels a video visit is not appropriate, you will not be charged for this service.\"    Patient has given verbal consent to a Video visit? Yes  How would you like to obtain your AVS? AVS Preference: "[employer].  If dropped by the video visit, the video invitation should be sent to: Text to cell phone: [phone]  Will anyone else be joining your video visit? No        Platform used for Video Visit: [employer]    Pain score 3  Constant   What does your pain like feel during a flare? Dull and achy  Does the pain interfere with:  Work ----- n/a  Walking ------ no  Sleep ------- no  Daily activities ------no  Relationships -------yes  F=4      Sofía Baker CMA    "

## 2021-06-13 NOTE — PATIENT INSTRUCTIONS - HE
Plan:   Interventions-    Follow up in 8 weeks     Hydrocodone- up to 6 per day on average, ok to take up to 8 on bad days and patch changes- refill sent to pharmacy- 11/25-12/23, 12/23-1/20    butrans patches- ok to continue use- refills sent to that pharmacy       This limited telehealth/televideo encounter is due to the Covid 19,  as required by the governmental agencies at this time due to risk of transmission of the pandemic, therefore testing availability is limited as well as physical exams.      Prescription Drug Management will be continued by the [facility]    Orders placed today  Medications that were ordered today   Requested Prescriptions     Pending Prescriptions Disp Refills     HYDROcodone-acetaminophen 5-325 mg per tablet 176 tablet 0     Sig: Take 1-2 tablets by mouth 4 (four) times a day as needed for pain (up to 6 per day with an extra 8 per month for (2 per-sunday) when changing patches).     buprenorphine (BUTRANS) 20 mcg/hour PTWK patch 4 patch 0     Sig: Place 1 patch on the skin every 7 days.     No orders of the defined types were placed in this encounter.        The patient understand todays plan and has their questions answered in regards to expectations and current treatment plan.     Prevent unexpected access/loss of medication: Keep medication locked. Only carry what you need with you    The plan of care will be adjusted to accommodate the issues discussed, discussing management of their care and follow up that is recommended. 11/13/2020         FABI Santamaria [facility]  [address]  [place] [address]  Ph: [phone]  Fax: [phone]

## 2021-06-14 NOTE — PROGRESS NOTES
"Vernon Lemus is a 80 y.o. male who is being evaluated with [facility] or [facility] services- via video     \"This telephone/video visit will be conducted via a call between you and your physician/provider. We have found that certain health care needs can be provided without the need for a physical exam.  This service lets us provide the care you need with a short phone conversation.  If a prescription is necessary we can send it directly to your pharmacy.  If lab work is needed we can place an order for that and you can then stop by our lab to have the test done at a later time.    If during the course of the call the physician/provider feels a telephone/video visit is not appropriate, you will not be charged for this service.\"     Start time- 9:08 am   End time- 9:34 am     Vernon Lemus complains of    Chief Complaint   Patient presents with     Follow-up     ABDOMIBAL       I have reviewed and updated the patient's Past Medical History, Social History, Family History and Medication List as well as the Precharting workup by the [facility].       PAIN CLINIC FOLLOW UP PROGRESS NOTE    CC:  Chief Complaint   Patient presents with     Follow-up     ABDOMIBAL       HPI  Vernon Lemus is a 80 y.o. male who presents for evaluation   Chief Complaint   Patient presents with     Follow-up     ABDOMIBAL    that is causing continued pain. Specific questions indicated the patient wanted addressed today include: to continue treatment for his ongoing chronic pain       Major issues:  1. Chronic pain syndrome    2. Chronic bilateral low back pain with bilateral sciatica    3. Abdominal pain, generalized        Pain score 3  Constant   What does your pain like feel during a flare? Dull, achy  Does the pain interfere with:  Work ----- no  Walking ------ no  Sleep ------- no  Daily activities ------no  Relationships -------no  F=3     UDT/CSA - 9/2020    ALLERGIES  Cat dander, Cilostazol, Gabapentin, Morphine, Pollen extracts, " "and Tizanidine    Previous Medical History  Social History     Substance and Sexual Activity   Alcohol Use No     Social History     Substance and Sexual Activity   Drug Use No     Social History     Tobacco Use   Smoking Status Former Smoker     Quit date: 8/10/1986     Years since quittin.4   Smokeless Tobacco Never Used       Pertinent Pain Medications/interventions:    2021- the butrans patch still seems to only last 6 days and his pain is not covered well by the extra norco on days 6,7. Will try belbuca again ( previously insurance did not want to cover) continue the use of the norco and lyrica as well as injections.     2020- increased hydrocodone to include 10 extra tabs per month for patch changes and bad nights.      2020- Increase in butrans to 20 mcg/hr, decrease norco to 6 tabs per day, he is taking lyrica 100 mg two times a day form his PCP      Currently he is taking norco 5/325 mg up to 5-8 tabs per day as well as Butrans 15 mcg/ hr      He notes that the amitriptyline was started with Dr. Alva from [facility]       He is currently taking norco up to 6 per day as needed, he recently repeated the celiac plexus block with Dr. Gaviria and reports no relief as of yet.      Acupuncture was helpful initially but is now not effective     Baclofen was not helpful     Recently stopped oxycontin ER 15 mg two times a day. 2019     Previously has tried morphine which led to confusion   Belbuca up to 600 mcg two times a day which helped a lot but unaffordable.      Failed medical cannabis  Percocet- too strong.       Reports having tried and failed pregablin- lyrica    TRIED AND FAILED MEDICATIONS:  Cymbalta - hallucinations at 60 mg, no issues with 40 mg  Lyrica - very depressed mood  Nortriptyline- \"spacing out\" and anxiety as well as jitteriness in the morning- however, it did help with sleep and decreased his shooting pains at night.  oxymorphone 5mg ER twice daily ,OxyContin 10 mg " twice a day.    Percocet 5-325 mg, max of 3 a day, Protonix 40 mg daily,  Aspirin 81 mg daily,  Maalox 17 grams for constipation, Flaxseed.     Reviewing the records it is noted patient has tried the celiac plexus blocks x 2 and trigger point injections to the local area.       Review of Systems:  12 point systems were reviewed with pt as documented on on previous exams. Any new diagnosis or new medication is reviewed today.     Medications    Current Outpatient Medications:      amLODIPine (NORVASC) 5 MG tablet, Take 5 mg by mouth daily., Disp: , Rfl:      aspirin 81 mg chewable tablet, Chew 81 mg daily., Disp: , Rfl:      buprenorphine (BUTRANS) 20 mcg/hour PTWK patch, APPLY 1 PATCH EXTERNALLY TO THE SKIN EVERY 7 DAYS, Disp: , Rfl:      HYDROcodone-acetaminophen 5-325 mg per tablet, Take 1-2 tablets by mouth 4 (four) times a day as needed for pain (up to 6 per day with an extra 10 per month for (2 per day) when changing patches)., Disp: 178 tablet, Rfl: 0     latanoprost (XALATAN) 0.005 % ophthalmic solution, Administer 1 drop to the right eye at bedtime., Disp: , Rfl:      metoprolol succinate (TOPROL-XL) 100 MG 24 hr tablet, TAKE ONE AND ONE-HALF TABLETS ONCE DAILY, Disp: , Rfl:      MULTIVITAMIN (MULTIPLE VITAMIN ORAL), Take 1 tablet by mouth daily., Disp: , Rfl:      omega-3/dha/epa/fish oil (FISH OIL-OMEGA-3 FATTY ACIDS) 300-1,000 mg capsule, Take 2 g by mouth daily., Disp: , Rfl:      pantoprazole (PROTONIX) 40 MG tablet, Take 40 mg by mouth daily., Disp: , Rfl:      polyethylene glycol (MIRALAX) 17 gram packet, Take 17 g by mouth daily as needed., Disp: , Rfl:      pregabalin (LYRICA) 100 MG capsule, Take 100 mg by mouth 2 (two) times a day. , Disp: , Rfl:      rosuvastatin (CRESTOR) 10 MG tablet, , Disp: , Rfl:      senna-docusate (SENNOSIDES-DOCUSATE SODIUM) 8.6-50 mg tablet, Take 1 tablet by mouth daily., Disp: 30 tablet, Rfl: 2     triamterene-hydrochlorothiazide (DYAZIDE) 37.5-25 mg per capsule, Take 1  capsule by mouth every morning., Disp: , Rfl:      vit A/vit C/vit E/zinc/copper (ICAPS AREDS ORAL), Take 1 tablet by mouth daily., Disp: , Rfl:      HYDROcodone-acetaminophen 5-325 mg per tablet, Take 1-2 tablets by mouth 4 (four) times a day as needed for pain (up to 6 per day with an extra 10 per month for (2 per day) when changing patches)., Disp: 178 tablet, Rfl: 0     LORazepam (ATIVAN) 1 MG tablet, TK 1 T PO 30 MINUTES BEFORE MRI, Disp: , Rfl:      oxybutynin (DITROPAN) 5 MG tablet, Take by mouth 2 (two) times a day as needed. 5-10 mg, Disp: , Rfl:      triamcinolone (KENALOG) 0.1 % ointment, 1 narinder, Disp: , Rfl:       Physical Exam- limited exam   General- patient is alert and oriented, in NAD, well-groomed, well-nourished  Psych- Judgment and insight normal, AOx4, recent and remote memory normal, mood and affect normal  Eyes- pupils are symmetrical, conjunctiva is clear bilaterally, no ptosis is noted.   Respiratory- Breathing appears non-labored  Integumentary- coloring of skin appears normal.   Musculoskeletal- patient is moving all extremities that are visible. Back pain reported per the patient     Lab:  Last UDS on 9/2020 had expected results. Last UDT on file was reviewed.    Imaging:  No new imaging reviewed with patient over the phone, no new orders placed       Recent   Dated 1/11/2021 was reviewed with the patient today.       Assessment:     Vernon Lemus is a 80 y.o. male seen in clinic today for   Chief Complaint   Patient presents with     Follow-up     ABDOMIBAL       They are here for follow up and continued medical management of their pain. Today we reviewed the plan of care for their chronic pain and determined that the patient will need a refill of the current prescription.      Due to the Covid 19 precautions all visits are converted to telephone encounters until the government restrictions are lifted and the precautions have been lifted.     New concerns today-patient is  requesting that the pain center take over the lyrica.   Patient denies side effects from the current regimen  Plan of care going forward for ongoing pain control- patient does still have back pain and reports that the thoracic injections do reduce his pain, discussed the differences between the two. Patient should follow up with neurosurgery due to the relief he got from the injections. The TFESI injection do not seem to correlate directly with the abdominal pain as the injections gave him back relief but not abdominal relief.      Opioids- patient is doing ok on the opioids however the butrans only lasts 6 days, recommend trying belbuca again for better pain coverage. Will continue the use of the norco for breakthrough pain at this time. Will also assume responsibility for the lyrica as the patient reports that this also makes a difference. Will continue care.   Patients current MME is 57    Plan:   Interventions-    Follow up in 8 weeks     Patient is taking lyrica from neurology will assume prescribing care for patient.   continue the use of the norco     Will try to get belbuca covered as the butrans only last 6 days- both ordered to see which insurance approves.     Recommend follow up with the neurosurgeon for the thoracic spine pain, Injection 1 at T7-8 seems to give you the best relief.    Injection 2 at T7-8, T8-9 gave you relief but not as much as the first.    Ok to trial a facet joint injection at T7-8, T8-9 right sided. Ok to scheudle with Dr. Gaviria.     Dre barrett on your MRI for your abdominal pain. Again.     This limited telehealth/televideo encounter is due to the Covid 19,  as required by the governmental agencies at this time due to risk of transmission of the pandemic, therefore testing availability is limited as well as physical exams.      Prescription Drug Management will be continued by the [facility]    Orders placed today  Medications that were ordered today   Requested  Prescriptions     Pending Prescriptions Disp Refills     buprenorphine (BUTRANS) 20 mcg/hour PTWK patch 4 patch 0     Sig: APPLY 1 PATCH EXTERNALLY TO THE SKIN EVERY 7 DAYS     HYDROcodone-acetaminophen 5-325 mg per tablet 178 tablet 0     Sig: Take 1-2 tablets by mouth 4 (four) times a day as needed for pain (up to 6 per day with an extra 10 per month for (2 per day) when changing patches).     No orders of the defined types were placed in this encounter.        The patient understand todays plan and has their questions answered in regards to expectations and current treatment plan.     Prevent unexpected access/loss of medication: Keep medication locked. Only carry what you need with you    The plan of care will be adjusted to accommodate the issues discussed, discussing management of their care and follow up that is recommended. 1/11/2021         Nuvia Smith [facility]  [address]  [place] [address]  Ph: [phone]  Fax: [phone]

## 2021-06-14 NOTE — PATIENT INSTRUCTIONS - HE
Plan:   Interventions-    Follow up in 8 weeks     Patient is taking lyrica from neurology will assume prescribing care for patient.   continue the use of the norco     Will try to get belbuca covered as the butrans only last 6 days- both ordered to see which insurance approves.     Recommend follow up with the neurosurgeon for the thoracic spine pain, Injection 1 at T7-8 seems to give you the best relief.    Injection 2 at T7-8, T8-9 gave you relief but not as much as the first.    Ok to trial a facet joint injection at T7-8, T8-9 right sided. Ok to scheudle with Dr. Gaviria.     Dre barrett on your MRI for your abdominal pain. Again.     This limited telehealth/televideo encounter is due to the Covid 19,  as required by the governmental agencies at this time due to risk of transmission of the pandemic, therefore testing availability is limited as well as physical exams.      Prescription Drug Management will be continued by the [facility]    Orders placed today  Medications that were ordered today   Requested Prescriptions     Pending Prescriptions Disp Refills     buprenorphine (BUTRANS) 20 mcg/hour PTWK patch 4 patch 0     Sig: APPLY 1 PATCH EXTERNALLY TO THE SKIN EVERY 7 DAYS     HYDROcodone-acetaminophen 5-325 mg per tablet 178 tablet 0     Sig: Take 1-2 tablets by mouth 4 (four) times a day as needed for pain (up to 6 per day with an extra 10 per month for (2 per day) when changing patches).     No orders of the defined types were placed in this encounter.        The patient understand todays plan and has their questions answered in regards to expectations and current treatment plan.     Prevent unexpected access/loss of medication: Keep medication locked. Only carry what you need with you    The plan of care will be adjusted to accommodate the issues discussed, discussing management of their care and follow up that is recommended. 1/11/2021         Nuvia Smith CNP  [facility]  [facility]  [address] [place] [address]. [address]  [place] [address]  Ph: [phone]  Fax: [phone]

## 2021-06-14 NOTE — PROGRESS NOTES
VASCULAR SURGERY PROGRESS NOTE    VASCULAR SURGEON: Maria Elena Rodas MD, RPVI     LOCATION:  [place] [facility] [place]    Vernon Lemus   Medical Record #:  [id number]  YOB: 1940  Age:  80 y.o.     Date of Service: 1/29/2021    PRIMARY CARE PROVIDER: Vicente De Luna MD      Reason for visit: Follow-up    IMPRESSION: 80-year-old male status post left lower extremity angiogram with balloon angioplasty of the distal SFA performed 4 months ago.  Patient states that his symptoms have resolved almost completely.  His claudication is still present but it is very mild.  He can walk his daily morning walks for about half a mile or mile without any problems.  He is currently on aspirin and statin therapy.  Ultrasound did show recurrent stenosis of the distal SFA, however, the flow remains biphasic distally and patient is asymptomatic.  So no intervention is indicated  RECOMMENDATION/RISKS: Continue current medical management therapy.  Follow-up in 3 months with repeat studies.  I encouraged him to be as active as possible.    HPI:  Vernon Lemus is a 80 y.o. male who was seen today for follow-up.  Patient states that his claudication symptoms completely resolved.  He is doing well in all regards and denies any new complaints.    REVIEW OF SYSTEMS:    A 12 point ROS was reviewed and is negative      PHH:    Past Medical History:   Diagnosis Date     Abdominal pain      Cancer (H)     prostate     Chronic pancreatitis (H)      GERD (gastroesophageal reflux disease)      History of basal cell cancer     left arm     Hypertension      Melanoma (H)     removed from neck     Mixed hyperlipidemia 10/7/2013     Polyneuropathy     both ankles and feet     Seasonal allergies      Shingles     right leg          Past Surgical History:   Procedure Laterality Date     CARPAL TUNNEL RELEASE Right      CATARACT EXTRACTION Bilateral      ERCP       ESOPHAGOGASTRODUODENOSCOPY N/A 8/11/2017    Procedure: ENDOSCOPIC  ULTRASOUND;  Surgeon: Eleazar Verma MD;  Location: [facility] OR;  Service:      INCONTINENCE SURGERY       IR EXTREMITY ANGIOGRAM LEFT  10/7/2020     PROSTATECTOMY       removal of melanoma       urinary sphincter transplant      with 2 subsequent revisions       ALLERGIES:  Cat dander, Cilostazol, Gabapentin, Morphine, Pollen extracts, and Tizanidine    MEDS:    Current Outpatient Medications:      amLODIPine (NORVASC) 5 MG tablet, Take 5 mg by mouth daily., Disp: , Rfl:      aspirin 81 mg chewable tablet, Chew 81 mg daily., Disp: , Rfl:      buprenorphine (BUTRANS) 20 mcg/hour PTWK patch, APPLY 1 PATCH EXTERNALLY TO THE SKIN EVERY 7 DAYS, Disp: 4 patch, Rfl: 0     buprenorphine  mcg Film, Apply 1 Film (600 mcg total) to cheek 2 (two) times a day as needed., Disp: 60 Film, Rfl: 0     HYDROcodone-acetaminophen 5-325 mg per tablet, Take 1-2 tablets by mouth 4 (four) times a day as needed for pain (up to 6 per day with an extra 10 per month for (2 per day) when changing patches)., Disp: 178 tablet, Rfl: 0     latanoprost (XALATAN) 0.005 % ophthalmic solution, Administer 1 drop to the right eye at bedtime., Disp: , Rfl:      LORazepam (ATIVAN) 1 MG tablet, TK 1 T PO 30 MINUTES BEFORE MRI, Disp: , Rfl:      metoprolol succinate (TOPROL-XL) 100 MG 24 hr tablet, TAKE ONE AND ONE-HALF TABLETS ONCE DAILY, Disp: , Rfl:      MULTIVITAMIN (MULTIPLE VITAMIN ORAL), Take 1 tablet by mouth daily., Disp: , Rfl:      omega-3/dha/epa/fish oil (FISH OIL-OMEGA-3 FATTY ACIDS) 300-1,000 mg capsule, Take 2 g by mouth daily., Disp: , Rfl:      oxybutynin (DITROPAN) 5 MG tablet, Take by mouth 2 (two) times a day as needed. 5-10 mg, Disp: , Rfl:      pantoprazole (PROTONIX) 40 MG tablet, Take 40 mg by mouth daily., Disp: , Rfl:      polyethylene glycol (MIRALAX) 17 gram packet, Take 17 g by mouth daily as needed., Disp: , Rfl:      pregabalin (LYRICA) 100 MG capsule, Take 1 capsule (100 mg total) by mouth 2 (two) times a day.,  Disp: 60 capsule, Rfl: 1     rosuvastatin (CRESTOR) 10 MG tablet, , Disp: , Rfl:      triamcinolone (KENALOG) 0.1 % ointment, 1 narinder, Disp: , Rfl:      triamterene-hydrochlorothiazide (DYAZIDE) 37.5-25 mg per capsule, Take 1 capsule by mouth every morning., Disp: , Rfl:      vit A/vit C/vit E/zinc/copper (ICAPS AREDS ORAL), Take 1 tablet by mouth daily., Disp: , Rfl:      HYDROcodone-acetaminophen 5-325 mg per tablet, Take 1-2 tablets by mouth 4 (four) times a day as needed for pain (up to 6 per day with an extra 10 per month for (2 per day) when changing patches)., Disp: 178 tablet, Rfl: 0     senna-docusate (SENNOSIDES-DOCUSATE SODIUM) 8.6-50 mg tablet, Take 1 tablet by mouth daily., Disp: 30 tablet, Rfl: 2    SOCIAL HABITS:    Social History     Tobacco Use   Smoking Status Former Smoker     Quit date: 8/10/1986     Years since quittin.4   Smokeless Tobacco Never Used       Social History     Substance and Sexual Activity   Alcohol Use No       Social History     Substance and Sexual Activity   Drug Use No       FAMILY HISTORY:    Family History   Problem Relation Age of Onset     Hypertension Mother      Heart attack Father      Hypertension Father        PE:  /70   Pulse 64   Temp 97.9  F (36.6  C) (Oral)   Wt Readings from Last 1 Encounters:   20 220 lb (99.8 kg)     There is no height or weight on file to calculate BMI.    EXAM:  GENERAL: This is a well-developed 80 y.o. male who appears his stated age  EYES: Grossly normal.  MOUTH: Buccal mucosa normal   CARDIAC:  Normal S1 and S2, no Murmur  CHEST/LUNG:  Clear lung fields bilaterally  GASTROINTESINAL (ABDOMEN):Soft, non-tender, B/S present, no pulsatile mass   MUSCULOSKELETAL: Grossly normal and both lower extremities are intact.  HEME/LYMPH: No lymphedema  NEUROLOGIC: Focally intact, Alert and oriented x 3.   PSYCH: appropriate affect  INTEGUMENT: No open lesions or ulcers  Pulse Exam: Multiphasic signals present  bilaterally          DIAGNOSTIC STUDIES:     Images:  Us Celsa Doppler No Exercise, 1-2 Levels, Bilateral    Result Date: 1/29/2021  BILATERAL RESTING ANKLE-BRACHIAL INDICES (CELSA'S) (01/29/21) Indication: Surveillance of left leg angio 10/7/2020 Previous: 10/30/2020 History: Previous Smoker, Hypertension, Hyperlipidemia, PAD and Angioplasty  Resting CELSA's          Right: mmHg Index     Brachial: 157  Ankle-(PT): 154 0.96 Ankle-(DP): 160 0.99           Digit: 87 0.54               Left: mmHg Index     Brachial: 161  Ankle-(PT): 127 0.79 Ankle-(DP): 134 0.83           Digit: 72 0.45 Resting ankle-brachial index of 0.99 on the right. Toe Pressures of 87 mmHg and TBI of 0.54  Resting ankle-brachial index of 0.83 on the left. Toe Pressures of 72 mmHg and TBI of 0.45  WAVEFORMS: The right dorsalis pedis and posterior tibial arteries show monophasic waveforms. The left dorsalis pedis and posterior tibial arteries show monohasic waveforms.  Impression:  Right CELSA is  Normal with an CELSA with normal toe pressures and TBI Left CELSA is Abnormal and consistent with mild peripheral vascular disease.  Toe pressures are adequate with slightly decreased TBI Reference: Ankle Brachial Pressures CELSA Disease Category > 1.3  Likely vessel calcification with monophasic waveforms, non-diagnostic 0.95-1.30 Normal with multiphasic waveforms 0.50-0.95 Single level disease 0.30-0.50 Multilevel disease < 0.30 Critical limb ischema Digit Pressures DBI Disease Category < 0.70 Normal > 0.70 Abnormal > 30 mmHg Potential wound healing < 30 mmHg Impaired wound healing Volume Plethysmography Recording (VPR) at all levels Normal Sharp systolic peak, fast upstroke, prominent dicrotic notch in wave Mild Sharp systolic peak, fast upstroke, absent dicrotic notch in wave Moderate Flattened systolic peak, slowed upstroke, absent dicrotic notch in wave Severe Low-amplitude wave with = upslope and down slope Occluded Flat Line     Us Arterial Leg Left    Result  Date: 1/29/2021  Arterial Duplex Ultrasound (01/29/21) Lower Extremity Artery Evaluation Indication: Surveillance of left leg angio 10/7/2020 Previous: 10/30/2020 History: Previous Smoker, Hypertension, Hyperlipidemia, PAD and Angioplasty Technique: Duplex imaging is performed utilizing gray-scale, two-dimensional images, and color-flow imaging. Doppler waveform analysis and spectral Doppler imaging is also performed. LOWER EXTREMITY ARTERIAL DUPLEX EXAM WITH WAVEFORMS Right Leg:(cm/s) Location: Velocities Waveforms PTA:   127   B JONNA:   21  B DPA:   14  B Waveforms: T=Triphasic, M=Monophasic, B=Biphasic Left Leg:(cm/s) Location: Velocities Waveforms EIA:   174  T CFA:   184  T PFA:   137  T SFA Proximal:   189  B SFA Mid:   134  B SFA Distal:   170/346/165  B Popliteal Artery:   125  B PTA:   64   B JONNA:   61  B DPA:  54  B Waveforms: T=Triphasic, M=Monophasic, B=Biphasic  Impression: Right Lower Extremity: Significantly elevated velocities in the distal SFA which could contribute to very focal stenosis.  The waveforms remain biphasic distally and the upstroke is very slightly dampened which shows nice collateralization. Left Lower Extremity: Biphasic waveforms in the right PTA and JONNA. Reference: Category Normal 1-19% 20-49% 50-99% Occluded PSV <160 cm/sec without spectral broadening <160 cm/sec with spectral broadening Increased Increased Absent Flow Ratio N/A N/A < 2.0 >2.0 N/A Post-Stenotic Turbulence No No No Yes N/A       I personally reviewed the images and my interpretation is ABIs did show mild disease on the left side.  Ultrasound did show a focal stenosis of the distal SFA.    LABS:      Sodium   Date Value Ref Range Status   01/22/2021 138 136 - 145 mmol/L Final   09/29/2020 137 136 - 145 mmol/L Final   08/06/2020 136 136 - 145 mmol/L Final     Potassium   Date Value Ref Range Status   01/22/2021 4.1 3.5 - 5.0 mmol/L Final   10/07/2020 4.2 3.5 - 5.0 mmol/L Final   09/29/2020 4.4 3.5 - 5.0 mmol/L Final      Chloride   Date Value Ref Range Status   01/22/2021 99 98 - 107 mmol/L Final   09/29/2020 102 98 - 107 mmol/L Final   08/06/2020 100 98 - 107 mmol/L Final     BUN   Date Value Ref Range Status   01/22/2021 27 8 - 28 mg/dL Final   09/29/2020 27 8 - 28 mg/dL Final   08/06/2020 25 8 - 28 mg/dL Final     Creatinine   Date Value Ref Range Status   01/22/2021 1.39 (H) 0.70 - 1.30 mg/dL Final   10/07/2020 1.42 (H) 0.70 - 1.30 mg/dL Final   09/29/2020 1.22 0.70 - 1.30 mg/dL Final     Hemoglobin   Date Value Ref Range Status   10/07/2020 12.6 (L) 14.0 - 18.0 g/dL Final   01/29/2018 13.7 (L) 14.0 - 18.0 g/dL Final   10/05/2016 13.7 (L) 14.0 - 18.0 g/dL Final     Platelets   Date Value Ref Range Status   10/07/2020 268 140 - 440 thou/uL Final   10/05/2016 305 140 - 440 thou/uL Final     INR   Date Value Ref Range Status   10/07/2020 1.03 0.90 - 1.10 Final       Total time spent 20 minutes face to face with patient with more than 50% time spent in counseling and coordination of care.    Maria Elena Rodas MD, [facility]  VASCULAR SURGERY

## 2021-06-14 NOTE — PROGRESS NOTES
Vernon Lemus is a 80 y.o. male who is being evaluated via a billable video visit.      How would you like to obtain your AVS? [employer].  If dropped from the video visit, the video invitation should be resent by: Text to cell phone: [phone]  Will anyone else be joining your video visit? No      Platform used for Video Visit: [facility]     Pain score 3  Constant   What does your pain like feel during a flare? Dull, achy  Does the pain interfere with:  Work ----- no  Walking ------ no  Sleep ------- no  Daily activities ------no  Relationships -------no  F=3    UDT/CSA - 9/2020

## 2021-06-14 NOTE — PATIENT INSTRUCTIONS - HE
Ultrasound    Thank you for choosing [facility] as partners in your care.  Please read the following information before your appointment.  Feel free to ask questions.    An ultrasound is an exam that uses sound waves to create pictures.  The special camera that takes the pictures is called a transducer.  An ultrasound technologist will perform the exam under the direction of a physician.    Preparation  Ultrasound preps vary.  If you are scheduled for an Aorta Ultrasound, please do not eat or drink anything 8 hours before your exam.  You may take daily medications with a small sip of water.  There is no preparation required for any of the other ultrasound exams performed at [facility].    The Examination  You may or may not need to change clothes for your exam.  The technologist will explain the exam to you.  He or she will ask you a few questions and answer any questions you may have.    You will lie on a table and a gel will be applied to your skin.  A small handheld instrument called a transducer will be moved across the area we are looking at while pictures are taken.  Also, your exam may include measuring your blood pressure on your arms, legs and/or toes.    When the Exam Is Completed  Your physician will receive the results of the exam and explain them to you.  You may return to your normal diet and activities unless otherwise directed by your doctor.  Feel free to ask questions if something is not clear to you.  We welcome comments.    At [facility], we are dedicated to providing the best possible care.  Thank you for taking time to read these instructions.  We hope your experience is as pleasant as possible.      You are scheduled for the following exam(s):    []Carotid Duplex:  Evaluates the carotid arteries in the neck that feed the brain with blood.  Gel is applied to the skin of the neck.  A transducer will be placed on the gel covered areas to obtain images and evaluate  and listen to the blood flow in the arteries.  This exam takes approximately 30 minutes.    []Venous Duplex:  Evaluates the veins that carry blood to the heart from the arms and/or legs.  Gel is applied to the skin of the arms and/or legs.  A transducer will be placed on the gel covered areas to obtain images and evaluate flow in the veins.  This exam takes approximately 30 minutes per arm/leg.    [x]Arterial Duplex:  Evaluates the arteries that feed the arms and legs with blood.  Gel is applied to the skin of the arms and/or legs.  A transducer will be placed on the gel covered areas to obtain images and listen to the blood flow in the arms and/or legs.  This exam takes approximately 30 minutes per arm/leg.    [x]CELSA or Segmental Pressures:  Ultrasound and blood pressure cuffs are used to evaluate the arteries that supply the arms and legs with blood.  Several blood pressure cuffs will be place on your arms and legs.  When inflated, the cuffs will provide blood pressure readings that are used to determine where blockages in the arteries may be.  You may be asked to do a moderate level of exercise during this exam.  This exam takes approximately 30-60 minutes.    []Aorta:  Evaluates the abdominal aorta for blockages and aneurysm.  Gel is applied to the stomach.  A transducer will be placed on the gel covered areas to obtain images of the aorta.  This exam takes approximately 30 minutes.    Prep instructions:  Do not eat or drink anything 8 hours before procedure.  You may take daily medications with a small sip of water.

## 2021-06-14 NOTE — PROGRESS NOTES
US before. 3 month f/u 10/7/20 left angio. Patient states that his walking has improved, no pain. on ASA, statin

## 2021-06-15 NOTE — PATIENT INSTRUCTIONS - HE
Plan:   Interventions-    Follow up in 4-8 weeks for follow up on labs.     lyrica is sent to [facility] 90 day supply.       Continue the use of the butrans patch.     Contiue the use of the norco.      Repeat injection in the thoracic spine as needed, it does a help.      Will draw a vitamin D lab, celiacs test as well as HLA DQB1 for celiacs.     Research Zonulin and gut health in regards to gluten- the [facility] has a fair amount of research.     Elimination diet- could consider trailing going gluten free for 60 days to see if this reduces your abdominal pain.     Discuss the use of systemic enzyme complex with Dr. Alva as in functional medicine is it used to reduce cysts. Brand Pure encapsulations.     Will trial toradol for break through pain 1-2 tabs per day- max 10 tabs per month.       Orders placed today  Medications that were ordered today   Requested Prescriptions     Signed Prescriptions Disp Refills     buprenorphine (BUTRANS) 20 mcg/hour PTWK patch 4 patch 1     Sig: Place 1 patch on the skin every 7 days.     HYDROcodone-acetaminophen 5-325 mg per tablet 178 tablet 0     Sig: Take 1-2 tablets by mouth 4 (four) times a day as needed for pain (up to 6 per day with an extra 10 per month for (2 per day) when changing patches).     ketorolac (TORADOL) 10 mg tablet 10 tablet 0     Sig: Take 1-2 tablets (10-20 mg total) by mouth daily as needed for pain (max 10 per month for breakthrough pain only).     HYDROcodone-acetaminophen 5-325 mg per tablet 178 tablet 0     Sig: Take 1-2 tablets by mouth 4 (four) times a day as needed for pain (up to 6 per day with an extra 10 per month for (2 per day) when changing patches).     pregabalin (LYRICA) 100 MG capsule 180 capsule 0     Sig: Take 1 capsule (100 mg total) by mouth 2 (two) times a day.     Orders Placed This Encounter   Procedures     Celiac(Gluten)Antibody Panel     Standing Status:   Future     Standing Expiration Date:   3/12/2022     HLA DQB1 for  Celiac Disease     Standing Status:   Future     Standing Expiration Date:   3/12/2022     Vitamin D, Total (25-Hydroxy)     Standing Status:   Future     Standing Expiration Date:   3/12/2022         The patient understand todays plan and has their questions answered in regards to expectations and current treatment plan.     Prevent unexpected access/loss of medication: Keep medication locked. Only carry what you need with you    The plan of care will be adjusted to accommodate the issues discussed, discussing management of their care and follow up that is recommended. 3/12/2021         Nuvia Smith [facility]  [address]  [place] [address]  Ph: [phone]  Fax: [phone]

## 2021-06-15 NOTE — TELEPHONE ENCOUNTER
Medication being requested: Butrans and Norco  Last visit date:1/11/21       Provider:BRIANNE  Next visit date:3/12/21      Provider:BRIANNE  Expected follow up: 8 weeks  MTM visit (Pain Center) date- No    UDT date:9/2020   Agreement date:9/2020  - (Medication name/ last date filled or purchased/Strength and Quantity, provider)   01/20/2021 Hydrocodone-Acetamin 5-325 Mg Qty: 178 for 30 days Cr Mon   01/14/2021 Buprenorphine 20 Mcg/hr Patch Qty: 4 for 28 days Cr Mon  Pertinent between visit information about requested medication (telephone, mychart, prior authorization, concerns, red flags, comments):   No  Script being sent to provider by nurse- dates and quantity:  Requested Prescriptions     Pending Prescriptions Disp Refills     buprenorphine (BUTRANS) 20 mcg/hour PTWK patch 4 patch 0     Sig: Place 1 patch on the skin every 7 days.     HYDROcodone-acetaminophen 5-325 mg per tablet 178 tablet 0     Sig: Take 1-2 tablets by mouth 4 (four) times a day as needed for pain (up to 6 per day with an extra 10 per month for (2 per day) when changing patches).     Date that the medication is expected to be refilled-  Norco 2/17/21-3/17/21  Butrans 2/11/21-3/11/21  Pharmacy cued:  [facility]  Standing orders for withdrawal protocol implemented or requested- N/A

## 2021-06-15 NOTE — PROGRESS NOTES
Vernon Lemus is a 80 y.o. male who is being evaluated with [facility] or [employer] services- via video       Start time- 11:01 am   End time- 11:51 am     Subjective-    I have reviewed and updated the patient's Past Medical History, Social History, Family History and Medication List as well as the Precharting workup by the [facility].     PAIN CLINIC FOLLOW UP PROGRESS NOTE    CC:  Chief Complaint   Patient presents with     Abdominal Pain       HPI  Vernon Lemus is a 80 y.o. male who presents for evaluation   Chief Complaint   Patient presents with     Abdominal Pain    that is causing continued pain. Specific questions indicated the patient wanted addressed today include: to address his ongoing chronic pain         Objective-  Major issues:  1. Chronic pain syndrome    2. Chronic bilateral low back pain with bilateral sciatica    3. Abdominal pain, generalized    4. Other intestinal malabsorption       Pain score: 4  What does your pain feel like: constant dull, achy  Does the pain interfere with:  Work ----- no  Walking ------ no  Sleep ------- no  Daily activities ------no  Relationships -------no  F=3     UDT/CSA - 2020      ALLERGIES  Cat dander, Cilostazol, Gabapentin, Morphine, Pollen extracts, and Tizanidine    Previous Medical History  Social History     Substance and Sexual Activity   Alcohol Use No     Social History     Substance and Sexual Activity   Drug Use No     Social History     Tobacco Use   Smoking Status Former Smoker     Quit date: 8/10/1986     Years since quittin.6   Smokeless Tobacco Never Used       Pertinent Pain Medications/interventions:    3/12/2021- will trial     2021- the butrans patch still seems to only last 6 days and his pain is not covered well by the extra norco on days 6,7. Will try belbuca again ( previously insurance did not want to cover) continue the use of the norco and lyrica as well as injections.      2020- increased hydrocodone to include 10 extra  "tabs per month for patch changes and bad nights.      6/30/2020- Increase in butrans to 20 mcg/hr, decrease norco to 6 tabs per day, he is taking lyrica 100 mg two times a day form his PCP      Currently he is taking norco 5/325 mg up to 5-8 tabs per day as well as Butrans 15 mcg/ hr      He notes that the amitriptyline was started with Dr. Alva from [facility]       He is currently taking norco up to 6 per day as needed, he recently repeated the celiac plexus block with Dr. Gaviria and reports no relief as of yet.      Acupuncture was helpful initially but is now not effective     Baclofen was not helpful     Recently stopped oxycontin ER 15 mg two times a day. 5/24/2019     Previously has tried morphine which led to confusion   Belbuca up to 600 mcg two times a day which helped a lot but unaffordable.      Failed medical cannabis  Percocet- too strong.       Reports having tried and failed pregablin- lyrica    TRIED AND FAILED MEDICATIONS:  Cymbalta - hallucinations at 60 mg, no issues with 40 mg  Lyrica - very depressed mood  Nortriptyline- \"spacing out\" and anxiety as well as jitteriness in the morning- however, it did help with sleep and decreased his shooting pains at night.  oxymorphone 5mg ER twice daily ,OxyContin 10 mg twice a day.    Percocet 5-325 mg, max of 3 a day, Protonix 40 mg daily,  Aspirin 81 mg daily,  Maalox 17 grams for constipation, Flaxseed.     Reviewing the records it is noted patient has tried the celiac plexus blocks x 2 and trigger point injections to the local area.      Medications    Current Outpatient Medications:      amLODIPine (NORVASC) 5 MG tablet, Take 5 mg by mouth daily., Disp: , Rfl:      aspirin 81 mg chewable tablet, Chew 81 mg daily., Disp: , Rfl:      latanoprost (XALATAN) 0.005 % ophthalmic solution, Administer 1 drop to the right eye at bedtime., Disp: , Rfl:      LORazepam (ATIVAN) 1 MG tablet, TK 1 T PO 30 MINUTES BEFORE MRI, Disp: , Rfl:      metoprolol succinate " (TOPROL-XL) 100 MG 24 hr tablet, TAKE ONE AND ONE-HALF TABLETS ONCE DAILY, Disp: , Rfl:      MULTIVITAMIN (MULTIPLE VITAMIN ORAL), Take 1 tablet by mouth daily., Disp: , Rfl:      omega-3/dha/epa/fish oil (FISH OIL-OMEGA-3 FATTY ACIDS) 300-1,000 mg capsule, Take 2 g by mouth daily., Disp: , Rfl:      oxybutynin (DITROPAN) 5 MG tablet, Take by mouth 2 (two) times a day as needed. 5-10 mg, Disp: , Rfl:      pantoprazole (PROTONIX) 40 MG tablet, Take 40 mg by mouth daily., Disp: , Rfl:      polyethylene glycol (MIRALAX) 17 gram packet, Take 17 g by mouth daily as needed., Disp: , Rfl:      rosuvastatin (CRESTOR) 10 MG tablet, , Disp: , Rfl:      triamcinolone (KENALOG) 0.1 % ointment, 1 narinder, Disp: , Rfl:      triamterene-hydrochlorothiazide (DYAZIDE) 37.5-25 mg per capsule, Take 1 capsule by mouth every morning., Disp: , Rfl:      vit A/vit C/vit E/zinc/copper (ICAPS AREDS ORAL), Take 1 tablet by mouth daily., Disp: , Rfl:      [START ON 4/5/2021] buprenorphine (BUTRANS) 20 mcg/hour PTWK patch, Place 1 patch on the skin every 7 days., Disp: 4 patch, Rfl: 1     [START ON 3/17/2021] HYDROcodone-acetaminophen 5-325 mg per tablet, Take 1-2 tablets by mouth 4 (four) times a day as needed for pain (up to 6 per day with an extra 10 per month for (2 per day) when changing patches)., Disp: 178 tablet, Rfl: 0     [START ON 4/14/2021] HYDROcodone-acetaminophen 5-325 mg per tablet, Take 1-2 tablets by mouth 4 (four) times a day as needed for pain (up to 6 per day with an extra 10 per month for (2 per day) when changing patches)., Disp: 178 tablet, Rfl: 0     ketorolac (TORADOL) 10 mg tablet, Take 1-2 tablets (10-20 mg total) by mouth daily as needed for pain (max 10 per month for breakthrough pain only)., Disp: 10 tablet, Rfl: 0     pregabalin (LYRICA) 100 MG capsule, Take 1 capsule (100 mg total) by mouth 2 (two) times a day., Disp: 180 capsule, Rfl: 0     senna-docusate (SENNOSIDES-DOCUSATE SODIUM) 8.6-50 mg tablet, Take 1  tablet by mouth daily., Disp: 30 tablet, Rfl: 2      Physical Exam- limited exam   General- patient is alert and oriented, in NAD, well-groomed, well-nourished  Psych- Judgment and insight normal, AOx4, recent and remote memory normal, mood and affect normal  Eyes- pupils are symmetrical, conjunctiva is clear bilaterally, no ptosis is noted.   Respiratory- Breathing appears non-labored  Integumentary- coloring of skin appears normal.   Musculoskeletal- patient is moving all extremities that are visible.     Lab:  Last UDS on 9/2020 had expected results. Last UDT on file was reviewed.    Imaging:  No new imaging reviewed with patient over the phone, no new orders placed       Recent   Dated 3/12/2021 was reviewed.       Assessment:     Vernon Lemus is a 80 y.o. male seen in clinic today for   Chief Complaint   Patient presents with     Abdominal Pain       New concerns today- his grandson and daughter were tested for celiacs and are postive.     Plan of care going forward for ongoing pain control- Patient reports that the back injection was very helpful and 75 % relief of his back pain.  It does not change his abdominal pain however.     His last MRI of his pancrease is the same. He reports that it is the same and unchanged at this time. Previously they thought about a Whipple but that is currently not on the table from the surgery team.    Systemic enzyme complex-  Look into this for pancreatic cysts. Patient would like to discuss with Dr. Alva.     Today we discussed diet issues that may be at play as his daughter and  Grandson were just diagnosed with celiacs, at this point will draw labs as he mostly has vauge abdominal pain. Patient has never gone gluten free before and this may be worth doing due to the inflammation gluten itself can cause without a + celiacs test. Will also draw a vitamin D lab.     For ongoing chronic pain patient notes that the has had quite a flare up and had to take a few extra  tabs of his medication to manage it. He did notify the clinic. He is not asking for an early refill but would like a back up plan- will trial Toradol 1-2 tabs to see if this helps during a flare up. He is also using lyrica and butrans and finds this beneficial. No other changes today     A fair amount of education today in regards to Zonulin in the gut and gluten intolerances.     Patients current MME is 56-76    Plan:   Interventions-    Follow up in 4-8 weeks for follow up on labs.     lyrica is sent to [facility]. 90 day supply.       Continue the use of the butrans patch.     Contiue the use of the norco.      Repeat injection in the thoracic spine as needed, it does a help.      Will draw a vitamin D lab, celiacs test as well as HLA DQB1 for celiacs.     Research Zonulin and gut health in regards to gluten- the [facility] has a fair amount of research.     Elimination diet- could consider trailing going gluten free for 60 days to see if this reduces your abdominal pain.     Discuss the use of systemic enzyme complex with Dr. Alva as in functional medicine is it used to reduce cysts. Brand Pure encapsulations.     Will trial toradol for break through pain 1-2 tabs per day- max 10 tabs per month.       Orders placed today  Medications that were ordered today   Requested Prescriptions     Signed Prescriptions Disp Refills     buprenorphine (BUTRANS) 20 mcg/hour PTWK patch 4 patch 1     Sig: Place 1 patch on the skin every 7 days.     HYDROcodone-acetaminophen 5-325 mg per tablet 178 tablet 0     Sig: Take 1-2 tablets by mouth 4 (four) times a day as needed for pain (up to 6 per day with an extra 10 per month for (2 per day) when changing patches).     ketorolac (TORADOL) 10 mg tablet 10 tablet 0     Sig: Take 1-2 tablets (10-20 mg total) by mouth daily as needed for pain (max 10 per month for breakthrough pain only).     HYDROcodone-acetaminophen 5-325 mg per tablet 178 tablet 0     Sig: Take 1-2 tablets by mouth  4 (four) times a day as needed for pain (up to 6 per day with an extra 10 per month for (2 per day) when changing patches).     pregabalin (LYRICA) 100 MG capsule 180 capsule 0     Sig: Take 1 capsule (100 mg total) by mouth 2 (two) times a day.     Orders Placed This Encounter   Procedures     Celiac(Gluten)Antibody Panel     Standing Status:   Future     Standing Expiration Date:   3/12/2022     HLA DQB1 for Celiac Disease     Standing Status:   Future     Standing Expiration Date:   3/12/2022     Vitamin D, Total (25-Hydroxy)     Standing Status:   Future     Standing Expiration Date:   3/12/2022         The patient understand todays plan and has their questions answered in regards to expectations and current treatment plan.     Prevent unexpected access/loss of medication: Keep medication locked. Only carry what you need with you    The plan of care will be adjusted to accommodate the issues discussed, discussing management of their care and follow up that is recommended. 3/12/2021         N[facility]ia [facility]  [address]  [place] [address]  Ph: [phone]  Fax: [phone]

## 2021-06-15 NOTE — TELEPHONE ENCOUNTER
Medication being requested: Butrans  Last visit date:1/11/21       Provider:BRIANNE  Next visit date:3/12/21      Provider:BRIANNE  Expected follow up: 8 weeks  MTM visit (Pain Center) date- No    UDT date:9/2020   Agreement date:9/2020  - (Medication name/ last date filled or purchased/Strength and Quantity, provider)   02/12/2021 Buprenorphine 20 Mcg/hr Patch Qty: 4 for 28 days Cr Mon   Pertinent between visit information about requested medication (telephone, mychart, prior authorization, concerns, red flags, comments):   Patient had a MRI on 2/16/21 and had to change patch early so provider authorized a refill to start 1 week prior to expected   Script being sent to provider by nurse- dates and quantity:  Requested Prescriptions     Pending Prescriptions Disp Refills     buprenorphine (BUTRANS) 20 mcg/hour PTWK patch 4 patch 0     Sig: Place 1 patch on the skin every 7 days.     Date that the medication is expected to be refilled-  3/5/21-4/2/21  Pharmacy cued:  [facility]  Standing orders for withdrawal protocol implemented or requested- N/A

## 2021-06-15 NOTE — PATIENT INSTRUCTIONS - HE
POST PROCEDURE INSTRUCTIONS  PROCEDURE DONE TODAY:Right T7-8 AND T8-9 FACET JOINT INJECTION    Rest today. Resume activity tomorrow as able.  Patient demonstrates the ability to ambulate independently with a steady gait at the time of discharge.      It is not unusual to have a temporary increase in your pain after a procedure. You can ice the area 24-48 hrs. 15 min at a time.      You received a steroid injection. This medication can take 2-7 days to take effect. Some temporary side effects include:    Heartburn    Flushed-red face    Insomnia-trouble sleeping-jitters    Diabetics may see a rise in blood sugar for a few days    Low back or SI joint (sacroiliac) injection: you may experience numbness in one or both legs. Please walk with help. This is temporary and will wear off in a few hours. Do not drive if you are tingling, numbness or weakness in your hands/arms or legs/feet.    Headache:  If you experience a headache after a lumbar epidural injection, lie down and drink fluids (especially caffeine). The headache will go away gradually. If it persists notify our clinic.    Fever: call if fever 100 or over, redness/warmth/swelling over the injection site.    No public hot tubs/pools for a couple days    Physical therapy: check with pain center provider regarding resuming therapy.    Monitor your response to the injection and report this at your next visit.    If you have been referred for a procedure only, please call your referring provider for a follow up.    IF YOU PLAN TO GET A FLU SHOT YOU MUST WAIT 2 WEEKS AFTER THIS INJECTION.      CALL IF ANY QUESTIONS [phone]

## 2021-06-15 NOTE — PROGRESS NOTES
Vernon Lemus is a 80 y.o. male who is being evaluated via a billable video visit.       How would you like to obtain your AVS? MyChart.  If dropped from the video visit, the video invitation should be reset:  [phone] [facility]  Will anyone else be joining your video visit? No     Pain score: 4  What does your pain feel like: constant dull, achy  Does the pain interfere with:  Work ----- no  Walking ------ no  Sleep ------- no  Daily activities ------no  Relationships -------no  F=3     UDT/CSA - 9/2020

## 2021-06-15 NOTE — TELEPHONE ENCOUNTER
Central PA team  [phone]  Pool: HE PA MED ([id number])          PA has been initiated.       PA form completed and faxed insurance via Cover My Meds     [place]:  HARRIET WARNER (Key: [id number])      Medication:  ketorolac (TORADOL) 10 mg tablet    Insurance:  [facility]        Response will be received via fax and may take up to 5-10 business days depending on plan

## 2021-06-15 NOTE — TELEPHONE ENCOUNTER
Prior Authorization Request  Who s requesting:  Luis  Pharmacy Name and Location: [facility]  Medication Name: ketorolac (TORADOL) 10 mg tablet  Insurance Plan: [employer]  Insurance Member ID Number:  [id number]  CoverMyMeds Key: N/A  Informed patient that prior authorizations can take up to 10 business days for response:   Yes  Okay to leave a detailed message: Yes

## 2021-06-16 NOTE — TELEPHONE ENCOUNTER
Called are appeals to follow up on appeal. I did have to leave a message for them. I also did receive a voicemail in regards to the appeal called the specific rep back, but had to leave a message. Waiting on a call back.

## 2021-06-16 NOTE — TELEPHONE ENCOUNTER
Telephone Encounter by Deanna Lewis RN at 9/16/2019  9:55 AM     Author: Deanna Lewis RN Service: -- Author Type: Registered Nurse    Filed: 9/16/2019  1:26 PM Encounter Date: 9/16/2019 Status: Addendum    : Deanna Lewis RN (Registered Nurse)    Related Notes: Original Note by Deanna Lewis RN (Registered Nurse) filed at 9/16/2019  1:23 PM       Refill request: hydrocodone  and buprenorphine    Last visit date: 8/21 Provider: BRIANNE  Will increase the dose of the butrans patch to 15 mcg/ hr   Continue the use of the norco up to 6 per day, you had 12 remaining and have a goal to reduce use of the norco if the pain is better controlled with the patch.   Next visit date: 10/3  Provider: BRIANNE  Expected follow up: 4-8 weeks  MTM visit (Pain Center) date: not since last office visit  UDS/CSA 7/2019   snipped in:    Pertinent between visit information about requested medication (telephone, mychart, prior authorization, concerns, comments):   Apt cancel with acupuncture  Apt cancel with provider-provider initiated  Script being sent to provider by nurse- dates and quantity:   Requested Prescriptions     Pending Prescriptions Disp Refills   ? HYDROcodone-acetaminophen 5-325 mg per tablet 168 tablet 0     Sig: Take 1-2 tablets by mouth 4 (four) times a day as needed for pain (up to 6 per day).   ? buprenorphine (BUTRANS) 15 mcg/hour PTWK patch 4 patch 0     Sig: Place 1 patch on the skin every 7 days.     Ph[facility] cued[facility]  Standing orders for withdrawal protocol implemented: NA

## 2021-06-16 NOTE — TELEPHONE ENCOUNTER
Telephone Encounter by Deanna Lewis RN at 3/5/2019 12:52 PM     Author: Deanna Lewis RN Service: -- Author Type: Registered Nurse    Filed: 3/5/2019  1:07 PM Encounter Date: 3/5/2019 Status: Signed    : Deanna Lewis RN (Registered Nurse)       Medication being requested: hydrocodone 5/325 mg and oxycontin 15 mg 12 hr  Last visit date: 2/5 Provider: BRIANNE  Visit notes:  Will increase the use of the OxyContin 15 mg two times a day   Will continue the use of the Norco as you are   Printed today ok to call for a refill for next month   Next visit date: 3/26  Provider: BRIANNE  Expected follow up: 4-8 weeks  MTM visit (Pain Center) date: not since last office visit  UDT date: 07/10/2018  Agreement date: 07/24/2018   snipped in:    Red Flags/Comments identified on call: no-my chart request  Pertinent between visit information about requested medication (telephone, mychart, prior authorization): no  Script being sent to provider by nurse- dates and quantity:   Requested Prescriptions     Pending Prescriptions Disp Refills   ? oxyCODONE (OXYCONTIN) 15 mg 12 hr tablet 60 tablet 0     Sig: Take 1 tablet (15 mg total) by mouth every 12 (twelve) hours.   ? HYDROcodone-acetaminophen 5-325 mg per tablet 180 tablet 0     Sig: Take 1-2 tablets by mouth 4 (four) times a day as needed for pain (max of 6 per day).     Pharmacy cued: [facility]  Standing orders for withdrawal protocol implemented: EPHRAIM

## 2021-06-16 NOTE — TELEPHONE ENCOUNTER
Telephone Encounter by Katerin Franks at 2/1/2019 10:02 AM     Author: Katerin Franks Service: -- Author Type: --    Filed: 2/1/2019 10:06 AM Encounter Date: 2/1/2019 Status: Addendum    : Katerin Franks    Related Notes: Original Note by Katerin Franks filed at 2/1/2019 10:05 AM       PA APPROVED:    Approval start date: 2/1/2019  Approval end date: 2/1/2020    Pharmacy has been notified of approval and will contact patient when medication is ready for pickup.

## 2021-06-16 NOTE — TELEPHONE ENCOUNTER
Telephone Encounter by Deanna Lewis RN at 1/29/2020  9:11 AM     Author: Deanna Lewis RN Service: -- Author Type: Registered Nurse    Filed: 1/29/2020  9:18 AM Encounter Date: 1/29/2020 Status: Signed    : Deanna Lewis RN (Registered Nurse)       Medication being requested: butrans 15 mcg patch(already sent to the pharmacy) and hydrocodone 5/325 mg  Last visit date: 1/15  Provider: BRIANNE  Continue the butrans patch- refills sent  Continue the Norco - next fill date is 1/15-2/12  Next visit date: 03/11.  Provider: BRIANNE  Expected follow up: 8 weeks  MTM visit (Pain Center) date: not since last office visit  UDT/CSA - 07/2019   snipped in:    Pertinent between visit information about requested medication (telephone, mychart, prior authorization, concerns, comments):   Patient will need another refill prior to next office visit    Script being sent to provider by nurse- dates and quantity:   Requested Prescriptions     Pending Prescriptions Disp Refills   ? HYDROcodone-acetaminophen 5-325 mg per tablet 224 tablet 0     Sig: Take 1-2 tablets by mouth 4 (four) times a day as needed for pain (up tp 8 tabs per day).     Ph[facility] cued: [facility]  Standing orders for withdrawal protocol implemented: EPHRAIM

## 2021-06-16 NOTE — PROGRESS NOTES
Pt is being seen today by Nuvia Smith CNP, for refill and f/u of abdominal pain.    Pain score 3  Constant   What does your pain like feel during a flare? [place], dull, achy  Does the pain interfere with:  Work ----- NA  Walking ------ no  Sleep ------- yes  Daily activities ------no  Relationships -------yes  F=5    UDT/CSA - 9/2020

## 2021-06-16 NOTE — TELEPHONE ENCOUNTER
Telephone Encounter by Katerin Franks at 6/25/2019 10:28 AM     Author: Katerin Franks Service: -- Author Type: --    Filed: 6/25/2019 10:29 AM Encounter Date: 6/24/2019 Status: Signed    : Katerin Franks APPROVED:    Approval start date:5/25/2019  Approval end date: 6/23/2020    Pharmacy has been notified of approval and will contact patient when medication is ready for pickup.

## 2021-06-16 NOTE — TELEPHONE ENCOUNTER
Telephone Encounter by Deanna Lewis RN at 8/20/2019 11:03 AM     Author: Deanna Lewis RN Service: -- Author Type: Registered Nurse    Filed: 8/20/2019 11:20 AM Encounter Date: 8/20/2019 Status: Signed    : Deanna Lewis RN (Registered Nurse)       Medication being requested: hydrocodone 5/325 mg and butrans 10 mcg patch  Patient is here for acupuncture apt and asking about medication refills.  Last visit date: 7/22  Provider: BRIANNE  Next visit date: 8/20  Provider: BRIANNE  Expected follow up: 4 weeks  MTM visit (Pain Center) date: no  UDS/CSA 7/2019   snipped in:    Pertinent between visit information about requested medication (telephone, mychart, prior authorization, concerns, comments):   My chart message sent on 8/16-forwarded to pain center pool and never received.  Script being sent to provider by nurse- dates and quantity:   Patient has apt with CM on 8/21, medication refills are due on 8/21

## 2021-06-16 NOTE — TELEPHONE ENCOUNTER
Telephone Encounter by Deanna Lewis RN at 11/1/2019  2:10 PM     Author: Deanna Lewis RN Service: -- Author Type: Registered Nurse    Filed: 11/1/2019  2:16 PM Encounter Date: 10/18/2019 Status: Signed    : Deanna Lewis RN (Registered Nurse)       Butrans refill already sent to pharmacy.  Medication being requested: hydrocodone 5/325   Last visit date: 10/03  Provider: BRIANNE  Next visit date: 11/20.  Provider: BRIANNE  Expected follow up: 8 weeks  MTM visit (Pain Center) date: no  UDS/CSA 7/2019   snipped in:    Pertinent between visit information about requested medication (telephone, mychart, prior authorization, concerns, comments):   Script being sent to provider by nurse- dates and quantity:   Requested Prescriptions     Pending Prescriptions Disp Refills   ? HYDROcodone-acetaminophen 5-325 mg per tablet 168 tablet 0     Sig: Take 1-2 tablets by mouth 4 (four) times a day as needed for pain (up to 6 per day).     Signed Prescriptions Disp Refills   ? buprenorphine (BUTRANS) 15 mcg/hour PTWK patch 4 patch 0     Sig: Place 1 patch on the skin every 7 days.     Authorizing Provider: BRIAN FOX     [facility] cued[facility]  Standing orders for withdrawal protocol implemented: EPHRAIM

## 2021-06-16 NOTE — PATIENT INSTRUCTIONS - HE
Plan:   Interventions-    Follow up in 4 weeks telephone visit ok     No refills are needed for today as they are already at the pharmacy      Continue to avoid gluten and Whey    Educational information was provided to the patient     IGA markers were elevated at 418. RR is       UDT/SWAB:  Patient required a random Urine Drug Testing, due to the need to comply with [place] Model Policy Guidelines and CDC Guideline for the use of any controlled substances. This is to ensure that patient is compliant with treatment, and monitor for risks such as diversion, abuse, or any other aberrant behaviors. Patient is either being considered for or taking a controlled substance. Unexpected findings will be discussed and treatment decision may be adjusted. Testing is being implemented across the board randomly w/o bias related to age, race, gender, socioeconomic status or Zoroastrianism affiliation.    The patient understand todays plan and has their questions answered in regards to expectations and current treatment plan.     SAFETY REMINDERS  No alcohol while taking controlled substances. Alcohol is not an illegal substance, it is unsafe to use in combination. It is a build up of substances in the body that can be extremely hazardous and may cause respirations to slow to a dangerous rate resulting in hospitalization, brain damage, or death.    Opioid medications have been associated with sharp rise in unintentional overdose and death.  Overdose is a condition characterized by the consumption in excess of a particular drug causing adverse effects. This can happen b/c you are sick, accidentally or intentionally took an extra dose, are on multiple medication that can interact. Someone took your medication and they are not use to the medication.  Symptoms of overdose include:   !breathing slow and shallow, erratic or not at all  !pinpoint pupils, hallucinations  !confusion  !muscle jerks, slack muscles   !extreme sleepiness  or loss of alertness   !awake but not able to talk   !face pale or clammy, vomiting, for lighter skinned people, the skin tone turns bluish purple, for darker skinned people, it turns grayish or ashen   If in a situation where overdose is a concern engage the emergency response system (dial 911).    In one study it was noted that 80% of unintentional overdoses occurred in people who were taking a combination of opioids and benzodiazepines.    Do not sell, loan, borrow or share your opioid medication with anyone. Deaths have occurred as a result of this practice. It is illegal and patients are being prosecuted.     Prevent unexpected access/loss of medication: Keep medication locked. Only carry what you need with you.    Nuvia Smith, [facility]  [address]  [place] [address]  Ph: [phone]  Fax: [phone]

## 2021-06-16 NOTE — TELEPHONE ENCOUNTER
Telephone Encounter by Deanna Lewis RN at 12/31/2019 10:57 AM     Author: Deanna Lewis RN Service: -- Author Type: Registered Nurse    Filed: 12/31/2019 11:09 AM Encounter Date: 12/31/2019 Status: Signed    : Deanna Lewis RN (Registered Nurse)       Medication being requested: hydrocodone 5/325 mg and butrans 15 mcg patch  Last visit date: 11/20  Provider: BRIANNE  Next visit date: 01/15  Provider: BRIANNE  Expected follow up: 8 weeks  MTM visit (Pain Center) date: no  UDS/CSA 7/2019   snipped in:    Pertinent between visit information about requested medication (telephone, mychart, prior authorization, concerns, comments): see patient message on 12/09-increasing hydrocodone to 8 tabs daily  Script being sent to provider by nurse- dates and quantity:   Requested Prescriptions     Pending Prescriptions Disp Refills   ? HYDROcodone-acetaminophen 5-325 mg per tablet 224 tablet 0     Sig: Take 1-2 tablets by mouth 4 (four) times a day as needed for pain (up tp 8 tabs per day).   ? buprenorphine (BUTRANS) 15 mcg/hour PTWK patch 4 patch 0     Sig: Place 1 patch on the skin every 7 days.     Ph[facility] cued: [facility]  Standing orders for withdrawal protocol implemented: EPHRAIM

## 2021-06-16 NOTE — TELEPHONE ENCOUNTER
Central PA team  [phone]  Pool: HE PA MED ([id number])          PA has been initiated.       PA form completed and faxed insurance via Cover My Meds     Key:  Faxed manually to [facility]     Medication:  Ketorolac 10 mg    Insurance:  [employer]        Response will be received via fax and may take up to 5-10 business days depending on plan

## 2021-06-16 NOTE — TELEPHONE ENCOUNTER
Telephone Encounter by Katerin Franks at 2/1/2019  9:58 AM     Author: Katerin Franks Service: -- Author Type: --    Filed: 2/1/2019 10:02 AM Encounter Date: 2/1/2019 Status: Signed    : Katerin Franks       [facility] team  [phone]    PA has been initiated.

## 2021-06-16 NOTE — TELEPHONE ENCOUNTER
[facility]  [address]            [place] [address]  P. [phone]   F. [phone]    Date:  RE: Prior Authorization Appeals    Insurance Company: [employer]  Insurance ID #: [id number]     Patient Name:  Vernon Lemus         :  1940      Medication Requested:  ketorolac (TORADOL) 10 mg tablet            To Whom It May Concern,  [facility] specializes in management of chronic pain issues. All of our providers have specifically trained in pain. The goal for our patients is to reach their highest level of function. Standard therapies for the patients in the pain center include: interventional procedures, non-opioid and when appropriate opioid medication at the lowest possible dose, physical therapy, behavior health, acupuncture. Many patients will work with a PharmD in an MTM visit for concerns about medication. We provide education and referrals for nutrition or functional medicine.    Patient has tried and failed Ibuprofen, Meloxicam and Naproxen due to ineffectiveness. Ketorolac was specifically selected in regards to CDC guidelines and following opioid precautions including agreement, , and UDT results. He is already using opioids for chronic pain and is still not having great success, his pain is likely to have a inflammatory component. Rather then increase his opioids would like to trial a new anti-inflammatory instead.                                 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 Treatment with this medication will decrease his pain and increase his function.      Please reconsider the denial of this medication.       Sincerely,      Nuvia Smith, CNP

## 2021-06-16 NOTE — PROGRESS NOTES
PAIN CLINIC FOLLOW UP PROGRESS NOTE    CC:  Chief Complaint   Patient presents with     Follow-up     abdominal pain       HPI  Vernon Lemus is a 80 y.o. male who presents for evaluation   Chief Complaint   Patient presents with     Follow-up     abdominal pain    that is causing continued pain. Since the last visit the patient denies any trips to the urgent care or ED specifically for their pain. The patient denies any new medications, diagnoses since the last visit. Specific questions indicated the patient wanted addressed today include: to follow up on his ongoing chronic pain as well as medication refill.       Major issues:  1. Chronic pain syndrome    2. Abdominal pain, generalized    3. Chronic bilateral low back pain with bilateral sciatica        Pain score 3  Constant   What does your pain like feel during a flare? [place]ork, dull, achy  Does the pain interfere with:  Work ----- NA  Walking ------ no  Sleep ------- yes  Daily activities ------no  Relationships -------yes  F=5      Previous Medical History  Social History     Substance and Sexual Activity   Alcohol Use No     Social History     Substance and Sexual Activity   Drug Use No     Social History     Tobacco Use   Smoking Status Former Smoker     Quit date: 8/10/1986     Years since quittin.6   Smokeless Tobacco Never Used       Pertinent Pain Medications/interventions:     2021- the butrans patch still seems to only last 6 days and his pain is not covered well by the extra norco on days 6,7. Will try belbuca again ( previously insurance did not want to cover) continue the use of the norco and lyrica as well as injections.      2020- increased hydrocodone to include 10 extra tabs per month for patch changes and bad nights.      2020- Increase in butrans to 20 mcg/hr, decrease norco to 6 tabs per day, he is taking lyrica 100 mg two times a day form his PCP      Currently he is taking norco 5/325 mg up to 5-8 tabs per day as  "well as Butrans 15 mcg/ hr      He notes that the amitriptyline was started with Dr. Alva from [facility]       He is currently taking norco up to 6 per day as needed, he recently repeated the celiac plexus block with Dr. Gaviria and reports no relief as of yet.      Acupuncture was helpful initially but is now not effective     Baclofen was not helpful     Recently stopped oxycontin ER 15 mg two times a day. 5/24/2019     Previously has tried morphine which led to confusion   Belbuca up to 600 mcg two times a day which helped a lot but unaffordable.      Failed medical cannabis  Percocet- too strong.       Reports having tried and failed pregablin- lyrica    TRIED AND FAILED MEDICATIONS:  Cymbalta - hallucinations at 60 mg, no issues with 40 mg  Lyrica - very depressed mood  Nortriptyline- \"spacing out\" and anxiety as well as jitteriness in the morning- however, it did help with sleep and decreased his shooting pains at night.  oxymorphone 5mg ER twice daily ,OxyContin 10 mg twice a day.    Percocet 5-325 mg, max of 3 a day, Protonix 40 mg daily,  Aspirin 81 mg daily,  Maalox 17 grams for constipation, Flaxseed.     Reviewing the records it is noted patient has tried the celiac plexus blocks x 2 and trigger point injections to the local area.    Review of Systems:  12 point systems were reviewed with pt as documented on pt health form and the patient denies any new diagnosis or changes in 12 point system review since the last visit.     Physical Exam  Vitals:    04/13/21 1429   BP: 166/77   Patient Site: Left Arm   Patient Position: Sitting   Cuff Size: Adult Regular   Pulse: 64     General- patient is alert and oriented, in NAD, well-groomed, well-nourished  Psych- Judgment and insight normal, AOx4, recent and remote memory normal, mood and affect normal  Eyes- pupils are equal and reactive, conjunctiva is clear bilaterally, no ptosis is noted.   Respiratory- breathing is non-labored  Cardiovascular- extremities " warm and well perfused, no peripheral edema or varicosities.  Musculoskeletal- gait is normal, extremities with no joint swelling, erythema, or warmth.  Neuro- normal strength, no gait abnormalities, normal sensation to pain, temperature, light touch.  Integumentary- no rashes, dermatitis or discolorations noted throughout, no open wounds noted.    Medications    Current Outpatient Medications:      amLODIPine (NORVASC) 5 MG tablet, Take 5 mg by mouth daily., Disp: , Rfl:      aspirin 81 mg chewable tablet, Chew 81 mg daily., Disp: , Rfl:      buprenorphine (BUTRANS) 20 mcg/hour PTWK patch, Place 1 patch on the skin every 7 days., Disp: 4 patch, Rfl: 1     ergocalciferol (VITAMIN D2) 1,250 mcg (50,000 unit) capsule, Take 1 capsule (50,000 Units total) by mouth once a week for 12 doses., Disp: 12 capsule, Rfl: 4     [START ON 4/14/2021] HYDROcodone-acetaminophen 5-325 mg per tablet, Take 1-2 tablets by mouth 4 (four) times a day as needed for pain (up to 6 per day with an extra 10 per month for (2 per day) when changing patches)., Disp: 178 tablet, Rfl: 0     ketorolac (TORADOL) 10 mg tablet, Take 1-2 tablets (10-20 mg total) by mouth daily as needed for pain (max 10 per month for breakthrough pain only)., Disp: 10 tablet, Rfl: 0     latanoprost (XALATAN) 0.005 % ophthalmic solution, Administer 1 drop to the right eye at bedtime., Disp: , Rfl:      metoprolol succinate (TOPROL-XL) 100 MG 24 hr tablet, TAKE ONE AND ONE-HALF TABLETS ONCE DAILY, Disp: , Rfl:      MULTIVITAMIN (MULTIPLE VITAMIN ORAL), Take 1 tablet by mouth daily., Disp: , Rfl:      omega-3/dha/epa/fish oil (FISH OIL-OMEGA-3 FATTY ACIDS) 300-1,000 mg capsule, Take 2 g by mouth daily., Disp: , Rfl:      oxybutynin (DITROPAN) 5 MG tablet, Take by mouth 2 (two) times a day as needed. 5-10 mg, Disp: , Rfl:      pantoprazole (PROTONIX) 40 MG tablet, Take 40 mg by mouth daily., Disp: , Rfl:      polyethylene glycol (MIRALAX) 17 gram packet, Take 17 g by mouth  daily as needed., Disp: , Rfl:      pregabalin (LYRICA) 100 MG capsule, Take 1 capsule (100 mg total) by mouth 2 (two) times a day., Disp: 180 capsule, Rfl: 0     rosuvastatin (CRESTOR) 10 MG tablet, , Disp: , Rfl:      senna-docusate (SENNOSIDES-DOCUSATE SODIUM) 8.6-50 mg tablet, Take 1 tablet by mouth daily., Disp: 30 tablet, Rfl: 2     triamterene-hydrochlorothiazide (DYAZIDE) 37.5-25 mg per capsule, Take 1 capsule by mouth every morning., Disp: , Rfl:      vit A/vit C/vit E/zinc/copper (ICAPS AREDS ORAL), Take 1 tablet by mouth daily., Disp: , Rfl:      LORazepam (ATIVAN) 1 MG tablet, TK 1 T PO 30 MINUTES BEFORE MRI, Disp: , Rfl:      triamcinolone (KENALOG) 0.1 % ointment, 1 narinder, Disp: , Rfl:     Lab:  Last UDS on 9/2020 had expected results. Any abnormal results have been discussed with the patient and may change the plan of care. Please see the scanned document from the outside lab under the media tab. This was reviewed with the patient.      Imaging:  No new imaging.      Recent   Dated 4/13/2021 was reviewed with the patient today.     Assessment:   Vernon Lemus is a 80 y.o. 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 He has followed up with Dr. Alva as well. Education was provided to patient and spouse today in regards to avoid gluten and whey products at this time. Patient is also vitamin D deficient and there is question about pancreatic insufficiency. Historically he has had pancreatitis. He will likely need healing and replacement, support of his GI tract, brush boarder. His goal is to reduce the use of opioids over time.     Plan:   Interventions-    Follow up in 4 weeks telephone visit ok     No refills are needed for today  as they are already at the pharmacy      Continue to avoid gluten and Whey    Educational information was provided to the patient     IGA markers were elevated at 418. RR is       UDT/SWAB:  Patient required a random Urine Drug Testing, due to the need to comply with [place] Model Policy Guidelines and CDC Guideline for the use of any controlled substances. This is to ensure that patient is compliant with treatment, and monitor for risks such as diversion, abuse, or any other aberrant behaviors. Patient is either being considered for or taking a controlled substance. Unexpected findings will be discussed and treatment decision may be adjusted. Testing is being implemented across the board randomly w/o bias related to age, race, gender, socioeconomic status or Christian affiliation.    The patient understand todays plan and has their questions answered in regards to expectations and current treatment plan.     SAFETY REMINDERS  No alcohol while taking controlled substances. Alcohol is not an illegal substance, it is unsafe to use in combination. It is a build up of substances in the body that can be extremely hazardous and may cause respirations to slow to a dangerous rate resulting in hospitalization, brain damage, or death.    Opioid medications have been associated with sharp rise in unintentional overdose and death.  Overdose is a condition characterized by the consumption in excess of a particular drug causing adverse effects. This can happen b/c you are sick, accidentally or intentionally took an extra dose, are on multiple medication that can interact. Someone took your medication and they are not use to the medication.  Symptoms of overdose include:   !breathing slow and shallow, erratic or not at all  !pinpoint pupils, hallucinations  !confusion  !muscle jerks, slack muscles   !extreme sleepiness or loss of alertness   !awake but not able to talk   !face pale or clammy, vomiting, for lighter skinned  people, the skin tone turns bluish purple, for darker skinned people, it turns grayish or ashen   If in a situation where overdose is a concern engage the emergency response system (dial 911).    In one study it was noted that 80% of unintentional overdoses occurred in people who were taking a combination of opioids and benzodiazepines.    Do not sell, loan, borrow or share your opioid medication with anyone. Deaths have occurred as a result of this practice. It is illegal and patients are being prosecuted.     Prevent unexpected access/loss of medication: Keep medication locked. Only carry what you need with you.    Nuvia Smith [facility]  [address]  [place] [address]  Ph: [phone]  Fax: [phone]

## 2021-06-16 NOTE — TELEPHONE ENCOUNTER
Telephone Encounter by Carin Dixon RN at 2/25/2020  3:44 PM     Author: Carin Dixon RN Service: -- Author Type: Registered Nurse    Filed: 2/25/2020  3:52 PM Encounter Date: 2/25/2020 Status: Signed    : Carin Dixon RN (Registered Nurse)       Medication being requested: Butrans  Last visit date: 1/15   Provider: BRIANNE  Next visit date:  3/11  Provider: BRIANNE  Expected follow up: 8 weeks  MTM visit ([facility] Center) date: na  UDT date: 7/2019  Agreement date: 7/2019   snipped in:    Pertinent between visit information about requested medication (telephone, mychart, prior authorization, concerns, comments): Continue the butrans patch- refills sent   1/15 appointment  Script being sent to provider by nurse- dates and quantity:   Requested Prescriptions     Pending Prescriptions Disp Refills   ? buprenorphine (BUTRANS) 15 mcg/hour PTWK patch 4 patch 0     Sig: Place 1 patch on the skin every 7 days.     Pharmacy cued: [facility]  Standing orders for withdrawal protocol implemented: frida

## 2021-06-16 NOTE — TELEPHONE ENCOUNTER
Telephone Encounter by Leanna Sanz at 5/29/2019 10:23 AM     Author: Leanna Sanz Service: -- Author Type: --    Filed: 5/29/2019 10:24 AM Encounter Date: 5/29/2019 Status: Signed    : Leanna Sanz APPROVED:    Approval start date:04/29/2019  Approval end date:05/28/2020    Pharmacy has been notified of approval and will contact patient when medication is ready for pickup.

## 2021-06-17 ENCOUNTER — RECORDS - HEALTHEAST (OUTPATIENT)
Dept: PALLIATIVE MEDICINE | Facility: OTHER | Age: 81
End: 2021-06-17

## 2021-06-17 NOTE — TELEPHONE ENCOUNTER
Telephone Encounter by Deanna Lewis RN at 4/6/2020 10:44 AM     Author: Deanna Lewis RN Service: -- Author Type: Registered Nurse    Filed: 4/6/2020 10:50 AM Encounter Date: 4/6/2020 Status: Signed    : Deanna Lewis RN (Registered Nurse)       Medication being requested: hydrocodone 5/325 mg  Last visit date: 3/11  Provider: BRIANNE  Continue the Norco - next fill date is 3/11-4/8  Next visit date: 4/30  Provider: BRIANNE  Expected follow up: 4-8 weeks  MTM visit (Pain Center) date: no  UDT/CSA - 07/2019   snipped in:    Pertinent between visit information about requested medication (telephone, mychart, prior authorization, concerns, comments):   Script being sent to provider by nurse- dates and quantity:   Requested Prescriptions     Pending Prescriptions Disp Refills   ? HYDROcodone-acetaminophen 5-325 mg per tablet 224 tablet 0     Sig: Take 1-2 tablets by mouth 4 (four) times a day as needed for pain (up tp 8 tabs per day).     Pharmacy cued: [facility]  Standing orders for withdrawal protocol implemented: NA

## 2021-06-17 NOTE — PROGRESS NOTES
VASCULAR SURGERY PROGRESS NOTE    VASCULAR SURGEON: Maria Elena Rodas MD, RPVI     LOCATION:  [facility] [place]    Vernon Lemus   Medical Record #:  [id number]  YOB: 1940  Age:  80 y.o.     Date of Service: 4/26/2021    PRIMARY CARE PROVIDER: Vicente De Luna MD      Reason for visit: Follow-up    IMPRESSION: 80-year-old male who is here for follow-up.  Last year patient underwent left lower extremity angiogram with balloon angioplasty of distal SFA and popliteal artery.  Patient is doing very well and states that he can walk for longer distances now.  His claudication is very minimal.  He is ABIs are relatively normal with normal toe pressures.  Ultrasound did show recurrent stenosis at the level of the distal SFA which is stable compared to the previous ultrasound.  Patient is currently on aspirin and statin therapy.    RECOMMENDATION/RISKS: Follow-up in 1 year with repeat ABIs and ultrasound.    HPI:  Vernon Lemus is a 80 y.o. male who was seen today for follow-up.  Patient is doing well in all regards and denies any new complaints.  Patient states he can walk for over a block without any problems.  Denies any pain at rest or nonhealing wounds.    REVIEW OF SYSTEMS:    A 12 point ROS was reviewed and is negative except for my claudication in the left lower extremity.    PHH:    Past Medical History:   Diagnosis Date     Abdominal pain      Cancer (H)     prostate     Chronic pancreatitis (H)      GERD (gastroesophageal reflux disease)      History of basal cell cancer     left arm     Hypertension      Melanoma (H)     removed from neck     Mixed hyperlipidemia 10/7/2013     Polyneuropathy     both ankles and feet     Seasonal allergies      Shingles     right leg          Past Surgical History:   Procedure Laterality Date     CARPAL TUNNEL RELEASE Right      CATARACT EXTRACTION Bilateral      ERCP       ESOPHAGOGASTRODUODENOSCOPY N/A 8/11/2017    Procedure: ENDOSCOPIC  ULTRASOUND;  Surgeon: Eleazar Verma MD;  Location: [facility] OR;  Service:      INCONTINENCE SURGERY       IR EXTREMITY ANGIOGRAM LEFT  10/7/2020     PROSTATECTOMY       removal of melanoma       urinary sphincter transplant      with 2 subsequent revisions       ALLERGIES:  Cat dander, Cilostazol, Gabapentin, Morphine, Pollen extracts, and Tizanidine    MEDS:    Current Outpatient Medications:      amLODIPine (NORVASC) 5 MG tablet, Take 5 mg by mouth daily., Disp: , Rfl:      aspirin 81 mg chewable tablet, Chew 81 mg daily., Disp: , Rfl:      buprenorphine (BUTRANS) 20 mcg/hour PTWK patch, Place 1 patch on the skin every 7 days., Disp: 4 patch, Rfl: 1     ergocalciferol (VITAMIN D2) 1,250 mcg (50,000 unit) capsule, Take 1 capsule (50,000 Units total) by mouth once a week for 12 doses., Disp: 12 capsule, Rfl: 4     HYDROcodone-acetaminophen 5-325 mg per tablet, Take 1-2 tablets by mouth 4 (four) times a day as needed for pain (up to 6 per day with an extra 10 per month for (2 per day) when changing patches)., Disp: 178 tablet, Rfl: 0     latanoprost (XALATAN) 0.005 % ophthalmic solution, Administer 1 drop to the right eye at bedtime., Disp: , Rfl:      LORazepam (ATIVAN) 1 MG tablet, TK 1 T PO 30 MINUTES BEFORE MRI, Disp: , Rfl:      metoprolol succinate (TOPROL-XL) 100 MG 24 hr tablet, TAKE ONE AND ONE-HALF TABLETS ONCE DAILY, Disp: , Rfl:      MULTIVITAMIN (MULTIPLE VITAMIN ORAL), Take 1 tablet by mouth daily., Disp: , Rfl:      omega-3/dha/epa/fish oil (FISH OIL-OMEGA-3 FATTY ACIDS) 300-1,000 mg capsule, Take 2 g by mouth daily., Disp: , Rfl:      oxybutynin (DITROPAN) 5 MG tablet, Take by mouth 2 (two) times a day as needed. 5-10 mg, Disp: , Rfl:      pantoprazole (PROTONIX) 40 MG tablet, Take 40 mg by mouth daily., Disp: , Rfl:      polyethylene glycol (MIRALAX) 17 gram packet, Take 17 g by mouth daily as needed., Disp: , Rfl:      pregabalin (LYRICA) 100 MG capsule, Take 1 capsule (100 mg total) by  mouth 2 (two) times a day., Disp: 180 capsule, Rfl: 0     rosuvastatin (CRESTOR) 10 MG tablet, , Disp: , Rfl:      triamcinolone (KENALOG) 0.1 % ointment, 1 narinder, Disp: , Rfl:      triamterene-hydrochlorothiazide (DYAZIDE) 37.5-25 mg per capsule, Take 1 capsule by mouth every morning., Disp: , Rfl:      vit A/vit C/vit E/zinc/copper (ICAPS AREDS ORAL), Take 1 tablet by mouth daily., Disp: , Rfl:      ketorolac (TORADOL) 10 mg tablet, Take 1-2 tablets (10-20 mg total) by mouth daily as needed for pain (max 10 per month for breakthrough pain only)., Disp: 10 tablet, Rfl: 0     senna-docusate (SENNOSIDES-DOCUSATE SODIUM) 8.6-50 mg tablet, Take 1 tablet by mouth daily., Disp: 30 tablet, Rfl: 2    SOCIAL HABITS:    Social History     Tobacco Use   Smoking Status Former Smoker     Quit date: 8/10/1986     Years since quittin.7   Smokeless Tobacco Never Used       Social History     Substance and Sexual Activity   Alcohol Use No       Social History     Substance and Sexual Activity   Drug Use No       FAMILY HISTORY:    Family History   Problem Relation Age of Onset     Hypertension Mother      Heart attack Father      Hypertension Father        PE:  /82   Pulse 64   Temp 97.7  F (36.5  C)   Wt Readings from Last 1 Encounters:   21 220 lb (99.8 kg)     There is no height or weight on file to calculate BMI.    EXAM:  GENERAL: This is a well-developed 80 y.o. male who appears his stated age  EYES: Grossly normal.  MOUTH: Buccal mucosa normal   CARDIAC:  Normal S1 and S2, no Murmur  CHEST/LUNG:  Clear lung fields bilaterally  GASTROINTESINAL (ABDOMEN):Soft, non-tender, B/S present, no pulsatile mass   MUSCULOSKELETAL: Grossly normal and both lower extremities are intact.  HEME/LYMPH: No lymphedema  NEUROLOGIC: Focally intact, Alert and oriented x 3.   PSYCH: appropriate affect  INTEGUMENT: No open lesions or ulcers  Pulse Exam: Strong biphasic signals bilaterally.          DIAGNOSTIC STUDIES:      Images:  Us Celsa Doppler No Exercise, 1-2 Levels, Bilateral    Result Date: 4/26/2021  BILATERAL RESTING ANKLE-BRACHIAL INDICES (CELSA'S) (04/26/21) Indication: Surveillance of left angio 10/7/20 Previous: 1/29/21 History: Previous Smoker, Hypertension, Hyperlipidemia, PAD and Angioplasty  Resting CELSA's          Right: mmHg Index     Brachial: 155  Ankle-(PT): 149 0.94 Ankle-(DP): 143 0.91           Digit: 72 0.46               Left: mmHg Index     Brachial: 158  Ankle-(PT): 118 0.75 Ankle-(DP): 142 0.90           Digit: 87 0.55 Resting ankle-brachial index of 0.94 on the right. Toe Pressures of 72 mmHg and TBI of 0.46  Resting ankle-brachial index of 0.90 on the left. Toe Pressures of 87 mmHg and TBI of 0.55  WAVEFORMS: The right dorsalis pedis and posterior tibial arteries show monophasic waveforms. The left dorsalis pedis and posterior tibial arteries show monophasic waveforms.  Impression:  1. RIGHT LOWER EXTREMITY: CELSA is Normal with an CELSA of 0.94. and Normal toe pressures of 72 mmHg. 2. LEFT LOWER EXTREMITY: CELSA is Normal with an CELSA of 0.90. and Normal toe pressures of 87 mmHg. Reference: Wound classification Grade CELSA Ankle Systolic Pressure Toe Pressures 0 > 0.80 > 100 mmHg > 60 mmHg 1 0.6 - 0.79 70 - 100 mmHg 40 - 59 mmHg 2 0.4 - 0.59 50-70 mmHg 30 - 39 mmHg 3 < 0.39 < 50 mmHg < 30 mmHg Digit Pressures DBI Disease Category > 0.70 Normal < 0.70 Abnormal > 30 mmHg Potential wound healing < 30 mmHg Impaired wound healing Ankle Brachial Pressures CELSA Disease Category > 1.3  Likely vessel calcification with monophasic waveforms, non-diagnostic 0.95-1.30 Normal with multiphasic waveforms 0.50-0.95 Single level disease 0.30-0.50 Multilevel disease < 0.30 Critical limb ischema Volume Plethysmography Recording (VPR) at all levels Normal Sharp systolic peak, fast upstroke, prominent dicrotic notch in wave Mild Sharp systolic peak, fast upstroke, absent dicrotic notch in wave Moderate Flattened systolic peak,  slowed upstroke, absent dicrotic notch in wave Severe Low-amplitude wave with = upslope and down slope Occluded Flat Line Multiple Level Segmental Pressures  Normal Abnormal Upper Thigh > 1.2 pressure indices, <30 mmHg between lateral and horizontal cuffs < 0.8 pressure indices suggest proximal occlusion, 0.8-1.2 pressure indices suggest inflow disease, > 30 mmHg between lateral and horizontal cuffs  Lower Thigh < 30 mmHg between lateral and horizontal cuffs > 30 mmHg between lateral and horizontal cuffs Upper Calf < 30 mmHg between lateral and horizontal cuffs > 30 mmHg between lateral and horizontal cuffs Note: As limb diameter increases, pressure measurements also increase.    Us Arterial Leg Left    Result Date: 4/26/2021  Arterial Duplex Ultrasound (04/26/21) Lower Extremity Artery Evaluation Indication: Surveillance of left angio 10/7/20 Previous: 1/29/21 History: Previous Smoker, Hypertension, Hyperlipidemia, PAD and Angioplasty Technique: Duplex imaging is performed utilizing gray-scale, two-dimensional images, and color-flow imaging. Doppler waveform analysis and spectral Doppler imaging is also performed. LOWER EXTREMITY ARTERIAL DUPLEX EXAM WITH WAVEFORMS Right Leg:(cm/s) Location: Velocities Waveforms PTA:   139   T JONNA:   29  M DPA:   26  M Waveforms: T=Triphasic, M=Monophasic, B=Biphasic Left Leg:(cm/s) Location: Velocities Waveforms EIA:   167  T CFA:   186  T PFA:   146  B SFA Proximal:   155  B SFA Mid:   133  B SFA Distal:  181/337/268/120  B Popliteal Artery:   94  B PTA:   70   B JONNA:   51  M DPA:   52  M Waveforms: T=Triphasic, M=Monophasic, B=Biphasic Stenotic Profile Ratio: 337 / 181 = 1.86  Impression: Right Lower Extremity: Not fully evaluated but triphasic waveforms and posterior tibial artery indicates mild disease proximally Left Lower Extremity: Recurrent stenosis at the level of distal SFA with increased peak systolic velocities, unchanged compared to the previous exam.  Waveforms  remain biphasic distally, probably from fish and collateral flow.  Monophasic waveforms in anterior tibial artery may indicate underlying arterial disease, most likely of not important significance. Reference: Category Normal 1-19% 20-49% 50-99% Occluded PSV <160 cm/sec without spectral broadening <160 cm/sec with spectral broadening Increased Increased Absent Flow Ratio N/A N/A < 2.0 >2.0 N/A Post-Stenotic Turbulence No No No Yes N/A       I personally reviewed the images and my interpretation is recurrent stenosis in the distal left SFA, stable compared to the previous exam.  Normal ABIs bilaterally..    LABS:      Sodium   Date Value Ref Range Status   01/22/2021 138 136 - 145 mmol/L Final   09/29/2020 137 136 - 145 mmol/L Final   08/06/2020 136 136 - 145 mmol/L Final     Potassium   Date Value Ref Range Status   01/22/2021 4.1 3.5 - 5.0 mmol/L Final   10/07/2020 4.2 3.5 - 5.0 mmol/L Final   09/29/2020 4.4 3.5 - 5.0 mmol/L Final     Chloride   Date Value Ref Range Status   01/22/2021 99 98 - 107 mmol/L Final   09/29/2020 102 98 - 107 mmol/L Final   08/06/2020 100 98 - 107 mmol/L Final     BUN   Date Value Ref Range Status   01/22/2021 27 8 - 28 mg/dL Final   09/29/2020 27 8 - 28 mg/dL Final   08/06/2020 25 8 - 28 mg/dL Final     Creatinine   Date Value Ref Range Status   01/22/2021 1.39 (H) 0.70 - 1.30 mg/dL Final   10/07/2020 1.42 (H) 0.70 - 1.30 mg/dL Final   09/29/2020 1.22 0.70 - 1.30 mg/dL Final     Hemoglobin   Date Value Ref Range Status   10/07/2020 12.6 (L) 14.0 - 18.0 g/dL Final   01/29/2018 13.7 (L) 14.0 - 18.0 g/dL Final   10/05/2016 13.7 (L) 14.0 - 18.0 g/dL Final     Platelets   Date Value Ref Range Status   10/07/2020 268 140 - 440 thou/uL Final   10/05/2016 305 140 - 440 thou/uL Final     INR   Date Value Ref Range Status   10/07/2020 1.03 0.90 - 1.10 Final       Total time spent 20 minutes face to face with patient with more than 50% time spent in counseling and coordination of care.    Maria Elena  Shakeel Rodas MD, RPVI  VASCULAR SURGERY

## 2021-06-17 NOTE — TELEPHONE ENCOUNTER
Telephone Encounter by Deanna Lewis RN at 4/20/2020  1:45 PM     Author: Deanna Lewis RN Service: -- Author Type: Registered Nurse    Filed: 4/20/2020  1:51 PM Encounter Date: 4/20/2020 Status: Signed    : Deanna Lewis RN (Registered Nurse)       Medication being requested: butrans 15 mcg  Last visit date: 3/11  Provider: BRIANNE  Next visit date: 4/30 Provider: BRIANNE  Expected follow up: 4-8 weeks  MTM visit (Pain Center) date: no  UDT/CSA - 07/2019   snipped in:    Pertinent between visit information about requested medication (telephone, mychart, prior authorization, concerns, comments): NA  Script being sent to provider by nurse- dates and quantity:   Requested Prescriptions     Pending Prescriptions Disp Refills   ? buprenorphine (BUTRANS) 15 mcg/hour PTWK patch 4 patch 0     Sig: Place 1 patch on the skin every 7 days.     Pharmacy cued: [facility]  Standing orders for withdrawal protocol implemented: NA

## 2021-06-17 NOTE — PROGRESS NOTES
Vernon Lemus is a 80 y.o. male who is being evaluated with [employer] or [facility] services- via video       Start time- 2:23 pm   End time- 2:43 pm   Patient location- of site- home  Provider location- off site- virtual     Subjective-    I have reviewed and updated the patient's Past Medical History, Social History, Family History and Medication List as well as the Precharting workup by the [facility].     PAIN CLINIC FOLLOW UP PROGRESS NOTE    CC:  Chief Complaint   Patient presents with     Follow-up     abdominal pain       HPI  Vernon Lemus is a 80 y.o. male who presents for evaluation   Chief Complaint   Patient presents with     Follow-up     abdominal pain    that is causing continued pain. Specific questions indicated the patient wanted addressed today include: to follow up on his ongoing chronic pain related to his abdomen        Objective-  Major issues:  1. Chronic pain syndrome    2. Chronic bilateral low back pain with bilateral sciatica    3. Abdominal pain, generalized         Pain score 4  Constant   What does your pain like feel during a flare? Burning, nausea  Does the pain interfere with:  Work ----- NA  Walking ------ no  Sleep ------- yes  Daily activities ------no  Relationships -------yes  F=5     UDT/CSA - 2020    ALLERGIES  Cat dander, Cilostazol, Gabapentin, Morphine, Pollen extracts, and Tizanidine    Previous Medical History  Social History     Substance and Sexual Activity   Alcohol Use No     Social History     Substance and Sexual Activity   Drug Use No     Social History     Tobacco Use   Smoking Status Former Smoker     Quit date: 8/10/1986     Years since quittin.7   Smokeless Tobacco Never Used       Pertinent Pain Medications/interventions:    5/10/2021- butrans 20 mcg/hr, norco 5/325 mg up to 6 per day, lyrica 100 mg two times a day.     2021- the butrans patch still seems to only last 6 days and his pain is not covered well by the extra norco on days 6,7. Will try  "belbuca again ( previously insurance did not want to cover) continue the use of the norco and lyrica as well as injections.      11/13/2020- increased hydrocodone to include 10 extra tabs per month for patch changes and bad nights.      6/30/2020- Increase in butrans to 20 mcg/hr, decrease norco to 6 tabs per day, he is taking lyrica 100 mg two times a day form his PCP      Currently he is taking norco 5/325 mg up to 5-8 tabs per day as well as Butrans 15 mcg/ hr      He notes that the amitriptyline was started with Dr. Alva from [facility]       He is currently taking norco up to 6 per day as needed, he recently repeated the celiac plexus block with Dr. Gaviria and reports no relief as of yet.      Acupuncture was helpful initially but is now not effective     Baclofen was not helpful     Recently stopped oxycontin ER 15 mg two times a day. 5/24/2019     Previously has tried morphine which led to confusion   Belbuca up to 600 mcg two times a day which helped a lot but unaffordable.      Failed medical cannabis  Percocet- too strong.       Reports having tried and failed pregablin- lyrica    TRIED AND FAILED MEDICATIONS:  Cymbalta - hallucinations at 60 mg, no issues with 40 mg  Lyrica - very depressed mood  Nortriptyline- \"spacing out\" and anxiety as well as jitteriness in the morning- however, it did help with sleep and decreased his shooting pains at night.  oxymorphone 5mg ER twice daily ,OxyContin 10 mg twice a day.    Percocet 5-325 mg, max of 3 a day, Protonix 40 mg daily,  Aspirin 81 mg daily,  Maalox 17 grams for constipation, Flaxseed.     Reviewing the records it is noted patient has tried the celiac plexus blocks x 2 and trigger point injections to the local area.      Medications    Current Outpatient Medications:      amLODIPine (NORVASC) 5 MG tablet, Take 5 mg by mouth daily., Disp: , Rfl:      aspirin 81 mg chewable tablet, Chew 81 mg daily., Disp: , Rfl:      [START ON 6/7/2021] buprenorphine " (BUTRANS) 20 mcg/hour PTWK patch, APPLY 1 PATCH EXTERNALLY TO THE SKIN EVERY 7 DAYS, Disp: 4 patch, Rfl: 1     ergocalciferol (VITAMIN D2) 1,250 mcg (50,000 unit) capsule, Take 1 capsule (50,000 Units total) by mouth once a week for 12 doses., Disp: 12 capsule, Rfl: 4     [START ON 6/11/2021] HYDROcodone-acetaminophen 5-325 mg per tablet, Take 1-2 tablets by mouth 4 (four) times a day as needed for pain (up to 6 per day with an extra 10 per month for (2 per day) when changing patches)., Disp: 178 tablet, Rfl: 0     [START ON 6/11/2021] HYDROcodone-acetaminophen 5-325 mg per tablet, Take 1-2 tablets by mouth 4 (four) times a day as needed for pain (up to 6 per day with an extra 10 per month for (2 per day) when changing patches)., Disp: 178 tablet, Rfl: 0     ketorolac (TORADOL) 10 mg tablet, Take 1-2 tablets (10-20 mg total) by mouth daily as needed for pain (max 10 per month for breakthrough pain only)., Disp: 10 tablet, Rfl: 0     latanoprost (XALATAN) 0.005 % ophthalmic solution, Administer 1 drop to the right eye at bedtime., Disp: , Rfl:      metoprolol succinate (TOPROL-XL) 100 MG 24 hr tablet, TAKE ONE AND ONE-HALF TABLETS ONCE DAILY, Disp: , Rfl:      MULTIVITAMIN (MULTIPLE VITAMIN ORAL), Take 1 tablet by mouth daily., Disp: , Rfl:      omega-3/dha/epa/fish oil (FISH OIL-OMEGA-3 FATTY ACIDS) 300-1,000 mg capsule, Take 2 g by mouth daily., Disp: , Rfl:      oxybutynin (DITROPAN) 5 MG tablet, Take by mouth 2 (two) times a day as needed. 5-10 mg, Disp: , Rfl:      pantoprazole (PROTONIX) 40 MG tablet, Take 40 mg by mouth daily., Disp: , Rfl:      polyethylene glycol (MIRALAX) 17 gram packet, Take 17 g by mouth daily as needed., Disp: , Rfl:      [START ON 6/13/2021] pregabalin (LYRICA) 100 MG capsule, Take 1 capsule (100 mg total) by mouth 2 (two) times a day., Disp: 180 capsule, Rfl: 0     rosuvastatin (CRESTOR) 10 MG tablet, , Disp: , Rfl:      senna-docusate (SENNOSIDES-DOCUSATE SODIUM) 8.6-50 mg tablet,  Take 1 tablet by mouth daily., Disp: 30 tablet, Rfl: 2     triamterene-hydrochlorothiazide (DYAZIDE) 37.5-25 mg per capsule, Take 1 capsule by mouth every morning., Disp: , Rfl:      vit A/vit C/vit E/zinc/copper (ICAPS AREDS ORAL), Take 1 tablet by mouth daily., Disp: , Rfl:      LORazepam (ATIVAN) 1 MG tablet, TK 1 T PO 30 MINUTES BEFORE MRI, Disp: , Rfl:      triamcinolone (KENALOG) 0.1 % ointment, 1 narinder, Disp: , Rfl:       Physical Exam- limited exam   General- patient is alert and oriented, in NAD, well-groomed, well-nourished  Psych- Judgment and insight normal, AOx4, recent and remote memory normal, mood and affect normal  Eyes- pupils are symmetrical, conjunctiva is clear bilaterally, no ptosis is noted.   Respiratory- Breathing appears non-labored  Integumentary- coloring of skin appears normal.   Musculoskeletal- patient is moving all extremities that are visible.     Lab:  Last UDS on 9/2020  had expected results. Last UDT on file was reviewed.    Imaging:  No new imaging reviewed with patient over the phone, no new orders placed       Recent   Dated 5/10/2021 was reviewed.       Assessment:     Vernon Lemus is a 80 y.o. male seen in clinic today for   Chief Complaint   Patient presents with     Follow-up     abdominal pain       New concerns today- none noted, he did have a basil cell spot removed from his ankle. This seemed to increase his pain in his abdomen.     Plan of care going forward for ongoing pain control- patient wants to look into going gluten free. He is also noting that the butrans only last about 4-5 days. He then uses his norco at that time to assist with abdominal pain to get through it. Patient reports that he did talk to the specialist regarding absence of gluten and feels that although it may not be a direct correlation he would like to try it after his 81th Birthday.  No changes made today, however did discuss that a higher dose of the butrans would be the form of a film  such as belbuca. Previously this was too expensive to use. He will look into this with his pharmacy as this is an option for increase as needed or even a daily dose.     Patients current MME is 20-40    Plan:   Interventions-    Follow up in 4 weeks telephone visit ok      Try avoid gluten. You plan on starting this on June 1st.      Will cotinue the use of the Butrans at this time- refills sent     Look into the cost of belbuca if we need to increase the dose.     IGA markers were elevated at 418. RR is     Will continue the use of the the lyrica and norco at this time, refills sent- 5/14-6/11, 6/11-7/9    Orders placed today  Medications that were ordered today   Requested Prescriptions     Signed Prescriptions Disp Refills     HYDROcodone-acetaminophen 5-325 mg per tablet 178 tablet 0     Sig: Take 1-2 tablets by mouth 4 (four) times a day as needed for pain (up to 6 per day with an extra 10 per month for (2 per day) when changing patches).     pregabalin (LYRICA) 100 MG capsule 180 capsule 0     Sig: Take 1 capsule (100 mg total) by mouth 2 (two) times a day.     buprenorphine (BUTRANS) 20 mcg/hour PTWK patch 4 patch 1     Sig: APPLY 1 PATCH EXTERNALLY TO THE SKIN EVERY 7 DAYS     HYDROcodone-acetaminophen 5-325 mg per tablet 178 tablet 0     Sig: Take 1-2 tablets by mouth 4 (four) times a day as needed for pain (up to 6 per day with an extra 10 per month for (2 per day) when changing patches).     No orders of the defined types were placed in this encounter.        The patient understand todays plan and has their questions answered in regards to expectations and current treatment plan.     Prevent unexpected access/loss of medication: Keep medication locked. Only carry what you need with you    The plan of care will be adjusted to accommodate the issues discussed, discussing management of their care and follow up that is recommended. 5/10/2021         Nuvia Smith [facility]  Pain Center  [address]  [place] [address]  Ph: [phone]  Fax: [phone]

## 2021-06-17 NOTE — TELEPHONE ENCOUNTER
Telephone Encounter by Deanna Lewis RN at 6/15/2020  3:07 PM     Author: Deanna Lewis RN Service: -- Author Type: Registered Nurse    Filed: 6/15/2020  3:17 PM Encounter Date: 6/15/2020 Status: Signed    : Deanna Lewis RN (Registered Nurse)       Refill request: buprenorphine.  Last visit date: 4/30  Provider: Christos  Next visit date: 6/30  Provider: Christos  Expected follow up: 8 weeks  MTM visit (Pain Center) date: no  UDT/CSA - 07/2019   snipped in:    Pertinent between visit information about requested medication (telephone, mychart, prior authorization, concerns, comments):   NA  Script being sent to provider by nurse- dates and quantity:   There should be a script already on profile at the pharmacy.  Pharmacy will get this ready for tomorrow  date

## 2021-06-17 NOTE — TELEPHONE ENCOUNTER
Telephone Encounter by Tono Hopson at 3/17/2021 10:22 AM     Author: Tono Hopson Service: -- Author Type: Patient Access    Filed: 3/17/2021 10:26 AM Encounter Date: 3/12/2021 Status: Signed    : Tono Hopson (Patient Access)       PRIOR AUTHORIZATION DENIED    Denial Rational: Chronic Pain Syndrome is not an approve FDA supported Diagnosis. I did answer the question that was received over the weekend. 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           Appeal Information: The patient's plan prefers that the patient have tried and failed: MELOXICAM, IBUPROFEN, and NAPROXEN or have a contraindication to those therapies. If the provider would like to appeal this denial, please provide a letter of medical necessity and once it has been completed and placed in the patient's chart, notify the [facility] ([facility] MED [id number]) and the appeal can be initiated on behalf of the patient and provider.  Please also include any therapies that the patient has tried and their outcomes.

## 2021-06-17 NOTE — PROGRESS NOTES
Vernon Lemus is a 80 y.o. male who is being evaluated via a billable video visit.      How would you like to obtain your AVS? [email or web address].  If dropped from the video visit, the video invitation should be resent by: Text to cell phone: [phone]  Will anyone else be joining your video visit? No      Platform used for Video Visit: [facility]     Pain score 4  Constant   What does your pain like feel during a flare? Burning, nausea  Does the pain interfere with:  Work ----- NA  Walking ------ no  Sleep ------- yes  Daily activities ------no  Relationships -------yes  F=5    UDT/CSA - 9/2020

## 2021-06-17 NOTE — TELEPHONE ENCOUNTER
Telephone Encounter by Tono Hopson at 4/6/2021  9:53 AM     Author: Tono Hopson Service: -- Author Type: Patient Access    Filed: 4/6/2021 12:19 PM Encounter Date: 3/12/2021 Status: Addendum    : Tono Hopson (Patient Access)    Related Notes: Original Note by Tono Hopson (Patient Access) filed at 4/6/2021 10:09 AM       MEDICATION APPEAL DENIED    Denial Rational: The patient needs to have tried/failed 2 formulary alternatives: Diclofenac, Celecoxib, Ketoprofen.        Appeal Information: The next step is a Second Level Appeal. Second Level Appeals are handled by the Provider/Provider's Care Team at the clinic level. Please see the information below as it has the Organization that will handle The Second Level Appeal. If any of the documentation is needed from previous submissions, please contact The [facility] -[facility] ([id number])

## 2021-06-17 NOTE — TELEPHONE ENCOUNTER
Telephone Encounter by Ceci Traylor RN at 2/12/2021 12:23 PM     Author: Ceci Traylor RN Service: -- Author Type: Registered Nurse    Filed: 2/12/2021 12:28 PM Encounter Date: 2/12/2021 Status: Signed    : Ceci Traylor RN (Registered Nurse)         RN spoke to patient regarding Nuvia Smith CNP's message and patient stated he would try the heat packs over the weekend to see if that helps relieve some of the additional pain.  Patient agrees to continue taking medication as prescribed and not changes are authorized at this time.  Patient states if the heat packs don't work he will maybe try and call scheduling for a sooner appointment to discuss pain management regimen change.

## 2021-06-17 NOTE — PROGRESS NOTES
Patient is in clinic today to see MD Maria Elena Rodas.      Patient is here for a 3 month follow up to discuss claudication    The patient does not smoke.    Pt is currently taking aspirin and statin.    Pt rates pain 0/10  Pts symptoms include Leg Cramps or Numbness or Weakness of  in Bilateral extremity    Patients condition is stable.Blank Single:[id number]::    The provider has been notified that the patient has no complaints.     Do you have specific questions you would like to be sure are addressed with the provider today?     1. How are things going? Are things improved?

## 2021-06-17 NOTE — PATIENT INSTRUCTIONS - HE
Plan:   Interventions-    Follow up in 4 weeks telephone visit ok      Try avoid gluten. You plan on starting this on June 1st.      Will cotinue the use of the Butrans at this time- refills sent     Look into the cost of belbuca if we need to increase the dose.     IGA markers were elevated at 418. RR is     Will continue the use of the the lyrica and norco at this time, refills sent- 5/14-6/11, 6/11-7/9    Orders placed today  Medications that were ordered today   Requested Prescriptions     Signed Prescriptions Disp Refills     HYDROcodone-acetaminophen 5-325 mg per tablet 178 tablet 0     Sig: Take 1-2 tablets by mouth 4 (four) times a day as needed for pain (up to 6 per day with an extra 10 per month for (2 per day) when changing patches).     pregabalin (LYRICA) 100 MG capsule 180 capsule 0     Sig: Take 1 capsule (100 mg total) by mouth 2 (two) times a day.     buprenorphine (BUTRANS) 20 mcg/hour PTWK patch 4 patch 1     Sig: APPLY 1 PATCH EXTERNALLY TO THE SKIN EVERY 7 DAYS     HYDROcodone-acetaminophen 5-325 mg per tablet 178 tablet 0     Sig: Take 1-2 tablets by mouth 4 (four) times a day as needed for pain (up to 6 per day with an extra 10 per month for (2 per day) when changing patches).     No orders of the defined types were placed in this encounter.        The patient understand todays plan and has their questions answered in regards to expectations and current treatment plan.     Prevent unexpected access/loss of medication: Keep medication locked. Only carry what you need with you    The plan of care will be adjusted to accommodate the issues discussed, discussing management of their care and follow up that is recommended. 5/10/2021         Nuvia Smith CNP  [facility]  [address]  [place] [address]  Ph: [phone]  Fax: [phone]

## 2021-06-17 NOTE — TELEPHONE ENCOUNTER
Patient calls, asking to check the date of next refill of hydrocodone.  Currently refill is date for next start date of 5/14/21.  These RX's are for a 28 day refil(according to the quantity and directions for use)l and dates will  need to be adjusted to reflect this.  Last RX filled on 4/14/21, 28 day RX,( in  this is listed as a 30 day fill which is not accurate.)    Rx's adjusted to reflect a 28 day refill.  Requested Prescriptions     Pending Prescriptions Disp Refills     HYDROcodone-acetaminophen 5-325 mg per tablet 178 tablet 0     Sig: Take 1-2 tablets by mouth 4 (four) times a day as needed for pain (up to 6 per day with an extra 10 per month for (2 per day) when changing patches).     HYDROcodone-acetaminophen 5-325 mg per tablet 178 tablet 0     Sig: Take 1-2 tablets by mouth 4 (four) times a day as needed for pain (up to 6 per day with an extra 10 per month for (2 per day) when changing patches).     Crys

## 2021-06-18 NOTE — PATIENT INSTRUCTIONS - HE
Patient Instructions by Fran Simmons RN at 4/26/2021  9:40 AM     Author: Fran Simmons RN Service: -- Author Type: Registered Nurse    Filed: 4/26/2021  9:43 AM Encounter Date: 4/26/2021 Status: Signed    : Fran Simmons RN (Registered Nurse)       Your test results are stable. We will have you follow up in 1 year. Please call us sooner if symptoms develop or worsens.     Patient Education     Peripheral Artery Disease (PAD)   Peripheral artery disease (PAD) occurs when the arteries that carry blood to the limbs are narrowed or blocked. This is usually due to a buildup of a fatty substance called plaque in the walls of the arteries.  PAD most often affects the arteries in the legs. When these arteries are narrowed or blocked, blood flow to the legs is reduced. This can cause leg and foot pain and other symptoms. If severe enough, reduced blood flow to the legs can lead to tissue death (gangrene) and the loss of a toe, foot, or leg. Having PAD also makes it more likely that arteries in other body areas are blocked. For instance, arteries that carry blood to the heart or brain may be affected. This raises the chances of heart attack and stroke.  Risk factors  Certain factors can make PAD more likely. They include:    Smoking    Diabetes    High blood pressure    Unhealthy cholesterol levels    Obesity    Inactive lifestyle    Older age    Family history of PAD  Symptoms  Many people with PAD have no symptoms. If symptoms do occur, they can include:    Pain in the muscles of the calves, thighs or hips that gets worse with activity and better with rest (intermittent claudication)    Achy, tired, or heavy feeling in the legs    Weakness, numbness, tingling, or loss of feeling in the legs    Changes in skin color of the legs    Sores on the legs and feet    Cold leg, feet, or toes    Pain the feet or toes even when lying down (rest pain)  Home care  PAD is a chronic (lifelong) condition. Treatment is focused on managing  your condition and lowering your health risks. This may include doing the following:    If you smoke, quit. This helps prevent further damage to your arteries and lowers your health risks. Ask your provider about medicines or products that can help you quit smoking. Also consider joining a stop-smoking program or support group.    Be more active. This helps you lose weight and manage problems such as high blood pressure and unhealthy cholesterol levels. Start a walking program if advised to by your provider. Your provider may also help you form a safe exercise program that is right for your needs.    Make healthy eating changes. This includes eating less fat, salt, and sugar.    Take medicines for high blood pressure, unhealthy cholesterol levels, and diabetes as directed.    Have your blood pressure and cholesterol levels checked as often as directed.    If you have diabetes, try to keep your blood sugar well controlled. Test your blood sugar as directed.    If you are overweight, talk to your provider about forming a weight-loss plan.    Watch for cuts, scrapes, or open sores on your feet. Poor blood flow to the feet may slow healing and increase the risk of infection from these problems.   Follow-up care  Follow up with your healthcare provider, or as advised. If imaging tests such as ultrasound were done, they will be reviewed by a doctor. You will be told the results and any new findings that may affect your care.  When to seek medical advice   Call your healthcare provider right away if any of these occur:    Sudden severe pain in the legs or feet    Sudden cold, pale or blue color in the legs or feet    Weakness or numbness in the legs or feet that worsens    Any sore or wound in the legs or feet that wont heal    Weak pulse in your legs or feet  Know the signs of heart attack and stroke  People with PAD are at high risk for heart attack and stroke. Knowing the signs of these problems can help you protect  your health and get help when you need it. Call 911 right away if you have any of the following:  Signs of a heart attack    Chest discomfort, such as pain, aching, tightness, or pressure that lasts more than a few minutes, or that comes and goes    Pain or discomfort in the arms, back, shoulders, neck, or jaw    Shortness of breath    Sweating (often a cold, clammy sweat)    Nausea    Lightheadedness  Signs of a stroke    Sudden numbness or weakness of the face, arms, or legs, especially on one side    Sudden confusion or trouble speaking or understanding    Sudden trouble seeing in one or both eyes    Sudden trouble walking, dizziness, or loss of balance    Sudden, severe headache with no known cause   Date Last Reviewed: 9/21/2015 2000-2017 [employer] [address] [place] [address], [place] [address]. All rights reserved. This information is not intended as a substitute for professional medical care. Always follow your healthcare professional's instructions.

## 2021-06-18 NOTE — PATIENT INSTRUCTIONS - HE
Patient Instructions by Fran Simmons RN at 9/18/2020  3:30 PM     Author: Fran Simmons RN Service: -- Author Type: Registered Nurse    Filed: 9/18/2020  4:35 PM Encounter Date: 9/18/2020 Status: Addendum    : Fran Simmons RN (Registered Nurse)    Related Notes: Original Note by Fran Simmons RN (Registered Nurse) filed at 9/18/2020  4:35 PM       You have been scheduled for the following procedure:    Procedure: LEFT LEG Angiogram    Date: 10/7/2020    Arrival Time: 6:30AM     Procedure Time: 8AM    Location: [facility]    Please report to the information desk located in the [place] entrance on [address] at [facility] or the Main Radiology desk on the ground level at [facility].  Tell them you are scheduled for an appointment in Interventional Radiology.  They will then direct you to the Surgical Admit Unit or the Main Radiology desk.    Please arrive at the hospital by 6:30 am on the day of your procedure.  (Note your scheduled arrival time will be earlier than your scheduled procedure start time to allow for your pre-procedure exam, lab work, preparation and consent.)    Other Instructions:    Do not eat or drink anything after midnight before your procedure.    You may take your normal medications on the morning of your procedure with a few sips of water (unless otherwise instructed)    Please inform us if you are taking insulin, Glucophage (Metformin), Coumadin, Arixtra, or Lovenox.    Please bring a current list of medications with    If you are having an angiogram:    You will need a responsible adult to stay with you at home your first night.    You will need a     You will need to hold your Coumadin 5 days before your angiogram and should consult with your primary regarding this.    Hold your Metformin the night before and morning of angiogram    Take only 1/2 of your bedtime insulin dose and hold your morning dose but bring it with you to the angiogram.    It is ok  to stay on your  aspirin unless the Vascular surgery directs you differently.        If you have any questions regarding your procedure, your instructions or should you need to reschedule or cancel your procedure, please contact Teresa at the [facility].       Peripheral Angiography  Peripheral angiography is an outpatient procedure that makes a map of the vessels (arteries) in your lower body, legs, and arms, using X-ray and dye.This map can show where blood flow may be blocked.  Talk with your healthcare provider about the risks and complications of angiography.   Before the procedure  Prepare for the peripheral angiography as follows:     Tell your healthcare provider about all medicines you take and any allergies you may have.    Follow any directions youre given for not eating or drinking before the procedure. If your provider says to take your normal medicines, swallow them with only small sips of water.    Arrange for a family member or friend to drive you home.  During the procedure  Here is what to expect:      You may get medicine through an IV (intravenous) line to relax you. Youre given an injection to numb the insertion site. Then, a tiny skin cut (incision) is made near an artery in your groin.    Your provider inserts a thin tube (catheter) through the incision. He or she then threads the catheter into an artery while looking at a video monitor.    Contrast dye is injected into the catheter to confirm position. You may feel warmth or pressure in your legs and back. You lie still as X-rays are taken. The catheter is then taken out.  After the procedure  Youll be taken to a recovery area. A healthcare provider will apply pressure to the site for about 10 minutes. Your healthcare provider will tell you how long to lie down and keep the insertion site still. Your healthcare provider will discuss the results with you soon after the procedure.  Back at home  On the day you get home, dont drive, dont exercise,  avoid walking and taking stairs, and avoid bending and lifting. Your healthcare provider may give you other care instructions.  Call your healthcare provider  Call your healthcare provider right away if:    You notice a lump or bleeding at the insertion site    You feel pain at the insertion site    You become lightheaded or dizzy    You have leg pain or numbness    You do not urinate in 8 hours    Date Last Reviewed: 5/1/2016 2000-2017 [employer]. [address] [place] [address]. All rights reserved. This information is not intended as a substitute for professional medical care. Always follow your healthcare professional's instructions.    Angiogram Procedure Discharge Instructions:     1. If you received sedation for your procedure: Do not drive or operate heavy machinery for the rest of the day.     2. Avoid strenuous activity for 72 hours (3 days):                        - Do not lift greater than 10 pounds.                        - Excessive exercise                        - Straining                        - Return to your normal activities as you tolerate after the 3 days restriction     3. Avoid tub baths, Jacuzzis, hot tubs and pools for 72 hours (3 days) or until puncture site is will healed.     4. You may shower beginning tomorrow. Do not scrub puncture site(s) until well healed, pat dry.     5. You can expect to return to work 1-2 days after your procedure - depending on the nature of your profession.     6. It is normal to have some tenderness and minimal swelling at puncture site. A small area of discoloration may be present. Tenderness typically subsides in 24-48 hours. A small knot may also be present at puncture site for 6-8 weeks, this can be a normal part of the healing process.     After the angiogram If you:      1. Experience any bleeding or active swelling from puncture site: Lie down, firmly apply pressure to puncture site and CALL 9-1-1     2. Fever greater than  101 degrees Fahrenheit.     3. Redness, swelling, warmth to touch, or purulent (yellow/green/foul smelling) drainage from the puncture site.     4. Increasing pain, tenderness or swelling at puncture site OR of arm/leg near puncture site.     5. Feeling weak or faint.     6. Change in color, temperature, or sensation of arm/leg where puncture was made.     Call us with any other questions or concerns after your procedure: [phone]    You will need to have an ultrasound 2-3 weeks after your angiogram and should be scheduled at the time of your follow up appt.  Further follow up will be based on ultrasound results. Typical follow up is every 3 months for the first year, then every 6 months to one year thereafter.     Instructions for Patients Scheduled for Vascular or Podiatry Procedures During the COVID 19 Pandemic    Your Provider has determined that your condition warrants going ahead with your procedure at this time.  You will need to be tested for COVID19 within 72 hours of your procedure. We highly encourage patients to get tested for COVID-19 at one of our designated [facility] testing sites. We process the tests in our lab, which allows us to get the results quickly. If you choose to get tested at a non-[facility] location, you will need to contact your primary care provider to make those arrangements and ensure the results are available to your surgeon before you are arriving for your procedure. If we do not receive the results in time, your procedure may be postponed or canceled.  Please make sure your test is collected 3-days prior to your procedure date.  The results will need to get faxed to [phone].  After testing, you will need to remain in self-quarantine until your procedure.  If you are notified of a positive COVID-19 result; you will need to call your provider for further recommendations.    Please call [phone] and press option 2 to speak to a nurse.  If the test is  positive; DO NOT PRESENT FOR YOUR PROCEDURE until you have been given further instructions.  You will not be called with negative results so arrive as instructed unless otherwise notified.  Don't:    Don't invite visitors or friends into your home.    Don't leave your home unless absolutely necessary.    Don't share utensils and other household items with others in the home.  Do:    Wash your hands regularly with soap and water and use hand  with at least 60% alcohol if you don't have easy access to soap and water.     Disinfect surface areas daily, including doorknobs, electronics - especially phones, laptops and other devices.     Wash utensils and other items thoroughly.     Separate yourself from others in the household as best you can, including pets.    Keep your hands away from your face.     Practice all other prevention tips the CDC recommends, including covering your coughs and sneezes with a tissue or your sleeve and immediately throwing the tissue into the trash and washing your hands.    Call your hospital if you develop the following signs before your surgery:    Fever.    Cough.    Shortness of breath.    Sore throat.    Runny or stuffy nose.    Muscle or body aches.    Headaches.    Fatigue.    Vomiting and diarrhea.    These steps will help keep you & your family, other patients, and hospital staff safe from COVID19.

## 2021-06-18 NOTE — PATIENT INSTRUCTIONS - HE
"Patient Instructions by Shakeel López PT at 6/5/2020  8:00 AM     Author: Shakeel López PT Service: -- Author Type: Physical Therapist    Filed: 6/5/2020  8:38 AM Encounter Date: 6/5/2020 Status: Addendum    : Shakeel López PT (Physical Therapist)    Related Notes: Original Note by Shakeel López PT (Physical Therapist) filed at 6/5/2020  8:36 AM        LOWER TRUNK ROTATIONS - LTR    Lying on your back with your knees bent, gently move your knees side-to-side.    Hold 2 seconds. 10x each side.  2x/day.      SINGLE KNEE TO CHEST STRETCH - SKTC    While Lying on your back,  hold your knee and gently pull it up towards your chest.    Hold 30 seconds, 2x each leg.  2x/day.        PELVIC TILT    While lying on your back, use your stomach muscles to press your spine downwards towards the ground. Do not move into a painful position.    Hold 5 seconds. 10x.  2x/day.      BRIDGING    While lying on your back, tighten your lower abdominals, squeeze your buttocks and then raise your buttocks off the floor/bed as creating a \"Bridge\" with your body.    Hold 3 seconds. 10-15x as tolerated.  2x/day.      QUADRUPED ARM/LEG LIFT    Start on hands and knees while keeping your spine flat and neutral.  Tighten abdominal muscles.  Raise leg back and opposite arm and hold 3 seconds.  Return to original position and alternate.    5-10x as tolerated each side.  2x/day.      ELASTIC BAND LATERAL WALKS     With an elastic band around both ankles, walk to the side while keeping your feet spread apart. Keep your knees bent the entire time.    10 steps left, 10 steps right.  2-3 sets to fatigue.    1-2x/day.    *Orange will be easier. If too easy, progress to the green band.                "

## 2021-06-18 NOTE — PATIENT INSTRUCTIONS - HE
Patient Instructions by Fran Simmons RN at 7/17/2020  9:00 AM     Author: Fran Simmons RN Service: -- Author Type: Registered Nurse    Filed: 7/17/2020 10:05 AM Encounter Date: 7/17/2020 Status: Signed    : Fran Simmons RN (Registered Nurse)            For informational purposes only. Not to replace the advice of your health care provider. Copyright   2014 [employer]. All rights reserved. [employer] [id number] - REV 02/18.  Peripheral Artery Disease (PAD) Rehabilitation  Supervised Exercise Therapy (SET)  PAD (peripheral artery disease) blocks the blood vessels in the leg. It may cause tiredness, cramps, pain or numbness in your leg muscles when you walk or exercise. These symptoms almost always go away after a few minutes of rest. This is known as claudication.   Why do I need exercise therapy?  PAD rehab, commonly known as supervised exercise therapy (SET), is known to improve claudication in most people. People who take part in SET:  are usually able to walk farther on a treadmill (by about 2 city blocks) after 3 months than people who exercise on their own   can walk farther without pain or needing to stop to rest (by about half a block)  often have better physical health and quality of life than people who use a stent (a wire mesh tube) to open blockages in the large arteries in the pelvis    What can I expect?  The PAD rehab program provides supervised exercise in a rehabilitation setting. We will:  Assess your needs: record your medical history, give you a six-minute walk test, and take your vital signs.  Show you how to begin your own exercise program on the treadmill.   Track your heart rate, blood pressure, onset of leg muscle pain and rating of pain in your legs.  Help you reach your goals for improving your symptoms.    As you use the treadmill, we will monitor the duration (length of time), speed and incline. We then adjust these factors based on the guidelines of the PAD program. This  approach usually improves your ability to walk with less pain within 2 to 3 months.  We may refer you to physical therapy if you have concerns about balance or pain not related to PAD.  Insurance  This program is a covered benefit for Medicare patients. If you have secondary or primary insurance other than Medicare, please check your coverage and out of pocket costs for the program. Use code [id number] when talking to your insurer.  More information  You will need a referral from your doctor. For more information, call one of these locations:  [facility]  [address] [place] [address]  [phone]   [facility]   [address] [place], [place] [id number] [phone]  [facility]   [address] [place], [place] [address] [phone]  [facility]   [address] [id number] [phone] or [phone]  [facility]   [address], [address], [place] [address]  [phone]  [address], [address], [place], [place] [address] [phone]  [address] [id number] [place] [address], [address]   [phone]    A Walking Program for Peripheral Arterial Disease (PAD)  Peripheral arterial disease (PAD) is a condition where the arteries in the legs are severely damaged. When you have PAD, walking can be painful. So you may start to walk less. Walking less makes your leg muscles weaker. It then becomes harder and more painful to walk. A walking program can break this cycle. Here's how to get started.     Walking farther without pain  Exercise strengthens leg muscles that are out of shape. It also trains these muscles to work with less oxygen. This helps your leg muscles work better even with reduced  blood flow to your legs. When you have PAD, a walking program can be helpful. Your program can:     Help you walk longer and farther without claudication. This is an ache in your legs during exercise that goes away with rest.     Let you do more and be more active    Add to your overall health and well-being    Help you control your blood sugar and blood pressure    Help you become healthier with no risk and at little or no cost  Getting started  Your local hospital, vascular center, or cardiac rehab center may have a special walking program for people with PAD. If so, this is your best option. But if you cant find a program, or its not covered by insurance, you can still walk on your own. Follow these steps at each session:     Step 1. Start at a pace that lets you walk for 5 to 10 minutes before you start to feel claudication. This feeling is unpleasant, but it doesnt hurt you. Keep going until the pain makes you feel that you need to stop.     Step 2. Stop and rest for 3 to 5 minutes, just long enough for the pain to go away. You can rest standing or sitting. (Some people like to bring along a cane or a lightweight folding chair.)     Step 3. Again walk at a pace that lets you walk for 5 to 10 minutes more before you feel pain. This may be slower than your starting pace in step 1. Then rest again.     Step 4. Repeat this process until youve walked for 45 minutes. This should be about 60 to 80 minutes total, including resting time. You may not be able to do a full 45 minutes at first. Do as much as you can, and increase your time as you improve.   Making the most of your program    Walk at least 3 times a week. Take no more than 2 days off between sessions. If you stop walking, even for a week or two, you can lose the health benefits of your program.     Find a good place to walk. A treadmill or a track may be better at first. That way, you wont run the risk of going too far and finding that you cant walk back. Be  sure to have a place to walk indoors in bad weather, such as a gym or a mall.     Wear shoes with sturdy, flexible soles. The heel should fit without slipping. You should have room to wiggle your toes.     Keep track of how long you walk. A pedometer will show your daily progress. It can also show how much farther you can walk as time goes by.     Ask a friend to keep you company. You may enjoy walking with someone else. Or you may want to make your walking sessions a time to relax by yourself.     Make it fun. Listen to music while you walk and rest. Walk in a favorite park. Get to know the people at the gym, or the folks that you pass on your route. While you rest, you can window-shop, read, or feed the birds. Do whatever will help you enjoy your exercise sessions.     4299-8809 [employer]. [address] [place] [address], [place] [address]. All rights reserved. This information is not intended as a substitute for professional medical care. Always follow your healthcare professional's instructions.

## 2021-06-18 NOTE — PATIENT INSTRUCTIONS - HE
Patient Instructions by Fran Simmons RN at 7/2/2020  9:30 AM     Author: Fran Simmons RN Service: -- Author Type: Registered Nurse    Filed: 7/2/2020 11:10 AM Encounter Date: 7/2/2020 Status: Addendum    : Fran Simmons RN (Registered Nurse)    Related Notes: Original Note by Fran Simmons RN (Registered Nurse) filed at 7/2/2020 10:32 AM         Please get your lab blood drawn the same day you go in to do your CTA test at [facility]. Thank you.         Peripheral Artery Disease (PAD)   Peripheral artery disease (PAD) occurs when the arteries that carry blood to the limbs are narrowed or blocked. This is usually due to a buildup of a fatty substance called plaque in the walls of the arteries.  PAD most often affects the arteries in the legs. When these arteries are narrowed or blocked, blood flow to the legs is reduced. This can cause leg and foot pain and other symptoms. If severe enough, reduced blood flow to the legs can lead to tissue death (gangrene) and the loss of a toe, foot, or leg. Having PAD also makes it more likely that arteries in other body areas are blocked. For instance, arteries that carry blood to the heart or brain may be affected. This raises the chances of heart attack and stroke.  Risk factors  Certain factors can make PAD more likely. They include:    Smoking    Diabetes    High blood pressure    Unhealthy cholesterol levels    Obesity    Inactive lifestyle    Older age    Family history of PAD  Symptoms  Many people with PAD have no symptoms. If symptoms do occur, they can include:    Pain in the muscles of the calves, thighs or hips that gets worse with activity and better with rest (intermittent claudication)    Achy, tired, or heavy feeling in the legs    Weakness, numbness, tingling, or loss of feeling in the legs    Changes in skin color of the legs    Sores on the legs and feet    Cold leg, feet, or toes    Pain the feet or toes even when lying down (rest pain)  Home care  PAD  is a chronic (lifelong) condition. Treatment is focused on managing your condition and lowering your health risks. This may include doing the following:    If you smoke, quit. This helps prevent further damage to your arteries and lowers your health risks. Ask your provider about medicines or products that can help you quit smoking. Also consider joining a stop-smoking program or support group.    Be more active. This helps you lose weight and manage problems such as high blood pressure and unhealthy cholesterol levels. Start a walking program if advised to by your provider. Your provider may also help you form a safe exercise program that is right for your needs.    Make healthy eating changes. This includes eating less fat, salt, and sugar.    Take medicines for high blood pressure, unhealthy cholesterol levels, and diabetes as directed.    Have your blood pressure and cholesterol levels checked as often as directed.    If you have diabetes, try to keep your blood sugar well controlled. Test your blood sugar as directed.    If you are overweight, talk to your provider about forming a weight-loss plan.    Watch for cuts, scrapes, or open sores on your feet. Poor blood flow to the feet may slow healing and increase the risk of infection from these problems.   Follow-up care  Follow up with your healthcare provider, or as advised. If imaging tests such as ultrasound were done, they will be reviewed by a doctor. You will be told the results and any new findings that may affect your care.  When to seek medical advice   Call your healthcare provider right away if any of these occur:    Sudden severe pain in the legs or feet    Sudden cold, pale or blue color in the legs or feet    Weakness or numbness in the legs or feet that worsens    Any sore or wound in the legs or feet that wont heal    Weak pulse in your legs or feet  Know the signs of heart attack and stroke  People with PAD are at high risk for heart attack and  stroke. Knowing the signs of these problems can help you protect your health and get help when you need it. Call 911 right away if you have any of the following:  Signs of a heart attack    Chest discomfort, such as pain, aching, tightness, or pressure that lasts more than a few minutes, or that comes and goes    Pain or discomfort in the arms, back, shoulders, neck, or jaw    Shortness of breath    Sweating (often a cold, clammy sweat)    Nausea    Lightheadedness  Signs of a stroke    Sudden numbness or weakness of the face, arms, or legs, especially on one side    Sudden confusion or trouble speaking or understanding    Sudden trouble seeing in one or both eyes    Sudden trouble walking, dizziness, or loss of balance    Sudden, severe headache with no known cause   Date Last Reviewed: 9/21/2015 2000-2017 [employer]. [address] [place] [address]. All rights reserved. This information is not intended as a substitute for professional medical care. Always follow your healthcare professional's instructions.    Computed Tomography Angiography (CTA)     CTA can make 3D images, such as the carotid arteries shown here.     Computed tomography angiography (CTA) is an imaging test. It uses X-rays and computer technology to make detailed pictures of your arteries. Before the test, an X-ray dye (contrast medium) is injected into your vein. The dye makes it easier to see your blood vessels on the X-ray. Pictures are then taken with the CT scanner. A computer turns the images into 2-D and 3-D pictures.  Why CTA is done  CTA may be used to:    Check arteries in your belly, neck, lungs, pelvis, kidneys, or brain.    Look for a ballooning of the blood vessel wall (aneurysm) or a tear (dissection).    Check if a tube (stent) used to keep an artery open is working well.    Find damage to your arteries due to injuries.    Collect details on blood vessels that supply blood to tumors.  Getting ready  for your test  Tell your health care provider if you:    Have diabetes    Have kidney disease    Are allergic to X-ray dye or other medicines    Are pregnant or think you may be pregnant    Are taking any medicines, herbs, or supplements, including prescription drugs, street drugs, and over-the-counter medicines such as aspirin or ibuprofen    You may be told not to eat or drink anything for a few hours before the CTA. Follow any other instructions from your health care provider.  During your test    You will be asked to remove any hair clips, jewelry, false teeth, or other metal items that could show up on the X-ray.    You will lie down on the scanning table. An IV line will be put in a vein in your arm or hand.    The scanning table will be properly placed. The part of your body being checked will be inside the doughnut-shaped CT scanner.    One image may be taken first to be sure you are in the proper position for the test.    The IV will be hooked up to an automatic injection machine. This controls how often and how fast the X-ray dye is injected. The injection may continue during part of the exam.    The dye will be put into your vein through the IV line. You may feel warmth through your body when the dye is injected.    You cant move while the X-rays are being taken. Pillows and foam pads may be used to help you stay still. You will be told to hold your breath for 10 to 25 seconds at a time.    The whole procedure may take 10 to 25 minutes.  After your test    Drink plenty of fluids to help flush the X-ray dye from your body.    You may eat as soon as you want to.  Risks of CTA  All procedures have some risks. A CTA has some possible minor risks. These include:    Problems due to the X-ray dye, such as an allergic reaction or kidney damage    Skin damage from leaking X-ray dye near where the IV was put in   Date Last Reviewed: 10/1/2017    4347-3997 [employer]. [address] [facility]  [place] [address]. All rights reserved. This information is not intended as a substitute for professional medical care. Always follow your healthcare professional's instructions.

## 2021-06-19 NOTE — PROGRESS NOTES
Patient presents to the clinic for a pain consult with Nuvia Smith CNP in regards to abdominal pain.

## 2021-06-19 NOTE — PROGRESS NOTES
Patient presents to the clinic today for a follow up with Nuvia Smith CNP in regards to abdominal pain.

## 2021-06-19 NOTE — PROGRESS NOTES
PAIN CLINIC FOLLOW UP PROGRESS NOTE    CC:  Chief Complaint   Patient presents with     Abdominal Pain       HPI  Vernon Lemus is a 78 y.o. male who presents for evaluation   Chief Complaint   Patient presents with     Abdominal Pain    that is causing continued pain. Since the last visit the patient denies any trips to the urgent care or ED specifically for their pain. The patient denies any new medications, diagnoses since the last visit. Specific questions indicated the patient wanted addressed today include: She is interested in weaning down off of the opioids altogether he is currently a candidate for a Whipple procedure however he like to delay this or avoid it altogether if possible.      Major issues:  1. Chronic pain syndrome    2. Chronic bilateral low back pain with bilateral sciatica    3. Abdominal pain, generalized        Today the pain is located in their abdomen and low back and is described as constant with flares and is described as dull and nauseating and is rated at a 5on a scale of 1-10  Associated symptoms: Denies any loss of bladder control, fevers/chills, unintentional weight loss, weakness, numbness or pain that interferes with sleep.   Aggravating factors include: Unknown at this time it is constant  Alleviating factors: Opioid management and rest  Adverse effects of medications: NONE   Functional symptoms: Affects his ADLs, socialization as well as mobility on a daily basis  Current subjective treatment efficacy: Fair      Previous Medical History  History   Alcohol Use No     History   Drug Use No     History   Smoking Status     Former Smoker     Quit date: 8/10/1986   Smokeless Tobacco     Never Used       Pertinent Pain Medications/interventions:  He currently takes morphine extended release 15 mg twice daily, Percocet 5/325-5 times per day as needed    Review of Systems:  12 point systems were reviewed with pt as documented on pt health form and the patient denies any new  diagnosis or changes in 12 point system review since the last visit.     Physical Exam  Vitals:    07/24/18 0836   BP: 138/67   Patient Site: Left Arm   Patient Position: Sitting   Cuff Size: Adult Regular   Resp: 16   Weight: 221 lb (100.2 kg)   Height: 6' (1.829 m)     General- patient is alert and oriented, in NAD, well-groomed, well-nourished  Psych- Judgment and insight normal, AOx4, recent and remote memory normal, mood and affect normal  Eyes- pupils are equal and reactive, conjunctiva is clear bilaterally, no ptosis is noted.   Respiratory- breathing is non-labored  Cardiovascular- extremities warm and well perfused, no peripheral edema or varicosities.  Musculoskeletal- gait is normal, extremities with no joint swelling, erythema, or warmth.  Neuro- normal strength, no gait abnormalities, normal sensation to pain, temperature, light touch.  Integumentary- no rashes, dermatitis or discolorations noted throughout, no open wounds noted.    Medications    Current Outpatient Prescriptions:      amLODIPine (NORVASC) 5 MG tablet, Take 5 mg by mouth daily., Disp: , Rfl:      aspirin 81 mg chewable tablet, Chew 81 mg daily., Disp: , Rfl:      latanoprost (XALATAN) 0.005 % ophthalmic solution, Administer 1 drop to the right eye at bedtime., Disp: , Rfl:      morphine (MS CONTIN) 15 MG 12 hr tablet, Take 1 tablet (15 mg total) by mouth 2 (two) times a day., Disp: 60 tablet, Rfl: 0     MULTIVITAMIN (MULTIPLE VITAMIN ORAL), Take 1 tablet by mouth daily., Disp: , Rfl:      omega-3/dha/epa/fish oil (FISH OIL-OMEGA-3 FATTY ACIDS) 300-1,000 mg capsule, Take 2 g by mouth daily., Disp: , Rfl:      oxyCODONE-acetaminophen (PERCOCET) 5-325 mg per tablet, Take 1 tablet by mouth every 4 (four) hours as needed for pain. 5/Day, Disp: 150 tablet, Rfl: 0     pantoprazole (PROTONIX) 40 MG tablet, Take 40 mg by mouth daily., Disp: , Rfl:      polyethylene glycol (MIRALAX) 17 gram packet, Take 17 g by mouth daily as needed., Disp: ,  Rfl:      triamterene-hydrochlorothiazide (DYAZIDE) 37.5-25 mg per capsule, Take 1 capsule by mouth every morning., Disp: , Rfl:      buprenorphine HCl 150 mcg Film, Apply 1 Film to cheek 2 (two) times a day., Disp: 60 each, Rfl: 0     hydrOXYzine HCl (ATARAX) 25 MG tablet, Take 1 tablet (25 mg total) by mouth 3 (three) times a day as needed for itching (withdrawl symptoms)., Disp: 90 tablet, Rfl: 0     ibuprofen (ADVIL,MOTRIN) 600 MG tablet, Take 1 tablet (600 mg total) by mouth every 6 (six) hours as needed for pain (take with food)., Disp: 30 tablet, Rfl: 0     TiZANidine (ZANAFLEX) 4 MG capsule, Take 1 capsule (4 mg total) by mouth 3 (three) times a day., Disp: 90 capsule, Rfl: 0    Lab:  Last UDS on 7/10/18 had expected results. Any abnormal results have been discussed with the patient and may change the plan of care. Please see the scanned document from the outside lab under the media tab. This was reviewed with the patient.      Imaging:  Any imaging viewed today was discussed with the patient on 7/24/2018  L4-L5: Moderate loss of T2 disc signal and moderate loss of interspace height with mild adjacent Modic type I endplate degenerative changes. There is mild broad-based annular bulge slightly asymmetric to the right. There is moderate facet arthropathy   with slight thickening of the ligamenta flava. These changes cause mild central spinal canal stenosis and moderate right-sided neural foraminal stenosis. The left neural foramen is widely patent.     L5-S1: Mild loss of T2 disc signal with preservation of the interspace height. No annular bulge or focal disc protrusion. Moderate facet arthropathy. No central spinal canal or neural foraminal stenosis.     IMPRESSION:   CONCLUSION:  1.  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     2.  In the lumbar spine there is moderate multilevel degenerative disc disease changes  present.     3.  At the L4-L5 level there is mild central spinal canal and moderate right-sided neural foraminal stenosis.     4.  At the L1-L2 level there is mild to moderate left-sided neural foraminal stenosis.      Recent   Dated 7/24/2018 was reviewed with the patient today.   Paper copy reviewed    Assessment:   Vernon Lemus is a 78 y.o. male seen in clinic today for   Chief Complaint   Patient presents with     Abdominal Pain         She presents to clinic today follow-up on his initial consultation currently he is looking at wanting to wean off of the opioids which is currently morphine extended release 15 mg twice daily as well as Percocet 5/325 up to 5 tablets per day as needed.  Patient does have a complicated history he has central canal stenosis lumbar spine as well as multiple levels of facet hypertrophy from L2-S1 bilaterally.  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  Patient is concerned about decrease his opioids with the increase in abdominal pain and the anxiety that is related to this.  Patient has tried medical cannabis the past and he did not find this helpful patient is also concerned about withdrawal type symptoms.  Today I have ordered the Belbuca to help transition him to lower doses of opioids and possibly as a treatment if it provides him adequate relief at low doses depending on his therapeutic response.  Patient's goal is to be off opioids altogether which we will try to support and achieved over time.  Supportive therapies were ordered for the patient's which included Vistaril and tizanidine patient also be candidate for medial branch blocks and RFA of his lumbar spine as well as physical therapy.   Physical therapy was ordered for [facility] for the patient request patient will follow-up within 30 days at that time we reevaluate the tapering schedule the Belbuca is been approved      They are here for follow up and continued medical management of their pain. Today we reviewed the lab work of the UDT test and the results are expected because of the prescribed narcotics found in the urine drug screen.     I have also reviewed the information obtained from the last visit encounter after the HEDY was signed and have asked any pertintent questions needed from other healthcare providers/family/patient to continue care and formulate a treatment plan.     Patients current MME is 67.5  Patient to call clinic for next month's prescription 5 days in advance. Based on the patients response to their previous narcotic therapy and quality of life, which includes their participation in ADLs, I recommend that the narcotics therapy continue, however the changes listed below will be incorporated into their plan of care.     Patient set goals to   1. Goal is to monitor the abdominal pain as the opioids are weaned and avoid the whipple surgery     Plan:   Interventions-    Follow up in 2-4  weeks to evaluate the effectiveness of the treatment interventions ordered today.    at that time the belbuca is hopefully approved and we will start the weaning process     Will refer you to [facility] for physical therapy of the low back and then monitor your progress you may need MBB and an RFA for the facet hypertrophy at levels L2-S1 bilaterally due to the arthritis    Will refill the morphine, percocet today for 30 days     Will provide support therapy with tizanidine for any withdrawal symptoms for you to start if you want to reduce the percocet that you are taking by 1 tablet per day for a week. I.e you are taking 5 per day and may want to reduce to 4 per day.     Will follow as you progress for the low back pain, upcoming imaging and pancreas tumor  that is being monitored.     Prescription Drug Management will be continued by the [facility]  A narcotic contract was signed by the patient and  Patient is unable to get narcotics from other providers. They will be subject to random UAs.      Orders placed today  Medications that were ordered today   Requested Prescriptions     Signed Prescriptions Disp Refills     buprenorphine HCl 150 mcg Film 60 each 0     Sig: Apply 1 Film to cheek 2 (two) times a day.     TiZANidine (ZANAFLEX) 4 MG capsule 90 capsule 0     Sig: Take 1 capsule (4 mg total) by mouth 3 (three) times a day.     hydrOXYzine HCl (ATARAX) 25 MG tablet 90 tablet 0     Sig: Take 1 tablet (25 mg total) by mouth 3 (three) times a day as needed for itching (withdrawl symptoms).     morphine (MS CONTIN) 15 MG 12 hr tablet 60 tablet 0     Sig: Take 1 tablet (15 mg total) by mouth 2 (two) times a day.     oxyCODONE-acetaminophen (PERCOCET) 5-325 mg per tablet 150 tablet 0     Sig: Take 1 tablet by mouth every 4 (four) hours as needed for pain. 5/Day     ibuprofen (ADVIL,MOTRIN) 600 MG tablet 30 tablet 0     Sig: Take 1 tablet (600 mg total) by mouth every 6 (six) hours as needed for pain (take with food).     No orders of the defined types were placed in this encounter.      UDT/SWAB:  Patient required a random Urine Drug Testing, due to the need to comply with [employer] Policy Guidelines and CDC Guideline for the use of any controlled substances. This is to ensure that patient is compliant with treatment, and monitor for risks such as diversion, abuse, or any other aberrant behaviors. Patient is either being considered for or taking a controlled substance. Unexpected findings will be discussed and treatment decision may be adjusted. Testing is being implemented across the board randomly w/o bias related to age, race, gender, socioeconomic status or Congregation affiliation.    The patient understand todays plan and has their questions  answered in regards to expectations and current treatment plan.     SAFETY REMINDERS  No alcohol while taking controlled substances. Alcohol is not an illegal substance, it is unsafe to use in combination. It is a build up of substances in the body that can be extremely hazardous and may cause respirations to slow to a dangerous rate resulting in hospitalization, brain damage, or death.    Opioid medications have been associated with sharp rise in unintentional overdose and death.  Overdose is a condition characterized by the consumption in excess of a particular drug causing adverse effects. This can happen b/c you are sick, accidentally or intentionally took an extra dose, are on multiple medication that can interact. Someone took your medication and they are not use to the medication.  Symptoms of overdose include:   !breathing slow and shallow, erratic or not at all  !pinpoint pupils, hallucinations  !confusion  !muscle jerks, slack muscles   !extreme sleepiness or loss of alertness   !awake but not able to talk   !face pale or clammy, vomiting, for lighter skinned people, the skin tone turns bluish purple, for darker skinned people, it turns grayish or ashen   If in a situation where overdose is a concern engage the emergency response system (dial 911).    In one study it was noted that 80% of unintentional overdoses occurred in people who were taking a combination of opioids and benzodiazepines.    Do not sell, loan, borrow or share your opioid medication with anyone. Deaths have occurred as a result of this practice. It is illegal and patients are being prosecuted.     Prevent unexpected access/loss of medication: Keep medication locked. Only carry what you need with you.    Nuvia Smith, [facility]  [address]  [place] [address]  Ph: [phone]  Fax: [phone]

## 2021-06-19 NOTE — PROGRESS NOTES
[facility]  New patient consultation        CC-  Chief Complaint   Patient presents with     Consult     abdominal pain       Vernon Lemus 78 y.o. is here today, sent to me by  to discuss the patients pain.  Associated symptoms with pain include for abdominal pain for the past 5 years, patient reports that he ate fried chicken and a beer and woke up with abdominal pain that hasn't gone away. Patient has had multiple workups for his abdominal pain including ERCP and gallbladder workup as well as continued duct exploration, patient has been diagnosed with a ductal neoplasm that may be the cause of the pain but currently is trying to decide on a surgical procedure  (whipple), he has tried the medical cannabis but did not find that helpful.      Alleviating factors: Include- medications   Aggravating factors: activity including bending, standing, laying down or lifting  The patient denies using any assistive devices to help with mobilization.  Pain level today: On a scale of 1-10, the patient rates their pain at a 5/10 described as a burning, dull and constant   Function Rating:This seems to affect his life on a daily bases as well as sleep.       HPI  Past Medical History:   Diagnosis Date     Abdominal pain      Cancer (H)     prostate     Chronic pancreatitis (H)      GERD (gastroesophageal reflux disease)      History of basal cell cancer     left arm     Hypertension      Melanoma (H)     removed from neck     Polyneuropathy (H)     both ankles and feet     Seasonal allergies      Shingles     right leg     Past Surgical History:   Procedure Laterality Date     CARPAL TUNNEL RELEASE Right      CATARACT EXTRACTION Bilateral      ERCP       ESOPHAGOGASTRODUODENOSCOPY N/A 8/11/2017    Procedure: ENDOSCOPIC ULTRASOUND;  Surgeon: Eleazar Verma MD;  Location: [facility] [place];  Service:      INCONTINENCE SURGERY       PROSTATECTOMY       removal of melanoma       urinary sphincter  transplant      with 2 subsequent revisions     Family History   Problem Relation Age of Onset     Family history unknown: Yes     History   Smoking Status     Former Smoker     Quit date: 8/10/1986   Smokeless Tobacco     Never Used     History   Alcohol Use No     History   Drug Use No     History   Sexual Activity     Sexual activity: Not on file       Chemical Dependency History: Patient Denies  tobacco use, alcohol use, illicit substance use including THC.  Denies any chemical dependency evaluation or treatment in the past.  Denies any legal issues related to substance use.    Psychiatric History:  Patient reports no current psychiatrist or psychologist.  Denies any suicidal ideation.  Denies any hospitalizations for mental illness. Endorses that he has anxiety and depression related to the pain    Pertinent Pain Medications:    He currently takes morphine 15 mg er two times a day, percocet 5/325 mg up to 5 per day. MME of 67.5      Current Outpatient Prescriptions:      amLODIPine (NORVASC) 5 MG tablet, Take 5 mg by mouth daily., Disp: , Rfl:      aspirin 81 mg chewable tablet, Chew 81 mg daily., Disp: , Rfl:      latanoprost (XALATAN) 0.005 % ophthalmic solution, Administer 1 drop to the right eye at bedtime., Disp: , Rfl:      morphine (MS CONTIN) 15 MG 12 hr tablet, Take 15 mg by mouth 2 (two) times a day., Disp: , Rfl: 0     MULTIVITAMIN (MULTIPLE VITAMIN ORAL), Take 1 tablet by mouth daily., Disp: , Rfl:      omega-3/dha/epa/fish oil (FISH OIL-OMEGA-3 FATTY ACIDS) 300-1,000 mg capsule, Take 2 g by mouth daily., Disp: , Rfl:      oxyCODONE-acetaminophen (PERCOCET) 5-325 mg per tablet, Take 1 tablet by mouth every 4 (four) hours as needed for pain. 5/Day, Disp: , Rfl:      pantoprazole (PROTONIX) 40 MG tablet, Take 40 mg by mouth daily., Disp: , Rfl:      polyethylene glycol (MIRALAX) 17 gram packet, Take 17 g by mouth daily as needed., Disp: , Rfl:      triamterene-hydrochlorothiazide (DYAZIDE) 37.5-25 mg  per capsule, Take 1 capsule by mouth every morning., Disp: , Rfl:     Therapeutic interventions previously tried-   Injections, anti-depressants and pregablins now opioids    Review of Systems:  12 point systems were reviewed with pt as documented on pt health form of 7/10/2018. ROS was positive for abdominal pain  the rest of the systems were pertinent negative.    Exam  Vitals:    07/10/18 1317   BP: 130/73   Patient Site: Right Arm   Patient Position: Sitting   Cuff Size: Adult Regular   Pulse: 69   Resp: 16   Weight: 221 lb (100.2 kg)   Height: 6' (1.829 m)     Constitutional-General:  Pleasant, straightforward, healthy appearing individual.   Psych-Mental Status: A & O in no acute distress. Speech is fluent.  Recent and remote memory are intact.  Attention span and concentration are normal. Displays appropriate mood and affect.   H,E,N,T- Symmetrical, Eyes- Perrla, Nares- patents bilaterally, throat- trachea is midline, airway is patent, unlabored respiratory effort.  Lymph-cervical lymph system (anterior and posterior) without palpable nodules or tenderness throughout. Supraclavicular chain without palpable nodules or tenderness throughout.   Cardiovascular- Regular S1, S2 rhythm without murmurs, gallops, clicks or rubs. legs and feet are warm.  Pulses are palpable and grade 2 at the posterior tibial arteries bilaterally, no edema noted.  Pulmonary- lungs are clear to auscultation throughout   Musckuloskeletal  Gait:  Gait is normal.   Gross Motor: Toe walking, heel walking, and Romberg tests are normal.   Strength:  Bilateral upper and lower extremity strength is normal and symmetric 5/5. No atrophy or tone abnormalities noted.   Sensation:  No loss of sensation noted to light touch in both upper and lower extremities. No dermatomal abnormal distributions noted.  Spine ROM:  Normal range of motion with no pain reproduction in the cervical, thoracic and lumbar spine.   Provocative Maneuvers:  Upper extremity,  Hip, sacroiliac, and knee provocative maneuvers are negative,Tinel's test negative bilaterally, Facet loading test negative bilaterally. Impingement tests of rotator cuff pathology, negative bilaterally. Knee exam: Ligaments test was negative, Sylvia test was negative. SLR test was negative bilaterally..   Palpation:  Inspection and palpatory examination of the spine and upper/lower extremities is unremarkable. Tenderness is noted in the RUQ of his abdominal wall with deep palpation.   Neuro:  Bilateral upper and lower extremity coordination and muscle stretch reflexes are physiologic and symmetric 2/4.  Babinski responses are downgoing.  No clonus bilaterally. Negative hoffmans sign bilaterally.   Integumentary: no rashes or breaks in the skin, no open wounds.     Lab:  Last UDS on 7/10/2018 was taken today and is pending.      Imaging:  No new imaging available to review    :  Dated 7/10/2018 was reviewed with the patient  Paper copy reviewed    Assessment -  Todays diagnosis includes   1. Chronic pain syndrome    2. RLQ abdominal pain        Patient presents the clinic today to establish care with the pain center.  Patient had previously been seen by [facility] as well as the [facility] have recommended there at the [facility] that the patient wean off of opioids as this may be contributing to his pain.  Upon questioning the patient reports he gets relief of his pain when he takes the pain medication but not as much as he would want or need to function fully.  Patient's wife is here as well she does support his decision in regards to opioid use however she would prefer that he go off of them as she reports he is very sleepy when he takes them.  Currently the direct correlation of his abdominal pain in the right upper quadrant has been from an unknown cause until recently when abdominal CT revealed a neoplasm, patient's medical team have offered him a surgical intervention such as a Whipple however  the patient is currently undecided on proceeding with this intervention at this time, patient reports that his doctor Dr. Alva has sent him here to the pain center to help manage his goal of weaning him off of opioids and helping him manage his pain.  He does did discuss with the patient today in the event that weaning the patient off of his opioids in a controlled manner with supportive therapy such as Vistaril, tizanidine, clonidine if needed and/or the use of Butrans or Belbuca to minimize any withdrawal symptoms while tapering him.  In the event that the patient has increased pain after weaning or tapering off the opioids completely patient feels that he be reevaluated for surgical intervention.    Patient has not decided as of yet if he wants to pursue the weaning taper through the [facility] pain center versus the [place] 17 day withdrawal program.  At this point the patient can decide work was signed today in regards to opioid management we did however draw a U DT as standard cares for a new consult for further evaluation in the event that opioid management is assumed by the pain center.    Of note patient has trialed medical cannabis recently which he did not find beneficial for his abdominal pain.  He is currently taking morphine extended release twice daily and reports that this is not helping as well as Percocet 5/325 up to 5 per day.     After reviewing the patients chart and physical findings I agree that the patient would benefit from what the pain center has to offer. I have discussed with the  patient today the diagnosis and treatment recommendations.  We reviewed the natural progression of this problem at great length.  Some possible benefits and detriments of physical therapy, chiropractic care, medication management, behavorial therapy, anti-inflammatory diet and other alternative treatments were discussed.  We also discussed future possible treatment options in a stepwise fashion, including  interventional pain procedures and injections and possible surgical referral if needed.      The patient understands that pain medication is not prescribed during the initial patient consultation at the pain center. If the patient is on pain medications for chronic pain, the PCP or prescribing provider may choose  to prescribe until the patient presents for follow up at the pain center. Medication prescriptions provided by the pain center, will depend on the UA results, safe prescribing practices established by the [facility] and patient response to their current treatment regimen at the follow up visit.      Patient set goals today in the office to achieve with the pain centers help. They are:  1. To get off of the opioids in the hopes that is what is causing his pain in his abdomen.     Plan Interventions recommended today:  Assessment tool- DAVID Score: 0  NDI Score: 0      Follow up in 2-3 weeks, we will discuss a pain treatment plan at that time, which may include narcotics and alternative therapies.     Sign a HEDY at the  so we can obtain your records for our next visit    Continue your current regimen of alleviating factors    Please see your current provider for any continued prescription, they may choose to provide you prescriptions of your narcotics until we have your urine screen back. They will continue to manage your general health and have requested you see the pain center for pain management. Please discuss any health concerns with your PCP     [place] Precautions are taken with every patient which includes a  report and drug screen. A UA will be taken today for baseline screening.     Behavioral Therapy-as needed, sometimes I use the therapist here at the clinic to help with patients anxiety and depression that is related to the pain.     Physical and Occupational Therapy-- not needed at this time     Injections-- sometimes trigger point injections can be performed for localized pain this may  not provide to much relief but can be trialed if you want to pursue.    MTM visit with the pharmacist- not needed today but if needed this is a service that is available here at the clinic    Non-opoid medical management includes- supportive medications for withdrawal include vistaril, tizanidine and sometimes clonidine     You have told me that you want to wean off of the opioids, we can support this goal of yours here at the clinic, one of the products that may help with this process is butrans or belbuca to reduce withdrawal efforts and or help with pain control.     Orders placed today   No orders of the defined types were placed in this encounter.      Patient reminders:   Diagnostics: UDT/SWAB collected 7/10/2018 and results are pending.  UDT/SWAB:  Patient required a random Urine Drug Testing, due to the need to comply with [place] Model Policy Guidelines and CDC Guideline for the use of any controlled substances. This is to ensure that patient is compliant with treatment, and monitor for risks such as diversion, abuse, or any other aberrant behaviors. Patient is either being considered for or taking a controlled substance. Unexpected findings will be discussed and treatment decision may be adjusted. Testing is being implemented across the board randomly w/o bias related to age, race, gender, socioeconomic status or Caodaism affiliation.     SAFETY REMINDERS  No alcohol while taking controlled substances. Alcohol is not an illegal substance, it is unsafe to use in combination. It is a build up of substances in the body that can be extremely hazardous and may cause respirations to slow to a dangerous rate resulting in hospitalization, brain damage, or death.    Opioid medications have been associated with sharp rise in unintentional overdose and death.  Overdose is a condition characterized by the consumption in excess of a particular drug causing adverse effects. This can happen b/c you are sick,  accidentally or intentionally took an extra dose, are on multiple medication that can interact. Someone took your medication and they are not use to the medication.  Symptoms of overdose include:   !breathing slow and shallow, erratic or not at all  !pinpoint pupils, hallucinations  !confusion  !muscle jerks, slack muscles   !extreme sleepiness or loss of alertness   !awake but not able to talk   !face pale or clammy, vomiting, for lighter skinned people, the skin tone turns bluish purple, for darker skinned people, it turns grayish or ashen   If in a situation where overdose is a concern engage the emergency response system (dial 911).    In one study it was noted that 80% of unintentional overdoses occurred in people who were taking a combination of opioids and benzodiazepines.    Do not sell, loan, borrow or share your opioid medication with anyone. Deaths have occurred as a result of this practice. It is illegal and patients are being prosecuted.     Prevent unexpected access/loss of medication: Keep medication locked. Only carry what you need with you.    The patient agrees to the plan and has no further questions, if questions arise the patient knows to call [phone].       Thank you for this consult and opportunity to assist with this patients care.    Nuvia Smith, [facility]  [address]  [place] [address]  Ph: [phone]  Fax: [phone]

## 2021-06-20 NOTE — PROGRESS NOTES
PAIN CLINIC FOLLOW UP PROGRESS NOTE    CC:  Chief Complaint   Patient presents with     Abdominal Pain       HPI  Vernon Lemus is a 78 y.o. male who presents for evaluation   Chief Complaint   Patient presents with     Abdominal Pain    that is causing continued pain. Since the last visit the patient denies any trips to the urgent care or ED specifically for their pain. The patient denies any new medications, diagnoses since the last visit. Specific questions indicated the patient wanted addressed today include: to follow up on his chronic pain as well as the ongoing taper dose.     Major issues:  1. Chronic pain syndrome    2. Chronic bilateral low back pain with bilateral sciatica    3. Abdominal pain, generalized      Today the pain is located in their abdominal pain and low back pain and is described as dull when it is aggravated it lasts for constant, and is rated at a 4 on a scale of 1-10, there is no change in the pain score after a complete transition off of the morphine to the belbuca.     Associated symptoms: Denies any loss of bladder control, fevers/chills, unintentional weight loss, weakness, numbness or pain that interferes with sleep.   Aggravating factors include: Unknown at this time it flares without any reasonable continence  Alleviating factors: Currently resting and pain medication  Adverse effects of medications: NONE   Functional symptoms: Patient reports that he naps frequently just because he is tired the pain does limit him socially as well as his ability to perform ADLs at times  Current subjective treatment efficacy: Fair      Previous Medical History  History   Alcohol Use No     History   Drug Use No     History   Smoking Status     Former Smoker     Quit date: 8/10/1986   Smokeless Tobacco     Never Used       Pertinent Pain Medications/interventions:  He currently takes  belbuca 300 mcg two times a day and percocet 5/325 mg up to 5 tabs per day     Review of Systems:  12 point  systems were reviewed with pt as documented on pt health form and the patient denies any new diagnosis or changes in 12 point system review since the last visit. 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    Physical Exam  Vitals:    10/09/18 0853   BP: 121/68   Patient Site: Right Arm   Patient Position: Sitting   Cuff Size: Adult Regular   Pulse: 78   Resp: 18   Weight: 221 lb (100.2 kg)   Height: 6' (1.829 m)     General- patient is alert and oriented, in NAD, well-groomed, well-nourished  Psych- Judgment and insight normal, AOx4, recent and remote memory normal, mood and affect normal  Eyes- pupils are equal and reactive, conjunctiva is clear bilaterally, no ptosis is noted.   Respiratory- breathing is non-labored  Cardiovascular- extremities warm and well perfused, no peripheral edema or varicosities.  Musculoskeletal- gait is normal, extremities with no joint swelling, erythema, or warmth.  Neuro- normal strength, no gait abnormalities, normal sensation to pain, temperature, light touch.  Integumentary- no rashes, dermatitis or discolorations noted throughout, no open wounds noted. Ongoing intermittent abdominal pain      Medications    Current Outpatient Prescriptions:      amLODIPine (NORVASC) 5 MG tablet, Take 5 mg by mouth daily., Disp: , Rfl:      aspirin 81 mg chewable tablet, Chew 81 mg daily., Disp: , Rfl:      latanoprost (XALATAN) 0.005 % ophthalmic solution, Administer 1 drop to the right eye at bedtime., Disp: , Rfl:      MULTIVITAMIN (MULTIPLE VITAMIN ORAL), Take 1 tablet by mouth daily., Disp: , Rfl:      omega-3/dha/epa/fish oil (FISH OIL-OMEGA-3 FATTY ACIDS) 300-1,000 mg capsule, Take 2 g by mouth daily., Disp: , Rfl:      oxybutynin (DITROPAN) 5 MG tablet, , Disp: , Rfl:      pantoprazole (PROTONIX) 40 MG tablet, Take 40 mg by mouth daily., Disp: , Rfl:      polyethylene glycol (MIRALAX) 17 gram packet, Take 17 g by mouth daily as needed., Disp: , Rfl:       triamterene-hydrochlorothiazide (DYAZIDE) 37.5-25 mg per capsule, Take 1 capsule by mouth every morning., Disp: , Rfl:      [START ON 11/1/2018] buprenorphine HCl 300 mcg Film, Apply 1 Film to cheek 2 (two) times a day., Disp: 60 each, Rfl: 0     [START ON 11/8/2018] oxyCODONE-acetaminophen (PERCOCET/ENDOCET) 5-325 mg per tablet, Take 1 tablet by mouth every 4 (four) hours as needed for pain. 5/Day, Disp: 150 tablet, Rfl: 0    Lab:  Last UDS on 7/24/2018 had expected results. Any abnormal results have been discussed with the patient and may change the plan of care. Please see the scanned document from the outside lab under the media tab. This was reviewed with the patient.      Imaging:  No new imaging.      Recent   Dated 10/9/2018 was reviewed with the patient today.   Paper copy reviewed    Assessment:   Vernon Lemus is a 78 y.o. male seen in clinic today for   Chief Complaint   Patient presents with     Abdominal Pain     She presents the pain Center today to follow-up on his chronic pain treatment plan.  His goal previously was to get off of the morphine extended relief 50 mg tablets he was taking twice a day.  Patient continues to have his diagnosis of ductal carcinoma for which he is following along with his primary care provider as well as his surgeon.  Patient was down to the [place] periodically for checkup in regards to this.  Patient's goal was to get off opioids because he was not sure if he was addicted to the pain medicine more so than needing it for pain relief.  As he is tapered now off of the morphine completely and has transition to Belbuca 300 mics twice a day and Percocet 5/325 up to 5 tablets/day he has noticed he still has pain but it is tolerable where it is on the current dose.  Patient also notes that the mental fogginess he experienced while on the morphine has been reversed.  Patient reports that his family is much more supportive of this current treatment plan as he does not seem  altered mentally.    Because the ongoing diagnosis and sporadic abdominal pain that interrupts his sleep patterns as well as activities we will continue this current regimen as he is doing quite well on it without side effects.  Patient denied any withdrawal symptoms as he weaned himself off of the morphine as well.  At this time the patient's total MME is 48 previously he was at an MME of 67 this is a reduction of 19.     Patients current MME is 48  Patient to call clinic for next month's prescription 5 days in advance. Based on the patients response to their previous narcotic therapy and quality of life, which includes their participation in ADLs, I recommend that the narcotics therapy continue, however the changes listed below will be incorporated into their plan of care.     Patient set goals to   1. To continue to treat his pain while he tapers down on the opioids.     Plan:   Interventions-     Follow up in 4-8 weeks to evaluate the effectiveness of the treatment interventions ordered today. If you are stable, meaning you pain is well managed can reschedule for 2 months out with a refill.     Ok to resume the current thearpy that you are on at belbuca 300 mcg two times a day and percocet 5/325 mg up to 5 per day as needed     Will destroy the morphine today that is remaining.     Prescription Drug Management will be continued by the [facility]  A narcotic contract was signed by the patient and  Patient is unable to get narcotics from other providers. They will be subject to random UAs.      Orders placed today  Medications that were ordered today   Requested Prescriptions     Signed Prescriptions Disp Refills     buprenorphine HCl 300 mcg Film 60 each 0     Sig: Apply 1 Film to cheek 2 (two) times a day.     oxyCODONE-acetaminophen (PERCOCET/ENDOCET) 5-325 mg per tablet 150 tablet 0     Sig: Take 1 tablet by mouth every 4 (four) hours as needed for pain. 5/Day     No orders of the defined types  were placed in this encounter.      UDT/SWAB:  Patient required a random Urine Drug Testing, due to the need to comply with [facility] Model Policy Guidelines and CDC Guideline for the use of any controlled substances. This is to ensure that patient is compliant with treatment, and monitor for risks such as diversion, abuse, or any other aberrant behaviors. Patient is either being considered for or taking a controlled substance. Unexpected findings will be discussed and treatment decision may be adjusted. Testing is being implemented across the board randomly w/o bias related to age, race, gender, socioeconomic status or [employer] affiliation.    The patient understand todays plan and has their questions answered in regards to expectations and current treatment plan.     SAFETY REMINDERS  No alcohol while taking controlled substances. Alcohol is not an illegal substance, it is unsafe to use in combination. It is a build up of substances in the body that can be extremely hazardous and may cause respirations to slow to a dangerous rate resulting in hospitalization, brain damage, or death.    Opioid medications have been associated with sharp rise in unintentional overdose and death.  Overdose is a condition characterized by the consumption in excess of a particular drug causing adverse effects. This can happen b/c you are sick, accidentally or intentionally took an extra dose, are on multiple medication that can interact. Someone took your medication and they are not use to the medication.  Symptoms of overdose include:   !breathing slow and shallow, erratic or not at all  !pinpoint pupils, hallucinations  !confusion  !muscle jerks, slack muscles   !extreme sleepiness or loss of alertness   !awake but not able to talk   !face pale or clammy, vomiting, for lighter skinned people, the skin tone turns bluish purple, for darker skinned people, it turns grayish or ashen   If in a situation where overdose is a concern  engage the emergency response system (dial 911).    In one study it was noted that 80% of unintentional overdoses occurred in people who were taking a combination of opioids and benzodiazepines.    Do not sell, loan, borrow or share your opioid medication with anyone. Deaths have occurred as a result of this practice. It is illegal and patients are being prosecuted.     Prevent unexpected access/loss of medication: Keep medication locked. Only carry what you need with you.    Nuvia Smith, [facility]  [address]  [place] [address]  Ph: [phone]  Fax: [phone]   [facility]

## 2021-06-20 NOTE — PROGRESS NOTES
Patient presents to the clinic today for a follow up with Nuvia Smith CNP regarding abdominal pain.

## 2021-06-20 NOTE — PROGRESS NOTES
PAIN CLINIC FOLLOW UP PROGRESS NOTE    CC:  Chief Complaint   Patient presents with     Abdominal Pain       HPI  Vernon Lemus is a 78 y.o. male who presents for evaluation   Chief Complaint   Patient presents with     Abdominal Pain    that is causing continued pain. Since the last visit the patient denies any trips to the urgent care or ED specifically for their pain. The patient denies any new medications, diagnoses since the last visit. Specific questions indicated the patient wanted addressed today include: to follow up on his pain issues in his abdominal area as well as the back pain. Patient does report that he never heard back from the pharmacy in regards to his belbuca, he does feel that when he takes the IBU it takes are of his back pain     Major issues:  1. Chronic pain syndrome    2. Chronic bilateral low back pain with bilateral sciatica    3. Abdominal pain, generalized    4. Continuous opioid dependence (H)        Today the pain is located in their abdominal wall and is described as abdominal pain when it is aggravated it lasts for constantly, and is rated at a 3 on a scale of 1-10  Associated symptoms: Denies any loss of bladder control, fevers/chills, unintentional weight loss, weakness, numbness or pain that interferes with sleep.   Aggravating factors include: eating  Alleviating factors: resting or sleeping, medications.     Adverse effects of medications: NONE     Functional symptoms:affects his ability to participate in ADLS such as yard work or socialize due to the discomfort when eating.     Current subjective treatment efficacy: Poor      Previous Medical History  History   Alcohol Use No     History   Drug Use No     History   Smoking Status     Former Smoker     Quit date: 8/10/1986   Smokeless Tobacco     Never Used       Pertinent Pain Medications/interventions:  He currently takes morphine ER 15 mg two times a day and percocet 5/325 mg up to 5 times per day as needed. And   mg as needed     Review of Systems:  12 point systems were reviewed with pt as documented on pt health form and the patient denies any new diagnosis or changes in 12 point system review since the last visit.     Physical Exam  Vitals:    08/21/18 0906   BP: 146/74   Patient Site: Left Arm   Patient Position: Sitting   Cuff Size: Adult Regular   Resp: 16   Weight: 221 lb (100.2 kg)   Height: 6' (1.829 m)     General- patient is alert and oriented, in NAD, well-groomed, well-nourished  Psych- Judgment and insight normal, AOx4, recent and remote memory normal, mood and affect normal  Eyes- pupils are equal and reactive, conjunctiva is clear bilaterally, no ptosis is noted.   Respiratory- breathing is non-labored  Cardiovascular- extremities warm and well perfused, no peripheral edema or varicosities.  Musculoskeletal- gait is normal, extremities with no joint swelling, erythema, or warmth.  Neuro- normal strength, no gait abnormalities, normal sensation to pain, temperature, light touch.  Integumentary- no rashes, dermatitis or discolorations noted throughout, no open wounds noted.    Medications    Current Outpatient Prescriptions:      amLODIPine (NORVASC) 5 MG tablet, Take 5 mg by mouth daily., Disp: , Rfl:      aspirin 81 mg chewable tablet, Chew 81 mg daily., Disp: , Rfl:      ibuprofen (ADVIL,MOTRIN) 600 MG tablet, Take 1 tablet (600 mg total) by mouth every 6 (six) hours as needed for pain (take with food)., Disp: 30 tablet, Rfl: 0     latanoprost (XALATAN) 0.005 % ophthalmic solution, Administer 1 drop to the right eye at bedtime., Disp: , Rfl:      MULTIVITAMIN (MULTIPLE VITAMIN ORAL), Take 1 tablet by mouth daily., Disp: , Rfl:      omega-3/dha/epa/fish oil (FISH OIL-OMEGA-3 FATTY ACIDS) 300-1,000 mg capsule, Take 2 g by mouth daily., Disp: , Rfl:      oxybutynin (DITROPAN) 5 MG tablet, , Disp: , Rfl:      pantoprazole (PROTONIX) 40 MG tablet, Take 40 mg by mouth daily., Disp: , Rfl:      polyethylene glycol  (MIRALAX) 17 gram packet, Take 17 g by mouth daily as needed., Disp: , Rfl:      TiZANidine (ZANAFLEX) 4 MG capsule, Take 1 capsule (4 mg total) by mouth 3 (three) times a day., Disp: 90 capsule, Rfl: 0     triamterene-hydrochlorothiazide (DYAZIDE) 37.5-25 mg per capsule, Take 1 capsule by mouth every morning., Disp: , Rfl:      buprenorphine HCl 150 mcg Film, Apply 1 Film to cheek 2 (two) times a day., Disp: 60 each, Rfl: 0     hydrOXYzine pamoate (VISTARIL) 25 MG capsule, Take 1 capsule (25 mg total) by mouth 3 (three) times a day as needed for itching., Disp: 90 capsule, Rfl: 0     [START ON 2018] morphine (MS CONTIN) 15 MG 12 hr tablet, Take 1 tablet (15 mg total) by mouth 2 (two) times a day., Disp: 60 tablet, Rfl: 0     [START ON 2018] oxyCODONE-acetaminophen (PERCOCET) 5-325 mg per tablet, Take 1 tablet by mouth every 4 (four) hours as needed for pain. 5/Day, Disp: 150 tablet, Rfl: 0    Lab:  Last UDS on 2018 had expected results. Any abnormal results have been discussed with the patient and may change the plan of care. Please see the scanned document from the outside lab under the media tab. This was reviewed with the patient.      Imaging:  No new imaging.      Recent   Dated 2018 was reviewed with the patient today.   [place] Date: 18  Query Report Page#: 1  Patient Rx History Report  TIMMY SIMEON  Search Criteria: Last Name 'timmy' and First Name 'hay' and  = '41' and Request Period = '17' to  '18' - 1 out of 1 Recipients Selected.  Fill Date Product, Str, Form Qty Days Pt ID Prescriber Written RX# N/R* Pharm **MED+  ---------- -------------------------------- ------------ ---- --------- ---------- ---------- ------------ ----- --------- ------  2018 LORAZEPAM 1 MG TABLET 1.078986 1 [id number] 2018 [id number]  [id number] 00.0  2018 OXYCODONE-ACETAMINOPHEN 5-325 150.498683 30 [id number] 2018  [id number] N [id number] 37.5  07/24/2018 MORPHINE SULF ER 15 MG TABLET 60.166164 30 [id number] 07/24/2018 [id number] N [id number] 30.0  07/10/2018 OXYCODONE-ACETAMINOPHEN 5-325 150.055945 30 [id number] 05/10/2018 [id number] N [id number] 37.5  06/22/2018 MORPHINE SULF ER 15 MG TABLET 60.591618 30 [id number] 06/19/2018 [id number] N [id number] 30.0  06/11/2018 OXYCODONE-ACETAMINOPHEN 5-325 150.432038 30 [id number] 06/10/2018 [id number] N [id number] 37.5  05/24/2018 MORPHINE SULF ER 15 MG TABLET 60.333899 30 [id number] 03/05/2018 [id number] N [id number] 30.0  05/15/2018 OXYCODONE-ACETAMINOPHEN 5-325 150.121467 30 [id number] 05/10/2018 [id number] N [id number] 37.5  04/25/2018 MORPHINE SULF ER 15 MG TABLET 60.294161 30 [id number] 04/23/2018 [id number] N [id number] 30.0  04/16/2018 OXYCODONE-ACETAMINOPHEN 5-325 150.815483 30 [id number] 04/16/2018 [id number] N [id number] 37.5  03/29/2018 MORPHINE SULF ER 15 MG TABLET 60.557577 30 [id number] 03/05/2018 [id number] N [id number] 30.0  03/29/2018 OXYCODONE-ACETAMINOPHEN 5-325 90.926380 30 [id number] 03/05/2018 [id number] N [id number] 22.5  02/27/2018 OXYCODONE-ACETAMINOPHEN 5-325 90.768293 30 [id number] 02/22/2018 [id number] N [id number] 22.5  02/27/2018 MORPHINE SULF ER 15 MG TABLET 60.363912 30 [id number] 01/29/2018 [id number] N [id number] 30.0  01/31/2018 MORPHINE SULF ER 15 MG TABLET 60.789898 30 [id number] 01/31/2018 [id number] N [id number] 30.0  01/31/2018 OXYCODONE-ACETAMINOPHEN 5-325 90.854348 30 [id number] 01/29/2018 [id number] N [id number] 22.5  01/23/2018 LORAZEPAM 1 MG TABLET 1.459812 1 [id number] 01/23/2018 [id number] N [id number] UNK  01/03/2018 OXYCODONE-ACETAMINOPHEN 5-325 90.943044 30 [id number] 01/02/2018 [id number] N [id number] 22.5  01/03/2018 MORPHINE SULF ER 15 MG TABLET 60.247406 30 [id number] 01/02/2018 [id number] N [id number] 30.0  12/11/2017 OXYCODONE-ACETAMINOPHEN 5-325  90.230420 30 [id number] 12/09/2017 [id number] N [id number] 22.5  12/09/2017 MORPHINE SULF ER 15 MG TABLET 60.975803 30 [id number] 10/02/2017 [id number] N [id number] 30.0  11/20/2017 LORAZEPAM 1 MG TABLET 1.220525 1 [id number] 11/20/2017 [id number] N [id number] UNK  11/13/2017 MORPHINE SULF ER 15 MG TABLET 60.516205 30 [id number] 10/02/2017 [id number] N [id number] 30.0  11/12/2017 OXYCODONE-ACETAMINOPHEN 5-325 90.409410 30 [id number] 10/02/2017 [id number] N [id number] 22.5  10/17/2017 MORPHINE SULF ER 15 MG TABLET 60.543619 30 [id number] 10/02/2017 [id number] N [id number] 30.0  10/16/2017 OXYCODONE-ACETAMINOPHEN 5-325 90.697189 30 [id number] 10/02/2017 [id number] N [id number] 22.5  09/20/2017 MORPHINE SULF ER 15 MG TABLET 60.311789 30 [id number] 09/20/2017 [id number] N [id number] 30.0  09/19/2017 OXYCODONE-ACETAMINOPHEN 5-325 90.959517 30 [id number] 07/14/2017 [id number] N [id number] 22.5  09/18/2017 LORAZEPAM 1 MG TABLET 1.544110 1 [id number] 09/18/2017 [id number] N [id number] UNK  08/22/2017 MORPHINE SULF ER 15 MG TABLET 60.318532 30 [id number] 08/18/2017 [id number] N [id number] 30.0  08/22/2017 OXYCODONE-ACETAMINOPHEN 5-325 90.378357 30 [id number] 07/14/2017 [id number] N [id number] 22.5  *N/R N=New R=Refill  +MED Daily  Prescribers for prescriptions listed  ----------------------------------------------------------------------------------------------------------------------------------  [id number] RU BELLE MD; [address]  **Per CDC guidance, the conversion factors and associated daily morphine milligram equivalents for drugs prescribed as part of  medication-assisted treatment for opioid use disorder should not be used to benchmark against dosage thresholds meant for opioids  prescribed for pain.  Report Disclaimers:  The report provided above is based upon the  search criteria and the data provided by the dispensing entities. For more information  about any prescription, please contact the dispenser or the prescriber.  This report contains confidential information, including patient identifiers, and is not a public record. The information on this  report must be treated as protected health information and is to be disclosed to others only as authorized by applicable state  and Federal regulations.    Assessment:   Vernon Lemus is a 78 y.o. male seen in clinic today for   Chief Complaint   Patient presents with     Abdominal Pain     Patient presents today for continued desire to treat his ongoing pain and reduce his opioid dependency. Patient tried the Zanaflex at home and experienced extreme dizziness. Patient also reports that there are days that he takes the percocet up to the max allowed of 5 but also reports that he takes 3-4 on most days. The patient would like to have an alternative treatment for his pain instead of the opioids he is dependent on. Patient notes that when he goes to long between doses he feels anxious. Likely this is due to opioid dependency issues. Will supply him with vistaril for these times as we move forward to wean him down as well as order belbuca. Will have the patient stop the zanaflex due to dizziness and increased fall risk.     Will have the patient reduce opioids once the belbuca has been ongoing for 2 weeks, allowing a time frame to check for reactions, S/E as well as tolerance.     Patients current MME is 67.5  Patient to call clinic for next month's prescription 5 days in advance. Based on the patients response to their previous narcotic therapy and quality of life, which includes their participation in ADLs, I recommend that the narcotics therapy continue, however the changes listed below will be incorporated into their plan of care.     Patient set goals to   1. To manage his ongoing pain in his abdomin that is secondary to      Plan:   Interventions-    Follow up in 3-4 weeks to evaluate the effectiveness of the treatment interventions ordered today. OVL    Will start the Belbuca today 150 mcg/hr ok to start this while using your other opioids to allow this to build up    Therapy- PT/OT- this has been completed     Continue to use the IBU as needed for back pain, take this with food    Stop taking tizanidine    Ok to continue using the morphine ER and the percocet 5/325mg up to 5 per day.     Ok to use the vistaril for withdrawal symptoms.     Prescription Drug Management will be continued by the [facility]  A narcotic contract was signed by the patient and  Patient is unable to get narcotics from other providers. They will be subject to random UAs.      Orders placed today  Medications that were ordered today   Requested Prescriptions     Signed Prescriptions Disp Refills     morphine (MS CONTIN) 15 MG 12 hr tablet 60 tablet 0     Sig: Take 1 tablet (15 mg total) by mouth 2 (two) times a day.     oxyCODONE-acetaminophen (PERCOCET) 5-325 mg per tablet 150 tablet 0     Sig: Take 1 tablet by mouth every 4 (four) hours as needed for pain. 5/Day     ibuprofen (ADVIL,MOTRIN) 600 MG tablet 30 tablet 0     Sig: Take 1 tablet (600 mg total) by mouth every 6 (six) hours as needed for pain (take with food).     buprenorphine HCl 150 mcg Film 60 each 0     Sig: Apply 1 Film to cheek 2 (two) times a day.     hydrOXYzine pamoate (VISTARIL) 25 MG capsule 90 capsule 0     Sig: Take 1 capsule (25 mg total) by mouth 3 (three) times a day as needed for itching.     No orders of the defined types were placed in this encounter.      UDT/SWAB:  Patient required a random Urine Drug Testing, due to the need to comply with [place] [employer] Policy Guidelines and CDC Guideline for the use of any controlled substances. This is to ensure that patient is compliant with treatment, and monitor for risks such as diversion, abuse, or any other aberrant  behaviors. Patient is either being considered for or taking a controlled substance. Unexpected findings will be discussed and treatment decision may be adjusted. Testing is being implemented across the board randomly w/o bias related to age, race, gender, socioeconomic status or Protestant affiliation.    The patient understand todays plan and has their questions answered in regards to expectations and current treatment plan.     SAFETY REMINDERS  No alcohol while taking controlled substances. Alcohol is not an illegal substance, it is unsafe to use in combination. It is a build up of substances in the body that can be extremely hazardous and may cause respirations to slow to a dangerous rate resulting in hospitalization, brain damage, or death.    Opioid medications have been associated with sharp rise in unintentional overdose and death.  Overdose is a condition characterized by the consumption in excess of a particular drug causing adverse effects. This can happen b/c you are sick, accidentally or intentionally took an extra dose, are on multiple medication that can interact. Someone took your medication and they are not use to the medication.  Symptoms of overdose include:   !breathing slow and shallow, erratic or not at all  !pinpoint pupils, hallucinations  !confusion  !muscle jerks, slack muscles   !extreme sleepiness or loss of alertness   !awake but not able to talk   !face pale or clammy, vomiting, for lighter skinned people, the skin tone turns bluish purple, for darker skinned people, it turns grayish or ashen   If in a situation where overdose is a concern engage the emergency response system (dial 911).    In one study it was noted that 80% of unintentional overdoses occurred in people who were taking a combination of opioids and benzodiazepines.    Do not sell, loan, borrow or share your opioid medication with anyone. Deaths have occurred as a result of this practice. It is illegal and patients are  being prosecuted.     Prevent unexpected access/loss of medication: Keep medication locked. Only carry what you need with you.    Nuvia Smith, [facility]  [address]  [place] [address]  Ph: [phone]  Fax: [phone]

## 2021-06-20 NOTE — PROGRESS NOTES
PAIN CLINIC FOLLOW UP PROGRESS NOTE    CC:  Chief Complaint   Patient presents with     Abdominal Pain       HPI  Vernon Lemus is a 78 y.o. male who presents for evaluation   Chief Complaint   Patient presents with     Abdominal Pain    that is causing continued pain. Since the last visit the patient denies any trips to the urgent care or ED specifically for their pain. The patient denies any new medications, diagnoses since the last visit. Specific questions indicated the patient wanted addressed today include: to address his ongoing pain in his back and abdominal area.     Major issues:  1. Chronic pain syndrome    2. Chronic bilateral low back pain with bilateral sciatica    3. Abdominal pain, generalized      Today the pain is located in their abdominal pain and low back pain and is described as dull when it is aggravated it lasts for constant, and is rated at a 4 on a scale of 1-10  Associated symptoms: Denies any loss of bladder control, fevers/chills, unintentional weight loss, weakness, numbness or pain that interferes with sleep.   Aggravating factors include: Unknown at this time it flares without any reasonable continence  Alleviating factors: Currently resting and pain medication  Adverse effects of medications: NONE   Functional symptoms: Patient reports that he naps frequently just because he is tired the pain does limit him socially as well as his ability to perform ADLs at times  Current subjective treatment efficacy: Fair      Previous Medical History  History   Alcohol Use No     History   Drug Use No     History   Smoking Status     Former Smoker     Quit date: 8/10/1986   Smokeless Tobacco     Never Used       Pertinent Pain Medications/interventions:  He currently takes morphine 15 mg two times a day and percocet 3-5 per day as needed and belbuca 150 mcg two times a day .     Also taking the IBU OTC and vistaril as needed.     Review of Systems:  12 point systems were reviewed with pt as  documented on pt health form and the patient denies any new diagnosis or changes in 12 point system review since the last visit.  Patient does report he had an updated abdominal image at [facility] for his neoplasm and reports that it is stable    Physical Exam  Vitals:    09/25/18 0857   BP: 119/61   Patient Site: Right Arm   Patient Position: Sitting   Cuff Size: Adult Regular   Pulse: 80   Resp: 18   Weight: 221 lb (100.2 kg)   Height: 6' (1.829 m)     General- patient is alert and oriented, in NAD, well-groomed, well-nourished  Psych- Judgment and insight normal, AOx4, recent and remote memory normal, mood and affect normal  Eyes- pupils are equal and reactive, conjunctiva is clear bilaterally, no ptosis is noted.   Respiratory- breathing is non-labored  Cardiovascular- extremities warm and well perfused, no peripheral edema or varicosities.  Musculoskeletal- gait is normal, extremities with no joint swelling, erythema, or warmth.  Neuro- normal strength, no gait abnormalities, normal sensation to pain, temperature, light touch.  Integumentary- no rashes, dermatitis or discolorations noted throughout, no open wounds noted.    Medications    Current Outpatient Prescriptions:      amLODIPine (NORVASC) 5 MG tablet, Take 5 mg by mouth daily., Disp: , Rfl:      aspirin 81 mg chewable tablet, Chew 81 mg daily., Disp: , Rfl:      buprenorphine HCl 300 mcg Film, Apply 1 Film to cheek 2 (two) times a day., Disp: 60 each, Rfl: 0     latanoprost (XALATAN) 0.005 % ophthalmic solution, Administer 1 drop to the right eye at bedtime., Disp: , Rfl:      MULTIVITAMIN (MULTIPLE VITAMIN ORAL), Take 1 tablet by mouth daily., Disp: , Rfl:      omega-3/dha/epa/fish oil (FISH OIL-OMEGA-3 FATTY ACIDS) 300-1,000 mg capsule, Take 2 g by mouth daily., Disp: , Rfl:      oxybutynin (DITROPAN) 5 MG tablet, , Disp: , Rfl:      pantoprazole (PROTONIX) 40 MG tablet, Take 40 mg by mouth daily., Disp: , Rfl:      polyethylene glycol  (MIRALAX) 17 gram packet, Take 17 g by mouth daily as needed., Disp: , Rfl:      triamterene-hydrochlorothiazide (DYAZIDE) 37.5-25 mg per capsule, Take 1 capsule by mouth every morning., Disp: , Rfl:      [START ON 10/1/2018] buprenorphine HCl 300 mcg Film, Apply 1 Film to cheek 2 (two) times a day., Disp: 60 each, Rfl: 0     morphine (MS CONTIN) 15 MG 12 hr tablet, Take 1 tablet (15 mg total) by mouth daily for 14 days., Disp: 14 tablet, Rfl: 0     [START ON 10/8/2018] oxyCODONE-acetaminophen (PERCOCET/ENDOCET) 5-325 mg per tablet, Take 1 tablet by mouth every 4 (four) hours as needed for pain. 5/Day, Disp: 150 tablet, Rfl: 0    Lab:  Last UDS on  had expected results. Any abnormal results have been discussed with the patient and may change the plan of care. Please see the scanned document from the outside lab under the media tab. This was reviewed with the patient.      Imaging:  No new imaging.      Recent   Dated 2018 was reviewed with the patient today.   [employer] [place] Date: 18  Query Report Page#: 1  Patient Rx History Report  TIMMY SIMEON  Search Criteria: Last Name 'timmy' and First Name 'hay' and  = ' and Request Period = '17' to  '18' - 1 out of 1 Recipients Selected.  Fill Date Product, Str, Form Qty Days Pt ID Prescriber Written RX# N/R* Pharm **MED+  ---------- -------------------------------- ------------ ---- --------- ---------- ---------- ------------ ----- --------- ------  2018 MORPHINE SULF ER 15 MG TABLET 12.953717 6 [id number] 2018 [id number] N [id number] 30.0  2018 OXYCODONE-ACETAMINOPHEN 5-325 150.949209 30 [id number] 2018 [id number] N [id number] 37.5  2018 BELBUCA 150 MCG FILM 60.656150 30 [id number] 2018 [id number] N [id number] UNK  2018 MORPHINE SULF ER 15 MG TABLET 60.987215 30 [id number] 2018 [id number] N [id number] 30.0  2018 LORAZEPAM 1 MG TABLET  1.389161 1 [id number] 08/12/2018 [id number] N [id number] 00.0  08/06/2018 OXYCODONE-ACETAMINOPHEN 5-325 150.931269 30 [id number] 07/24/2018 [id number] N [id number] 37.5  07/24/2018 MORPHINE SULF ER 15 MG TABLET 60.318859 30 [id number] 07/24/2018 [id number] N [id number] 30.0  07/10/2018 OXYCODONE-ACETAMINOPHEN 5-325 150.258881 30 [id number] 05/10/2018 [id number] N [id number] 37.5  06/22/2018 MORPHINE SULF ER 15 MG TABLET 60.023075 30 [id number] 06/19/2018 [id number] N [id number] 30.0  06/11/2018 OXYCODONE-ACETAMINOPHEN 5-325 150.639072 30 [id number] 06/10/2018 [id number] N [id number] 37.5  05/24/2018 MORPHINE SULF ER 15 MG TABLET 60.579153 30 [id number] 03/05/2018 [id number] N [id number] 30.0  05/15/2018 OXYCODONE-ACETAMINOPHEN 5-325 150.851227 30 [id number] 05/10/2018 [id number] N [id number] 37.5  04/25/2018 MORPHINE SULF ER 15 MG TABLET 60.500171 30 [id number] 04/23/2018 [id number] N [id number] 30.0  04/16/2018 OXYCODONE-ACETAMINOPHEN 5-325 150.977415 30 [id number] 04/16/2018 [id number] N [id number] 37.5  03/29/2018 MORPHINE SULF ER 15 MG TABLET 60.035480 30 [id number] 03/05/2018 [id number] N [id number] 30.0  03/29/2018 OXYCODONE-ACETAMINOPHEN 5-325 90.932202 30 [id number] 03/05/2018 [id number] N [id number] 22.5  02/27/2018 OXYCODONE-ACETAMINOPHEN 5-325 90.362057 30 [id number] 02/22/2018 [id number] N [id number] 22.5  02/27/2018 MORPHINE SULF ER 15 MG TABLET 60.139553 30 [id number] 01/29/2018 [id number] N [id number] 30.0  01/31/2018 MORPHINE SULF ER 15 MG TABLET 60.850452 30 [id number] 01/31/2018 [id number] N [id number] 30.0  01/31/2018 OXYCODONE-ACETAMINOPHEN 5-325 90.753646 30 [id number] 01/29/2018 [id number] N [id number] 22.5  01/23/2018 LORAZEPAM 1 MG TABLET 1.633907 1 [id number] 01/23/2018 [id number] N [id number] UNK  01/03/2018 OXYCODONE-ACETAMINOPHEN 5-325 90.946990 30 [id number] 01/02/2018 [id number] N [id number]  22.5  01/03/2018 MORPHINE SULF ER 15 MG TABLET 60.156436 30 [id number] 01/02/2018 [id number] N [id number] 30.0  12/11/2017 OXYCODONE-ACETAMINOPHEN 5-325 90.278781 30 [id number] 12/09/2017 [id number] N [id number] 22.5  12/09/2017 MORPHINE SULF ER 15 MG TABLET 60.072238 30 [id number] 10/02/2017 [id number] N [id number] 30.0  11/20/2017 LORAZEPAM 1 MG TABLET 1.893301 1 [id number] 11/20/2017 [id number] N [id number] UNK  11/13/2017 MORPHINE SULF ER 15 MG TABLET 60.111589 30 [id number] 10/02/2017 [id number] N [id number] 30.0  11/12/2017 OXYCODONE-ACETAMINOPHEN 5-325 90.755549 30 [id number] 10/02/2017 [id number] N [id number] 22.5  10/17/2017 MORPHINE SULF ER 15 MG TABLET 60.425203 30 [id number] 10/02/2017 [id number] N [id number] 30.0  10/16/2017 OXYCODONE-ACETAMINOPHEN 5-325 90.388368 30 [id number] 10/02/2017 [id number] N [id number] 22.5  *N/R N=New R=Refill  +MED Daily  Prescribers for prescriptions listed  ----------------------------------------------------------------------------------------------------------------------------------  [id number] RU BELLE MD; [facility] [place] [facility] [id number] [facility]  [id number] JONATHAN FOSS MD; [facility], [address], [place] [address]  **Per CDC guidance, the conversion factors and associated daily morphine milligram equivalents for drugs prescribed as part of  medication-assisted treatment for opioid use disorder should not be used to benchmark against dosage thresholds meant for opioids  prescribed for pain.  Report Disclaimers:  The report provided above is based upon the search criteria and the data provided by the dispensing entities. For more information  about any prescription, please contact the dispenser or the prescriber.  This report contains confidential information, including patient identifiers, and is not a public record. The information on  this  report must be treated as protected health information and is to be disclosed to others only as authorized by applicable state  and Federal regulations.    Assessment:   Vernon Lemus is a 78 y.o. male seen in clinic today for   Chief Complaint   Patient presents with     Abdominal Pain     Patient presents to clinic today to follow-up on his chronic pain treatment plan.  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  Patient has seen a surgeon for evaluation and consideration of a Whipple however the patient would like to wait as long as possible.  Patient's main goal here at the pain center is to reduce or eliminate his opioid use completely.  Patient is currently taking morphine 50 mg twice daily as well as Percocet 5/325 up to 5 per day.  Patient has recently started Belbuca 150 mics twice daily and denies any side effects at this time.  At this time I will increase the dose of Belbuca to 300 mics twice daily and decrease the use of morphine to only 15 mg per day.  Patient is to do this for 1 week and then stop the use of the morphine altogether while continuing the Belbuca 300 mics twice daily and the Percocet 5/325 up to 5 per day as needed.  Patient does report he only uses most the time 3-4 tablets per day of the Percocet.  At the time we will have him reevaluate by coming back in for an assessment.    Patient states over the last few days he has had good days and bad days and also states that there have been a few days he is eliminated the extra dose of morphine he denies any side effects or withdrawals.     The goal would be to eliminate all use of Percocet and morphine will reevaluate how he is doing on the Belbuca as a stand-alone as currently his diagnosis is stable but will continue to flare at times.     Patients current MME is 70  Patient to call clinic for next month's prescription 5 days in advance. Based on the patients response to their previous  narcotic therapy and quality of life, which includes their participation in ADLs, I recommend that the narcotics therapy continue, however the changes listed below will be incorporated into their plan of care.     Patient set goals to   1.To reduce his use of opioids and manage his abdominal pain without the use of opioids if possible.     Plan:   Interventions-    Follow up in 2 weeks to evaluate the effectiveness of the treatment interventions ordered today.     Please give me a call 1 week after you have reduced the morphine to 1 tab per day to monitor the pain level and any withdrawal symptoms.  If this goes well you can stop taking the morphine, you may have 7 days remaining that need to be destroyed in clinic.     Ok to increase the belbuca starting on Monday to 300 mcg two times a day     Continue taking the percocet 5/35 mg up to 5 per day if needed.     Prescription Drug Management will be continued by the [facility]  A narcotic contract was signed by the patient and  Patient is unable to get narcotics from other providers. They will be subject to random UAs.      Orders placed today  Medications that were ordered today   Requested Prescriptions     Signed Prescriptions Disp Refills     oxyCODONE-acetaminophen (PERCOCET/ENDOCET) 5-325 mg per tablet 150 tablet 0     Sig: Take 1 tablet by mouth every 4 (four) hours as needed for pain. 5/Day     morphine (MS CONTIN) 15 MG 12 hr tablet 14 tablet 0     Sig: Take 1 tablet (15 mg total) by mouth daily for 14 days.     buprenorphine HCl 300 mcg Film 60 each 0     Sig: Apply 1 Film to cheek 2 (two) times a day.     No orders of the defined types were placed in this encounter.      UDT/SWAB:  Patient required a random Urine Drug Testing, due to the need to comply with [place] [employer] Policy Guidelines and CDC Guideline for the use of any controlled substances. This is to ensure that patient is compliant with treatment, and monitor for risks such as  diversion, abuse, or any other aberrant behaviors. Patient is either being considered for or taking a controlled substance. Unexpected findings will be discussed and treatment decision may be adjusted. Testing is being implemented across the board randomly w/o bias related to age, race, gender, socioeconomic status or Oriental orthodox affiliation.    The patient understand todays plan and has their questions answered in regards to expectations and current treatment plan.     SAFETY REMINDERS  No alcohol while taking controlled substances. Alcohol is not an illegal substance, it is unsafe to use in combination. It is a build up of substances in the body that can be extremely hazardous and may cause respirations to slow to a dangerous rate resulting in hospitalization, brain damage, or death.    Opioid medications have been associated with sharp rise in unintentional overdose and death.  Overdose is a condition characterized by the consumption in excess of a particular drug causing adverse effects. This can happen b/c you are sick, accidentally or intentionally took an extra dose, are on multiple medication that can interact. Someone took your medication and they are not use to the medication.  Symptoms of overdose include:   !breathing slow and shallow, erratic or not at all  !pinpoint pupils, hallucinations  !confusion  !muscle jerks, slack muscles   !extreme sleepiness or loss of alertness   !awake but not able to talk   !face pale or clammy, vomiting, for lighter skinned people, the skin tone turns bluish purple, for darker skinned people, it turns grayish or ashen   If in a situation where overdose is a concern engage the emergency response system (dial 911).    In one study it was noted that 80% of unintentional overdoses occurred in people who were taking a combination of opioids and benzodiazepines.    Do not sell, loan, borrow or share your opioid medication with anyone. Deaths have occurred as a result of this  practice. It is illegal and patients are being prosecuted.     Prevent unexpected access/loss of medication: Keep medication locked. Only carry what you need with you.    Nuvia Smith, [facility]  [address]  [place] [address]  Ph: [phone]  Fax: [phone]

## 2021-06-21 NOTE — PROGRESS NOTES
PAIN CLINIC FOLLOW UP PROGRESS NOTE    CC:  Chief Complaint   Patient presents with     Abdominal Pain       HPI  Vernon Lemus is a 78 y.o. male who presents for evaluation   Chief Complaint   Patient presents with     Abdominal Pain    that is causing continued pain. Since the last visit the patient denies any trips to the urgent care or ED specifically for their pain. The patient denies any new medications, diagnoses since the last visit. Specific questions indicated the patient wanted addressed today include: to follow up on his ongoing pain control that is related to his back pain and his abdominal pain     Major issues:  1. Chronic pain syndrome    2. Chronic bilateral low back pain with bilateral sciatica    3. Abdominal pain, generalized      Today the pain is located in their abdominal pain and low back pain and is described as dull when it is aggravated it lasts for constant, and is rated at a 5 on a scale of 1-10, there is no change in the pain score after a complete transition off of the morphine to the belbuca.      Associated symptoms: Denies any loss of bladder control, fevers/chills, unintentional weight loss, weakness, numbness or pain that interferes with sleep.   Aggravating factors include: Unknown at this time it flares without any reasonable continence  Alleviating factors: Currently resting and pain medication  Adverse effects of medications: NONE   Functional symptoms: Patient reports that he naps frequently just because he is tired the pain does limit him socially as well as his ability to perform ADLs at times  Current subjective treatment efficacy: Fair      Previous Medical History  History   Alcohol Use No     History   Drug Use No     History   Smoking Status     Former Smoker     Quit date: 8/10/1986   Smokeless Tobacco     Never Used       Pertinent Pain Medications/interventions:  He currently takes  belbuca 300 mcg two times a day and percocet 5/325 mg up to 5 tabs per day      Review of Systems:  12 point systems were reviewed with pt as documented on pt health form and the patient denies any new diagnosis or changes in 12 point system review since the last visit.     Physical Exam  Vitals:    11/07/18 0905   BP: 134/78   Patient Site: Right Arm   Patient Position: Sitting   Cuff Size: Adult Regular   Pulse: 77   Resp: 16   Weight: 221 lb (100.2 kg)   Height: 6' (1.829 m)     General- patient is alert and oriented, in NAD, well-groomed, well-nourished  Psych- Judgment and insight normal, AOx4, recent and remote memory normal, mood and affect normal  Eyes- pupils are equal and reactive, conjunctiva is clear bilaterally, no ptosis is noted.   Respiratory- breathing is non-labored  Cardiovascular- extremities warm and well perfused, no peripheral edema or varicosities.  Musculoskeletal- gait is normal, extremities with no joint swelling, erythema, or warmth.  Neuro- normal strength, no gait abnormalities, normal sensation to pain, temperature, light touch.  Abdominal pain noted  Integumentary- no rashes, dermatitis or discolorations noted throughout, no open wounds noted.    Medications    Current Outpatient Prescriptions:      amLODIPine (NORVASC) 5 MG tablet, Take 5 mg by mouth daily., Disp: , Rfl:      aspirin 81 mg chewable tablet, Chew 81 mg daily., Disp: , Rfl:      buprenorphine HCl 300 mcg Film, Apply 1 Film to cheek 2 (two) times a day., Disp: 60 each, Rfl: 0     latanoprost (XALATAN) 0.005 % ophthalmic solution, Administer 1 drop to the right eye at bedtime., Disp: , Rfl:      MULTIVITAMIN (MULTIPLE VITAMIN ORAL), Take 1 tablet by mouth daily., Disp: , Rfl:      omega-3/dha/epa/fish oil (FISH OIL-OMEGA-3 FATTY ACIDS) 300-1,000 mg capsule, Take 2 g by mouth daily., Disp: , Rfl:      oxybutynin (DITROPAN) 5 MG tablet, , Disp: , Rfl:      pantoprazole (PROTONIX) 40 MG tablet, Take 40 mg by mouth daily., Disp: , Rfl:      polyethylene glycol (MIRALAX) 17 gram packet, Take 17 g by  mouth daily as needed., Disp: , Rfl:      triamterene-hydrochlorothiazide (DYAZIDE) 37.5-25 mg per capsule, Take 1 capsule by mouth every morning., Disp: , Rfl:      HYDROcodone-acetaminophen 5-325 mg per tablet, Take 1 tablet by mouth 4 (four) times a day as needed for pain (take 2 in the am to start your day)., Disp: 120 tablet, Rfl: 0     ketorolac (TORADOL) 10 mg tablet, Take 1 tablet (10 mg total) by mouth daily for 10 days., Disp: 10 tablet, Rfl: 0    Lab:  Last UDS on  had expected results. Any abnormal results have been discussed with the patient and may change the plan of care. Please see the scanned document from the outside lab under the media tab. This was reviewed with the patient.      Imaging:  No new imaging.      Recent   Dated 2018 was reviewed with the patient today.   [facility] [place] Date: 18  Query Report Page#: 1  Patient Rx History Report  TIMMY SIMEON  Search Criteria: Last Name 'liang' and First Name 'hay' and  = ' and Request Period = ' to  ' - 1 out of 1 Recipients Selected.  Fill Date Product, Str, Form Qty Days Pt ID Prescriber Written RX# N/R* Pharm **MED+  ---------- -------------------------------- ------------ ---- --------- ---------- ---------- ------------ ----- --------- ------  2018 BELBUCA 300 MCG FILM 60.944473 30 [id number] 10/09/2018 [id number] N [id number] UNK  10/08/2018 OXYCODONE-ACETAMINOPHEN 5-325 150.917907 30 [id number] 2018 [id number] N [id number] 37.5  10/01/2018 BELBUCA 300 MCG FILM 60.690284 30 [id number] 2018 [id number] N [id number] UNK  2018 MORPHINE SULF ER 15 MG TABLET 14.946068 14 [id number] 2018 [id number] N [id number] 15.0  2018 MORPHINE SULF ER 15 MG TABLET 12.440124 6 [id number] 2018 [id number] N [id number] 30.0  2018 OXYCODONE-ACETAMINOPHEN 5-325 150.469130 30 [id number] 2018 [id number] N [id number]  37.5  09/03/2018 BELBUCA 150 MCG FILM 60.824368 30 [id number] 08/21/2018 [id number] N [id number] UNK  08/23/2018 MORPHINE SULF ER 15 MG TABLET 60.483950 30 [id number] 08/21/2018 [id number] N [id number] 30.0  08/12/2018 LORAZEPAM 1 MG TABLET 1.530914 1 [id number] 08/12/2018 [id number] N [id number] 00.0  08/06/2018 OXYCODONE-ACETAMINOPHEN 5-325 150.343427 30 [id number] 07/24/2018 [id number] N [id number] 37.5  07/24/2018 MORPHINE SULF ER 15 MG TABLET 60.247568 30 [id number] 07/24/2018 [id number] N [id number] 30.0  07/10/2018 OXYCODONE-ACETAMINOPHEN 5-325 150.032784 30 [id number] 05/10/2018 [id number] N [id number] 37.5  06/22/2018 MORPHINE SULF ER 15 MG TABLET 60.105689 30 [id number] 06/19/2018 [id number] N [id number] 30.0  06/11/2018 OXYCODONE-ACETAMINOPHEN 5-325 150.452541 30 [id number] 06/10/2018 [id number] N [id number] 37.5  05/24/2018 MORPHINE SULF ER 15 MG TABLET 60.617940 30 [id number] 03/05/2018 [id number] N [id number] 30.0  05/15/2018 OXYCODONE-ACETAMINOPHEN 5-325 150.217882 30 [id number] 05/10/2018 [id number] N [id number] 37.5  04/25/2018 MORPHINE SULF ER 15 MG TABLET 60.469388 30 [id number] 04/23/2018 [id number] N [id number] 30.0  04/16/2018 OXYCODONE-ACETAMINOPHEN 5-325 150.960325 30 [id number] 04/16/2018 [id number] N [id number] 37.5  03/29/2018 MORPHINE SULF ER 15 MG TABLET 60.653071 30 [id number] 03/05/2018 [id number] N [id number] 30.0  03/29/2018 OXYCODONE-ACETAMINOPHEN 5-325 90.281172 30 [id number] 03/05/2018 [id number] N [id number] 22.5  02/27/2018 OXYCODONE-ACETAMINOPHEN 5-325 90.369358 30 [id number] 02/22/2018 [id number] N [id number] 22.5  02/27/2018 MORPHINE SULF ER 15 MG TABLET 60.951396 30 [id number] 01/29/2018 [id number] N [id number] 30.0  01/31/2018 MORPHINE SULF ER 15 MG TABLET 60.040340 30 [id number] 01/31/2018 [id number] N [id number] 30.0  01/31/2018 OXYCODONE-ACETAMINOPHEN 5-325 90.252580 30 [id number]  [id number] 01/29/2018 [id number] N [id number] 22.5  01/23/2018 LORAZEPAM 1 MG TABLET 1.657129 1 [id number] 01/23/2018 [id number] N [id number] UNK  01/03/2018 OXYCODONE-ACETAMINOPHEN 5-325 90.677121 30 [id number] 01/02/2018 [id number] N [id number] 22.5  01/03/2018 MORPHINE SULF ER 15 MG TABLET 60.373732 30 [id number] 01/02/2018 [id number] N [id number] 30.0  12/11/2017 OXYCODONE-ACETAMINOPHEN 5-325 90.335388 30 [id number] 12/09/2017 [id number] N [id number] 22.5  12/09/2017 MORPHINE SULF ER 15 MG TABLET 60.616188 30 [id number] 10/02/2017 [id number] N [id number] 30.0  11/20/2017 LORAZEPAM 1 MG TABLET 1.482773 1 [id number] 11/20/2017 [id number] N [id number] K  11/13/2017 MORPHINE SULF ER 15 MG TABLET 60.001383 30 [id number] 10/02/2017 [id number] N [id number] 30.0  11/12/2017 OXYCODONE-ACETAMINOPHEN 5-325 90.169593 30 [id number] 10/02/2017 [id number] N [id number] 22.5  *N/R N=New R=Refill  +MED Daily  **Per CDC guidance, the conversion factors and associated daily morphine milligram equivalents for drugs prescribed as part of  medication-assisted treatment for opioid use disorder should not be used to benchmark against dosage thresholds meant for opioids  prescribed for pain.  Report Disclaimers:  The report provided above is based upon the search criteria and the data provided by the dispensing entities. For more information  about any prescription, please contact the dispenser or the prescriber.  This report contains confidential information, including patient identifiers, and is not a public record. The information on this  report must be treated as protected health information and is to be disclosed to others only as authorized by applicable state  and Federal regulations.    Assessment:   Vernon Lemus is a 78 y.o. male seen in clinic today for   Chief Complaint   Patient presents with     Abdominal Pain     Patient follows up the pain clinic today with his spouse.  Today  the patient reports that he is aware that his daughter called and he has signed a release of information for us to speak to her if needed.  Patient notes that he feels that his family is just concerned.  Patient is continuing to maintain his desired goal of reducing and completely eliminating the use of opioids if possible for his chronic abdominal pain and back pain.  Patient does report has been taking the Belbuca at bedtime and this is been helping him sleep patient also notes he takes it during the day as well it is currently also taking short acting medication of Percocet 5/325 up to 5 tablets/day.  Patient's internal conflict today is verbalized as concern about having continued acute pain if he eliminates opioids as his family wishes.  Discussing with the spouse today in the office per patient's permission-the spouse does verbalize that when he takes the opioids he says things that are out of context and off-the-wall.  Spouse states her children are concerned therefore they are mentioning this to her about this.  Seeing this with the patient today as well he is unsure if the statements are accurate.  I have offered behavioral therapy counseling for the patient to attend to here at the pain center.     Otherwise the continue goal would be to eliminate the use of opioids if possible today we will continue the Belbuca as well as change the short acting medication to Conway max of 4/day patient is okay to take Toradol for breakthrough pain that he feels is intolerable this is a decrease in his MME by approximately 12.  Patient has been making progress towards his goal slowly initially when he presents the pain center is MME was approximately 67.  She also mentions the Belbuca has cleared his mentation.    Regimen for patient to take the Norco as he complains his worst pain is in the morning when the Belbuca has worn off is to take 2 Conway in the a.m. for getting out of bed.  Patient agrees with this plan    They  are here for follow up and continued medical management of their pain. Today we reviewed the lab work of the UDT test and the results are expected because of the prescribed narcotics found in the urine drug screen.     I have also reviewed the information obtained from the last visit encounter after the HEDY was signed and have asked any pertintent questions needed from other healthcare providers/family/patient to continue care and formulate a treatment plan.     Patients current MME is 38 this is a decrease from 55 previously  Patient to call clinic for next month's prescription 5 days in advance. Based on the patients response to their previous narcotic therapy and quality of life, which includes their participation in ADLs, I recommend that the narcotics therapy continue, however the changes listed below will be incorporated into their plan of care.     Patient set goals to   1.  To reduce his acute on chronic abdominal pain that occurs    Plan:   Interventions-    DAVID Score: 0  NDI Score: 0    Follow up in 4 weeks to evaluate the effectiveness of the treatment interventions ordered today.     Continue the Belbuca as you are next renewal will be due on 12/2/2018    Stop the percocet today due to altered mentation when taking    Will start the Norco at 5/325 mg up to 4 per day.     Try to take the Norco in the am 2 tabs at a time to get started.     Please get an intake packet if you would like to pursue BH with Marta or Juany here at the clinic     Ok to take the toradol when having a bad day- max of 10 tabs per month.    Prescription Drug Management will be continued by the [facility]  A narcotic contract was signed by the patient and  Patient is unable to get narcotics from other providers. They will be subject to random UAs.      Orders placed today  Medications that were ordered today   Requested Prescriptions     Signed Prescriptions Disp Refills     HYDROcodone-acetaminophen 5-325 mg per tablet  120 tablet 0     Sig: Take 1 tablet by mouth 4 (four) times a day as needed for pain (take 2 in the am to start your day).     ketorolac (TORADOL) 10 mg tablet 10 tablet 0     Sig: Take 1 tablet (10 mg total) by mouth daily for 10 days.     No orders of the defined types were placed in this encounter.      UDT/SWAB:  Patient required a random Urine Drug Testing, due to the need to comply with [place] [employer] Policy Guidelines and CDC Guideline for the use of any controlled substances. This is to ensure that patient is compliant with treatment, and monitor for risks such as diversion, abuse, or any other aberrant behaviors. Patient is either being considered for or taking a controlled substance. Unexpected findings will be discussed and treatment decision may be adjusted. Testing is being implemented across the board randomly w/o bias related to age, race, gender, socioeconomic status or Sikhism affiliation.    The patient understand todays plan and has their questions answered in regards to expectations and current treatment plan.     SAFETY REMINDERS  No alcohol while taking controlled substances. Alcohol is not an illegal substance, it is unsafe to use in combination. It is a build up of substances in the body that can be extremely hazardous and may cause respirations to slow to a dangerous rate resulting in hospitalization, brain damage, or death.    Opioid medications have been associated with sharp rise in unintentional overdose and death.  Overdose is a condition characterized by the consumption in excess of a particular drug causing adverse effects. This can happen b/c you are sick, accidentally or intentionally took an extra dose, are on multiple medication that can interact. Someone took your medication and they are not use to the medication.  Symptoms of overdose include:   !breathing slow and shallow, erratic or not at all  !pinpoint pupils, hallucinations  !confusion  !muscle jerks, slack muscles    !extreme sleepiness or loss of alertness   !awake but not able to talk   !face pale or clammy, vomiting, for lighter skinned people, the skin tone turns bluish purple, for darker skinned people, it turns grayish or ashen   If in a situation where overdose is a concern engage the emergency response system (dial 911).    In one study it was noted that 80% of unintentional overdoses occurred in people who were taking a combination of opioids and benzodiazepines.    Do not sell, loan, borrow or share your opioid medication with anyone. Deaths have occurred as a result of this practice. It is illegal and patients are being prosecuted.     Prevent unexpected access/loss of medication: Keep medication locked. Only carry what you need with you.    Nuvia Smith, [facility]  [address]  [place] [address]  Ph: [phone]  Fax: [phone]

## 2021-06-22 NOTE — PROGRESS NOTES
"Patient presents to the clinic today for a follow up with Nuvia Smith CNP regarding . Patient describes their pain as dull, constant, \"nauseating\" . His function score is 1.    "

## 2021-06-22 NOTE — PROGRESS NOTES
PAIN CLINIC FOLLOW UP PROGRESS NOTE    CC:  Chief Complaint   Patient presents with     Abdominal Pain       HPI  Vernon Lemus is a 78 y.o. male who presents for evaluation   Chief Complaint   Patient presents with     Abdominal Pain    that is causing continued pain. Since the last visit the patient denies any trips to the urgent care or ED specifically for their pain. The patient denies any new medications, diagnoses since the last visit. Specific questions indicated the patient wanted addressed today include:     To follow up on his ongoing pain regimen that stims from his ongoing abdominal issues.     Major issues:  1. Chronic pain syndrome    2. Chronic bilateral low back pain with bilateral sciatica    3. Abdominal pain, generalized    4. Nausea        Today the pain is located in their abdominal pain and low back pain and is described as dull when it is aggravated it lasts for constant, and is rated at a 3 on a scale of 1-10  Associated symptoms: Denies any loss of bladder control, fevers/chills, unintentional weight loss, weakness, numbness or pain that interferes with sleep.   Aggravating factors include: Unknown at this time it flares without any reasonable continence  Alleviating factors: Currently resting and pain medication  Adverse effects of medications: NONE   Functional symptoms: Patient reports that he naps frequently just because he is tired the pain does limit him socially as well as his ability to perform ADLs at times  Current subjective treatment efficacy: Fair        Previous Medical History  Social History     Substance and Sexual Activity   Alcohol Use No     Social History     Substance and Sexual Activity   Drug Use No     Social History     Tobacco Use   Smoking Status Former Smoker     Last attempt to quit: 8/10/1986     Years since quittin.3   Smokeless Tobacco Never Used       Pertinent Pain Medications/interventions:  He currently takes Belbuca 600 mcg/ hr and norco 5/325  mg up to 6 tabs per day     Review of Systems:  12 point systems were reviewed with pt as documented on pt health form and the patient denies any new diagnosis or changes in 12 point system review since the last visit. patient reports that he is due for an ultrasound with Dr. Alva    Physical Exam  Vitals:    12/07/18 0912   BP: 138/66   Patient Site: Left Arm   Patient Position: Sitting   Cuff Size: Adult Regular   Pulse: 70   Resp: 16   Weight: 221 lb (100.2 kg)   Height: 6' (1.829 m)     General- patient is alert and oriented, in NAD, well-groomed, well-nourished  Psych- Judgment and insight normal, AOx4, recent and remote memory normal, mood and affect normal  Eyes- pupils are equal and reactive, conjunctiva is clear bilaterally, no ptosis is noted.   Respiratory- breathing is non-labored  Cardiovascular- extremities warm and well perfused, no peripheral edema or varicosities.  Musculoskeletal- gait is normal, extremities with no joint swelling, erythema, or warmth.   Neuro- normal strength, no gait abnormalities, normal sensation to pain, temperature, light touch.  Integumentary- no rashes, dermatitis or discolorations noted throughout, no open wounds noted. Continued abdominal pain that is persistent RUQ    Medications    Current Outpatient Medications:      amLODIPine (NORVASC) 5 MG tablet, Take 5 mg by mouth daily., Disp: , Rfl:      aspirin 81 mg chewable tablet, Chew 81 mg daily., Disp: , Rfl:      latanoprost (XALATAN) 0.005 % ophthalmic solution, Administer 1 drop to the right eye at bedtime., Disp: , Rfl:      MULTIVITAMIN (MULTIPLE VITAMIN ORAL), Take 1 tablet by mouth daily., Disp: , Rfl:      omega-3/dha/epa/fish oil (FISH OIL-OMEGA-3 FATTY ACIDS) 300-1,000 mg capsule, Take 2 g by mouth daily., Disp: , Rfl:      oxybutynin (DITROPAN) 5 MG tablet, , Disp: , Rfl:      pantoprazole (PROTONIX) 40 MG tablet, Take 40 mg by mouth daily., Disp: , Rfl:      polyethylene glycol (MIRALAX) 17 gram packet, Take  17 g by mouth daily as needed., Disp: , Rfl:      triamterene-hydrochlorothiazide (DYAZIDE) 37.5-25 mg per capsule, Take 1 capsule by mouth every morning., Disp: , Rfl:      buprenorphine HCl (BELBUCA) 600 mcg Film, Apply 1 Film to cheek 2 (two) times a day as needed., Disp: 60 each, Rfl: 1     [START ON 1/2/2019] HYDROcodone-acetaminophen 5-325 mg per tablet, Take 1-2 tablets by mouth 4 (four) times a day as needed for pain (max of 6 per day)., Disp: 180 tablet, Rfl: 0    Lab:  Last UDS on 7/24/2018 had expected results. Any abnormal results have been discussed with the patient and may change the plan of care. Please see the scanned document from the outside lab under the media tab. This was reviewed with the patient.      Imaging:  No new imaging.      Recent   Dated 12/7/2018 was reviewed with the patient today.     Paper copy reviewed     Assessment:   Vernon Lemus is a 78 y.o. male seen in clinic today for   Chief Complaint   Patient presents with     Abdominal Pain     Patient presents the pain Center today to follow-up on his chronic pain treatment plan.  Recently at the last visit he was changed from Percocet to Norco due to the agitation the Percocet induced after he took it according to his family.  Patient was still struggling with pain and as documented he did reach out via telephone to report an increase in his symptoms and request to go back to the Percocet.  At that time his medication was adjusted after discussing with the patient his current symptoms to include an increase in the Belbuca 600 mics twice daily and to continue the Norco up to 6 tablets/day.  Patient presents today and states that he feels that he is doing quite well on this regimen his initial goal was to wean off of the opioids however this has failed as when he tries to wean down his abdominal pain increases significantly.  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 Alva for we will continue to justify the current need for opioids.  Patient's mood is appropriate today he is interactive and stable he reports he is less depressed since last time he was in.    Patient does state he is nauseated more so than before we did discuss different types of antinausea medication he is currently taking Protonix as needed he prefer to avoid the Zofran if possible there are some over-the-counter solutions he can trial such as meclizine.     At this time the patient is stable on his current opioid regimen and he continues to follow with Dr. Alva currently feels that he has a scope coming up in January.     Patients current MME is 50  Patient to call clinic for next month's prescription 5 days in advance. Based on the patients response to their previous narcotic therapy and quality of life, which includes their participation in ADLs, I recommend that the narcotics therapy continue, however the changes listed below will be incorporated into their plan of care.     Patient set goals to   1. To treat his ongoing pain with the most reduced amount of opioids possible if any     Plan:   Interventions-    Follow up in 8 weeks to evaluate the effectiveness of the treatment interventions ordered today.     Will continue the use for the Belbuca 600 mcg/ hr two times a day and Norco 5/325mg up to 6 per day this has been sent to your pharmacy on file.     Ok to fill the [place] on 1/2/2019 this will last you until 2/1/2019    Good luck with the ultrasound with Dr. Alva in january     Try meclizine OTC in the travel sickness isle for nausea.     Prescription Drug Management will be continued by the [facility]  A narcotic contract was signed by the patient and  Patient is unable to get narcotics from other providers. They will be subject to random UAs.      Orders placed today  Medications that were ordered today   Requested Prescriptions     Signed Prescriptions  Disp Refills     HYDROcodone-acetaminophen 5-325 mg per tablet 180 tablet 0     Sig: Take 1-2 tablets by mouth 4 (four) times a day as needed for pain (max of 6 per day).     buprenorphine HCl (BELBUCA) 600 mcg Film 60 each 1     Sig: Apply 1 Film to cheek 2 (two) times a day as needed.     No orders of the defined types were placed in this encounter.      UDT/SWAB:  Patient required a random Urine Drug Testing, due to the need to comply with [facility] Model Policy Guidelines and CDC Guideline for the use of any controlled substances. This is to ensure that patient is compliant with treatment, and monitor for risks such as diversion, abuse, or any other aberrant behaviors. Patient is either being considered for or taking a controlled substance. Unexpected findings will be discussed and treatment decision may be adjusted. Testing is being implemented across the board randomly w/o bias related to age, race, gender, socioeconomic status or [employer] affiliation.    The patient understand todays plan and has their questions answered in regards to expectations and current treatment plan.     SAFETY REMINDERS  No alcohol while taking controlled substances. Alcohol is not an illegal substance, it is unsafe to use in combination. It is a build up of substances in the body that can be extremely hazardous and may cause respirations to slow to a dangerous rate resulting in hospitalization, brain damage, or death.    Opioid medications have been associated with sharp rise in unintentional overdose and death.  Overdose is a condition characterized by the consumption in excess of a particular drug causing adverse effects. This can happen b/c you are sick, accidentally or intentionally took an extra dose, are on multiple medication that can interact. Someone took your medication and they are not use to the medication.  Symptoms of overdose include:   !breathing slow and shallow, erratic or not at all  !pinpoint pupils,  hallucinations  !confusion  !muscle jerks, slack muscles   !extreme sleepiness or loss of alertness   !awake but not able to talk   !face pale or clammy, vomiting, for lighter skinned people, the skin tone turns bluish purple, for darker skinned people, it turns grayish or ashen   If in a situation where overdose is a concern engage the emergency response system (dial 911).    In one study it was noted that 80% of unintentional overdoses occurred in people who were taking a combination of opioids and benzodiazepines.    Do not sell, loan, borrow or share your opioid medication with anyone. Deaths have occurred as a result of this practice. It is illegal and patients are being prosecuted.     Prevent unexpected access/loss of medication: Keep medication locked. Only carry what you need with you.    Nuvia Smith, [facility]  [address]  [place] [address]  Ph: [phone]  Fax: [phone]

## 2021-06-23 NOTE — PATIENT INSTRUCTIONS - HE
Plan:   Interventions-    Follow up in 4-8 weeks to evaluate the effectiveness of the treatment interventions ordered today.     Will increase the use of the OxyContin 15 mg two times a day   Will continue the use of the Norco as you are   Printed today ok to call for a refill for next month     Agree with continued treatment with the opioids as you are, I do believe that the abdominal pain is the main reason you are not able to get off of the pain medications at this point.     In the future if your abdominal pain gets treated with surgery it would be mch easier to wean you off of the opioids.     If you family notices any concerns in regards to the effects of the opioids then please have them reach out to me.     Prescription Drug Management will be continued by the [facility]  A narcotic contract was signed by the patient and  Patient is unable to get narcotics from other providers. They will be subject to random UAs.      Orders placed today  Medications that were ordered today   Requested Prescriptions     Signed Prescriptions Disp Refills     HYDROcodone-acetaminophen 5-325 mg per tablet 180 tablet 0     Sig: Take 1-2 tablets by mouth 4 (four) times a day as needed for pain (max of 6 per day).     oxyCODONE (OXYCONTIN) 15 mg 12 hr tablet 60 tablet 0     Sig: Take 1 tablet (15 mg total) by mouth every 12 (twelve) hours.     No orders of the defined types were placed in this encounter.      UDT/SWAB:  Patient required a random Urine Drug Testing, due to the need to comply with [facility] Model Policy Guidelines and CDC Guideline for the use of any controlled substances. This is to ensure that patient is compliant with treatment, and monitor for risks such as diversion, abuse, or any other aberrant behaviors. Patient is either being considered for or taking a controlled substance. Unexpected findings will be discussed and treatment decision may be adjusted. Testing is being implemented across the  board randomly w/o bias related to age, race, gender, socioeconomic status or [employer] affiliation.    The patient understand todays plan and has their questions answered in regards to expectations and current treatment plan.     SAFETY REMINDERS  No alcohol while taking controlled substances. Alcohol is not an illegal substance, it is unsafe to use in combination. It is a build up of substances in the body that can be extremely hazardous and may cause respirations to slow to a dangerous rate resulting in hospitalization, brain damage, or death.    Opioid medications have been associated with sharp rise in unintentional overdose and death.  Overdose is a condition characterized by the consumption in excess of a particular drug causing adverse effects. This can happen b/c you are sick, accidentally or intentionally took an extra dose, are on multiple medication that can interact. Someone took your medication and they are not use to the medication.  Symptoms of overdose include:   !breathing slow and shallow, erratic or not at all  !pinpoint pupils, hallucinations  !confusion  !muscle jerks, slack muscles   !extreme sleepiness or loss of alertness   !awake but not able to talk   !face pale or clammy, vomiting, for lighter skinned people, the skin tone turns bluish purple, for darker skinned people, it turns grayish or ashen   If in a situation where overdose is a concern engage the emergency response system (dial 911).    In one study it was noted that 80% of unintentional overdoses occurred in people who were taking a combination of opioids and benzodiazepines.    Do not sell, loan, borrow or share your opioid medication with anyone. Deaths have occurred as a result of this practice. It is illegal and patients are being prosecuted.     Prevent unexpected access/loss of medication: Keep medication locked. Only carry what you need with you.    Nuvia Smith, [facility]  [address] [place] [address].  [address]  [place] [address]  Ph: [phone]  Fax: [phone]

## 2021-06-23 NOTE — PROGRESS NOTES
PAIN CLINIC FOLLOW UP PROGRESS NOTE    CC:  Chief Complaint   Patient presents with     Abdominal Pain       HPI  Vernon Lemus is a 78 y.o. male who presents for evaluation   Chief Complaint   Patient presents with     Abdominal Pain    that is causing continued pain. Since the last visit the patient denies any trips to the urgent care or ED specifically for their pain. The patient denies any new medications, diagnoses since the last visit. Specific questions indicated the patient wanted addressed today include: to follow up on his chronic pain that is related to his abdominal pain that he is following with at [facility] and Dr. Alva.     Major issues:  1. Chronic pain syndrome    2. Chronic bilateral low back pain with bilateral sciatica    3. Abdominal pain, generalized      Today the pain is located in their abdominal pain and low back pain and is described as dull when it is aggravated it lasts for constant, and is rated at a 6 on a scale of 1-10  Associated symptoms: Denies any loss of bladder control, fevers/chills, unintentional weight loss, weakness, numbness or pain that interferes with sleep.     Aggravating factors include: Unknown at this time it flares without any reasonable continence  Alleviating factors: Currently resting and pain medication  Adverse effects of medications: NONE   Functional symptoms: Patient reports that he naps frequently just because he is tired the pain does limit him socially as well as his ability to perform ADLs at times  Current subjective treatment efficacy: Fair    Previous Medical History  Social History     Substance and Sexual Activity   Alcohol Use No     Social History     Substance and Sexual Activity   Drug Use No     Social History     Tobacco Use   Smoking Status Former Smoker     Last attempt to quit: 8/10/1986     Years since quittin.5   Smokeless Tobacco Never Used       Pertinent Pain Medications/interventions:  He currently takes Norco 5/325 mg  as well as long acting oxycontin daily that was recently added.     Review of Systems:  12 point systems were reviewed with pt as documented on pt health form and the patient denies any new diagnosis or changes in 12 point system review since the last visit.     Physical Exam  Vitals:    02/05/19 1332   BP: 139/72   Patient Site: Right Arm   Patient Position: Sitting   Cuff Size: Adult Regular   Pulse: 79   Resp: 16   Weight: 221 lb (100.2 kg)   Height: 6' (1.829 m)     General- patient is alert and oriented, in NAD, well-groomed, well-nourished  Psych- Judgment and insight normal, AOx4, recent and remote memory normal, mood and affect normal  Eyes- pupils are equal and reactive, conjunctiva is clear bilaterally, no ptosis is noted.   Respiratory- breathing is non-labored  Cardiovascular- extremities warm and well perfused, no peripheral edema or varicosities.  Musculoskeletal- gait is normal, extremities with no joint swelling, erythema, or warmth. Abdominal pain   Neuro- normal strength, no gait abnormalities, normal sensation to pain, temperature, light touch.  Integumentary- no rashes, dermatitis or discolorations noted throughout, no open wounds noted.    Medications    Current Outpatient Medications:      amLODIPine (NORVASC) 5 MG tablet, Take 5 mg by mouth daily., Disp: , Rfl:      aspirin 81 mg chewable tablet, Chew 81 mg daily., Disp: , Rfl:      latanoprost (XALATAN) 0.005 % ophthalmic solution, Administer 1 drop to the right eye at bedtime., Disp: , Rfl:      MULTIVITAMIN (MULTIPLE VITAMIN ORAL), Take 1 tablet by mouth daily., Disp: , Rfl:      omega-3/dha/epa/fish oil (FISH OIL-OMEGA-3 FATTY ACIDS) 300-1,000 mg capsule, Take 2 g by mouth daily., Disp: , Rfl:      oxybutynin (DITROPAN) 5 MG tablet, , Disp: , Rfl:      oxyCODONE (OXYCONTIN) 10 mg 12 hr tablet, Take 1 tablet (10 mg total) by mouth every 12 (twelve) hours for 10 days., Disp: 20 tablet, Rfl: 0     pantoprazole (PROTONIX) 40 MG tablet, Take 40  mg by mouth daily., Disp: , Rfl:      polyethylene glycol (MIRALAX) 17 gram packet, Take 17 g by mouth daily as needed., Disp: , Rfl:      triamterene-hydrochlorothiazide (DYAZIDE) 37.5-25 mg per capsule, Take 1 capsule by mouth every morning., Disp: , Rfl:      HYDROcodone-acetaminophen 5-325 mg per tablet, Take 1-2 tablets by mouth 4 (four) times a day as needed for pain (max of 6 per day)., Disp: 180 tablet, Rfl: 0     oxyCODONE (OXYCONTIN) 15 mg 12 hr tablet, Take 1 tablet (15 mg total) by mouth every 12 (twelve) hours., Disp: 60 tablet, Rfl: 0    Lab:  Last UDS on 7/24/2018 had expected results. Any abnormal results have been discussed with the patient and may change the plan of care. Please see the scanned document from the outside lab under the media tab. This was reviewed with the patient.      Imaging:  No new imaging.      Recent   Dated 2/5/2019 was reviewed with the patient today.   Paper copy reviewed    Assessment:   Vernon Lemus is a 78 y.o. male seen in clinic today for   Chief Complaint   Patient presents with     Abdominal Pain       Patient presents to the pain Center today to follow-up on his chronic pain treatment plan.  Patient has unsuccessfully attempted to wean down and off of the opioids in general due to his increasing abdominal pain.  Currently he is following along with the [facility] as well as Dr. Alva regards to this.  Patient was recently on Belbuca as well as Norco and had a very good regimen of pain control however unfortunately due to the financials in the new year he is unable to afford it out of pocket.  Just tried to go with a short acting Norco unfortunately this was not successful in treating his pain at baseline.  Did recently add OxyContin 10 mg twice daily patient notes that this does help but does not reduce it to where he was previously.  Patient's previously with morphine and confusion and aggression the morphine was not an option.  Mostly these concerns  came from the patient's family.     Items twice a day continue the Norco as it is patient will reevaluate with me in 4 weeks.    Recapping the patient's concerns he first initially came to the clinic with concerns of addiction due to his ongoing use of the opioids however he was very compliant in following the regimen during the weaning process.   Patients current MME is 95  Patient to call clinic for next month's prescription 5 days in advance. Based on the patients response to their previous narcotic therapy and quality of life, which includes their participation in ADLs, I recommend that the narcotics therapy continue, however the changes listed below will be incorporated into their plan of care.     Patient set goals to   1. To treat his ongoing chronic pain so he can remain independent.     Plan:   Interventions-    Follow up in 4-8 weeks to evaluate the effectiveness of the treatment interventions ordered today.     Will increase the use of the OxyContin 15 mg two times a day   Will continue the use of the Norco as you are   Printed today ok to call for a refill for next month     Agree with continued treatment with the opioids as you are, I do believe that the abdominal pain is the main reason you are not able to get off of the pain medications at this point.     In the future if your abdominal pain gets treated with surgery it would be mch easier to wean you off of the opioids.     If you family notices any concerns in regards to the effects of the opioids then please have them reach out to me.     Prescription Drug Management will be continued by the [facility]  A narcotic contract was signed by the patient and  Patient is unable to get narcotics from other providers. They will be subject to random UAs.      Orders placed today  Medications that were ordered today   Requested Prescriptions     Signed Prescriptions Disp Refills     HYDROcodone-acetaminophen 5-325 mg per tablet 180 tablet 0      Sig: Take 1-2 tablets by mouth 4 (four) times a day as needed for pain (max of 6 per day).     oxyCODONE (OXYCONTIN) 15 mg 12 hr tablet 60 tablet 0     Sig: Take 1 tablet (15 mg total) by mouth every 12 (twelve) hours.     No orders of the defined types were placed in this encounter.      UDT/SWAB:  Patient required a random Urine Drug Testing, due to the need to comply with [place] [employer] Policy Guidelines and CDC Guideline for the use of any controlled substances. This is to ensure that patient is compliant with treatment, and monitor for risks such as diversion, abuse, or any other aberrant behaviors. Patient is either being considered for or taking a controlled substance. Unexpected findings will be discussed and treatment decision may be adjusted. Testing is being implemented across the board randomly w/o bias related to age, race, gender, socioeconomic status or [employer]ab[employer] affiliation.    The patient understand todays plan and has their questions answered in regards to expectations and current treatment plan.     SAFETY REMINDERS  No alcohol while taking controlled substances. Alcohol is not an illegal substance, it is unsafe to use in combination. It is a build up of substances in the body that can be extremely hazardous and may cause respirations to slow to a dangerous rate resulting in hospitalization, brain damage, or death.    Opioid medications have been associated with sharp rise in unintentional overdose and death.  Overdose is a condition characterized by the consumption in excess of a particular drug causing adverse effects. This can happen b/c you are sick, accidentally or intentionally took an extra dose, are on multiple medication that can interact. Someone took your medication and they are not use to the medication.  Symptoms of overdose include:   !breathing slow and shallow, erratic or not at all  !pinpoint pupils, hallucinations  !confusion  !muscle jerks, slack muscles   !extreme sleepiness  or loss of alertness   !awake but not able to talk   !face pale or clammy, vomiting, for lighter skinned people, the skin tone turns bluish purple, for darker skinned people, it turns grayish or ashen   If in a situation where overdose is a concern engage the emergency response system (dial 911).    In one study it was noted that 80% of unintentional overdoses occurred in people who were taking a combination of opioids and benzodiazepines.    Do not sell, loan, borrow or share your opioid medication with anyone. Deaths have occurred as a result of this practice. It is illegal and patients are being prosecuted.     Prevent unexpected access/loss of medication: Keep medication locked. Only carry what you need with you.    Nuvia Smith, [facility]  [address]  [place] [address]  Ph: [phone]  Fax: [phone]

## 2021-06-23 NOTE — PROGRESS NOTES
Patient presents to the clinic today for a follow up with Nuvia Smith CNP regarding abdominal pain. Patient describes their pain as dull, burning, constant. His function score is 2.

## 2021-06-24 ENCOUNTER — COMMUNICATION - HEALTHEAST (OUTPATIENT)
Dept: PALLIATIVE MEDICINE | Facility: OTHER | Age: 81
End: 2021-06-24

## 2021-06-24 DIAGNOSIS — M54.41 CHRONIC BILATERAL LOW BACK PAIN WITH BILATERAL SCIATICA: ICD-10-CM

## 2021-06-24 DIAGNOSIS — G89.29 CHRONIC BILATERAL LOW BACK PAIN WITH BILATERAL SCIATICA: ICD-10-CM

## 2021-06-24 DIAGNOSIS — M54.42 CHRONIC BILATERAL LOW BACK PAIN WITH BILATERAL SCIATICA: ICD-10-CM

## 2021-06-24 DIAGNOSIS — G89.4 CHRONIC PAIN SYNDROME: ICD-10-CM

## 2021-06-24 DIAGNOSIS — R10.84 ABDOMINAL PAIN, GENERALIZED: ICD-10-CM

## 2021-06-25 NOTE — TELEPHONE ENCOUNTER
Patient sends a my chart message regarding the next RX of buprenorphine  Appears that he has been getting these prescriptions appropriately going back several refills.  Per : 5/5/21, 4 days  Next refill was set up for a start date of 6/7/21 which looks to be incorrect.    Will re-cue for the correct date so patient can stay on track.  Will include a note that the previous RX needs to be deleted    Requested Prescriptions     Pending Prescriptions Disp Refills     buprenorphine (BUTRANS) 20 mcg/hour PTWK patch 4 patch 1     Sig: APPLY 1 PATCH EXTERNALLY TO THE SKIN EVERY 7 DAYS     Crys

## 2021-06-26 NOTE — PROGRESS NOTES
Vernon Lemus is a 81 y.o. male who is being evaluated via a billable video visit.      How would you like to obtain your AVS? [email or web address].  If dropped from the video visit, the video invitation should be resent by: Text to cell phone: [phone]  Will anyone else be joining your video visit? No      Platform used for Video Visit: [facility]     Pain score 4  Constant  What does your pain like feel during a flare? Burning, boring, nausea  Does the pain interfere with:  Work ----- NA  Walking ------ no  Sleep ------- no  Daily activities ------no  Relationships -------yes  F=4    UDT/CSA - 9/2020

## 2021-06-26 NOTE — PATIENT INSTRUCTIONS - HE
Plan:   Interventions-    Follow up in 7-8 weeks telephone visit ok      Try avoid gluten. You plan on starting this on June 1st.       Finish the box of butrans and then trial of the belbuca for better coverage 600 mcg film two times a day     lyrica 100 mg once per day for 5 days then stop. Ok to start again if pain increases.      Will continue the ongoing use of hydrocodone- no changes at this time.   Toradol for flare ups if needed     Try to record the flare up days on a calender to track the frequency.        Orders placed today  Medications that were ordered today   Requested Prescriptions     Signed Prescriptions Disp Refills     HYDROcodone-acetaminophen 5-325 mg per tablet 178 tablet 0     Sig: Take 1-2 tablets by mouth 4 (four) times a day as needed for pain (up to 6 per day with an extra 10 per month for (2 per day) when changing patches).     buprenorphine HCL (BELBUCA) 600 mcg Film 60 Film 1     Sig: Apply 1 Film (600 mcg total) to cheek 2 (two) times a day.     No orders of the defined types were placed in this encounter.        The patient understand todays plan and has their questions answered in regards to expectations and current treatment plan.     Prevent unexpected access/loss of medication: Keep medication locked. Only carry what you need with you    The plan of care will be adjusted to accommodate the issues discussed, discussing management of their care and follow up that is recommended. 6/7/2021         Nuvia Smith [facility]  [address]  [place] [address]  Ph: [phone]  Fax: [phone]

## 2021-06-26 NOTE — PROGRESS NOTES
Vernon Lemus is a 81 y.o. male who is being evaluated with [facility] or [facility] services- via video       Start time- 2:21 pm  End time- 2:56 pm   Patient location- of site- home  Provider location- off site- virtual     Subjective-    I have reviewed and updated the patient's Past Medical History, Social History, Family History and Medication List as well as the Precharting workup by the [facility].     PAIN CLINIC FOLLOW UP PROGRESS NOTE    CC:  Chief Complaint   Patient presents with     Follow-up     abdominal pain       HPI  Vernon Lemus is a 81 y.o. male who presents for evaluation   Chief Complaint   Patient presents with     Follow-up     abdominal pain    that is causing continued pain. Specific questions indicated the patient wanted addressed today include: to follow up on his ongoing chronic pain         Objective-  Major issues:  1. Chronic pain syndrome    2. Chronic bilateral low back pain with bilateral sciatica    3. Abdominal pain, generalized        Pain score 4  Constant  What does your pain like feel during a flare? Burning, boring, nausea  Does the pain interfere with:  Work ----- NA  Walking ------ no  Sleep ------- no  Daily activities ------no  Relationships -------yes  F=4       ALLERGIES  Cat dander, Cilostazol, Gabapentin, Morphine, Pollen extracts, and Tizanidine    Previous Medical History  Social History     Substance and Sexual Activity   Alcohol Use No     Social History     Substance and Sexual Activity   Drug Use No     Social History     Tobacco Use   Smoking Status Former Smoker     Quit date: 8/10/1986     Years since quittin.8   Smokeless Tobacco Never Used       Pertinent Pain Medications/interventions:    5/10/2021- butrans 20 mcg/hr, norco 5/325 mg up to 6 per day, lyrica 100 mg two times a day.      2021- the butrans patch still seems to only last 6 days and his pain is not covered well by the extra norco on days 6,7. Will try belbuca again ( previously  "insurance did not want to cover) continue the use of the norco and lyrica as well as injections.      11/13/2020- increased hydrocodone to include 10 extra tabs per month for patch changes and bad nights.      6/30/2020- Increase in butrans to 20 mcg/hr, decrease norco to 6 tabs per day, he is taking lyrica 100 mg two times a day form his PCP      Currently he is taking norco 5/325 mg up to 5-8 tabs per day as well as Butrans 15 mcg/ hr      He notes that the amitriptyline was started with Dr. Alva from [facility]       He is currently taking norco up to 6 per day as needed, he recently repeated the celiac plexus block with Dr. Gaviria and reports no relief as of yet.      Acupuncture was helpful initially but is now not effective     Baclofen was not helpful     Recently stopped oxycontin ER 15 mg two times a day. 5/24/2019     Previously has tried morphine which led to confusion   Belbuca up to 600 mcg two times a day which helped a lot but unaffordable.      Failed medical cannabis  Percocet- too strong.       Reports having tried and failed pregablin- lyrica    TRIED AND FAILED MEDICATIONS:  Cymbalta - hallucinations at 60 mg, no issues with 40 mg  Lyrica - very depressed mood  Nortriptyline- \"spacing out\" and anxiety as well as jitteriness in the morning- however, it did help with sleep and decreased his shooting pains at night.  oxymorphone 5mg ER twice daily ,OxyContin 10 mg twice a day.    Percocet 5-325 mg, max of 3 a day, Protonix 40 mg daily,  Aspirin 81 mg daily,  Maalox 17 grams for constipation, Flaxseed.     Reviewing the records it is noted patient has tried the celiac plexus blocks x 2 and trigger point injections to the local area.      Medications    Current Outpatient Medications:      amLODIPine (NORVASC) 5 MG tablet, Take 5 mg by mouth daily., Disp: , Rfl:      aspirin 81 mg chewable tablet, Chew 81 mg daily., Disp: , Rfl:      buprenorphine (BUTRANS) 20 mcg/hour PTWK patch, APPLY 1 PATCH " EXTERNALLY TO THE SKIN EVERY 7 DAYS, Disp: 4 patch, Rfl: 1     ergocalciferol (VITAMIN D2) 1,250 mcg (50,000 unit) capsule, Take 1 capsule (50,000 Units total) by mouth once a week for 12 doses., Disp: 12 capsule, Rfl: 4     HYDROcodone-acetaminophen 5-325 mg per tablet, Take 1-2 tablets by mouth 4 (four) times a day as needed for pain (up to 6 per day with an extra 10 per month for (2 per day) when changing patches)., Disp: 178 tablet, Rfl: 0     [START ON 7/7/2021] HYDROcodone-acetaminophen 5-325 mg per tablet, Take 1-2 tablets by mouth 4 (four) times a day as needed for pain (up to 6 per day with an extra 10 per month for (2 per day) when changing patches)., Disp: 178 tablet, Rfl: 0     ketorolac (TORADOL) 10 mg tablet, Take 1-2 tablets (10-20 mg total) by mouth daily as needed for pain (max 10 per month for breakthrough pain only)., Disp: 10 tablet, Rfl: 0     latanoprost (XALATAN) 0.005 % ophthalmic solution, Administer 1 drop to the right eye at bedtime., Disp: , Rfl:      metoprolol succinate (TOPROL-XL) 100 MG 24 hr tablet, TAKE ONE AND ONE-HALF TABLETS ONCE DAILY, Disp: , Rfl:      MULTIVITAMIN (MULTIPLE VITAMIN ORAL), Take 1 tablet by mouth daily., Disp: , Rfl:      omega-3/dha/epa/fish oil (FISH OIL-OMEGA-3 FATTY ACIDS) 300-1,000 mg capsule, Take 2 g by mouth daily., Disp: , Rfl:      oxybutynin (DITROPAN) 5 MG tablet, Take by mouth 2 (two) times a day as needed. 5-10 mg, Disp: , Rfl:      pantoprazole (PROTONIX) 40 MG tablet, Take 40 mg by mouth daily., Disp: , Rfl:      polyethylene glycol (MIRALAX) 17 gram packet, Take 17 g by mouth daily as needed., Disp: , Rfl:      [START ON 6/13/2021] pregabalin (LYRICA) 100 MG capsule, Take 1 capsule (100 mg total) by mouth 2 (two) times a day., Disp: 180 capsule, Rfl: 0     rosuvastatin (CRESTOR) 10 MG tablet, , Disp: , Rfl:      senna-docusate (SENNOSIDES-DOCUSATE SODIUM) 8.6-50 mg tablet, Take 1 tablet by mouth daily., Disp: 30 tablet, Rfl: 2      triamterene-hydrochlorothiazide (DYAZIDE) 37.5-25 mg per capsule, Take 1 capsule by mouth every morning., Disp: , Rfl:      vit A/vit C/vit E/zinc/copper (ICAPS AREDS ORAL), Take 1 tablet by mouth daily., Disp: , Rfl:      [START ON 6/28/2021] buprenorphine HCL (BELBUCA) 600 mcg Film, Apply 1 Film (600 mcg total) to cheek 2 (two) times a day., Disp: 60 Film, Rfl: 1     LORazepam (ATIVAN) 1 MG tablet, TK 1 T PO 30 MINUTES BEFORE MRI, Disp: , Rfl:      triamcinolone (KENALOG) 0.1 % ointment, 1 narinder, Disp: , Rfl:       Physical Exam- limited exam   General- patient is alert and oriented, in NAD, well-groomed, well-nourished  Psych- Judgment and insight normal, AOx4, recent and remote memory normal, mood and affect normal  Eyes- pupils are symmetrical, conjunctiva is clear bilaterally, no ptosis is noted.   Respiratory- Breathing appears non-labored  Integumentary- coloring of skin appears normal.   Musculoskeletal- patient is moving all extremities that are visible.     Lab:  UDT/CSA - 9/2020 had expected results. Last UDT on file was reviewed.    Imaging:  No new imaging reviewed with patient over the phone, no new orders placed       Recent   Dated 6/7/2021 was reviewed.       Assessment:     Vernon Lemus is a 81 y.o. male seen in clinic today for   Chief Complaint   Patient presents with     Follow-up     abdominal pain       New concerns today- patient notes that the belbuca is more covered     Plan of care going forward for ongoing pain control- patients pain contorl is diminished by day 5 of the butrans patch at this time would recommend that he trial the belbuca films two times a day for his ongoing chronic pain so he has a more consistent pain coverage, will continue the use of the hydrocodone at this time for pain management.     Low back pain-L3-4,L4-5 and L5-S1- facet hypertrophy on the xrays noted with increased pain with flexion and extension. Patient may benefit from Facet joint injections, he  will think about this and call to order if needed.     Continue the use of the toradol at this time for emergent flare ups.     Encouraged use of gluten free diet as well to reduce joint pain overall.     Patients current MME is 20-40    Plan:   Interventions-    Follow up in 7-8 weeks telephone visit ok      Try avoid gluten. You plan on starting this on June 1st.       Finish the box of butrans and then trial of the belbuca for better coverage 600 mcg film two times a day     lyrica 100 mg once per day for 5 days then stop. Ok to start again if pain increases.      Will continue the ongoing use of hydrocodone- no changes at this time.        Orders placed today  Medications that were ordered today   Requested Prescriptions     Signed Prescriptions Disp Refills     HYDROcodone-acetaminophen 5-325 mg per tablet 178 tablet 0     Sig: Take 1-2 tablets by mouth 4 (four) times a day as needed for pain (up to 6 per day with an extra 10 per month for (2 per day) when changing patches).     buprenorphine HCL (BELBUCA) 600 mcg Film 60 Film 1     Sig: Apply 1 Film (600 mcg total) to cheek 2 (two) times a day.     No orders of the defined types were placed in this encounter.        The patient understand todays plan and has their questions answered in regards to expectations and current treatment plan.     Prevent unexpected access/loss of medication: Keep medication locked. Only carry what you need with you    The plan of care will be adjusted to accommodate the issues discussed, discussing management of their care and follow up that is recommended. 6/7/2021         Nuvia Smith [facility]  [address]  [place] [address]  Ph: [phone]  Fax: [phone]

## 2021-06-29 NOTE — PROGRESS NOTES
"Progress Notes by Keyanna Alva CMA at 6/30/2020  1:40 PM     Author: Keyanna Alva CMA Service: -- Author Type: Certified Medical Assistant    Filed: 6/30/2020  2:27 PM Date of Service: 6/30/2020  1:40 PM Status: Signed    : Keyanna Alva CMA (Certified Medical Assistant)       Pt is being treated today via telephone visit with Nuvia Smith CNP, for refill and f/u of abdominal pain.    Vernon Lemus is a 80 y.o. male who is being evaluated via a billable telephone visit.      The patient has been notified of following:     \"This telephone visit will be conducted via a call between you and your physician/provider. We have found that certain health care needs can be provided without the need for a physical exam.  This service lets us provide the care you need with a short phone conversation.  If a prescription is necessary we can send it directly to your pharmacy.  If lab work is needed we can place an order for that and you can then stop by our lab to have the test done at a later time.    Telephone visits are billed at different rates depending on your insurance coverage. During this emergency period, for some insurers they may be billed the same as an in-person visit.  Please reach out to your insurance provider with any questions.    If during the course of the call the physician/provider feels a telephone visit is not appropriate, you will not be charged for this service.\"    Patient has given verbal consent to a Telephone visit? Yes, in [place]    What phone number would you like to be contacted at? [phone]    Patient would like to receive their AVS by AVS Preference: Danae.    Pain score 4  Constant   What does your pain like feel during a flare? Dull   Does the pain interfere with:  Work ----- NA  Walking ------ no  Sleep ------- no  Daily activities ------no  Relationships -------yes  F=4      Keyanna Alva CMA             "

## 2021-06-29 NOTE — PROGRESS NOTES
"Progress Notes by Nuvia Smith CNP at 7/28/2020  8:40 AM     Author: Nuvia Smith CNP Service: -- Author Type: Nurse Practitioner    Filed: 7/28/2020  2:49 PM Date of Service: 7/28/2020  8:40 AM Status: Signed    : Nuvia Smith CNP (Nurse Practitioner)       Vernon Lemus is a 80 y.o. male who is being evaluated via a billable telephone visit.      Start time- 9:14 am   End time- 9:40am     This entire visit was completed via video with C[employer] or [employer]ze    The patient has been notified of following:     \"This telephone visit will be conducted via a call between you and your physician/provider. We have found that certain health care needs can be provided without the need for a physical exam.  This service lets us provide the care you need with a short phone conversation.  If a prescription is necessary we can send it directly to your pharmacy.  If lab work is needed we can place an order for that and you can then stop by our lab to have the test done at a later time.    If during the course of the call the physician/provider feels a telephone visit is not appropriate, you will not be charged for this service.\"     Vernon Lemus complains of    Chief Complaint   Patient presents with   ? Follow-up     abdominal pain       I have reviewed and updated the patient's Past Medical History, Social History, Family History and Medication List as well as the Precharting workup by the [facility].       PAIN CLINIC FOLLOW UP PROGRESS NOTE    CC:  Chief Complaint   Patient presents with   ? Follow-up     abdominal pain       HPI  Vernon Lemus is a 80 y.o. male who presents for evaluation   Chief Complaint   Patient presents with   ? Follow-up     abdominal pain    that is causing continued pain. Specific questions indicated the patient wanted addressed today include: medication management       Major issues:  1. Chronic pain syndrome    2. Chronic bilateral low back pain " "with bilateral sciatica    3. Abdominal pain, generalized        Pain score 3  Constant  What does your pain like feel during a flare? dull  Does the pain interfere with:  Work ----- NA  Walking ------ no  Sleep ------- no  Daily activities ------no  Relationships -------yes  F=4                             ALLERGIES  Cat dander; Morphine; Pollen extracts; and Tizanidine    Previous Medical History  Social History     Substance and Sexual Activity   Alcohol Use No     Social History     Substance and Sexual Activity   Drug Use No     Social History     Tobacco Use   Smoking Status Former Smoker   ? Last attempt to quit: 8/10/1986   ? Years since quittin.9   Smokeless Tobacco Never Used       Pertinent Pain Medications/interventions:    2020- increase in butrans to 20 mcg/hr, decrease norco to 6 tabs per day, he is taking lyrica 100 mg two times a day form his PCP      Currently he is taking norco 5/325 mg up to 5-8 tabs per day as well as Butrans 15 mcg/ hr      He notes that the amitriptyline was started with Dr. Alva from [facility]       He is currently taking norco up to 6 per day as needed, he recently repeated the celiac plexus block with Dr. Gaviria and reports no relief as of yet.      Acupuncture was helpful initially but is now not effective     Baclofen was not helpful     Recently stopped oxycontin ER 15 mg two times a day. 2019     Previously has tried morphine which led to confusion   Belbuca up to 600 mcg two times a day which helped a lot but unaffordable.      Failed medical cannabis  Percocet- too strong.       Reports having tried and failed pregablin- lyrica    TRIED AND FAILED MEDICATIONS:  Cymbalta - hallucinations at 60 mg, no issues with 40 mg  Lyrica - very depressed mood  Nortriptyline- \"spacing out\" and anxiety as well as jitteriness in the morning- however, it did help with sleep and decreased his shooting pains at night.  oxymorphone 5mg ER twice daily ,OxyContin 10 mg twice " a day.    Percocet 5-325 mg, max of 3 a day, Protonix 40 mg daily,  Aspirin 81 mg daily,  Maalox 17 grams for constipation, Flaxseed.     Reviewing the records it is noted patient has tried the celiac plexus blocks x 2 and trigger point injections to the local area.    Review of Systems:  12 point systems were reviewed with pt as documented on on previous exams. Any new diagnosis or new medication is reviewed today.     Medications    Current Outpatient Medications:   ?  buprenorphine (BUTRANS) 20 mcg/hour PTWK patch, Place 1 patch on the skin every 7 days., Disp: 30 patch, Rfl: 0  ?  [START ON 8/4/2020] HYDROcodone-acetaminophen 5-325 mg per tablet, Take 1-2 tablets by mouth 4 (four) times a day as needed for pain (up to 6 per day)., Disp: 168 tablet, Rfl: 0  ?  senna-docusate (SENNOSIDES-DOCUSATE SODIUM) 8.6-50 mg tablet, Take 1 tablet by mouth daily., Disp: 30 tablet, Rfl: 2  ?  amLODIPine (NORVASC) 5 MG tablet, Take 5 mg by mouth daily., Disp: , Rfl:   ?  aspirin 81 mg chewable tablet, Chew 81 mg daily., Disp: , Rfl:   ?  cilostazoL (PLETAL) 100 MG tablet, Take 1 tablet (100 mg total) by mouth 2 (two) times a day., Disp: 30 tablet, Rfl: 3  ?  latanoprost (XALATAN) 0.005 % ophthalmic solution, Administer 1 drop to the right eye at bedtime., Disp: , Rfl:   ?  LORazepam (ATIVAN) 1 MG tablet, TK 1 T PO 30 MINUTES BEFORE MRI, Disp: , Rfl:   ?  metoprolol succinate (TOPROL-XL) 100 MG 24 hr tablet, TAKE ONE AND ONE-HALF TABLETS ONCE DAILY, Disp: , Rfl:   ?  MULTIVITAMIN (MULTIPLE VITAMIN ORAL), Take 1 tablet by mouth daily., Disp: , Rfl:   ?  omega-3/dha/epa/fish oil (FISH OIL-OMEGA-3 FATTY ACIDS) 300-1,000 mg capsule, Take 2 g by mouth daily., Disp: , Rfl:   ?  oxybutynin (DITROPAN) 5 MG tablet, , Disp: , Rfl:   ?  pantoprazole (PROTONIX) 40 MG tablet, Take 40 mg by mouth daily., Disp: , Rfl:   ?  polyethylene glycol (MIRALAX) 17 gram packet, Take 17 g by mouth daily as needed., Disp: , Rfl:   ?  pregabalin (LYRICA)  100 MG capsule, Take 100 mg by mouth 2 (two) times a day. , Disp: , Rfl:   ?  triamcinolone (KENALOG) 0.1 % ointment, 1 narinder, Disp: , Rfl:   ?  triamterene-hydrochlorothiazide (DYAZIDE) 37.5-25 mg per capsule, Take 1 capsule by mouth every morning., Disp: , Rfl:       Physical Exam- limited exam   General- patient is alert and oriented, in NAD, well-groomed, well-nourished  Psych- Judgment and insight normal, AOx4, recent and remote memory normal, mood and affect normal  Eyes- pupils are symmetrical, conjunctiva is clear bilaterally, no ptosis is noted.   Respiratory- Breathing appears non-labored  Integumentary- coloring of skin appears normal.   Musculoskeletal- patient is moving all extremities that are visible.     .    Imaging:  No new imaging reviewed with patient over the phone, no new orders placed       Recent   Dated 7/28/2020 was reviewed with the patient today.   Paper copy reviewed     Assessment:     Vernon Lemus is a 80 y.o. male seen in clinic today for   Chief Complaint   Patient presents with   ? Follow-up     abdominal pain       They are here for follow up and continued medical management of their pain. Today we reviewed the plan of care for their chronic pain and determined that the patient will need a refill of the current prescription.      Due to the Covid 19 precautions all visits are converted to telephone encounters until the government restrictions are lifted and the precautions have been lifted.     New concerns today-notes that the current pain regimen is working ok. He notes that he is anxious in regards to the injection     Any new diagnosis since the last visit- vascular issues.     Any new prescriptions since the last visit- from vascular   Patient denies side effects from the current regimen  Plan of care going forward for ongoing pain control- Patient notes that he does feel better with the use of butrans patches as well as short acting opioids. He is also noted to the new  medication has helped with the discomfort of his leg pain. Questions answered in regards to the use of opioids as well as injection that has been ordered. Pt just needs to schedule this. Follow up in 4 weeks     Patients current MME is 56-75    Plan:   Interventions-    Follow up in 4  weeks     Continue the use of butrans at 20 mcg/hr- call when you need a refill     continue the use of the norco at this time as needed. Ok to take up to 6 per day.     Will send research link to patient for omega talk for cholesterol      Ok to schedule the injection with Dr. Gaviria for the right sided T8-9 injection purposed.     This limited telehealth/televideo encounter is due to the Covid 19,  as required by the governmental agencies at this time due to risk of transmission of the pandemic, therefore testing availability is limited as well as physical exams.      Prescription Drug Management will be continued by the [facility]    Orders placed today  Medications that were ordered today   Requested Prescriptions     Signed Prescriptions Disp Refills   ? HYDROcodone-acetaminophen 5-325 mg per tablet 168 tablet 0     Sig: Take 1-2 tablets by mouth 4 (four) times a day as needed for pain (up to 6 per day).     No orders of the defined types were placed in this encounter.        The patient understand todays plan and has their questions answered in regards to expectations and current treatment plan.     Prevent unexpected access/loss of medication: Keep medication locked. Only carry what you need with you    The plan of care will be adjusted to accommodate the issues discussed, discussing management of their care and follow up that is recommended. 7/28/2020         Nuvia Smith CNP  [facility]  [address]  [place] [address]  Ph: [phone]  Fax: [phone]

## 2021-06-29 NOTE — PROGRESS NOTES
"Progress Notes by Keyanna Alva CMA at 7/28/2020  8:40 AM     Author: Keyanna Alva CMA Service: -- Author Type: Certified Medical Assistant    Filed: 7/28/2020  2:49 PM Date of Service: 7/28/2020  8:40 AM Status: Signed    : Keyanna Alva CMA (Certified Medical Assistant)       Pt is being treated today via video visit with Nuvia Smith CNP, for refills and f/u of abdominal pain.    Vernon Lemus is a 80 y.o. male who is being evaluated via a billable video visit.      The patient has been notified of following:     \"This video visit will be conducted via a call between you and your physician/provider. We have found that certain health care needs can be provided without the need for an in-person physical exam.  This service lets us provide the care you need with a video conversation.  If a prescription is necessary we can send it directly to your pharmacy.  If lab work is needed we can place an order for that and you can then stop by our lab to have the test done at a later time.    Video visits are billed at different rates depending on your insurance coverage. Please reach out to your insurance provider with any questions.    If during the course of the call the physician/provider feels a video visit is not appropriate, you will not be charged for this service.\"    Patient has given verbal consent to a Video visit? Yes  How would you like to obtain your AVS? AVS Preference: MyChart.  If dropped by the video visit, the video invitation should be sent to: Text to cell phone: [phone]  Will anyone else be joining your video visit? No     Pain score 3  Constant  What does your pain like feel during a flare? dull  Does the pain interfere with:  Work ----- NA  Walking ------ no  Sleep ------- no  Daily activities ------no  Relationships -------yes  F=4               "

## 2021-06-29 NOTE — PROGRESS NOTES
"Progress Notes by Nuvia Smith CNP at 6/30/2020  1:40 PM     Author: Nuvia Smith CNP Service: -- Author Type: Nurse Practitioner    Filed: 6/30/2020  2:27 PM Date of Service: 6/30/2020  1:40 PM Status: Signed    : Nuvia Smith CNP (Nurse Practitioner)       Vernon Lemus is a 80 y.o. male who is being evaluated via a billable telephone visit.      Start time- 2:02 pm   End time- 2:19 am 16:50    The patient has been notified of following:     \"This telephone visit will be conducted via a call between you and your physician/provider. We have found that certain health care needs can be provided without the need for a physical exam.  This service lets us provide the care you need with a short phone conversation.  If a prescription is necessary we can send it directly to your pharmacy.  If lab work is needed we can place an order for that and you can then stop by our lab to have the test done at a later time.    If during the course of the call the physician/provider feels a telephone visit is not appropriate, you will not be charged for this service.\"     Vernon Lemus complains of    Chief Complaint   Patient presents with   ? Follow-up     abdominal pain       I have reviewed and updated the patient's Past Medical History, Social History, Family History and Medication List as well as the Precharting workup by the [facility].       PAIN CLINIC FOLLOW UP PROGRESS NOTE    CC:  Chief Complaint   Patient presents with   ? Follow-up     abdominal pain       HPI  Vernon Lemus is a 80 y.o. male who presents for evaluation   Chief Complaint   Patient presents with   ? Follow-up     abdominal pain    that is causing continued pain. Specific questions indicated the patient wanted addressed today include: medication management       Major issues:  1. Chronic pain syndrome    2. Chronic bilateral low back pain with bilateral sciatica    3. Abdominal pain, generalized        Pain score " "4  Constant   What does your pain like feel during a flare? Dull   Does the pain interfere with:  Work ----- NA  Walking ------ no  Sleep ------- no  Daily activities ------no  Relationships -------yes  F=4        Keyanna Alva, CMA                          ALLERGIES  Cat dander; Morphine; Pollen extracts; and Tizanidine    Previous Medical History  Social History     Substance and Sexual Activity   Alcohol Use No     Social History     Substance and Sexual Activity   Drug Use No     Social History     Tobacco Use   Smoking Status Former Smoker   ? Last attempt to quit: 8/10/1986   ? Years since quittin.9   Smokeless Tobacco Never Used       Pertinent Pain Medications/interventions:    2020- increase in butrans to 20 mcg/hr, decrease norco to 6 tabs per day, he is taking lyrica 100 mg two times a day form his PCP     Currently he is taking norco 5/325 mg up to 5-8 tabs per day as well as Butrans 15 mcg/ hr      He notes that the amitriptyline was started with Dr. Alva from [facility]       He is currently taking norco up to 6 per day as needed, he recently repeated the celiac plexus block with Dr. Gaviria and reports no relief as of yet.      Acupuncture was helpful initially but is now not effective     Baclofen was not helpful     Recently stopped oxycontin ER 15 mg two times a day. 2019     Previously has tried morphine which led to confusion   Belbuca up to 600 mcg two times a day which helped a lot but unaffordable.      Failed medical cannabis  Percocet- too strong.       Reports having tried and failed pregablin- lyrica    TRIED AND FAILED MEDICATIONS:  Cymbalta - hallucinations at 60 mg, no issues with 40 mg  Lyrica - very depressed mood  Nortriptyline- \"spacing out\" and anxiety as well as jitteriness in the morning- however, it did help with sleep and decreased his shooting pains at night.  oxymorphone 5mg ER twice daily ,OxyContin 10 mg twice a day.    Percocet 5-325 mg, max of 3 a " day, Protonix 40 mg daily,  Aspirin 81 mg daily,  Maalox 17 grams for constipation, Flaxseed.     Reviewing the records it is noted patient has tried the celiac plexus blocks x 2 and trigger point injections to the local area.       Review of Systems:  12 point systems were reviewed with pt as documented on on previous exams. Any new diagnosis or new medication is reviewed today.     Medications    Current Outpatient Medications:   ?  amLODIPine (NORVASC) 5 MG tablet, Take 5 mg by mouth daily., Disp: , Rfl:   ?  aspirin 81 mg chewable tablet, Chew 81 mg daily., Disp: , Rfl:   ?  buprenorphine (BUTRANS) 15 mcg/hour PTWK patch, Place 1 patch on the skin every 7 days., Disp: 4 patch, Rfl: 1  ?  [START ON 7/6/2020] HYDROcodone-acetaminophen 5-325 mg per tablet, Take 1-2 tablets by mouth 4 (four) times a day as needed for pain (up to 6 per day)., Disp: 168 tablet, Rfl: 0  ?  latanoprost (XALATAN) 0.005 % ophthalmic solution, Administer 1 drop to the right eye at bedtime., Disp: , Rfl:   ?  metoprolol succinate (TOPROL-XL) 100 MG 24 hr tablet, TAKE ONE AND ONE-HALF TABLETS ONCE DAILY, Disp: , Rfl:   ?  MULTIVITAMIN (MULTIPLE VITAMIN ORAL), Take 1 tablet by mouth daily., Disp: , Rfl:   ?  omega-3/dha/epa/fish oil (FISH OIL-OMEGA-3 FATTY ACIDS) 300-1,000 mg capsule, Take 2 g by mouth daily., Disp: , Rfl:   ?  oxybutynin (DITROPAN) 5 MG tablet, , Disp: , Rfl:   ?  pantoprazole (PROTONIX) 40 MG tablet, Take 40 mg by mouth daily., Disp: , Rfl:   ?  polyethylene glycol (MIRALAX) 17 gram packet, Take 17 g by mouth daily as needed., Disp: , Rfl:   ?  pregabalin (LYRICA) 75 MG capsule, Take 100 mg by mouth 2 (two) times a day., Disp: , Rfl:   ?  senna-docusate (SENNOSIDES-DOCUSATE SODIUM) 8.6-50 mg tablet, Take 1 tablet by mouth daily., Disp: 30 tablet, Rfl: 2  ?  triamcinolone (KENALOG) 0.1 % ointment, 1 narinder, Disp: , Rfl:   ?  triamterene-hydrochlorothiazide (DYAZIDE) 37.5-25 mg per capsule, Take 1 capsule by mouth every  morning., Disp: , Rfl:   ?  buprenorphine (BUTRANS) 20 mcg/hour PTWK patch, Place 1 patch on the skin every 7 days., Disp: 30 patch, Rfl: 0  ?  LORazepam (ATIVAN) 1 MG tablet, TK 1 T PO 30 MINUTES BEFORE MRI, Disp: , Rfl:     Lab:  Last UDS on 7/2019 had expected results. Last UDT on file was reviewed.    Imaging:  No new imaging reviewed with patient over the phone, no new orders placed       Recent   Dated 6/30/2020 was reviewed with the patient today.   Paper copy reviewed     Assessment:     Vernon Lemus is a 80 y.o. male seen in clinic today for   Chief Complaint   Patient presents with   ? Follow-up     abdominal pain       They are here for follow up and continued medical management of their pain. Today we reviewed the plan of care for their chronic pain and determined that the patient will need a refill of the current prescription.      Due to the Covid 19 precautions all visits are converted to telephone encounters until the government restrictions are lifted and the precautions have been lifted.     New concerns today- he has had a vascular ultrasound yesterday, as this may be some a contribute to his leg weakness.      Any new diagnosis since the last visit- none at this time     Any new prescriptions since the last visit- none at this time   Patient denies side effects from the current regimen  Plan of care going forward for ongoing pain control- patient notes that the lyrica helps knock down the pain intensity.   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 Moore and will be seeing Dr. Lynch next week.  Patient notes that with the use of the lyrica , butrans and norco he is doing ok. He would like to get off of all of the opioids in the future- his personal goal. He is currently working with PT as well and finds this helpful. Patient notes that he does not need as many norco when he is  taking the lyrica. Reduced to 6 tabs per day, will increase the butrans patch to 20 mcg/hr and reevaluate in 4 weeks.   Patients current MME is 56-76    Plan:   Interventions-    Follow up in 4 weeks     norco at 6 per day- next fill is 7/6-8/3  Agree with the use of the lyrica 100 mg two times a day   Increase in the butrans patch to 20 mcg/hr q 7 days.     Upcoming follow up with Dr. Lynch.     This limited telehealth encounter is due to the Covid 19,  as required by the governmental agencies at this time due to risk of transmission of the pandemic, therefore testing availability is limited as well as physical exams.      Prescription Drug Management will be continued by the [facility]    Orders placed today  Medications that were ordered today   Requested Prescriptions     Signed Prescriptions Disp Refills   ? HYDROcodone-acetaminophen 5-325 mg per tablet 168 tablet 0     Sig: Take 1-2 tablets by mouth 4 (four) times a day as needed for pain (up to 6 per day).   ? buprenorphine (BUTRANS) 20 mcg/hour PTWK patch 30 patch 0     Sig: Place 1 patch on the skin every 7 days.     No orders of the defined types were placed in this encounter.        The patient understand todays plan and has their questions answered in regards to expectations and current treatment plan.     Prevent unexpected access/loss of medication: Keep medication locked. Only carry what you need with you    The plan of care will be adjusted to accommodate the issues discussed, discussing management of their care and follow up that is recommended. 6/30/2020         FABI Santam[facility]  [address]  [place] [address]  Ph: [phone]  Fax: [phone]

## 2021-06-29 NOTE — PROGRESS NOTES
Progress Notes by Shakeel López PT at 6/5/2020  8:00 AM     Author: Shakeel López PT Service: -- Author Type: Physical Therapist    Filed: 6/5/2020  8:52 AM Encounter Date: 6/5/2020 Status: Attested    : Shakeel López PT (Physical Therapist) Cosigner: Chelsey Moore MD at 6/5/2020  9:33 AM    Attestation signed by Chelsey Moore MD at 6/5/2020  9:33 AM    Physician recommendation:     __x_ Follow therapist's recommendation        ___ Modify therapy                     Vernon Lemus is a 80 y.o. male who is being seen via a billable video visit.  Patient has given verbal consent for video visit? Yes  Video Start Time: 7:55  Telehealth Visit Details:  Type of service: Telehealth  Video End Time (time video stopped): 8:36  Originating Location (Patient): Home  Additional Participants in Telehealth Visit: none  Distant Location (Provider Location): [facility]  Mode of Communication: Audio Visual    Shakeel López PT  6/5/2020        [facility] Certification Request    June 5, 2020      Patient: Vernon Lemus  MR Number: [id number]  YOB: 1940  Date of Visit: 6/5/2020      Dear Dr. Chelsey Moore:    Thank you for this referral.   We are seeing Vernon Lemus in Physical Therapy for back and LE pain.    Medicare and/or Medicaid requires physician review and approval of the treatment plan. Please review the plan of care and verify that you agree with the therapy plan of care by co-signing this note.      Plan of Care  Authorization / Certification Start Date: 06/05/20  Authorization / Certification End Date: 09/03/20  Authorization / Certification Number of Visits: 8  Communication with: Referral Source  Patient Related Instruction: Nature of Condition;Treatment plan and rationale;Self Care instruction;Basis of treatment;Body mechanics;Posture;Precautions;Next steps;Expected outcome  Times per Week: 1  Number of  Weeks: 8  Number of Visits: 8  Therapeutic Exercise: Stretching;Strengthening  Neuromuscular Reeducation: core  Manual Therapy: joint mobilization;soft tissue mobilization (PRN)  Modalities: traction (PRN)      Goals:  Pt. will be independent with home exercise program in : 6 weeks    Pt will: be able to tolerate up to 1 mile ambulation for exercise and community mobility without increased back or LE pain in 8 weeks.        If you have any questions or concerns, please don't hesitate to call.    Sincerely,      Shakeel López, PT        Physician recommendation:     ___ Follow therapist's recommendation        ___ Modify therapy      *Physician co-signature indicates they certify the need for these services furnished within this plan and while under their care.    [facility]   Initial Evaluation    Patient Name: Vernon Lemus  Date of evaluation: 6/5/2020  PRECAUTIONS: none  Referral Diagnosis: Chronic bilateral low back pain without sciatica   Referring provider: Chelsey Moore MD  Visit Diagnosis:     ICD-10-CM    1. Chronic midline low back pain with bilateral sciatica  M54.41     M54.42     G89.29    2. Decreased ROM of lumbar spine  M53.86        Assessment:      Pt. is appropriate for skilled PT intervention as outlined in the Plan of Care (POC).  Pt. is a good candidate for skilled PT services to improve pain levels and function.  Goals and POC established in collaboration with the patient.    Pt presents to PT with primary concerns of low back and bilat LE pain, most consistent with neurogenic claudication from lumbar stenosis, although MRI appears relatively negative.  HEP initiated today, and patient with good tolerance for all without increased pain.  Plan to progress core/glute/multifidus strength and coordination training as tolerated to decrease pain associated with prolonged ambulation.    Goals:  Pt. will be independent with home exercise program in : 6 weeks    Pt  "will: be able to tolerate up to 1 mile ambulation for exercise and community mobility without increased back or LE pain in 8 weeks.      Patient's expectations/goals are realistic.    Barriers to Learning or Achieving Goals:  Age.        Plan / Patient Instructions:        Plan of Care:   Authorization / Certification Start Date: 06/05/20  Authorization / Certification End Date: 09/03/20  Authorization / Certification Number of Visits: 8  Communication with: Referral Source  Patient Related Instruction: Nature of Condition;Treatment plan and rationale;Self Care instruction;Basis of treatment;Body mechanics;Posture;Precautions;Next steps;Expected outcome  Times per Week: 1  Number of Weeks: 8  Number of Visits: 8  Therapeutic Exercise: Stretching;Strengthening  Neuromuscular Reeducation: core  Manual Therapy: joint mobilization;soft tissue mobilization (PRN)  Modalities: traction (PRN)      Plan for next visit: Review HEP. Progress supine core ex and add planks as tolerated. Consider addition of SKTC sciatic nerve glides.     Subjective:       History of Present Illness:    Vernon is a 80 y.o. male who presents to therapy today with complaints of primarily low back and bilat LE pain into the feet.  Reports N/T as well.  Enjoys walking .5-.75 miles in the AM most days.   Onset: Chronic over the last 15 years, but worsening over the last couple of months. Notes greatest difficulty with prolonged ambulation.  Duration: Intermittent  Worse with first thing in the AM, sit>stand after prolonged sitting, prolonged ambulation (able to tolerate .5 miles at most)  Better with sitting or lying down- pain usually goes away within several minutes  Pain Medication: Pleaser refer to EMR. Finding Lyrica over the last month to be most helpful for his abdominal pain.  Sleep: Denies waking due to pain, but does get cramping, often in the AM.  PER EMR from neurosurgery visit on 6/1/20: \" 81 yo male who presents with right abdominal " "pain, back pain, cramping of his legs.  With regards to his right abdominal pain MRI thoracic spine shows thoracic 8-thoracic 9 moderate foraminal narrowing may correlate with his abdominal pain.  In regards to back and leg cramping appears to be claudicating in nature but patient on MRI lumbar spine does not seem to have any significant stenosis centrally in his canal.  Does however appear to have mild lumbar degenerative scoliosis.  Will obtain a CT of the thoracic spine without contrast and scoliosis x-rays.  Follow-up in clinic after these obtained.     I spent more than 45 minutes in this apt, examining the pt, reviewing the scans, reviewing notes from chart, discussing treatment options with risks and benefits and coordinating care. >50 % clinic time was spent in face to face counseling and coordinating care     Chelsey Moore      HPI:  81 yo male who presents with right abdominal pain, back pain, cramping of his legs.  The last 7 years patient has had right-sided abdominal pain which is slightly located above his umbilicus.  He states he cannot tolerate any pressure in this area.  Very bothersome to him.  He has been worked up  abdominal pathology which was negative.  He underwent a previous celiac plexus block on 2019 without any relief.  In the last 2 to 3 months he also noticed worsening low back pain.  He will also have leg pain and cramping in his legs with walking.  Symptoms resolve with rest.  Has numbness and tingling in his distal lower extremities only and has been diagnosed with peripheral neuropathy in the past.  He has not had any physical therapy or spinal injections for these new symptoms.\"    Pt seeks PT to \"help with the pain with walking.\"    Pain Ratin  Pain rating at best: 0  Pain rating at worst: 6  Pain description: numbness, pain and tingling     Objective:      Patient Outcome Measures :    None completed    Examination  1. Chronic midline low back pain with bilateral " sciatica     2. Decreased ROM of lumbar spine       Involved side: Bilateral  Posture Observation:      General sitting posture is  normal.  General standing posture is normal.    Lumbar ROM: All % of WNL  Date:      *Indicate scale AROM AROM AROM   Lumbar Flexion 75     Lumbar Extension 50 with pain in low back and LE      Right Left Right Left Right Left   Lumbar Sidebending 75 75       Lumbar Rotation 75 75       Thoracic Flexion      Thoracic Extension      Thoracic Sidebending         Thoracic Rotation               Treatment Today     TREATMENT MINUTES COMMENTS   Evaluation 15    Self-care/ Home management     Manual therapy     Neuromuscular Re-education     Therapeutic Activity     Therapeutic Exercises 26 Exercises:  Exercise #1: SKTC, LTR - H  Comment #1: 2x30 sec B; 10x2 sec B  Exercise #2: PPT - H  Comment #2: 10x5 sec  Exercise #3: Bridges - h  Comment #3: 10x3 sec  Exercise #4: 4ped ex - H  Comment #4: opposite UE/LE lift x10  Exercise #5: TB sidestepping - H  Comment #5: demonstration provided. Mailed OTB and GTB to patient  Exercise #6: Planks - add as able     Gait training     Modality__________________                Total 41    Blank areas are intentional and mean the treatment did not include these items.            PT Evaluation Code: (Please list factors)  Patient History/Comorbidities: age  Examination: Low back and LE pain  Clinical Presentation: Stable  Clinical Decision Making: Low    Patient History/  Comorbidities Examination  (body structures and functions, activity limitations, and/or participation restrictions) Clinical Presentation Clinical Decision Making (Complexity)   No documented Comorbidities or personal factors 1-2 Elements Stable and/or uncomplicated Low   1-2 documented comorbidities or personal factor 3 Elements Evolving clinical presentation with changing characteristics Moderate   3-4 documented comorbidities or personal factors 4 or more Unstable and unpredictable High            Shakeel López  6/5/2020  7:46 AM

## 2021-06-29 NOTE — PROGRESS NOTES
"Progress Notes by Nuvia Smith CNP at 4/30/2020  1:00 PM     Author: Nuvia Smith CNP Service: -- Author Type: Nurse Practitioner    Filed: 4/30/2020  1:44 PM Date of Service: 4/30/2020  1:00 PM Status: Signed    : Nuvia Smith CNP (Nurse Practitioner)       Vernon Lemus is a 79 y.o. male who is being evaluated via a billable telephone visit.      Start time- 1;25 pm   End time- 1:40 pm     The patient has been notified of following:     \"This telephone visit will be conducted via a call between you and your physician/provider. We have found that certain health care needs can be provided without the need for a physical exam.  This service lets us provide the care you need with a short phone conversation.  If a prescription is necessary we can send it directly to your pharmacy.  If lab work is needed we can place an order for that and you can then stop by our lab to have the test done at a later time.    If during the course of the call the physician/provider feels a telephone visit is not appropriate, you will not be charged for this service.\"     Vernon Lemus complains of    Chief Complaint   Patient presents with   ? Back Pain   ? Abdominal Pain       I have reviewed and updated the patient's Past Medical History, Social History, Family History and Medication List as well as the Precharting workup by the [facility].       PAIN CLINIC FOLLOW UP PROGRESS NOTE    CC:  Chief Complaint   Patient presents with   ? Back Pain   ? Abdominal Pain       HPI  Vernon Lemus is a 79 y.o. male who presents for evaluation   Chief Complaint   Patient presents with   ? Back Pain   ? Abdominal Pain    that is causing continued pain. Specific questions indicated the patient wanted addressed today include: to follow up on his ongoing chronic pain as well as medication management       Major issues:  1. Chronic pain syndrome    2. Chronic bilateral low back pain with bilateral sciatica  " "  3. Abdominal pain, generalized        Pain score---4  Constant or intermittent---constant  What does your pain feel like during a flare (description)---dull burning pain in abdomin  Does the pain interfere with:  Work ----- retired  Walking ------ no  Sleep ------- no  Daily activities ------no  Relationships -------yes  F= 0     UDT---2019  CSA---2019          Patient presents to the clinic today for a follow up with Nuvia Smith CNP regarding back and abdominal pain. Patient describes their pain as dull burning. Her/His function score is 0.          ALLERGIES  Cat dander; Lyrica [pregabalin]; Morphine; Pollen extracts; and Tizanidine    Previous Medical History  Social History     Substance and Sexual Activity   Alcohol Use No     Social History     Substance and Sexual Activity   Drug Use No     Social History     Tobacco Use   Smoking Status Former Smoker   ? Last attempt to quit: 8/10/1986   ? Years since quittin.7   Smokeless Tobacco Never Used       Pertinent Pain Medications/interventions:   Currently he is taking norco 5/325 mg up to 5-8 tabs per day as well as Butrans 15 mcg/ hr      He notes that the amitriptyline was started with Dr. Alva from [facility]       He is currently taking norco up to 6 per day as needed, he recently repeated the celiac plexus block with Dr. Gaviria and reports no relief as of yet.      Acupuncture was helpful initially but is now not effective     Baclofen was not helpful     Recently stopped oxycontin ER 15 mg two times a day. 2019     Previously has tried morphine which led to confusion   Belbuca up to 600 mcg two times a day which helped a lot but unaffordable.      Failed medical cannabis  Percocet- too strong.       Reports having tried and failed pregablin- lyrica    TRIED AND FAILED MEDICATIONS:  Cymbalta - hallucinations at 60 mg, no issues with 40 mg  Lyrica - very depressed mood  Nortriptyline- \"spacing out\" and anxiety as well as jitteriness in " the morning- however, it did help with sleep and decreased his shooting pains at night.  oxymorphone 5mg ER twice daily ,OxyContin 10 mg twice a day.    Percocet 5-325 mg, max of 3 a day, Protonix 40 mg daily,  Aspirin 81 mg daily,  Maalox 17 grams for constipation, Flaxseed.     Reviewing the records it is noted patient has tried the celiac plexus blocks x 2 and trigger point injections to the local area.    Review of Systems:  12 point systems were reviewed with pt as documented on on previous exams. Any new diagnosis or new medication is reviewed today.     Medications    Current Outpatient Medications:   ?  amLODIPine (NORVASC) 5 MG tablet, Take 5 mg by mouth daily., Disp: , Rfl:   ?  aspirin 81 mg chewable tablet, Chew 81 mg daily., Disp: , Rfl:   ?  buprenorphine (BUTRANS) 15 mcg/hour PTWK patch, Place 1 patch on the skin every 7 days., Disp: 4 patch, Rfl: 1  ?  HYDROcodone-acetaminophen 5-325 mg per tablet, Take 1-2 tablets by mouth 4 (four) times a day as needed for pain (up tp 8 tabs per day)., Disp: 224 tablet, Rfl: 0  ?  latanoprost (XALATAN) 0.005 % ophthalmic solution, Administer 1 drop to the right eye at bedtime., Disp: , Rfl:   ?  metoprolol succinate (TOPROL-XL) 100 MG 24 hr tablet, TAKE ONE AND ONE-HALF TABLETS ONCE DAILY, Disp: , Rfl:   ?  metoprolol tartrate (LOPRESSOR) 100 MG tablet, Take 150 mg by mouth daily., Disp: , Rfl:   ?  MULTIVITAMIN (MULTIPLE VITAMIN ORAL), Take 1 tablet by mouth daily., Disp: , Rfl:   ?  omega-3/dha/epa/fish oil (FISH OIL-OMEGA-3 FATTY ACIDS) 300-1,000 mg capsule, Take 2 g by mouth daily., Disp: , Rfl:   ?  oxybutynin (DITROPAN) 5 MG tablet, , Disp: , Rfl:   ?  pantoprazole (PROTONIX) 40 MG tablet, Take 40 mg by mouth daily., Disp: , Rfl:   ?  polyethylene glycol (MIRALAX) 17 gram packet, Take 17 g by mouth daily as needed., Disp: , Rfl:   ?  senna-docusate (SENNOSIDES-DOCUSATE SODIUM) 8.6-50 mg tablet, Take 1 tablet by mouth daily., Disp: 30 tablet, Rfl: 2  ?   triamcinolone (KENALOG) 0.1 % ointment, 1 narinder, Disp: , Rfl:   ?  triamterene-hydrochlorothiazide (DYAZIDE) 37.5-25 mg per capsule, Take 1 capsule by mouth every morning., Disp: , Rfl:   ?  amoxicillin (AMOXIL) 500 MG capsule, Take 500 mg by mouth 3 (three) times a day., Disp: , Rfl: 1  ?  LORazepam (ATIVAN) 1 MG tablet, TK 1 T PO 30 MINUTES BEFORE MRI, Disp: , Rfl:   ?  nortriptyline (PAMELOR) 50 MG capsule, TK ONE C PO QHS, Disp: , Rfl:   ?  VITAMIN K2 ORAL, Take 100 mg by mouth daily., Disp: , Rfl:     Lab:  Last UDS on 7/2019 had expected results. Last UDT on file was reviewed.    Imaging:  No new imaging reviewed with patient over the phone, no new orders placed       Recent   Dated 4/30/2020 was reviewed with the patient today.   Paper copy reviewed     Assessment:     Vernon Lemus is a 79 y.o. male seen in clinic today for   Chief Complaint   Patient presents with   ? Back Pain   ? Abdominal Pain       They are here for follow up and continued medical management of their pain. Today we reviewed the plan of care for their chronic pain and determined that the patient will need a refill of the current prescription.      Due to the Covid 19 precautions all visits are converted to telephone encounters until the government restrictions are lifted and the precautions have been lifted.     New concerns today- none at this time, he is working with his PCP and discuss his recent imaging related to the T9-10 nerve impingement noted. He noted that it may be a concern for the unilateral abdominal pain.     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 A thyroid nodule with a recent needle biopsy but does not have the results at this time.   Any new prescriptions since the last visit- none but he did stop taking nortriptyline due to the mental fogginess.   Patient denies side effects from the current regimen  Plan of care going forward for ongoing pain control-  will continue the use of the norco and butrans patches at this time. He would benefit from an injection as a diagnostic measure he is unsure if he should see his PCP, spine specialist for this injection, discussed with the patient that it is up to him, he has been very methodical in his approach to the pain and treatment. He is more then welcome to come to the pain center for the T9-10 injection or he can obtain at the spine center, pending his comfort level. Will continue the use of the butrans patch and norco at this time.       Patients current MME is 41    Plan:   Interventions-    Follow up in 8 weeks     Continue the use of the norco at this time refill dates are 5/6-6/3  continue the use of the butrans patches     Recommend a T9-10 injection- diagnostic       This limited telehealth encounter is due to the Covid 19,  as required by the governmental agencies at this time due to risk of transmission of the pandemic, therefore testing availability is limited as well as physical exams.      Prescription Drug Management will be continued by the [facility]    Orders placed today  Medications that were ordered today   Requested Prescriptions     Pending Prescriptions Disp Refills   ? buprenorphine (BUTRANS) 15 mcg/hour PTWK patch 4 patch 0     Sig: Place 1 patch on the skin every 7 days.   ? HYDROcodone-acetaminophen 5-325 mg per tablet 224 tablet 0     Sig: Take 1-2 tablets by mouth 4 (four) times a day as needed for pain (up tp 8 tabs per day).     No orders of the defined types were placed in this encounter.        The patient understand todays plan and has their questions answered in regards to expectations and current treatment plan.     Prevent unexpected access/loss of medication: Keep medication locked. Only carry what you need with you    The plan of care will be adjusted to accommodate the issues discussed, discussing management of their care and follow up that is recommended. 4/30/2020          Nuvia Smith [facility]  [address]  [place] [address]  Ph: [phone]  Fax: [phone]

## 2021-06-29 NOTE — PROGRESS NOTES
"Progress Notes by Leanna Hernandez CMA at 4/30/2020  1:00 PM     Author: Leanna Hernandez CMA Service: -- Author Type: Certified Medical Assistant    Filed: 4/30/2020  1:44 PM Date of Service: 4/30/2020  1:00 PM Status: Signed    : Leanna Hernandez CMA (Certified Medical Assistant)       Vernon Lemus is a 79 y.o. male who is being evaluated via a billable telephone visit.      The patient has been notified of following:     \"This telephone visit will be conducted via a call between you and your physician/provider. We have found that certain health care needs can be provided without the need for a physical exam.  This service lets us provide the care you need with a short phone conversation.  If a prescription is necessary we can send it directly to your pharmacy.  If lab work is needed we can place an order for that and you can then stop by our lab to have the test done at a later time.    Telephone visits are billed at different rates depending on your insurance coverage. During this emergency period, for some insurers they may be billed the same as an in-person visit.  Please reach out to your insurance provider with any questions.    If during the course of the call the physician/provider feels a telephone visit is not appropriate, you will not be charged for this service.\"    Patient has given verbal consent to a Telephone visit? Yes    Patient would like to receive their AVS by AVS Preference: Danae.      Pain score---4  Constant or intermittent---constant  What does your pain feel like during a flare (description)---dull burning pain in abdomin  Does the pain interfere with:  Work ----- retired  Walking ------ no  Sleep ------- no  Daily activities ------no  Relationships -------yes  F= 0    UDT---7/2019  CSA---7/2019        Patient presents to the clinic today for a follow up with Nuvia Smith CNP regarding back and abdominal pain. Patient describes their pain as dull burning. " Her/His function score is 0.      Leanna Hernandez, CMA

## 2021-07-03 NOTE — ADDENDUM NOTE
Addendum Note by Christy Huffman at 1/11/2021  8:40 AM     Author: Christy Huffman Service: -- Author Type: --    Filed: 1/13/2021  5:42 AM Date of Service: 1/11/2021  8:40 AM Status: Signed    : Christy Huffman    Encounter addended by: Christy Huffman on: 1/13/2021  5:42 AM      Actions taken: Charge Capture section accepted

## 2021-07-03 NOTE — ADDENDUM NOTE
Addendum Note by Christy Huffman at 6/30/2020  1:40 PM     Author: Christy Huffman Service: -- Author Type: --    Filed: 7/3/2020 11:28 AM Date of Service: 6/30/2020  1:40 PM Status: Signed    : Christy Huffman    Encounter addended by: Christy Huffman on: 7/3/2020 11:28 AM      Actions taken: Charge Capture section accepted

## 2021-07-03 NOTE — ADDENDUM NOTE
Addendum Note by Kiana Goff CMA at 5/7/2019  7:54 AM     Author: Kiana Goff CMA Service: -- Author Type: Certified Medical Assistant    Filed: 5/7/2019 10:13 AM Date of Service: 5/7/2019  7:54 AM Status: Signed    : Kiana Goff CMA ([facility] Medical Assistant)    Encounter addended by: Kiana Goff CMA on: 5/7/2019 10:13 AM      Actions taken: Visit Navigator Flowsheet section accepted

## 2021-07-03 NOTE — ADDENDUM NOTE
Addendum Note by Deanna Lynn RN at 11/1/2019  2:16 PM     Author: Deanna Lynn RN Service: -- Author Type: Registered Nurse    Filed: 11/1/2019  2:16 PM Encounter Date: 10/18/2019 Status: Signed    : Deanna Lynn RN (Registered Nurse)    Addended by: DEANNA LYNN on: 11/1/2019 02:16 PM        Modules accepted: Orders

## 2021-07-03 NOTE — ADDENDUM NOTE
Addendum Note by Christy Huffman at 7/28/2020  8:40 AM     Author: Christy Huffman Service: -- Author Type: --    Filed: 7/30/2020  1:25 PM Date of Service: 7/28/2020  8:40 AM Status: Signed    : Christy Huffman    Encounter addended by: Christy Huffman on: 7/30/2020  1:25 PM      Actions taken: Charge Capture section accepted

## 2021-07-03 NOTE — ADDENDUM NOTE
Addendum Note by Christy Huffman at 4/30/2020  1:00 PM     Author: Christy Huffman Service: -- Author Type: --    Filed: 5/20/2020  8:44 AM Date of Service: 4/30/2020  1:00 PM Status: Signed    : Christy Huffman    Encounter addended by: Christy Huffman on: 5/20/2020  8:44 AM      Actions taken: Charge Capture section accepted

## 2021-07-03 NOTE — ADDENDUM NOTE
Addendum Note by Christy Huffman at 11/13/2020 12:40 PM     Author: Christy Huffman Service: -- Author Type: --    Filed: 11/16/2020  6:26 AM Date of Service: 11/13/2020 12:40 PM Status: Signed    : Christy Huffman    Encounter addended by: Christy Huffman on: 11/16/2020  6:26 AM      Actions taken: Charge Capture section accepted

## 2021-07-03 NOTE — ADDENDUM NOTE
Addendum Note by Deanna Lynn RN at 8/20/2019  3:22 PM     Author: Deanna Lynn RN Service: -- Author Type: Registered Nurse    Filed: 8/20/2019  3:22 PM Encounter Date: 8/20/2019 Status: Signed    : Deanna Lynn RN (Registered Nurse)    Addended by: DEANNA LYNN on: 8/20/2019 03:22 PM        Modules accepted: Orders

## 2021-07-03 NOTE — ADDENDUM NOTE
Addendum Note by Marta Beth LICSW at 6/6/2019 11:59 PM     Author: Marta Beth LICSW Service: -- Author Type: Licensed Social Worker    Filed: 7/3/2019  8:49 AM Date of Service: 6/6/2019 11:59 PM Status: Signed    : Marta Beth LICSW (Licensed Social Worker)    Encounter addended by: Marta Beth LICSW on: 7/3/2019  8:49 AM      Actions taken: Charge Capture section accepted

## 2021-07-03 NOTE — ADDENDUM NOTE
Addendum Note by Keyanna Alva CMA at 9/17/2020  2:00 PM     Author: Keyanna Alva CMA Service: -- Author Type: Certified Medical Assistant    Filed: 9/17/2020  3:27 PM Date of Service: 9/17/2020  2:00 PM Status: Signed    : Keyanna Alva CMA (Certified Medical Assistant)    Encounter addended by: Keyanna Alva CMA on: 9/17/2020  3:27 PM      Actions taken: Order list changed, Diagnosis association updated

## 2021-07-03 NOTE — ADDENDUM NOTE
Addendum Note by Shelby Wallace, RN at 5/31/2019  3:13 PM     Author: Shelby Wallace RN Service: -- Author Type: Registered Nurse    Filed: 5/31/2019  3:13 PM Encounter Date: 5/30/2019 Status: Signed    : Shelby Wallace RN (Registered Nurse)    Addended by: SHELBY WALLACE on: 5/31/2019 03:13 PM        Modules accepted: Orders

## 2021-07-03 NOTE — ADDENDUM NOTE
Addendum Note by Nuvia Smith CNP at 8/20/2019  4:21 PM     Author: Nuvia Smith CNP Service: -- Author Type: Nurse Practitioner    Filed: 8/20/2019  4:21 PM Encounter Date: 8/20/2019 Status: Signed    : Nuvia Smith CNP (Nurse Practitioner)    Addended by: NUVIA SMITH on: 8/20/2019 04:21 PM        Modules accepted: Orders

## 2021-07-04 NOTE — ADDENDUM NOTE
Addendum Note by Christy Huffman at 3/12/2021 10:40 AM     Author: Christy Huffman Service: -- Author Type: --    Filed: 3/15/2021  9:43 AM Date of Service: 3/12/2021 10:40 AM Status: Signed    : Christy Huffman    Encounter addended by: Christy Huffman on: 3/15/2021  9:43 AM      Actions taken: Charge Capture section accepted

## 2021-07-04 NOTE — ADDENDUM NOTE
Addendum Note by Christy Huffman at 6/7/2021  2:00 PM     Author: Christy Huffman Service: -- Author Type: --    Filed: 6/9/2021  8:03 AM Date of Service: 6/7/2021  2:00 PM Status: Signed    : Christy Huffman    Encounter addended by: Christy Huffman on: 6/9/2021  8:03 AM      Actions taken: Charge Capture section accepted

## 2021-07-04 NOTE — ADDENDUM NOTE
Addendum Note by Christy Huffman at 5/10/2021  2:00 PM     Author: Christy Huffman Service: -- Author Type: --    Filed: 5/12/2021  2:10 PM Date of Service: 5/10/2021  2:00 PM Status: Signed    : Christy Huffman    Encounter addended by: Christy Huffman on: 5/12/2021  2:10 PM      Actions taken: Charge Capture section accepted

## 2021-07-04 NOTE — ADDENDUM NOTE
Addendum Note by Angelika Sloan RN at 3/9/2021  9:00 AM     Author: Angelika Sloan RN Service: -- Author Type: Registered Nurse    Filed: 3/9/2021 10:32 AM Date of Service: 3/9/2021  9:00 AM Status: Signed    : Angelika Sloan RN (Registered Nurse)    Encounter addended by: Angelika Sloan RN on: 3/9/2021 10:32 AM      Actions taken: Clinical Note Signed

## 2021-07-29 ENCOUNTER — TELEPHONE (OUTPATIENT)
Dept: PALLIATIVE MEDICINE | Facility: OTHER | Age: 81
End: 2021-07-29

## 2021-07-29 ENCOUNTER — VIRTUAL VISIT (OUTPATIENT)
Dept: PALLIATIVE MEDICINE | Facility: OTHER | Age: 81
End: 2021-07-29
Payer: COMMERCIAL

## 2021-07-29 DIAGNOSIS — G89.4 CHRONIC PAIN SYNDROME: Primary | ICD-10-CM

## 2021-07-29 DIAGNOSIS — G89.4 CHRONIC PAIN SYNDROME: ICD-10-CM

## 2021-07-29 PROCEDURE — 999N001193 HC VIDEO/TELEPHONE VISIT; NO CHARGE: Performed by: NURSE PRACTITIONER

## 2021-07-29 PROCEDURE — 99213 OFFICE O/P EST LOW 20 MIN: CPT | Mod: 95 | Performed by: NURSE PRACTITIONER

## 2021-07-29 RX ORDER — KETOROLAC TROMETHAMINE 10 MG/1
TABLET, FILM COATED ORAL
COMMUNITY
Start: 2021-03-12 | End: 2023-04-11

## 2021-07-29 RX ORDER — PREGABALIN 100 MG/1
CAPSULE ORAL
Qty: 60 CAPSULE | Refills: 1 | Status: SHIPPED | OUTPATIENT
Start: 2021-07-29 | End: 2021-10-22

## 2021-07-29 RX ORDER — PREGABALIN 100 MG/1
CAPSULE ORAL
COMMUNITY
Start: 2021-06-13 | End: 2021-07-29

## 2021-07-29 RX ORDER — HYDROCODONE BITARTRATE AND ACETAMINOPHEN 5; 325 MG/1; MG/1
TABLET ORAL
COMMUNITY
Start: 2021-05-13 | End: 2021-07-29

## 2021-07-29 RX ORDER — HYDROCODONE BITARTRATE AND ACETAMINOPHEN 5; 325 MG/1; MG/1
1-2 TABLET ORAL EVERY 4 HOURS PRN
Qty: 178 TABLET | Refills: 0 | Status: SHIPPED | OUTPATIENT
Start: 2021-07-29 | End: 2021-08-26

## 2021-07-29 RX ORDER — HYDROCODONE BITARTRATE AND ACETAMINOPHEN 5; 325 MG/1; MG/1
TABLET ORAL
Qty: 178 TABLET | Refills: 0 | Status: SHIPPED | OUTPATIENT
Start: 2021-08-04 | End: 2021-07-29

## 2021-07-29 ASSESSMENT — PAIN SCALES - GENERAL: PAINLEVEL: MODERATE PAIN (4)

## 2021-07-29 NOTE — PROGRESS NOTES
Vernon Lemus is a 81 year old male who is being evaluated with [facility]- via telephone      Start time- 9:31 am   End time- 9:53 am   Patient location- of site- home  Provider location- off site- virtual     Subjective-    I have reviewed and updated the patient's Past Medical History, Social History, Family History and Medication List as well as the Precharting workup by the [facility].     PAIN CLINIC FOLLOW UP PROGRESS NOTE    CC:chief complaint    HPI  Vernon Lemus is a 81 year old male who presents for evaluation chief complaint that is causing continued pain. Specific questions indicated the patient wanted addressed today include: to follow up with his ongoing chronic pain as well as medication refills.         Objective-  Major issues:  1. Chronic pain syndrome        Pain score 4  Constant   What does your pain like feel during a flare? Dull, burning  Does the pain interfere with:  Work ----- NA  Walking ------ no  Sleep ------- no  Daily activities ------no  Relationships -------yes  F=4     UDT/CSA - 9/2020    ALLERGIES  Cat dander [animal dander], Cilostazol, Gabapentin, Morphine, Pollen extracts [pollen extract], and Tizanidine    Previous Medical History  Social History    Substance and Sexual Activity      Alcohol use: No        Comment: 4 drinks a week/socially; 12/30/2013 stopped drinking    History   Drug Use No     History   Smoking Status     Former Smoker     Quit date: 8/10/1986   Smokeless Tobacco     Never Used       Pertinent Pain Medications/interventions:  7/29/2021- Transitioned to Belbuca 900 mcg/hr and Norco 5/325 mg up to 6 tabs per day. lyrica 10 mg bid. He feels that the 60 days absence of gluten has made a difference in his hand pain but he feels that it is not enough to continue.      5/10/2021- butrans 20 mcg/hr, norco 5/325 mg up to 6 per day, lyrica 100 mg two times a day.      1/11/2021- the butrans patch still seems to only last 6 days and his pain is  "not covered well by the extra norco on days 6,7. Will try belbuca again ( previously insurance did not want to cover) continue the use of the norco and lyrica as well as injections.      11/13/2020- increased hydrocodone to include 10 extra tabs per month for patch changes and bad nights.      6/30/2020- Increase in butrans to 20 mcg/hr, decrease norco to 6 tabs per day, he is taking lyrica 100 mg two times a day form his PCP      Currently he is taking norco 5/325 mg up to 5-8 tabs per day as well as Butrans 15 mcg/ hr      He notes that the amitriptyline was started with Dr. Alva from [facility]       He is currently taking norco up to 6 per day as needed, he recently repeated the celiac plexus block with Dr. Gaviria and reports no relief as of yet.      Acupuncture was helpful initially but is now not effective     Baclofen was not helpful     Recently stopped oxycontin ER 15 mg two times a day. 5/24/2019     Previously has tried morphine which led to confusion   Belbuca up to 600 mcg two times a day which helped a lot but unaffordable.      Failed medical cannabis  Percocet- too strong.       Reports having tried and failed pregablin- lyrica    TRIED AND FAILED MEDICATIONS:  Cymbalta - hallucinations at 60 mg, no issues with 40 mg  Lyrica - very depressed mood  Nortriptyline- \"spacing out\" and anxiety as well as jitteriness in the morning- however, it did help with sleep and decreased his shooting pains at night.  oxymorphone 5mg ER twice daily ,OxyContin 10 mg twice a day.    Percocet 5-325 mg, max of 3 a day, Protonix 40 mg daily,  Aspirin 81 mg daily,  Maalox 17 grams for constipation, Flaxseed.     Reviewing the records it is noted patient has tried the celiac plexus blocks x 2 and trigger point injections to the local area.      Medications    Current Outpatient Medications:      amLODIPine (NORVASC) 5 MG tablet, Take 1 tablet (5 mg) by mouth daily, Disp: 30 tablet, Rfl: 0     aspirin 81 MG tablet, Take 1 " tablet by mouth daily, Disp: , Rfl:      Buprenorphine HCl (BELBUCA) 600 MCG FILM buccal film, Belbuca 600 mcg buccal film  APPLY 1 FILM TO INSIDE CHEEK TWICE DAILY, Disp: , Rfl:      Buprenorphine HCl (BELBUCA) 900 MCG FILM buccal film, Place 1 Film (900 mcg) inside cheek every 12 hours, Disp: 60 Film, Rfl: 0     FISH OIL, 1,400 mg daily, Disp: , Rfl:      HYDROCHLOROTHIAZIDE PO, Take by mouth daily, Disp: , Rfl:      [START ON 8/4/2021] HYDROcodone-acetaminophen (NORCO) 5-325 MG tablet, hydrocodone 5 mg-acetaminophen 325 mg tablet, Disp: 178 tablet, Rfl: 0     ketorolac (TORADOL) 10 MG tablet, ketorolac 10 mg tablet, Disp: , Rfl:      Multiple Vitamin (DAILY MULTIVITAMIN PO), Take 1 tablet by mouth daily, Disp: , Rfl:      pantoprazole (PROTONIX) 40 MG enteric coated tablet, Take 1 tablet by mouth daily, Disp: , Rfl:      pregabalin (LYRICA) 100 MG capsule, pregabalin 100 mg capsule 2 times a day, Disp: 60 capsule, Rfl: 1     senna-docusate (SENOKOT-S;PERICOLACE) 8.6-50 MG per tablet, Take 1 tablet by mouth daily as needed for constipation, Disp: 7 tablet, Rfl: 0     triamterene-hydrochlorothiazide (MAXZIDE-25) 37.5-25 MG per tablet, Take 1 tablet by mouth daily, Disp: , Rfl:      amylase-lipase-protease (PERTZYE) 03386 UNITS CPEP, Take 1-5 with snacks and meals, up to 16 per day.  First Start Program for a trial of this medication., Disp: 500 capsule, Rfl: 0     amylase-lipase-protease (VIOKACE) 33281 UNITS TABS tablet, Take 1-2 with snacks and 2-4 with meals, up to 15 per day., Disp: 50 tablet, Rfl: 0     LATANOPROST OP, Place 1 drop into the right eye At Bedtime, Disp: , Rfl:      triamcinolone (KENALOG) 0.1 % ointment, Apply topically as needed, Disp: , Rfl:       Lab:  UDT/OTONIEL - 9/2020     hydrocodone at 0600 this am  belbuca at 0630 this am    Imaging:  No new imaging reviewed with patient over the phone, no new orders placed       Recent   Dated 7/29/2021 was reviewed.       Assessment:     Vernon BOWMAN  Mariah is a 81 year old male seen in clinic today for chief complaint    New concerns today- he feels that he is coming up short with the amount of pain relief he needs and what he is taking. He notes that the belbuca is only lasting about 600 mcg bid for 10 hours and he has had to wake up at night to take hydrocodone for breakthrough pain. Patient has been taking notes on his pain and finds that he has had 3 days where he has woke up without any pain whatsoever but then after 3 hours his pain comes back. But there are other days that he has woken up with severe pain 8/10 and then it takes a few hours to dissipate. Then he has noted about 20 days without consistent relief. He reports his pain is consistently the same as it was 8 years ago and the pressure itself hurts when even his clothes are on it.     Plan of care going forward for ongoing pain control- at this time I would recommend an increase in the belbuca up to 900 mcg bid as well as norco for breakthrough pain.   Patients current MME is 20-40    Plan:   Interventions-    Follow up in 4-6  weeks telephone visit ok      If you feel that gluten avoidance is not impacting your pain ok to reintroduce small amount and monitor your reaction for up to 4 days after.     Dr. Wai cagle spore restore for gut health take 1 per day.      Increase the belbuca to 900 mcg bid      Will continue the ongoing use of hydrocodone- no changes at this time.     Recommend Following with Dr. Colby at some point.       Orders placed today  Medications that were ordered today   Signed Prescriptions:                        Disp   Refills    Buprenorphine HCl (BELBUCA) 600 MCG FILM b*                Sig: Belbuca 600 mcg buccal film   APPLY 1 FILM TO INSIDE           CHEEK TWICE DAILY  Authorizing Provider: PATIENT REPORTED    ketorolac (TORADOL) 10 MG tablet                           Sig: ketorolac 10 mg tablet  Authorizing Provider: PATIENT REPORTED     HYDROcodone-acetaminophen (NORCO) 5-325 MG*178 ta*0        Sig: hydrocodone 5 mg-acetaminophen 325 mg tablet  Authorizing Provider: BRIAN FOX    Buprenorphine HCl (BELBUCA) 900 MCG FILM b*60 Film0        Sig: Place 1 Film (900 mcg) inside cheek every 12 hours  Authorizing Provider: BRIAN FOX    pregabalin (LYRICA) 100 MG capsule         60 cap*1        Sig: pregabalin 100 mg capsule 2 times a day  Authorizing Provider: BRIAN FOX    No orders of the defined types were placed in this encounter.        The patient understand todays plan and has their questions answered in regards to expectations and current treatment plan.     Prevent unexpected access/loss of medication: Keep medication locked. Only carry what you need with you    The plan of care will be adjusted to accommodate the issues discussed, discussing management of their care and follow up that is recommended. 7/29/2021         EMILIA Art [facility]  [address]  [place] [address]  Ph: [phone]  Fax: [phone]

## 2021-07-29 NOTE — PROGRESS NOTES
Molina is a 81 year old who is being evaluated via a billable telephone visit.      What phone number would you like to be contacted at? [phone]  How would you like to obtain your AVS? Basimhart    Pain score 4  Constant   What does your pain like feel during a flare? Dull, burning  Does the pain interfere with:  Work ----- NA  Walking ------ no  Sleep ------- no  Daily activities ------no  Relationships -------yes  F=4    UDT/CSA - 9/2020    hydrocodone at 0600 this am  belbuca at 0630 this am

## 2021-07-29 NOTE — LETTER
7/29/2021         RE: Vernon Lemus  [address]  [place] [address]        Dear Colleague,    Thank you for referring your patient, Vernon Lemus, to the [facility]. Please see a copy of my visit note below.    Molina is a 81 year old who is being evaluated via a billable telephone visit.      What phone number would you like to be contacted at? [phone]  How would you like to obtain your AVS? MyChart    Pain score 4  Constant   What does your pain like feel during a flare? Dull, burning  Does the pain interfere with:  Work ----- NA  Walking ------ no  Sleep ------- no  Daily activities ------no  Relationships -------yes  F=4    UDT/CSA - 9/2020    hydrocodone at 0600 this am  belbuca at 0630 this am    Vernon Lemus is a 81 year old male who is being evaluated with [facility] or [employer] services- via telephone      Start time- 9:31 am   End time- 9:53 am   Patient location- of site- home  Provider location- off site- virtual     Subjective-    I have reviewed and updated the patient's Past Medical History, Social History, Family History and Medication List as well as the Precharting workup by the [facility].     PAIN CLINIC FOLLOW UP PROGRESS NOTE    CC:chief complaint    HPI  Vernon Lemus is a 81 year old male who presents for evaluation chief complaint that is causing continued pain. Specific questions indicated the patient wanted addressed today include: to follow up with his ongoing chronic pain as well as medication refills.         Objective-  Major issues:  1. Chronic pain syndrome        Pain score 4  Constant   What does your pain like feel during a flare? Dull, burning  Does the pain interfere with:  Work ----- NA  Walking ------ no  Sleep ------- no  Daily activities ------no  Relationships -------yes  F=4     UDT/CSA - 9/2020    ALLERGIES  Cat dander [animal dander], Cilostazol, Gabapentin, Morphine, Pollen extracts [pollen extract], and Tizanidine    Previous  Medical History  Social History    Substance and Sexual Activity      Alcohol use: No        Comment: 4 drinks a week/socially; 12/30/2013 stopped drinking    History   Drug Use No     History   Smoking Status     Former Smoker     Quit date: 8/10/1986   Smokeless Tobacco     Never Used       Pertinent Pain Medications/interventions:  7/29/2021- Transitioned to Belbuca 900 mcg/hr and Norco 5/325 mg up to 6 tabs per day. lyrica 10 mg bid. He feels that the 60 days absence of gluten has made a difference in his hand pain but he feels that it is not enough to continue.      5/10/2021- butrans 20 mcg/hr, norco 5/325 mg up to 6 per day, lyrica 100 mg two times a day.      1/11/2021- the butrans patch still seems to only last 6 days and his pain is not covered well by the extra norco on days 6,7. Will try belbuca again ( previously insurance did not want to cover) continue the use of the norco and lyrica as well as injections.      11/13/2020- increased hydrocodone to include 10 extra tabs per month for patch changes and bad nights.      6/30/2020- Increase in butrans to 20 mcg/hr, decrease norco to 6 tabs per day, he is taking lyrica 100 mg two times a day form his PCP      Currently he is taking norco 5/325 mg up to 5-8 tabs per day as well as Butrans 15 mcg/ hr      He notes that the amitriptyline was started with Dr. Alva from [facility]       He is currently taking norco up to 6 per day as needed, he recently repeated the celiac plexus block with Dr. Gaviria and reports no relief as of yet.      Acupuncture was helpful initially but is now not effective     Baclofen was not helpful     Recently stopped oxycontin ER 15 mg two times a day. 5/24/2019     Previously has tried morphine which led to confusion   Belbuca up to 600 mcg two times a day which helped a lot but unaffordable.      Failed medical cannabis  Percocet- too strong.       Reports having tried and failed pregablin- lyrica    TRIED AND FAILED  "MEDICATIONS:  Cymbalta - hallucinations at 60 mg, no issues with 40 mg  Lyrica - very depressed mood  Nortriptyline- \"spacing out\" and anxiety as well as jitteriness in the morning- however, it did help with sleep and decreased his shooting pains at night.  oxymorphone 5mg ER twice daily ,OxyContin 10 mg twice a day.    Percocet 5-325 mg, max of 3 a day, Protonix 40 mg daily,  Aspirin 81 mg daily,  Maalox 17 grams for constipation, Flaxseed.     Reviewing the records it is noted patient has tried the celiac plexus blocks x 2 and trigger point injections to the local area.      Medications    Current Outpatient Medications:      amLODIPine (NORVASC) 5 MG tablet, Take 1 tablet (5 mg) by mouth daily, Disp: 30 tablet, Rfl: 0     aspirin 81 MG tablet, Take 1 tablet by mouth daily, Disp: , Rfl:      Buprenorphine HCl (BELBUCA) 600 MCG FILM buccal film, Belbuca 600 mcg buccal film  APPLY 1 FILM TO INSIDE CHEEK TWICE DAILY, Disp: , Rfl:      Buprenorphine HCl (BELBUCA) 900 MCG FILM buccal film, Place 1 Film (900 mcg) inside cheek every 12 hours, Disp: 60 Film, Rfl: 0     FISH OIL, 1,400 mg daily, Disp: , Rfl:      HYDROCHLOROTHIAZIDE PO, Take by mouth daily, Disp: , Rfl:      [START ON 8/4/2021] HYDROcodone-acetaminophen (NORCO) 5-325 MG tablet, hydrocodone 5 mg-acetaminophen 325 mg tablet, Disp: 178 tablet, Rfl: 0     ketorolac (TORADOL) 10 MG tablet, ketorolac 10 mg tablet, Disp: , Rfl:      Multiple Vitamin (DAILY MULTIVITAMIN PO), Take 1 tablet by mouth daily, Disp: , Rfl:      pantoprazole (PROTONIX) 40 MG enteric coated tablet, Take 1 tablet by mouth daily, Disp: , Rfl:      pregabalin (LYRICA) 100 MG capsule, pregabalin 100 mg capsule 2 times a day, Disp: 60 capsule, Rfl: 1     senna-docusate (SENOKOT-S;PERICOLACE) 8.6-50 MG per tablet, Take 1 tablet by mouth daily as needed for constipation, Disp: 7 tablet, Rfl: 0     triamterene-hydrochlorothiazide (MAXZIDE-25) 37.5-25 MG per tablet, Take 1 tablet by mouth daily, " Disp: , Rfl:      amylase-lipase-protease (PERTZYE) 77778 UNITS CPEP, Take 1-5 with snacks and meals, up to 16 per day.  First Start Program for a trial of this medication., Disp: 500 capsule, Rfl: 0     amylase-lipase-protease (VIOKACE) 30507 UNITS TABS tablet, Take 1-2 with snacks and 2-4 with meals, up to 15 per day., Disp: 50 tablet, Rfl: 0     LATANOPROST OP, Place 1 drop into the right eye At Bedtime, Disp: , Rfl:      triamcinolone (KENALOG) 0.1 % ointment, Apply topically as needed, Disp: , Rfl:       Lab:  UDT/CSA - 9/2020     hydrocodone at 0600 this am  belbuca at 0630 this am    Imaging:  No new imaging reviewed with patient over the phone, no new orders placed       Recent   Dated 7/29/2021 was reviewed.       Assessment:     Vernon Lemus is a 81 year old male seen in clinic today for chief complaint    New concerns today- he feels that he is coming up short with the amount of pain relief he needs and what he is taking. He notes that the belbuca is only lasting about 600 mcg bid for 10 hours and he has had to wake up at night to take hydrocodone for breakthrough pain. Patient has been taking notes on his pain and finds that he has had 3 days where he has woke up without any pain whatsoever but then after 3 hours his pain comes back. But there are other days that he has woken up with severe pain 8/10 and then it takes a few hours to dissipate. Then he has noted about 20 days without consistent relief. He reports his pain is consistently the same as it was 8 years ago and the pressure itself hurts when even his clothes are on it.     Plan of care going forward for ongoing pain control- at this time I would recommend an increase in the belbuca up to 900 mcg bid as well as norco for breakthrough pain.   Patients current MME is 20-40    Plan:   Interventions-    Follow up in 4-6  weeks telephone visit ok      If you feel that gluten avoidance is not impacting your pain ok to reintroduce small amount  and monitor your reaction for up to 4 days after.     Dr. Wai cagle spore restore for gut health take 1 per day.      Increase the belbuca to 900 mcg bid      Will continue the ongoing use of hydrocodone- no changes at this time.     Recommend Following with Dr. Colby at some point.       Orders placed today  Medications that were ordered today   Signed Prescriptions:                        Disp   Refills    Buprenorphine HCl (BELBUCA) 600 MCG FILM b*                Sig: Belbuca 600 mcg buccal film   APPLY 1 FILM TO INSIDE           CHEEK TWICE DAILY  Authorizing Provider: PATIENT REPORTED    ketorolac (TORADOL) 10 MG tablet                           Sig: ketorolac 10 mg tablet  Authorizing Provider: PATIENT REPORTED    HYDROcodone-acetaminophen (NORCO) 5-325 MG*178 ta*0        Sig: hydrocodone 5 mg-acetaminophen 325 mg tablet  Authorizing Provider: BRIAN FOX    Buprenorphine HCl (BELBUCA) 900 MCG FILM b*60 Film0        Sig: Place 1 Film (900 mcg) inside cheek every 12 hours  Authorizing Provider: BRIAN FOX    pregabalin (LYRICA) 100 MG capsule         60 cap*1        Sig: pregabalin 100 mg capsule 2 times a day  Authorizing Provider: BRIAN FOX    No orders of the defined types were placed in this encounter.        The patient understand todays plan and has their questions answered in regards to expectations and current treatment plan.     Prevent unexpected access/loss of medication: Keep medication locked. Only carry what you need with you    The plan of care will be adjusted to accommodate the issues discussed, discussing management of their care and follow up that is recommended. 7/29/2021         EMILIA Art [facility]  [address]  [place] [address]  Ph: [phone]  Fax: [phone]            Again, thank you for allowing me to participate in the care of your  patient.        Sincerely,        EMILIA Art CNP

## 2021-07-29 NOTE — TELEPHONE ENCOUNTER
[facility] calls stating they received a controlled substance with no directions so this will need to be resent.  After review, patient had an appointment today and looks like Norco needs more information so will send to provider.

## 2021-07-29 NOTE — PATIENT INSTRUCTIONS
Plan:   Interventions-    Follow up in 4-6  weeks telephone visit ok      If you feel that gluten avoidance is not impacting your pain ok to reintroduce small amount and monitor your reaction for up to 4 days after.     Dr. Wai sparks restore for gut health take 1 per day.      Increase the belbuca to 900 mcg bid      Will continue the ongoing use of hydrocodone- no changes at this time.     Recommend Following with Dr. Colby at some point.       Orders placed today  Medications that were ordered today   Signed Prescriptions:                        Disp   Refills    Buprenorphine HCl (BELBUCA) 600 MCG FILM b*                Sig: Belbuca 600 mcg buccal film   APPLY 1 FILM TO INSIDE           CHEEK TWICE DAILY  Authorizing Provider: PATIENT REPORTED    ketorolac (TORADOL) 10 MG tablet                           Sig: ketorolac 10 mg tablet  Authorizing Provider: PATIENT REPORTED    HYDROcodone-acetaminophen (NORCO) 5-325 MG*178 ta*0        Sig: hydrocodone 5 mg-acetaminophen 325 mg tablet  Authorizing Provider: BRIAN FOX    Buprenorphine HCl (BELBUCA) 900 MCG FILM b*60 Film0        Sig: Place 1 Film (900 mcg) inside cheek every 12 hours  Authorizing Provider: BRIAN FOX    pregabalin (LYRICA) 100 MG capsule         60 cap*1        Sig: pregabalin 100 mg capsule 2 times a day  Authorizing Provider: BRIAN FOX    No orders of the defined types were placed in this encounter.        The patient understand todays plan and has their questions answered in regards to expectations and current treatment plan.     Prevent unexpected access/loss of medication: Keep medication locked. Only carry what you need with you    The plan of care will be adjusted to accommodate the issues discussed, discussing management of their care and follow up that is recommended. 7/29/2021         EMILIA Art [facility]  [address]  [place] [address]  Ph: [phone]  Fax: [phone]

## 2021-08-03 ENCOUNTER — HOSPITAL ENCOUNTER (OUTPATIENT)
Dept: NUCLEAR MEDICINE | Facility: CLINIC | Age: 81
End: 2021-08-03
Attending: UROLOGY
Payer: COMMERCIAL

## 2021-08-03 DIAGNOSIS — C61 PRIMARY MALIGNANT NEOPLASM OF PROSTATE (H): ICD-10-CM

## 2021-08-03 PROCEDURE — 78306 BONE IMAGING WHOLE BODY: CPT

## 2021-08-03 PROCEDURE — A9503 TC99M MEDRONATE: HCPCS | Performed by: UROLOGY

## 2021-08-03 PROCEDURE — 343N000001 HC RX 343: Performed by: UROLOGY

## 2021-08-03 RX ORDER — TC 99M MEDRONATE 20 MG/10ML
23-25 INJECTION, POWDER, LYOPHILIZED, FOR SOLUTION INTRAVENOUS ONCE
Status: COMPLETED | OUTPATIENT
Start: 2021-08-03 | End: 2021-08-03

## 2021-08-03 RX ADMIN — TC 99M MEDRONATE 24.2 MCI.: 20 INJECTION, POWDER, LYOPHILIZED, FOR SOLUTION INTRAVENOUS at 10:21

## 2021-08-26 DIAGNOSIS — G89.4 CHRONIC PAIN SYNDROME: ICD-10-CM

## 2021-08-27 DIAGNOSIS — G89.4 CHRONIC PAIN SYNDROME: ICD-10-CM

## 2021-08-27 NOTE — TELEPHONE ENCOUNTER
Medication refill information reviewed.     Due date for Belbuca is 8/29/21     Prescriptions prepped for review.     Will route to provider.     ___________________________________  Received call from patient requesting refill(s) of Belbuca     Last dispensed from pharmacy on 7/30/21    Patient's last office/virtual visit by prescribing provider on 7/29/21  Next office/virtual appointment scheduled for 9/3/21    Last urine drug screen date 9/17/20  Current opioid agreement on file (completed within the last year) Yes Date of opioid agreement: 9/18/20    E-prescribe to [id number] [place], [place] pharmacy    Will route to nursing Shelly for review and preparation of prescription(s).       Larry Johnston RN  Patient Care Supervisor   [facility]

## 2021-08-30 NOTE — TELEPHONE ENCOUNTER
Patient leaves a message stating that the pharmacy does not have 900mcg Belbuca films.  Patient is requesting to have the clinic resend in the Belbuca 600mcg films as they have this in stock.    Pending Prescriptions:                       Disp   Refills    Buprenorphine HCl (BELBUCA) 600 MCG FILM *56 Film0            Sig: Place 1 Film (600 mcg) inside cheek 2 times daily           for 28 days    Signed Prescriptions:                        Disp   Refills    Buprenorphine HCl (BELBUCA) 900 MCG FILM b*60 Film0        Sig: Place 1 Film (900 mcg) inside cheek every 12 hours           Fill 8/27/21 and start 8/29/21  Authorizing Provider: BRIAN FOX

## 2021-09-03 ENCOUNTER — VIRTUAL VISIT (OUTPATIENT)
Dept: PALLIATIVE MEDICINE | Facility: OTHER | Age: 81
End: 2021-09-03
Payer: COMMERCIAL

## 2021-09-03 DIAGNOSIS — G89.4 CHRONIC PAIN SYNDROME: Primary | ICD-10-CM

## 2021-09-03 PROCEDURE — 999N001193 HC VIDEO/TELEPHONE VISIT; NO CHARGE: Performed by: NURSE PRACTITIONER

## 2021-09-03 PROCEDURE — 99213 OFFICE O/P EST LOW 20 MIN: CPT | Mod: 95 | Performed by: NURSE PRACTITIONER

## 2021-09-03 RX ORDER — HYDROCODONE BITARTRATE AND ACETAMINOPHEN 5; 325 MG/1; MG/1
1 TABLET ORAL EVERY 4 HOURS PRN
Qty: 180 TABLET | Refills: 0 | Status: SHIPPED | OUTPATIENT
Start: 2021-09-03 | End: 2021-10-22

## 2021-09-03 RX ORDER — HYDROCODONE BITARTRATE AND ACETAMINOPHEN 5; 325 MG/1; MG/1
1 TABLET ORAL EVERY 4 HOURS PRN
Qty: 180 TABLET | Refills: 0 | Status: SHIPPED | OUTPATIENT
Start: 2021-10-03 | End: 2021-10-22

## 2021-09-03 ASSESSMENT — PAIN SCALES - GENERAL: PAINLEVEL: MODERATE PAIN (4)

## 2021-09-03 NOTE — LETTER
9/3/2021         RE: Vernon Lemus  [address]  [place] [address]        Dear Colleague,    Thank you for referring your patient, Vernon Lemus, to the [facility]. Please see a copy of my visit note below.    Molina is a 81 year old who is being evaluated via a billable video visit.      How would you like to obtain your AVS? My chart  If the video visit is dropped, the invitation should be resent by: [phone] [employer]  Will anyone else be joining your video visit? no    Pain score: 4  Constant   What does your pain feel like: Dull, burning  Does the pain interfere with:  Work ----- NA  Walking ------ no  Sleep ------- no  Daily activities ------no  Relationships -------yes  F=4    Vernon Lemus is a 81 year old male who is being evaluated with [employer] or [employer]- via video       Start time- 2:22 pm   End time- 2:47 pm   Patient location- of site- home  Provider location- off site- virtual     Subjective-    I have reviewed and updated the patient's Past Medical History, Social History, Family History and Medication List as well as the Precharting workup by the [facility].     PAIN CLINIC FOLLOW UP PROGRESS NOTE    CC:chief complaint    HPI  Vernon Lemus is a 81 year old male who presents for evaluation chief complaint that is causing continued pain. Specific questions indicated the patient wanted addressed today include: to follow up on his current plan of care        Objective-  Major issues:  1. Chronic pain syndrome        Pain score: 4  Constant   What does your pain feel like: Dull, burning in his abdominal pain that is constant  Does the pain interfere with:  Work ----- NA  Walking ------ no  Sleep ------- no  Daily activities ------no  Relationships -------yes  F=4      ALLERGIES  Atorvastatin, Cat dander [animal dander], Cilostazol, Gabapentin, Morphine, Pollen extracts [pollen extract], Pregabalin, and Tizanidine    Previous Medical History  Social  History    Substance and Sexual Activity      Alcohol use: No        Comment: 4 drinks a week/socially; 12/30/2013 stopped drinking    History   Drug Use No     History   Smoking Status     Former Smoker     Quit date: 8/10/1986   Smokeless Tobacco     Never Used       Pertinent Pain Medications/interventions:    7/29/2021- Transitioned to Belbuca 900 mcg/hr and Norco 5/325 mg up to 6 tabs per day. lyrica 10 mg bid. He feels that the 60 days absence of gluten has made a difference in his hand pain but he feels that it is not enough to continue.       5/10/2021- butrans 20 mcg/hr, norco 5/325 mg up to 6 per day, lyrica 100 mg two times a day.      1/11/2021- the butrans patch still seems to only last 6 days and his pain is not covered well by the extra norco on days 6,7. Will try belbuca again ( previously insurance did not want to cover) continue the use of the norco and lyrica as well as injections.      11/13/2020- increased hydrocodone to include 10 extra tabs per month for patch changes and bad nights.      6/30/2020- Increase in butrans to 20 mcg/hr, decrease norco to 6 tabs per day, he is taking lyrica 100 mg two times a day form his PCP      Currently he is taking norco 5/325 mg up to 5-8 tabs per day as well as Butrans 15 mcg/ hr      He notes that the amitriptyline was started with Dr. Alva from [facility]       He is currently taking norco up to 6 per day as needed, he recently repeated the celiac plexus block with Dr. Gaviria and reports no relief as of yet.      Acupuncture was helpful initially but is now not effective     Baclofen was not helpful     Recently stopped oxycontin ER 15 mg two times a day. 5/24/2019     Previously has tried morphine which led to confusion   Belbuca up to 600 mcg two times a day which helped a lot but unaffordable.      Failed medical cannabis  Percocet- too strong.       Reports having tried and failed pregablin- lyrica    TRIED AND FAILED MEDICATIONS:  Cymbalta -  "hallucinations at 60 mg, no issues with 40 mg  Lyrica - very depressed mood  Nortriptyline- \"spacing out\" and anxiety as well as jitteriness in the morning- however, it did help with sleep and decreased his shooting pains at night.  oxymorphone 5mg ER twice daily ,OxyContin 10 mg twice a day.    Percocet 5-325 mg, max of 3 a day, Protonix 40 mg daily,  Aspirin 81 mg daily,  Maalox 17 grams for constipation, Flaxseed.     Reviewing the records it is noted patient has tried the celiac plexus blocks x 2 and trigger point injections to the local area.         Medications    Current Outpatient Medications:      amLODIPine (NORVASC) 5 MG tablet, Take 1 tablet (5 mg) by mouth daily, Disp: 30 tablet, Rfl: 0     aspirin 81 MG tablet, Take 1 tablet by mouth daily, Disp: , Rfl:      Buprenorphine HCl (BELBUCA) 900 MCG FILM buccal film, Place 1 Film (900 mcg) inside cheek every 12 hours Fill 8/27/21 and start 8/29/21, Disp: 60 Film, Rfl: 0     FISH OIL, 1,400 mg daily, Disp: , Rfl:      HYDROCHLOROTHIAZIDE PO, Take by mouth daily, Disp: , Rfl:      ketorolac (TORADOL) 10 MG tablet, ketorolac 10 mg tablet, Disp: , Rfl:      LATANOPROST OP, Place 1 drop into the right eye At Bedtime, Disp: , Rfl:      Multiple Vitamin (DAILY MULTIVITAMIN PO), Take 1 tablet by mouth daily, Disp: , Rfl:      pantoprazole (PROTONIX) 40 MG enteric coated tablet, Take 1 tablet by mouth daily, Disp: , Rfl:      pregabalin (LYRICA) 100 MG capsule, pregabalin 100 mg capsule 2 times a day, Disp: 60 capsule, Rfl: 1     senna-docusate (SENOKOT-S;PERICOLACE) 8.6-50 MG per tablet, Take 1 tablet by mouth daily as needed for constipation, Disp: 7 tablet, Rfl: 0     triamcinolone (KENALOG) 0.1 % ointment, Apply topically as needed, Disp: , Rfl:      triamterene-hydrochlorothiazide (MAXZIDE-25) 37.5-25 MG per tablet, Take 1 tablet by mouth daily, Disp: , Rfl:       Lab:  Last UDS on 9/2020 had expected results. Last UDT on file was reviewed.    Imaging:  No new " imaging reviewed with patient over the phone, no new orders placed       Recent   Dated 9/3/2021 was reviewed.       Assessment:     Vernon Lemus is a 81 year old male seen in clinic today for chief complaint    New concerns today- none at this time, he notes that he is following up with Dr. Alva for the GI system. He has been dealing with the abdominal pain for the past 9 years. His abdominal pain is constant and he does not feel that there are any triggers that he can identify, sometimes after exercise the pain is worse or unless he physically pushes on the abdomin physically or when clothes     Plan of care going forward for ongoing pain control- at this time he is using the belbuca 900 mcg bid and the norco as needed for breakthrough pain, in review the past few months he has tried the gluten free diet and did not find any difference in his pain. He has resumed his regular diet at this time, He also notes that he plans on returning to Dr. Alva for a review as he continued to try to sort out why he continues to have abdominal pain.   No changes at this time with his ongoing chronic pain.   Patient has also tried a probiotic daily.       Patients current MME is 20-40    Plan:   Interventions-    Follow up in 8 weeks with Dr. Colby     Continue the belbuca to 900 mcg bid- refills sent     Will continue the ongoing use of hydrocodone- no changes at this time. refill dates are 9/3-10/3, 10/3-11/2    Orders placed today  Medications that were ordered today   Pending Prescriptions:                       Disp   Refills    HYDROcodone-acetaminophen (NORCO) 5-325 M*180 ta*0            Sig: Take 1 tablet by mouth every 4 hours as needed           for severe pain (Max of 6/day)    Buprenorphine HCl (BELBUCA) 900 MCG FILM *60 Film0            Sig: Place 1 Film (900 mcg) inside cheek every 12           hours    No orders of the defined types were placed in this encounter.        The patient understand todays  plan and has their questions answered in regards to expectations and current treatment plan.     Prevent unexpected access/loss of medication: Keep medication locked. Only carry what you need with you    The plan of care will be adjusted to accommodate the issues discussed, discussing management of their care and follow up that is recommended. 9/3/2021         EMILIA Art CNP  [facility]  [address]  [place] [address]  Ph: [phone]  Fax: [phone]            Again, thank you for allowing me to participate in the care of your patient.        Sincerely,        EMILIA Art CNP

## 2021-09-03 NOTE — PATIENT INSTRUCTIONS
Plan:   Interventions-    Follow up in 8 weeks with Dr. Colby     Continue the belbuca to 900 mcg bid- refills sent     Will continue the ongoing use of hydrocodone- no changes at this time. refill dates are 9/3-10/3, 10/3-11/2    Orders placed today  Medications that were ordered today   Pending Prescriptions:                       Disp   Refills    HYDROcodone-acetaminophen (NORCO) 5-325 M*180 ta*0            Sig: Take 1 tablet by mouth every 4 hours as needed           for severe pain (Max of 6/day)    Buprenorphine HCl (BELBUCA) 900 MCG FILM *60 Film0            Sig: Place 1 Film (900 mcg) inside cheek every 12           hours    No orders of the defined types were placed in this encounter.        The patient understand todays plan and has their questions answered in regards to expectations and current treatment plan.     Prevent unexpected access/loss of medication: Keep medication locked. Only carry what you need with you    The plan of care will be adjusted to accommodate the issues discussed, discussing management of their care and follow up that is recommended. 9/3/2021         EMILIA Art [facility]  [address]  [place] [address]  Ph: [phone]  Fax: [phone]

## 2021-09-03 NOTE — PROGRESS NOTES
Molina is a 81 year old who is being evaluated via a billable video visit.      How would you like to obtain your AVS? My chart  If the video visit is dropped, the invitation should be resent by: [phone] [employer]  Will anyone else be joining your video visit? no    Pain score: 4  Constant   What does your pain feel like: Dull, burning  Does the pain interfere with:  Work ----- NA  Walking ------ no  Sleep ------- no  Daily activities ------no  Relationships -------yes  F=4

## 2021-09-03 NOTE — PROGRESS NOTES
Vernon Lemus is a 81 year old male who is being evaluated with [employer]- via video       Start time- 2:22 pm   End time- 2:47 pm   Patient location- of site- home  Provider location- off site- virtual     Subjective-    I have reviewed and updated the patient's Past Medical History, Social History, Family History and Medication List as well as the Precharting workup by the [facility].     PAIN CLINIC FOLLOW UP PROGRESS NOTE    CC:chief complaint    HPI  Vernon Lemus is a 81 year old male who presents for evaluation chief complaint that is causing continued pain. Specific questions indicated the patient wanted addressed today include: to follow up on his current plan of care        Objective-  Major issues:  1. Chronic pain syndrome        Pain score: 4  Constant   What does your pain feel like: Dull, burning in his abdominal pain that is constant  Does the pain interfere with:  Work ----- NA  Walking ------ no  Sleep ------- no  Daily activities ------no  Relationships -------yes  F=4      ALLERGIES  Atorvastatin, Cat dander [animal dander], Cilostazol, Gabapentin, Morphine, Pollen extracts [pollen extract], Pregabalin, and Tizanidine    Previous Medical History  Social History    Substance and Sexual Activity      Alcohol use: No        Comment: 4 drinks a week/socially; 12/30/2013 stopped drinking    History   Drug Use No     History   Smoking Status     Former Smoker     Quit date: 8/10/1986   Smokeless Tobacco     Never Used       Pertinent Pain Medications/interventions:    7/29/2021- Transitioned to Belbuca 900 mcg/hr and Norco 5/325 mg up to 6 tabs per day. lyrica 10 mg bid. He feels that the 60 days absence of gluten has made a difference in his hand pain but he feels that it is not enough to continue.       5/10/2021- butrans 20 mcg/hr, norco 5/325 mg up to 6 per day, lyrica 100 mg two times a day.      1/11/2021- the butrans patch still seems to only last 6 days and his pain is not  "covered well by the extra norco on days 6,7. Will try belbuca again ( previously insurance did not want to cover) continue the use of the norco and lyrica as well as injections.      11/13/2020- increased hydrocodone to include 10 extra tabs per month for patch changes and bad nights.      6/30/2020- Increase in butrans to 20 mcg/hr, decrease norco to 6 tabs per day, he is taking lyrica 100 mg two times a day form his PCP      Currently he is taking norco 5/325 mg up to 5-8 tabs per day as well as Butrans 15 mcg/ hr      He notes that the amitriptyline was started with Dr. Alva from [facility]       He is currently taking norco up to 6 per day as needed, he recently repeated the celiac plexus block with Dr. Gaviria and reports no relief as of yet.      Acupuncture was helpful initially but is now not effective     Baclofen was not helpful     Recently stopped oxycontin ER 15 mg two times a day. 5/24/2019     Previously has tried morphine which led to confusion   Belbuca up to 600 mcg two times a day which helped a lot but unaffordable.      Failed medical cannabis  Percocet- too strong.       Reports having tried and failed pregablin- lyrica    TRIED AND FAILED MEDICATIONS:  Cymbalta - hallucinations at 60 mg, no issues with 40 mg  Lyrica - very depressed mood  Nortriptyline- \"spacing out\" and anxiety as well as jitteriness in the morning- however, it did help with sleep and decreased his shooting pains at night.  oxymorphone 5mg ER twice daily ,OxyContin 10 mg twice a day.    Percocet 5-325 mg, max of 3 a day, Protonix 40 mg daily,  Aspirin 81 mg daily,  Maalox 17 grams for constipation, Flaxseed.     Reviewing the records it is noted patient has tried the celiac plexus blocks x 2 and trigger point injections to the local area.         Medications    Current Outpatient Medications:      amLODIPine (NORVASC) 5 MG tablet, Take 1 tablet (5 mg) by mouth daily, Disp: 30 tablet, Rfl: 0     aspirin 81 MG tablet, Take 1 " tablet by mouth daily, Disp: , Rfl:      Buprenorphine HCl (BELBUCA) 900 MCG FILM buccal film, Place 1 Film (900 mcg) inside cheek every 12 hours Fill 8/27/21 and start 8/29/21, Disp: 60 Film, Rfl: 0     FISH OIL, 1,400 mg daily, Disp: , Rfl:      HYDROCHLOROTHIAZIDE PO, Take by mouth daily, Disp: , Rfl:      ketorolac (TORADOL) 10 MG tablet, ketorolac 10 mg tablet, Disp: , Rfl:      LATANOPROST OP, Place 1 drop into the right eye At Bedtime, Disp: , Rfl:      Multiple Vitamin (DAILY MULTIVITAMIN PO), Take 1 tablet by mouth daily, Disp: , Rfl:      pantoprazole (PROTONIX) 40 MG enteric coated tablet, Take 1 tablet by mouth daily, Disp: , Rfl:      pregabalin (LYRICA) 100 MG capsule, pregabalin 100 mg capsule 2 times a day, Disp: 60 capsule, Rfl: 1     senna-docusate (SENOKOT-S;PERICOLACE) 8.6-50 MG per tablet, Take 1 tablet by mouth daily as needed for constipation, Disp: 7 tablet, Rfl: 0     triamcinolone (KENALOG) 0.1 % ointment, Apply topically as needed, Disp: , Rfl:      triamterene-hydrochlorothiazide (MAXZIDE-25) 37.5-25 MG per tablet, Take 1 tablet by mouth daily, Disp: , Rfl:       Lab:  Last UDS on 9/2020 had expected results. Last UDT on file was reviewed.    Imaging:  No new imaging reviewed with patient over the phone, no new orders placed       Recent   Dated 9/3/2021 was reviewed.       Assessment:     Vernon Lemus is a 81 year old male seen in clinic today for chief complaint    New concerns today- none at this time, he notes that he is following up with Dr. Alva for the GI system. He has been dealing with the abdominal pain for the past 9 years. His abdominal pain is constant and he does not feel that there are any triggers that he can identify, sometimes after exercise the pain is worse or unless he physically pushes on the abdomin physically or when clothes     Plan of care going forward for ongoing pain control- at this time he is using the belbuca 900 mcg bid and the norco as needed  for breakthrough pain, in review the past few months he has tried the gluten free diet and did not find any difference in his pain. He has resumed his regular diet at this time, He also notes that he plans on returning to Dr. Alva for a review as he continued to try to sort out why he continues to have abdominal pain.   No changes at this time with his ongoing chronic pain.   Patient has also tried a probiotic daily.       Patients current MME is 20-40    Plan:   Interventions-    Follow up in 8 weeks with Dr. Colby     Continue the belbuca to 900 mcg bid- refills sent     Will continue the ongoing use of hydrocodone- no changes at this time. refill dates are 9/3-10/3, 10/3-11/2    Orders placed today  Medications that were ordered today   Pending Prescriptions:                       Disp   Refills    HYDROcodone-acetaminophen (NORCO) 5-325 M*180 ta*0            Sig: Take 1 tablet by mouth every 4 hours as needed           for severe pain (Max of 6/day)    Buprenorphine HCl (BELBUCA) 900 MCG FILM *60 Film0            Sig: Place 1 Film (900 mcg) inside cheek every 12           hours    No orders of the defined types were placed in this encounter.        The patient understand todays plan and has their questions answered in regards to expectations and current treatment plan.     Prevent unexpected access/loss of medication: Keep medication locked. Only carry what you need with you    The plan of care will be adjusted to accommodate the issues discussed, discussing management of their care and follow up that is recommended. 9/3/2021         EMILIA Art [facility]  [address]  [place] [address]  Ph: [phone]  Fax: [phone]

## 2021-09-30 ENCOUNTER — TRANSFERRED RECORDS (OUTPATIENT)
Dept: HEALTH INFORMATION MANAGEMENT | Facility: CLINIC | Age: 81
End: 2021-09-30
Payer: COMMERCIAL

## 2021-10-03 ENCOUNTER — HEALTH MAINTENANCE LETTER (OUTPATIENT)
Age: 81
End: 2021-10-03

## 2021-10-12 ENCOUNTER — TRANSFERRED RECORDS (OUTPATIENT)
Dept: HEALTH INFORMATION MANAGEMENT | Facility: CLINIC | Age: 81
End: 2021-10-12
Payer: COMMERCIAL

## 2021-10-15 ENCOUNTER — TRANSFERRED RECORDS (OUTPATIENT)
Dept: HEALTH INFORMATION MANAGEMENT | Facility: CLINIC | Age: 81
End: 2021-10-15
Payer: COMMERCIAL

## 2021-10-18 ENCOUNTER — IMMUNIZATION (OUTPATIENT)
Dept: NURSING | Facility: CLINIC | Age: 81
End: 2021-10-18
Payer: COMMERCIAL

## 2021-10-18 PROCEDURE — 91300 PR COVID VAC PFIZER DIL RECON 30 MCG/0.3 ML IM: CPT

## 2021-10-18 PROCEDURE — 0004A PR COVID VAC PFIZER DIL RECON 30 MCG/0.3 ML IM: CPT

## 2021-10-22 ENCOUNTER — OFFICE VISIT (OUTPATIENT)
Dept: PALLIATIVE MEDICINE | Facility: OTHER | Age: 81
End: 2021-10-22
Payer: COMMERCIAL

## 2021-10-22 VITALS
SYSTOLIC BLOOD PRESSURE: 150 MMHG | DIASTOLIC BLOOD PRESSURE: 70 MMHG | HEART RATE: 62 BPM | WEIGHT: 225 LBS | RESPIRATION RATE: 16 BRPM | BODY MASS INDEX: 30.48 KG/M2 | HEIGHT: 72 IN

## 2021-10-22 DIAGNOSIS — G89.4 CHRONIC PAIN SYNDROME: Primary | ICD-10-CM

## 2021-10-22 LAB
CANNABINOIDS UR QL SCN: NORMAL
CREAT UR-MCNC: 125 MG/DL

## 2021-10-22 PROCEDURE — 99215 OFFICE O/P EST HI 40 MIN: CPT | Performed by: ANESTHESIOLOGY

## 2021-10-22 PROCEDURE — 80349 CANNABINOIDS NATURAL: CPT | Performed by: ANESTHESIOLOGY

## 2021-10-22 PROCEDURE — 80307 DRUG TEST PRSMV CHEM ANLYZR: CPT | Performed by: ANESTHESIOLOGY

## 2021-10-22 PROCEDURE — G0463 HOSPITAL OUTPT CLINIC VISIT: HCPCS

## 2021-10-22 PROCEDURE — 80320 DRUG SCREEN QUANTALCOHOLS: CPT | Performed by: ANESTHESIOLOGY

## 2021-10-22 PROCEDURE — 82570 ASSAY OF URINE CREATININE: CPT | Mod: XU | Performed by: ANESTHESIOLOGY

## 2021-10-22 RX ORDER — ROSUVASTATIN CALCIUM 10 MG/1
1 TABLET, COATED ORAL EVERY 24 HOURS
COMMUNITY
Start: 2021-04-20 | End: 2023-07-11 | Stop reason: DRUGHIGH

## 2021-10-22 RX ORDER — BUPRENORPHINE AND NALOXONE 2; .5 MG/1; MG/1
FILM, SOLUBLE BUCCAL; SUBLINGUAL
Qty: 30 EACH | Refills: 1 | Status: SHIPPED | OUTPATIENT
Start: 2021-10-22 | End: 2021-11-08

## 2021-10-22 RX ORDER — PREGABALIN 100 MG/1
CAPSULE ORAL
Qty: 60 CAPSULE | Refills: 1 | Status: SHIPPED | OUTPATIENT
Start: 2021-10-22 | End: 2022-02-01

## 2021-10-22 RX ORDER — ERGOCALCIFEROL 1.25 MG/1
1 CAPSULE ORAL
COMMUNITY
Start: 2021-04-20 | End: 2023-04-11

## 2021-10-22 RX ORDER — HYDROCODONE BITARTRATE AND ACETAMINOPHEN 5; 325 MG/1; MG/1
1 TABLET ORAL EVERY 4 HOURS PRN
Qty: 180 TABLET | Refills: 0 | Status: SHIPPED | OUTPATIENT
Start: 2021-10-22 | End: 2021-11-19

## 2021-10-22 RX ORDER — METOPROLOL SUCCINATE 100 MG/1
TABLET, EXTENDED RELEASE ORAL
COMMUNITY

## 2021-10-22 RX ORDER — GENTAMICIN SULFATE 1 MG/G
OINTMENT TOPICAL
COMMUNITY
Start: 2021-07-12 | End: 2023-04-11

## 2021-10-22 RX ORDER — HYDROCODONE BITARTRATE AND ACETAMINOPHEN 5; 325 MG/1; MG/1
1 TABLET ORAL EVERY 4 HOURS PRN
Qty: 90 TABLET | Refills: 0 | Status: SHIPPED | OUTPATIENT
Start: 2021-10-22 | End: 2022-01-13

## 2021-10-22 ASSESSMENT — PAIN SCALES - GENERAL: PAINLEVEL: MODERATE PAIN (4)

## 2021-10-22 ASSESSMENT — MIFFLIN-ST. JEOR: SCORE: 1768.59

## 2021-10-22 NOTE — PROGRESS NOTES
Pt is scheduled for follow-up visit for his chronic right upper abdominal pain.     Pain score: 4  Constant   What does your pain feel like: Dull  Does the pain interfere with:  Work: No  Walking/distance: No  Sleep: yes, occasionally  Daily activities: no  Relationships/social life: Yes  Mood: yes  F= 5  UDT/CSA: today

## 2021-10-22 NOTE — PATIENT INSTRUCTIONS
PLAN:  Starting November, begin Suboxone 2 mg films under tongue twice a day for 7 days, if needed increase to 2 mg films 3 times a day.  Take first dose with wife present.    When starting Suboxone stop Belbuca.    With starting Suboxone change the hydrocodone to 1 tablet 3 times a day for 3 days then stop.    Referral to general surgery to evaluate for ventral hernia and possible adhesions since your prostate surgery.    Referral to physical therapy for visceral  myofascial release.    Dr. Colby to assess appropriate referrals for a TAP transabdominal plane injection    Follow-up with Dr. Colby in 4 to 5 weeks

## 2021-10-22 NOTE — PROGRESS NOTES
"      Jac Auguste is a 81 year old who presents for the following health issues   Been followed by Nuvia Smith NP for abdominal pain, transition to undersigned.    81year-old male living in [place] with his wife.  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    HPI     Review is abdominal pain, starting 9 years ago, would observe when he would drive to [place] to visit his daughter had abdominal wall sensitivity need to loosen his pants.  He began having evaluations, including ERCP.  Concerned may relate to his pancreas, told had \"pancreas divisum\".  He went to the [facility] which was not revealing, the [facility] did not find anything, at 1 time been concerned he might need a Whipple procedure, return to the [facility] that did not support that.    He was followed at [facility], tried gluten-free diet twice which did not help.  Family members do have issue with gluten.    He has tried celiac plexus blocks which did not seem to help.  He had trigger point at injections of the abdominal wall which did not help.    Reviewing previous procedures, did have angiography down the left leg with concern for peripheral vascular disease, thinks he had a right brain approach.  Given that he had a history of concern for vascular disease did have an abdominal CTA which was clear.    Reviews in 1997 having treatment for prostate disease, indicated had surgery from his umbilicus down, had had hernia repair many years ago.    Indicates the pain is to be dull, sometimes nausea, points to his mid epigastric area.  Indicates with his other fingers can be deep to his mid back.  Does not seem to vary with meals.  Appetite has been stable around 225.  Does not vary with bowel movements or if he is constipated feels increased pressure.    Has been on a variety of opioid regimens as outlined by Kiara Smith poorly tolerating some.  Presently his present regimen is to take " "hydrocodone 2 tablets about 5:56 in the morning, Belbuca 900 mcg around 6 in the morning, 8:00 the Lyrica, around noon to more hydrocodone, and then into dinnertime Hydro codon tomorrow, then the Belbuca at 6 PM.  Lyrica in the evening.  Describes in the evening time it is worse and there is \"too much time with uncovered pain\".  The Belbuca seem to last about 10-1/2 hours, the hydrocodone about 3 hours.    Describes going to a 3-day program at the [facility] to get off opioids but he could not tolerate being without opioids.  Graph reports wakes 3 times a night to urinate.  His energy is at times adequate, other times naps.  Reports his mood is usually pretty good.    Scribes in August he had 2 or 3 days where she did not wake up pain, cannot explain.    Pain affects his life and that he is less outgoing, less active social life.    Alcohol use was moderate until opioids and quit.  No one is attributed the pancreatitis to alcohol.  Quit smoking cigarettes in 1986.  Did try medical cannabis did not help.    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  It was not attributed to the gluten issue.  He has not seen a neurologist.    Has seen dermatology as he is sensitive to skin cancers.        Current Outpatient Medications:      amLODIPine (NORVASC) 5 MG tablet, Take 1 tablet (5 mg) by mouth daily, Disp: 30 tablet, Rfl: 0     aspirin 81 MG tablet, Take 1 tablet by mouth daily, Disp: , Rfl:      Buprenorphine HCl (BELBUCA) 900 MCG FILM buccal film, Place 1 Film (900 mcg) inside cheek every 12 hours, Disp: 60 Film, Rfl: 1     buprenorphine HCl-naloxone HCl (SUBOXONE) 2-0.5 MG per film, One twice a day for 7 days, if needed one three times a day, Disp: 30 each, Rfl: 1     ergocalciferol (ERGOCALCIFEROL) 1.25 MG (04484 UT) capsule, Take 1 capsule by mouth, Disp: , Rfl:      FISH OIL, 1,400 mg daily, Disp: , Rfl:      HYDROcodone-acetaminophen (NORCO) 5-325 MG tablet, Take 1 tablet " by mouth every 4 hours as needed for severe pain (Max of 6/day), Disp: 90 tablet, Rfl: 0     HYDROcodone-acetaminophen (NORCO) 5-325 MG tablet, Take 1 tablet by mouth every 4 hours as needed for severe pain (Max of 6/day), Disp: 180 tablet, Rfl: 0     ketorolac (TORADOL) 10 MG tablet, ketorolac 10 mg tablet, Disp: , Rfl:      LATANOPROST OP, Place 1 drop into the right eye At Bedtime, Disp: , Rfl:      Multiple Vitamin (DAILY MULTIVITAMIN PO), Take 1 tablet by mouth daily, Disp: , Rfl:      naloxone (NARCAN) 4 MG/0.1ML nasal spray, Spray 1 spray (4 mg) into one nostril alternating nostrils once as needed for opioid reversal every 2-3 minutes until assistance arrives, Disp: 0.2 mL, Rfl: 0     pantoprazole (PROTONIX) 40 MG enteric coated tablet, Take 1 tablet by mouth daily, Disp: , Rfl:      pregabalin (LYRICA) 100 MG capsule, pregabalin 100 mg capsule 2 times a day, Disp: 60 capsule, Rfl: 1     rosuvastatin (CRESTOR) 10 MG tablet, Take 1 tablet by mouth every 24 hours, Disp: , Rfl:      senna-docusate (SENOKOT-S;PERICOLACE) 8.6-50 MG per tablet, Take 1 tablet by mouth daily as needed for constipation, Disp: 7 tablet, Rfl: 0     triamcinolone (KENALOG) 0.1 % ointment, Apply topically as needed, Disp: , Rfl:      triamterene-hydrochlorothiazide (MAXZIDE-25) 37.5-25 MG per tablet, Take 1 tablet by mouth daily, Disp: , Rfl:      gentamicin (GARAMYCIN) 0.1 % external ointment, APPLY TO EAR TWICE DAILY WITH WOUND CHECKS AS DIRECTED, Disp: , Rfl:      metoprolol succinate ER (TOPROL-XL) 100 MG 24 hr tablet, TAKE ONE AND ONE-HALF TABLETS ONCE DAILY, Disp: , Rfl:     Developmental history raised in [place], father worked in the meat packing plant.  Third of 4 children.  He is a college degree.  Hobbies include [employer] War [employer] buff, home repairs, and gardening.  Very active in his [employer].  Denies a history of trauma.    Review of Systems         Is alert with a clear sensorium good eye contact.  Thought process  logical.  Affect is fairly full range.    Flat on the table, flexed at the waist a ventral hernia is noted.  He is not certain when that has been developed it that has been addressed.    There is a midline scar from the umbilicus through the groin.  That area can be sensitive to the touch at times.  There is no rebound tenderness.    No CVA tenderness.  Objective    BP (!) 150/70   Pulse 62   Resp 16   Ht 1.829 m (6')   Wt 102.1 kg (225 lb)   BMI 30.52 kg/m    Body mass index is 30.52 kg/m .  Physical Exam        Assessment:: Patient reports abdominal pain, reevaluation GI doctors, with concern that may relate to the pancreas.    Does have a history of abdominal surgery regarding his prostate, and appears to have a ventral hernia.    Has had a trigger point injection the abdominal wall without benefit.    Reviews his present opioid regimen provides some benefit though does not have good coverage.    Plan: Reviewed as there appears to be an element of abdominal wall pain, a tap procedure may be more helpful and diagnostic.    Also reviewed the role of visceral myofascial release with concern for possible adhesions with previous surgery.    Would also recommend a surgery consult for the ventral hernia.    Discussed with the Belbuca reports fair control though each dose lasting 10 hours.  Options including increasing the Dupree Armand to 900 mg 3 times a day though presently is rather costly and in the donut hole.  Reviewed changing to Suboxone which may allow him to simplify his regimen, be relatively less expensive.  Discussed a tapering schedule.    Total time more than 50 minutes

## 2021-10-22 NOTE — LETTER
"    10/22/2021         RE: Vernon Lemus  [address]  [place] [address]        Dear Colleague,    Thank you for referring your patient, Vernon Lemus, to the [facility]. Please see a copy of my visit note below.    Pt is scheduled for follow-up visit for his chronic right upper abdominal pain.     Pain score: 4  Constant   What does your pain feel like: Dull  Does the pain interfere with:  Work: No  Walking/distance: No  Sleep: yes, occasionally  Daily activities: no  Relationships/social life: Yes  Mood: yes  F= 5  UDT/CSA: today                Subjective   Molina is a 81 year old who presents for the following health issues   Been followed by Nuvia Smith NP for abdominal pain, transition to undersigned.    81year-old male living in [place] with his wife.  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    HPI     Review is abdominal pain, starting 9 years ago, would observe when he would drive to [place] to visit his daughter had abdominal wall sensitivity need to loosen his pants.  He began having evaluations, including ERCP.  Concerned may relate to his pancreas, told had \"pancreas divisum\".  He went to the [facility] which was not revealing, the [facility] did not find anything, at 1 time been concerned he might need a Whipple procedure, return to the [facility] that did not support that.    He was followed at [facility], tried gluten-free diet twice which did not help.  Family members do have issue with gluten.    He has tried celiac plexus blocks which did not seem to help.  He had trigger point at injections of the abdominal wall which did not help.    Reviewing previous procedures, did have angiography down the left leg with concern for peripheral vascular disease, thinks he had a right brain approach.  Given that he had a history of concern for vascular disease did have an abdominal CTA which was clear.    Reviews in 1997 " "having treatment for prostate disease, indicated had surgery from his umbilicus down, had had hernia repair many years ago.    Indicates the pain is to be dull, sometimes nausea, points to his mid epigastric area.  Indicates with his other fingers can be deep to his mid back.  Does not seem to vary with meals.  Appetite has been stable around 225.  Does not vary with bowel movements or if he is constipated feels increased pressure.    Has been on a variety of opioid regimens as outlined by Kiara Smith poorly tolerating some.  Presently his present regimen is to take hydrocodone 2 tablets about 5:56 in the morning, Belbuca 900 mcg around 6 in the morning, 8:00 the Lyrica, around noon to more hydrocodone, and then into dinnertime Hydro codon tomorrow, then the Belbuca at 6 PM.  Lyrica in the evening.  Describes in the evening time it is worse and there is \"too much time with uncovered pain\".  The Belbuca seem to last about 10-1/2 hours, the hydrocodone about 3 hours.    Describes going to a 3-day program at the [facility] to get off opioids but he could not tolerate being without opioids.  Graph reports wakes 3 times a night to urinate.  His energy is at times adequate, other times naps.  Reports his mood is usually pretty good.    Scribes in August he had 2 or 3 days where she did not wake up pain, cannot explain.    Pain affects his life and that he is less outgoing, less active social life.    Alcohol use was moderate until opioids and quit.  No one is attributed the pancreatitis to alcohol.  Quit smoking cigarettes in 1986.  Did try medical cannabis did not help.    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  It was not attributed to the gluten issue.  He has not seen a neurologist.    Has seen dermatology as he is sensitive to skin cancers.        Current Outpatient Medications:      amLODIPine (NORVASC) 5 MG tablet, Take 1 tablet (5 mg) by mouth daily, Disp: 30 " tablet, Rfl: 0     aspirin 81 MG tablet, Take 1 tablet by mouth daily, Disp: , Rfl:      Buprenorphine HCl (BELBUCA) 900 MCG FILM buccal film, Place 1 Film (900 mcg) inside cheek every 12 hours, Disp: 60 Film, Rfl: 1     buprenorphine HCl-naloxone HCl (SUBOXONE) 2-0.5 MG per film, One twice a day for 7 days, if needed one three times a day, Disp: 30 each, Rfl: 1     ergocalciferol (ERGOCALCIFEROL) 1.25 MG (83807 UT) capsule, Take 1 capsule by mouth, Disp: , Rfl:      FISH OIL, 1,400 mg daily, Disp: , Rfl:      HYDROcodone-acetaminophen (NORCO) 5-325 MG tablet, Take 1 tablet by mouth every 4 hours as needed for severe pain (Max of 6/day), Disp: 90 tablet, Rfl: 0     HYDROcodone-acetaminophen (NORCO) 5-325 MG tablet, Take 1 tablet by mouth every 4 hours as needed for severe pain (Max of 6/day), Disp: 180 tablet, Rfl: 0     ketorolac (TORADOL) 10 MG tablet, ketorolac 10 mg tablet, Disp: , Rfl:      LATANOPROST OP, Place 1 drop into the right eye At Bedtime, Disp: , Rfl:      Multiple Vitamin (DAILY MULTIVITAMIN PO), Take 1 tablet by mouth daily, Disp: , Rfl:      naloxone (NARCAN) 4 MG/0.1ML nasal spray, Spray 1 spray (4 mg) into one nostril alternating nostrils once as needed for opioid reversal every 2-3 minutes until assistance arrives, Disp: 0.2 mL, Rfl: 0     pantoprazole (PROTONIX) 40 MG enteric coated tablet, Take 1 tablet by mouth daily, Disp: , Rfl:      pregabalin (LYRICA) 100 MG capsule, pregabalin 100 mg capsule 2 times a day, Disp: 60 capsule, Rfl: 1     rosuvastatin (CRESTOR) 10 MG tablet, Take 1 tablet by mouth every 24 hours, Disp: , Rfl:      senna-docusate (SENOKOT-S;PERICOLACE) 8.6-50 MG per tablet, Take 1 tablet by mouth daily as needed for constipation, Disp: 7 tablet, Rfl: 0     triamcinolone (KENALOG) 0.1 % ointment, Apply topically as needed, Disp: , Rfl:      triamterene-hydrochlorothiazide (MAXZIDE-25) 37.5-25 MG per tablet, Take 1 tablet by mouth daily, Disp: , Rfl:      gentamicin  (GARAMYCIN) 0.1 % external ointment, APPLY TO EAR TWICE DAILY WITH WOUND CHECKS AS DIRECTED, Disp: , Rfl:      metoprolol succinate ER (TOPROL-XL) 100 MG 24 hr tablet, TAKE ONE AND ONE-HALF TABLETS ONCE DAILY, Disp: , Rfl:     Developmental history raised in [place], father worked in the meat packing plant.  Third of 4 children.  He is a college degree.  Hobbies include World War [employer] buff, home repairs, and gardening.  Very active in his [employer] Je[employer] witness.  Denies a history of trauma.    Review of Systems         Is alert with a clear sensorium good eye contact.  Thought process logical.  Affect is fairly full range.    Flat on the table, flexed at the waist a ventral hernia is noted.  He is not certain when that has been developed it that has been addressed.    There is a midline scar from the umbilicus through the groin.  That area can be sensitive to the touch at times.  There is no rebound tenderness.    No CVA tenderness.  Objective    BP (!) 150/70   Pulse 62   Resp 16   Ht 1.829 m (6')   Wt 102.1 kg (225 lb)   BMI 30.52 kg/m    Body mass index is 30.52 kg/m .  Physical Exam        Assessment:: Patient reports abdominal pain, reevaluation GI doctors, with concern that may relate to the pancreas.    Does have a history of abdominal surgery regarding his prostate, and appears to have a ventral hernia.    Has had a trigger point injection the abdominal wall without benefit.    Reviews his present opioid regimen provides some benefit though does not have good coverage.    Plan: Reviewed as there appears to be an element of abdominal wall pain, a tap procedure may be more helpful and diagnostic.    Also reviewed the role of visceral myofascial release with concern for possible adhesions with previous surgery.    Would also recommend a surgery consult for the ventral hernia.    Discussed with the Belbuca reports fair control though each dose lasting 10 hours.  Options including increasing the Dupree Armand to 900  mg 3 times a day though presently is rather costly and in the donut hole.  Reviewed changing to Suboxone which may allow him to simplify his regimen, be relatively less expensive.  Discussed a tapering schedule.    Total time more than 50 minutes          Again, thank you for allowing me to participate in the care of your patient.        Sincerely,        JUANITO DOOLEY MD

## 2021-10-23 LAB
BARBITURATES UR QL: NORMAL
ETHANOL UR QL SCN: NORMAL

## 2021-10-25 ENCOUNTER — TELEPHONE (OUTPATIENT)
Dept: PALLIATIVE MEDICINE | Facility: OTHER | Age: 81
End: 2021-10-25

## 2021-10-25 DIAGNOSIS — G89.4 CHRONIC PAIN SYNDROME: ICD-10-CM

## 2021-10-25 NOTE — TELEPHONE ENCOUNTER
Received call from patient requesting refill(s) of Buprenorphine HCl (BELBUCA) 900 MCG FILM buccal film   Last dispensed from pharmacy on 09/29/21    Patient is requesting a bridge of the Belbuca medication until he starts buprenorphine HCl-naloxone HCl (SUBOXONE) 2-0.5 MG per film on 11/1/21.    Patient's last office/virtual visit by prescribing provider on 10/22/21    Next office/virtual appointment scheduled for 11/19/21    Last urine drug screen date 10/22/21    Current opioid agreement on file (completed within the last year) No Date of opioid agreement: 09/17/20    E-prescribe to   [id number]  [address] [facility] [place] [address]  Phone: [phone] Fax: [phone]    Will route to nursing pool for review and preparation of prescription(s).     Kimberly Mixon [facility] [place]

## 2021-10-25 NOTE — TELEPHONE ENCOUNTER
Pending Prescriptions:                       Disp   Refills    Buprenorphine HCl (BELBUCA) 900 MCG FILM *60 Film1            Sig: Place 1 Film (900 mcg) inside cheek every 12           hours    aracelis

## 2021-10-26 LAB
BUPRENORPHINE UR CFM-MCNC: 72 NG/ML
BUPRENORPHINE/CREAT UR: 58 NG/MG {CREAT}
DHC UR CFM-MCNC: 1180 NG/ML
DHC/CREAT UR: 944 NG/MG {CREAT}
HYDROCODONE UR CFM-MCNC: 1840 NG/ML
HYDROCODONE/CREAT UR: 1472 NG/MG {CREAT}
HYDROMORPHONE UR CFM-MCNC: 1460 NG/ML
HYDROMORPHONE/CREAT UR: 1168 NG/MG {CREAT}
NORBUPRENORPHINE UR CFM-MCNC: 89 NG/ML
NORBUPRENORPHINE/CREAT UR: 71 NG/MG {CREAT}
PREGABALIN UR QL CFM: PRESENT

## 2021-10-27 LAB
CANNABINOIDS UR CFM-MCNC: <5 NG/ML
CARBOXYTHC/CREAT UR: NORMAL

## 2021-10-28 LAB
ETHANOL UR QL CFM: NEGATIVE
ETHYL GLUCURONIDE UR QL SCN: NOT DETECTED NG/MG CREAT
ETHYL SULFATE/CREAT UR: NOT DETECTED NG/MG CREAT
LEVEL OF DETECTION (ETHANOL): NORMAL

## 2021-11-02 PROBLEM — R10.13 EPIGASTRIC PAIN: Status: ACTIVE | Noted: 2017-06-26

## 2021-11-02 PROBLEM — T83.89XD: Status: ACTIVE | Noted: 2020-07-20

## 2021-11-02 PROBLEM — Z85.46 HISTORY OF MALIGNANT NEOPLASM OF PROSTATE: Status: ACTIVE | Noted: 2020-07-20

## 2021-11-02 PROBLEM — Z12.5 SCREENING FOR MALIGNANT NEOPLASM OF PROSTATE: Status: ACTIVE | Noted: 2020-07-20

## 2021-11-02 PROBLEM — C44.529 SQUAMOUS CELL CARCINOMA OF SKIN OF TRUNK: Status: ACTIVE | Noted: 2020-07-20

## 2021-11-03 ENCOUNTER — OFFICE VISIT (OUTPATIENT)
Dept: SURGERY | Facility: CLINIC | Age: 81
End: 2021-11-03
Attending: ANESTHESIOLOGY
Payer: COMMERCIAL

## 2021-11-03 DIAGNOSIS — G89.4 CHRONIC PAIN SYNDROME: ICD-10-CM

## 2021-11-03 PROCEDURE — 99204 OFFICE O/P NEW MOD 45 MIN: CPT | Performed by: SURGERY

## 2021-11-03 NOTE — LETTER
11/3/2021         RE: Vernon Lemus  [address] [place] [address]        Dear Colleague,    Thank you for referring your patient, Vernon Lemus, to the [facility] [place]. Please see a copy of my visit note below.    HPI: Vernon Lemus is a 81 year old male referred to see me by Vicente De Luna for right upper quadrant pain.  He notes  a several year history of right upper quadrant pain described as a smoldering dull ache with intermittent radiation to the back.  He denies any exacerbating factors.  She has had a fairly extensive work-up for chronic abdominal pain which has been relatively unremarkable.  He has had upper and lower endoscopies as well as other diagnostic tests which have not revealed any cause for his discomfort.  He does have a history of pancreatic divisum and has undergone multiple endoscopic ultrasounds for evaluation..  He denies any nausea, vomiting, fevers, chills, juandice or any other symptoms at present.       Allergies:Atorvastatin, Cat dander [animal dander], Cilostazol, Gabapentin, Morphine, Pollen extracts [pollen extract], Pregabalin, and Tizanidine    Past Medical History:   Diagnosis Date     Abdominal pain      Abdominal pain      Arthritis     hands     Cancer (H)     1997 prostate cancer     Cancer (H)     prostate     Chronic pancreatitis (H)      GERD (gastroesophageal reflux disease)      Glaucoma     right eye     History of basal cell cancer     left arm     Hypertension      Hypertension      Melanoma (H)     removed from neck     Mixed hyperlipidemia 10/7/2013     Polyneuropathy     both ankles and feet     Recurrent pancreatitis      Seasonal allergies      Shingles     right leg       Past Surgical History:   Procedure Laterality Date     CARPAL TUNNEL RELEASE RT/LT      right     CATARACT EXTRACTION Bilateral      CATARACT IOL, RT/LT      left eye     ENDOSCOPIC RETROGRADE  CHOLANGIOPANCREATOGRAM       ENDOSCOPIC RETROGRADE CHOLANGIOPANCREATOGRAM  1/30/2014    Procedure: ENDOSCOPIC RETROGRADE CHOLANGIOPANCREATOGRAM;  Endoscopic Retrograde Cholangopancreatogram with  biliary and pancreatic sphincterotomy, pancreatic duct dilation, and pancreatic and biliary stent placement;  Surgeon: Julius Agosto MD;  Location: [facility]     ENDOSCOPIC RETROGRADE CHOLANGIOPANCREATOGRAM  2/18/2014    Procedure: ENDOSCOPIC RETROGRADE CHOLANGIOPANCREATOGRAM;  endoscopic retrograde cholangiopancreatogram with minor sphincterotomy, stent placement;  Surgeon: Julius Agosto MD;  Location: [facility]     ENDOSCOPIC RETROGRADE CHOLANGIOPANCREATOGRAM       ESOPHAGOSCOPY, GASTROSCOPY, DUODENOSCOPY (EGD), COMBINED  3/18/2014    Procedure: COMBINED ESOPHAGOSCOPY, GASTROSCOPY, DUODENOSCOPY (EGD), REMOVE FOREIGN BODY;;  Surgeon: Concepcion Keen MD;  Location: [facility]     ESOPHAGOSCOPY, GASTROSCOPY, DUODENOSCOPY (EGD), COMBINED N/A 8/11/2017    Procedure: ENDOSCOPIC ULTRASOUND;  Surgeon: Eleazar Verma MD;  Location: [facility];  Service:       UGI ENDOSCOPY W EUS  1/16/2014    Procedure: COMBINED ENDOSCOPIC ULTRASOUND, ESOPHAGOSCOPY, GASTROSCOPY, DUODENOSCOPY (EGD);  Surgeon: Michael Monahan MD;  Location: [facility]     IMPLANT PROSTHESIS SPHINCTER URINARY      urethral sphincter     INCONTINENCE SURGERY       IR EXTREMITY ANGIOGRAM LEFT  10/7/2020     IR LOWER EXTREMITY ANGIOGRAM LEFT  10/7/2020     OTHER SURGICAL HISTORY      urinary sphincter transplantwith 2 subsequent revisions     OTHER SURGICAL HISTORY      removal of melanoma     PROSTATE SURGERY       PROSTATECTOMY       RELEASE CARPAL TUNNEL Right      UPPER EUS         CURRENT MEDS:    Current Outpatient Medications:      amLODIPine (NORVASC) 5 MG tablet, Take 1 tablet (5 mg) by mouth daily, Disp: 30 tablet, Rfl: 0     aspirin 81 MG tablet, Take 1 tablet by mouth daily, Disp: , Rfl:      Buprenorphine HCl (BELBUCA) 900 MCG FILM  buccal film, Place 1 Film (900 mcg) inside cheek every 12 hours, Disp: 60 Film, Rfl: 0     buprenorphine HCl-naloxone HCl (SUBOXONE) 2-0.5 MG per film, One twice a day for 7 days, if needed one three times a day, Disp: 30 each, Rfl: 1     ergocalciferol (ERGOCALCIFEROL) 1.25 MG (95106 UT) capsule, Take 1 capsule by mouth, Disp: , Rfl:      FISH OIL, 1,400 mg daily, Disp: , Rfl:      gentamicin (GARAMYCIN) 0.1 % external ointment, APPLY TO EAR TWICE DAILY WITH WOUND CHECKS AS DIRECTED, Disp: , Rfl:      HYDROcodone-acetaminophen (NORCO) 5-325 MG tablet, Take 1 tablet by mouth every 4 hours as needed for severe pain (Max of 6/day), Disp: 90 tablet, Rfl: 0     HYDROcodone-acetaminophen (NORCO) 5-325 MG tablet, Take 1 tablet by mouth every 4 hours as needed for severe pain (Max of 6/day), Disp: 180 tablet, Rfl: 0     ketorolac (TORADOL) 10 MG tablet, ketorolac 10 mg tablet, Disp: , Rfl:      LATANOPROST OP, Place 1 drop into the right eye At Bedtime, Disp: , Rfl:      metoprolol succinate ER (TOPROL-XL) 100 MG 24 hr tablet, TAKE ONE AND ONE-HALF TABLETS ONCE DAILY, Disp: , Rfl:      Multiple Vitamin (DAILY MULTIVITAMIN PO), Take 1 tablet by mouth daily, Disp: , Rfl:      naloxone (NARCAN) 4 MG/0.1ML nasal spray, Spray 1 spray (4 mg) into one nostril alternating nostrils once as needed for opioid reversal every 2-3 minutes until assistance arrives, Disp: 0.2 mL, Rfl: 0     pantoprazole (PROTONIX) 40 MG enteric coated tablet, Take 1 tablet by mouth daily, Disp: , Rfl:      pregabalin (LYRICA) 100 MG capsule, pregabalin 100 mg capsule 2 times a day, Disp: 60 capsule, Rfl: 1     rosuvastatin (CRESTOR) 10 MG tablet, Take 1 tablet by mouth every 24 hours, Disp: , Rfl:      senna-docusate (SENOKOT-S;PERICOLACE) 8.6-50 MG per tablet, Take 1 tablet by mouth daily as needed for constipation, Disp: 7 tablet, Rfl: 0     triamcinolone (KENALOG) 0.1 % ointment, Apply topically as needed, Disp: , Rfl:       triamterene-hydrochlorothiazide (MAXZIDE-25) 37.5-25 MG per tablet, Take 1 tablet by mouth daily, Disp: , Rfl:     Family History   Problem Relation Age of Onset     Prostate Cancer Brother      C.A.D. Father      Prostate Cancer Brother      Hypertension Mother      Coronary Artery Disease Father      Hypertension Father         reports that he quit smoking about 35 years ago. He has never used smokeless tobacco. He reports that he does not drink alcohol and does not use drugs.    Review of Systems:  The 12 system review is within normal limits except for as mentioned above.  General ROS: No complaints or constitutional symptoms  Ophthalmic ROS: No complaints of visual changes  Skin: No complaints or symptoms   Endocrine: No complaints or symptoms  Hematologic/Lymphatic: No symptoms or complaints  Psychiatric: No symptoms or complaints  Respiratory ROS: no cough, shortness of breath, or wheezing  Cardiovascular ROS: no chest pain or dyspnea on exertion  Gastrointestinal ROS: As per HPI  Genito-Urinary ROS: no dysuria, trouble voiding, or hematuria  Musculoskeletal ROS: no joint or muscle pain  Neurological ROS: no TIA or stroke symptoms      EXAM:  There were no vitals taken for this visit.  GENERAL: Well developed male, No acute distress, pleasant and conversant   EYES: Pupils equal, round and reactive, no scleral icterus  ABDOMEN: Soft, mild tenderness palpation right upper quadrant with no evidence of any Puckett sign or any other abnormalities  SKIN: Pink, warm and dry, no obvious rashes or lesions   NEURO:No focal deficits, ambulatory  MUSCULOSKELETAL:No obvious deformities, no swelling, normal appearing      LABS:  Lab Results   Component Value Date    WBC 7.9 02/18/2014    HGB 12.6 10/07/2020    HGB 14.5 02/18/2014    HCT 43.6 02/18/2014    MCV 89 02/18/2014     10/07/2020     02/18/2014     INR/Prothrombin Time  @LABRCNTIP(NA,K,CL,co2,bun,creatinine,labglom,glucose,calcium)@  Lab Results    Component Value Date    ALT 19 01/22/2021    AST 21 01/22/2021    ALKPHOS 65 01/22/2021       IMAGES:   Relevant images were reviewed and discussed with the patient.  Notable findings were: CT scan images were reviewed and did not demonstrate any obvious abnormalities to explain the patient's symptoms.    Assessment/Plan:   Vernon Lemus is a 81 year old male with abdominal pain in the right upper quadrant of an unclear etiology.  He has had multiple evaluations including MRIs, CT scans and EUS's in the past.  It is unclear whether or not the pancreatic divisum is the cause of his discomfort.  He has had a HIDA scan but this was done several years ago.  We do not have the report for this.  I will track this down to see if this is something we should repeat.  I explained all the possible causes for his discomfort and that there is very little we can do in terms of work-up since he is already had an exhaustive evaluation.  However, there may be a role for diagnostic laparoscopy in order to evaluate the abdomen to see if we are missing anything else.  This would be a last resort and I explained to him that I would prefer to track down the HIDA scan results and call him to discuss those prior to any surgical intervention.  He understands everything that was discussed and we will call him once we have the HIDA results.      Messi Huggins DO [facility]  [phone]  [facility] Department of Surgery      Again, thank you for allowing me to participate in the care of your patient.        Sincerely,        Messi Huggins DO

## 2021-11-03 NOTE — PROGRESS NOTES
HPI: Vernon Lemus is a 81 year old male referred to see me by Vicente De Luna for right upper quadrant pain.  He notes  a several year history of right upper quadrant pain described as a smoldering dull ache with intermittent radiation to the back.  He denies any exacerbating factors.  She has had a fairly extensive work-up for chronic abdominal pain which has been relatively unremarkable.  He has had upper and lower endoscopies as well as other diagnostic tests which have not revealed any cause for his discomfort.  He does have a history of pancreatic divisum and has undergone multiple endoscopic ultrasounds for evaluation..  He denies any nausea, vomiting, fevers, chills, juandice or any other symptoms at present.       Allergies:Atorvastatin, Cat dander [animal dander], Cilostazol, Gabapentin, Morphine, Pollen extracts [pollen extract], Pregabalin, and Tizanidine    Past Medical History:   Diagnosis Date     Abdominal pain      Abdominal pain      Arthritis     hands     Cancer (H)     1997 prostate cancer     Cancer (H)     prostate     Chronic pancreatitis (H)      GERD (gastroesophageal reflux disease)      Glaucoma     right eye     History of basal cell cancer     left arm     Hypertension      Hypertension      Melanoma (H)     removed from neck     Mixed hyperlipidemia 10/7/2013     Polyneuropathy     both ankles and feet     Recurrent pancreatitis      Seasonal allergies      Shingles     right leg       Past Surgical History:   Procedure Laterality Date     CARPAL TUNNEL RELEASE RT/LT      right     CATARACT EXTRACTION Bilateral      CATARACT IOL, RT/LT      left eye     ENDOSCOPIC RETROGRADE CHOLANGIOPANCREATOGRAM       ENDOSCOPIC RETROGRADE CHOLANGIOPANCREATOGRAM  1/30/2014    Procedure: ENDOSCOPIC RETROGRADE CHOLANGIOPANCREATOGRAM;  Endoscopic Retrograde Cholangopancreatogram with  biliary and pancreatic sphincterotomy, pancreatic duct dilation, and pancreatic and biliary stent placement;   Surgeon: Julius Agosto MD;  Location: [facility] OR     ENDOSCOPIC RETROGRADE CHOLANGIOPANCREATOGRAM  2/18/2014    Procedure: ENDOSCOPIC RETROGRADE CHOLANGIOPANCREATOGRAM;  endoscopic retrograde cholangiopancreatogram with minor sphincterotomy, stent placement;  Surgeon: Julius Agosto MD;  Location: [facility] OR     ENDOSCOPIC RETROGRADE CHOLANGIOPANCREATOGRAM       ESOPHAGOSCOPY, GASTROSCOPY, DUODENOSCOPY (EGD), COMBINED  3/18/2014    Procedure: COMBINED ESOPHAGOSCOPY, GASTROSCOPY, DUODENOSCOPY (EGD), REMOVE FOREIGN BODY;;  Surgeon: Concepcion Keen MD;  Location: [facility]     ESOPHAGOSCOPY, GASTROSCOPY, DUODENOSCOPY (EGD), COMBINED N/A 8/11/2017    Procedure: ENDOSCOPIC ULTRASOUND;  Surgeon: Eleazar Verma MD;  Location: [facility] OR;  Service:       UGI ENDOSCOPY W EUS  1/16/2014    Procedure: COMBINED ENDOSCOPIC ULTRASOUND, ESOPHAGOSCOPY, GASTROSCOPY, DUODENOSCOPY (EGD);  Surgeon: Michael Monahan MD;  Location: [facility]     IMPLANT PROSTHESIS SPHINCTER URINARY      urethral sphincter     INCONTINENCE SURGERY       IR EXTREMITY ANGIOGRAM LEFT  10/7/2020     IR LOWER EXTREMITY ANGIOGRAM LEFT  10/7/2020     OTHER SURGICAL HISTORY      urinary sphincter transplantwith 2 subsequent revisions     OTHER SURGICAL HISTORY      removal of melanoma     PROSTATE SURGERY       PROSTATECTOMY       RELEASE CARPAL TUNNEL Right      UPPER EUS         CURRENT MEDS:    Current Outpatient Medications:      amLODIPine (NORVASC) 5 MG tablet, Take 1 tablet (5 mg) by mouth daily, Disp: 30 tablet, Rfl: 0     aspirin 81 MG tablet, Take 1 tablet by mouth daily, Disp: , Rfl:      Buprenorphine HCl (BELBUCA) 900 MCG FILM buccal film, Place 1 Film (900 mcg) inside cheek every 12 hours, Disp: 60 Film, Rfl: 0     buprenorphine HCl-naloxone HCl (SUBOXONE) 2-0.5 MG per film, One twice a day for 7 days, if needed one three times a day, Disp: 30 each, Rfl: 1     ergocalciferol (ERGOCALCIFEROL) 1.25 MG (80151 UT) capsule, Take 1  capsule by mouth, Disp: , Rfl:      FISH OIL, 1,400 mg daily, Disp: , Rfl:      gentamicin (GARAMYCIN) 0.1 % external ointment, APPLY TO EAR TWICE DAILY WITH WOUND CHECKS AS DIRECTED, Disp: , Rfl:      HYDROcodone-acetaminophen (NORCO) 5-325 MG tablet, Take 1 tablet by mouth every 4 hours as needed for severe pain (Max of 6/day), Disp: 90 tablet, Rfl: 0     HYDROcodone-acetaminophen (NORCO) 5-325 MG tablet, Take 1 tablet by mouth every 4 hours as needed for severe pain (Max of 6/day), Disp: 180 tablet, Rfl: 0     ketorolac (TORADOL) 10 MG tablet, ketorolac 10 mg tablet, Disp: , Rfl:      LATANOPROST OP, Place 1 drop into the right eye At Bedtime, Disp: , Rfl:      metoprolol succinate ER (TOPROL-XL) 100 MG 24 hr tablet, TAKE ONE AND ONE-HALF TABLETS ONCE DAILY, Disp: , Rfl:      Multiple Vitamin (DAILY MULTIVITAMIN PO), Take 1 tablet by mouth daily, Disp: , Rfl:      naloxone (NARCAN) 4 MG/0.1ML nasal spray, Spray 1 spray (4 mg) into one nostril alternating nostrils once as needed for opioid reversal every 2-3 minutes until assistance arrives, Disp: 0.2 mL, Rfl: 0     pantoprazole (PROTONIX) 40 MG enteric coated tablet, Take 1 tablet by mouth daily, Disp: , Rfl:      pregabalin (LYRICA) 100 MG capsule, pregabalin 100 mg capsule 2 times a day, Disp: 60 capsule, Rfl: 1     rosuvastatin (CRESTOR) 10 MG tablet, Take 1 tablet by mouth every 24 hours, Disp: , Rfl:      senna-docusate (SENOKOT-S;PERICOLACE) 8.6-50 MG per tablet, Take 1 tablet by mouth daily as needed for constipation, Disp: 7 tablet, Rfl: 0     triamcinolone (KENALOG) 0.1 % ointment, Apply topically as needed, Disp: , Rfl:      triamterene-hydrochlorothiazide (MAXZIDE-25) 37.5-25 MG per tablet, Take 1 tablet by mouth daily, Disp: , Rfl:     Family History   Problem Relation Age of Onset     Prostate Cancer Brother      C.A.D. Father      Prostate Cancer Brother      Hypertension Mother      Coronary Artery Disease Father      Hypertension Father          reports that he quit smoking about 35 years ago. He has never used smokeless tobacco. He reports that he does not drink alcohol and does not use drugs.    Review of Systems:  The 12 system review is within normal limits except for as mentioned above.  General ROS: No complaints or constitutional symptoms  Ophthalmic ROS: No complaints of visual changes  Skin: No complaints or symptoms   Endocrine: No complaints or symptoms  Hematologic/Lymphatic: No symptoms or complaints  Psychiatric: No symptoms or complaints  Respiratory ROS: no cough, shortness of breath, or wheezing  Cardiovascular ROS: no chest pain or dyspnea on exertion  Gastrointestinal ROS: As per HPI  Genito-Urinary ROS: no dysuria, trouble voiding, or hematuria  Musculoskeletal ROS: no joint or muscle pain  Neurological ROS: no TIA or stroke symptoms      EXAM:  There were no vitals taken for this visit.  GENERAL: Well developed male, No acute distress, pleasant and conversant   EYES: Pupils equal, round and reactive, no scleral icterus  ABDOMEN: Soft, mild tenderness palpation right upper quadrant with no evidence of any Puckett sign or any other abnormalities  SKIN: Pink, warm and dry, no obvious rashes or lesions   NEURO:No focal deficits, ambulatory  MUSCULOSKELETAL:No obvious deformities, no swelling, normal appearing      LABS:  Lab Results   Component Value Date    WBC 7.9 02/18/2014    HGB 12.6 10/07/2020    HGB 14.5 02/18/2014    HCT 43.6 02/18/2014    MCV 89 02/18/2014     10/07/2020     02/18/2014     INR/Prothrombin Time  @LABRCNTIP(NA,K,CL,co2,bun,creatinine,labglom,glucose,calcium)@  Lab Results   Component Value Date    ALT 19 01/22/2021    AST 21 01/22/2021    ALKPHOS 65 01/22/2021       IMAGES:   Relevant images were reviewed and discussed with the patient.  Notable findings were: CT scan images were reviewed and did not demonstrate any obvious abnormalities to explain the patient's symptoms.    Assessment/Plan:   Vernon BOWMAN  Mariah is a 81 year old male with abdominal pain in the right upper quadrant of an unclear etiology.  He has had multiple evaluations including MRIs, CT scans and EUS's in the past.  It is unclear whether or not the pancreatic divisum is the cause of his discomfort.  He has had a HIDA scan but this was done several years ago.  We do not have the report for this.  I will track this down to see if this is something we should repeat.  I explained all the possible causes for his discomfort and that there is very little we can do in terms of work-up since he is already had an exhaustive evaluation.  However, there may be a role for diagnostic laparoscopy in order to evaluate the abdomen to see if we are missing anything else.  This would be a last resort and I explained to him that I would prefer to track down the HIDA scan results and call him to discuss those prior to any surgical intervention.  He understands everything that was discussed and we will call him once we have the HIDA results.      Messi Huggins, DO FACS  [phone]  [facility] Department of Surgery

## 2021-11-04 ENCOUNTER — TELEPHONE (OUTPATIENT)
Dept: SURGERY | Facility: CLINIC | Age: 81
End: 2021-11-04
Payer: COMMERCIAL

## 2021-11-04 DIAGNOSIS — R10.9 ABDOMINAL WALL PAIN: Primary | ICD-10-CM

## 2021-11-04 RX ORDER — LIDOCAINE 40 MG/G
CREAM TOPICAL
Status: CANCELLED | OUTPATIENT
Start: 2021-11-04

## 2021-11-04 NOTE — TELEPHONE ENCOUNTER
I called health information and requested HIDA from 2013. They will be faxing report.       [facility]      Sarahi LAMA [facility]  General Surgery  [address] [address]  [email or web address]   Office:[phone]  Employed by [facility],

## 2021-11-04 NOTE — TELEPHONE ENCOUNTER
----- Message from Messi Huggins, DO sent at 11/4/2021 12:57 PM CDT -----  Any chance of tracking down the report for his HIDA scan he had in 2013? I can't seem to find it in the epic.   Thanks  ag

## 2021-11-05 ENCOUNTER — HOSPITAL ENCOUNTER (OUTPATIENT)
Dept: PHYSICAL THERAPY | Facility: REHABILITATION | Age: 81
End: 2021-11-05
Payer: COMMERCIAL

## 2021-11-05 DIAGNOSIS — M79.18 MYOFASCIAL PAIN: ICD-10-CM

## 2021-11-05 DIAGNOSIS — G89.4 CHRONIC PAIN SYNDROME: Primary | ICD-10-CM

## 2021-11-05 DIAGNOSIS — R10.11 RIGHT UPPER QUADRANT ABDOMINAL PAIN: ICD-10-CM

## 2021-11-05 PROCEDURE — 97110 THERAPEUTIC EXERCISES: CPT | Mod: GP | Performed by: PHYSICAL THERAPIST

## 2021-11-05 PROCEDURE — 97161 PT EVAL LOW COMPLEX 20 MIN: CPT | Mod: GP | Performed by: PHYSICAL THERAPIST

## 2021-11-05 PROCEDURE — 97140 MANUAL THERAPY 1/> REGIONS: CPT | Mod: GP | Performed by: PHYSICAL THERAPIST

## 2021-11-05 NOTE — PROGRESS NOTES
[facility]          OUTPATIENT PHYSICAL THERAPY ORTHOPEDIC EVALUATION  PLAN OF TREATMENT FOR OUTPATIENT REHABILITATION  (COMPLETE FOR INITIAL CLAIMS ONLY)  Patient's Last Name, First Name, M.I.  YOB: 1940  Vernon Lemus    Provider s Name:  [facility]   Medical Record No.  [id number]   Start of Care Date:  11/05/21   Onset Date:      Type:     _X__PT   ___OT   ___SLP Medical Diagnosis:  chronic abdominal pain     PT Diagnosis:  chronic abdominal pain   Visits from SOC:  1      _________________________________________________________________________________  Plan of Treatment/Functional Goals:  manual therapy, neuromuscular re-education, ROM, strengthening, stretching           Goals  Goal Identifier: 1  Goal Description: Pt will verbalize ability to sit for >30' without pain in 12 weeks.   Target Date: 02/03/22    Goal Identifier: 2  Goal Description: Pt will verbalize not waking from pain at night in 12 weeks.   Target Date: 02/03/22    Goal Identifier: 3  Goal Description: Pt will decrease pain level from 1-9/10 to 1-6/10 with ADLs in 12 weeks.   Target Date: 02/03/22                                                           Therapy Frequency:  1 time/week  Predicted Duration of Therapy Intervention:  12 weeks    DORENE PASTRANA, PT                 I CERTIFY THE NEED FOR THESE SERVICES FURNISHED UNDER        THIS PLAN OF TREATMENT AND WHILE UNDER MY CARE     (Physician co-signature of this document indicates review and certification of the therapy plan).                       Certification Date From:  11/05/21   Certification Date To:  02/03/22    Referring Provider:  Dr. Colby    Initial Assessment        See Epic Evaluation Start of Care Date: 11/05/21 11/05/21 1100   General Information   Type of Visit Initial OP Ortho PT Evaluation   Start of Care Date 11/05/21   Referring Physician Dr. Colby   Date  of Order 10/22/21   Certification Required? Yes   Medical Diagnosis chronic abdominal pain   Body Part(s)   Body Part(s) Lumbar Spine/SI   Presentation and Etiology   Pertinent history of current problem (include personal factors and/or comorbidities that impact the POC) He has had abdominal pain for the last 8 yrs or so. There is afocal point of pain in the upper R side of his abdomen. 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 He has been using opioids and does find some success with them and does use them daily. He would like to get off of them and still have some relief. He has tried trigger point injections and a celiac nerve block but that did not have an effect on him. He has had a number of MRI/ERCP and they really haven't come back with anything. Function: cannot sit for >30', standing seems ok for him, mostly yard work and house work is mostly ok, sleeping is pretty good but sometimes if not having meds on board he will wake up with a lot of pain, wearing pants is difficult for him especially when they are buttoned, when he is in pain he really doesn't feel like having social encounters. For the most part though when the meds are good he is able to do quite a bit.  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    Symptom Location upper R abdomen   Chronicity Chronic   Pain rating (0-10 point scale) Best (/10);Worst (/10);Other   Best (/10) 1   Worst (/10) 9   Pain rating comment today: 4   Pain quality B. Dull   Frequency of pain/symptoms A. Constant   Pain/symptoms are: Worse in the morning   Pain/symptoms eased by K. Other   Pain eased by comment opioids   Progression of symptoms since onset: Unchanged   Fall Risk Screen   Have you fallen 2 or more times in the past year? Yes   Have you fallen and had an injury in the past year? No   Is patient a fall risk? No   Abuse Screen (yes response referral indicated)   Feels Unsafe  at Home or Work/School no   Feels Threatened by Someone no   Does Anyone Try to Keep You From Having Contact with Others or Doing Things Outside Your Home? no   Physical Signs of Abuse Present no   Lumbar Spine/SI Objective Findings   Posture R thor convex curvature which does shift shoulders R of the pelvis, FHP, incrased kyphosis   Hip Flexion (L2) Strength 5/5   Knee Flexion Strength 5/5   Knee Extension (L3) Strength 5/5   Palpation pain over R upper abdomen near sphincter of Oddi and duodenum/pancreas   Planned Therapy Interventions   Planned Therapy Interventions manual therapy;neuromuscular re-education;ROM;strengthening;stretching   Clinical Impression   Criteria for Skilled Therapeutic Interventions Met yes, treatment indicated   PT Diagnosis chronic abdominal pain   Clinical Presentation Stable/Uncomplicated   Clinical Decision Making (Complexity) Low complexity   Therapy Frequency 1 time/week   Predicted Duration of Therapy Intervention (days/wks) 12 weeks   Risk & Benefits of therapy have been explained Yes   Patient, Family & other staff in agreement with plan of care Yes   Clinical Impression Comments Pt is an 82 y/o male being referred to PT for abdominal pain. He does have pain which seems to be originating around the head of the pancreas and more specifically near the sphincter of Oddi. He does have signfiicant fascial tensions present in his abdomen/spine which will strongly contribute to his current sx. He is appropriate for skilled PT to reach all stated goals.    ORTHO GOALS   PT Ortho Eval Goals 1;2;3   Ortho Goal 1   Goal Identifier 1   Goal Description Pt will verbalize ability to sit for >30' without pain in 12 weeks.    Target Date 02/03/22   Ortho Goal 2   Goal Identifier 2   Goal Description Pt will verbalize not waking from pain at night in 12 weeks.    Target Date 02/03/22   Ortho Goal 3   Goal Identifier 3   Goal Description Pt will decrease pain level from 1-9/10 to 1-6/10 with ADLs  in 12 weeks.    Target Date 02/03/22   Total Evaluation Time   PT Eval, Low Complexity Minutes (03789) 25   Therapy Certification   Certification date from 11/05/21   Certification date to 02/03/22   Medical Diagnosis chronic abdominal pain

## 2021-11-08 DIAGNOSIS — G89.4 CHRONIC PAIN SYNDROME: ICD-10-CM

## 2021-11-08 RX ORDER — BUPRENORPHINE AND NALOXONE 2; .5 MG/1; MG/1
1 FILM, SOLUBLE BUCCAL; SUBLINGUAL 3 TIMES DAILY
Qty: 42 EACH | Refills: 1 | Status: SHIPPED | OUTPATIENT
Start: 2021-11-08 | End: 2021-11-19

## 2021-11-17 ENCOUNTER — TELEPHONE (OUTPATIENT)
Dept: ANESTHESIOLOGY | Facility: CLINIC | Age: 81
End: 2021-11-17
Payer: COMMERCIAL

## 2021-11-17 DIAGNOSIS — Z11.59 ENCOUNTER FOR SCREENING FOR OTHER VIRAL DISEASES: ICD-10-CM

## 2021-11-17 DIAGNOSIS — G89.4 CHRONIC PAIN SYNDROME: Primary | ICD-10-CM

## 2021-11-17 NOTE — TELEPHONE ENCOUNTER
DOS:   01/11/22  Provider:   Dr. Lawson   [place]    [facility]   PAC:    N/A  COVID test:    01/07/21  Post Op:      N/A  Patient Called: Called and confirmed all upcoming appointments with patient.    Additional comments:  We went over the patient needing a  after the surgery. The patient was given my direct number for any questions [phone]

## 2021-11-19 ENCOUNTER — OFFICE VISIT (OUTPATIENT)
Dept: PALLIATIVE MEDICINE | Facility: OTHER | Age: 81
End: 2021-11-19
Payer: COMMERCIAL

## 2021-11-19 VITALS
WEIGHT: 227 LBS | DIASTOLIC BLOOD PRESSURE: 70 MMHG | OXYGEN SATURATION: 98 % | HEART RATE: 62 BPM | SYSTOLIC BLOOD PRESSURE: 152 MMHG | BODY MASS INDEX: 30.79 KG/M2

## 2021-11-19 DIAGNOSIS — G89.4 CHRONIC PAIN SYNDROME: ICD-10-CM

## 2021-11-19 PROCEDURE — 99213 OFFICE O/P EST LOW 20 MIN: CPT | Performed by: ANESTHESIOLOGY

## 2021-11-19 PROCEDURE — G0463 HOSPITAL OUTPT CLINIC VISIT: HCPCS

## 2021-11-19 RX ORDER — BUPRENORPHINE AND NALOXONE 2; .5 MG/1; MG/1
1 FILM, SOLUBLE BUCCAL; SUBLINGUAL 3 TIMES DAILY
Qty: 42 EACH | Refills: 3 | Status: SHIPPED | OUTPATIENT
Start: 2021-11-19 | End: 2022-01-13

## 2021-11-19 ASSESSMENT — PAIN SCALES - GENERAL: PAINLEVEL: MODERATE PAIN (5)

## 2021-11-19 NOTE — PATIENT INSTRUCTIONS
PLAN:  Continue the Suboxone film 2 mg 3 times a day.    May add hydrocodone 5 mg 1 daily as needed for breakthrough pain.    Discussed you are scheduled for a HIDA scan to review with surgeon.    You may return to the visceral myofascial release physical therapy to assess your initial response.    You are scheduled for a TAP procedure January 11 at the [facility]    Follow-up with Dr. Colby in 8 weeks

## 2021-11-19 NOTE — PROGRESS NOTES
Patient presents to the clinic today for a follow up with JUANITO DOOLEY MD regarding  Pain Management     Patient describes their pain as: .     Her/His function score is: 4.    Pain score: 5/10    Constant or intermittent:  constant    Does the pain interfere with:    Work: No    Walking/distance: No    Sleep: No    Daily activities: no    Relationships/social life: Yes    Mood: Yes      Last dose of scheduled medication:    Suboxone 2-0.5mg last take 11/19/2021 at 6:00AM  Ketorlac 10 mg last take 1 MONTH AGO  Pregabalin 100 mg last take 11/19/2021 at 7:00am    Patient does not take Norco any longer and would like to have this removed from his medication list .           UDS/CSA:  UDT/CSA - 10/2021        SELENE Olivares (AAMA).....11/19/2021  9:46 AM  [facility]

## 2021-11-19 NOTE — LETTER
11/19/2021         RE: Vernon Lemus  [address] [place] [facility] [address]        Dear Colleague,    Thank you for referring your patient, Vernon Lemus, to the [facility]. Please see a copy of my visit note below.    Patient presents to the clinic today for a follow up with JUANITO DOOLEY MD regarding  Pain Management     Patient describes their pain as: .     Her/His function score is: 4.    Pain score: 5/10    Constant or intermittent:  constant    Does the pain interfere with:    Work: No    Walking/distance: No    Sleep: No    Daily activities: no    Relationships/social life: Yes    Mood: Yes      Last dose of scheduled medication:    Suboxone 2-0.5mg last take 11/19/2021 at 6:00AM  Ketorlac 10 mg last take 1 MONTH AGO  Pregabalin 100 mg last take 11/19/2021 at 7:00am    Patient does not take Norco any longer and would like to have this removed from his medication list .           UDS/CSA:  UDT/CSA - 10/2021        SELENE Olivares (AAMA).....11/19/2021  9:46 AM  [facility]               Subjective   Molina is a 81 year old who presents for the following health issues    followed for abdominal pain.    HPI     He reviews with Suboxone 2 mg 3 times a day there can be a gap in coverage perhaps an hour and a half before each dose.  Also seems to take half an hour to kick in which is uncomfortable in the morning.    He did have an evaluation for visceral myofascial release.  Describes as it was a painful evaluation and for 3 days afterwards difficult to urinate with some retention.  He is not sure whether he wants to return.    He was seen by general surgery, thought he did not have a hernia.  However they reviewed his history gallbladder problems 8 years ago and may have another HIDA scan to see if there is any relationship.    He is scheduled for his tap procedure 1/11.    Ischial he has goal had been to get off opioids, though now at 81  years old he is a bit more concerned with comfort.  He wonders if he could add back some hydrocodone to help smooth out the coverage.  Describes that sometimes he is quite miserable.    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     reviewed as expected, urine drug test in October as expected      Current Outpatient Medications:      amLODIPine (NORVASC) 5 MG tablet, Take 1 tablet (5 mg) by mouth daily, Disp: 30 tablet, Rfl: 0     aspirin 81 MG tablet, Take 1 tablet by mouth daily, Disp: , Rfl:      buprenorphine HCl-naloxone HCl (SUBOXONE) 2-0.5 MG per film, Place 1 Film under the tongue 3 times daily One twice a day for 7 days, if needed one three times a day, Disp: 42 each, Rfl: 3     ergocalciferol (ERGOCALCIFEROL) 1.25 MG (37592 UT) capsule, Take 1 capsule by mouth, Disp: , Rfl:      FISH OIL, 1,400 mg daily, Disp: , Rfl:      gentamicin (GARAMYCIN) 0.1 % external ointment, APPLY TO EAR TWICE DAILY WITH WOUND CHECKS AS DIRECTED, Disp: , Rfl:      HYDROcodone-acetaminophen (NORCO) 5-325 MG tablet, Take 1 tablet by mouth every 4 hours as needed for severe pain (Max of 6/day) (Patient not taking: Reported on 11/19/2021), Disp: 90 tablet, Rfl: 0     ketorolac (TORADOL) 10 MG tablet, ketorolac 10 mg tablet, Disp: , Rfl:      LATANOPROST OP, Place 1 drop into the right eye At Bedtime, Disp: , Rfl:      metoprolol succinate ER (TOPROL-XL) 100 MG 24 hr tablet, TAKE ONE AND ONE-HALF TABLETS ONCE DAILY, Disp: , Rfl:      Multiple Vitamin (DAILY MULTIVITAMIN PO), Take 1 tablet by mouth daily, Disp: , Rfl:      naloxone (NARCAN) 4 MG/0.1ML nasal spray, Spray 1 spray (4 mg) into one nostril alternating nostrils once as needed for opioid reversal every 2-3 minutes until assistance arrives, Disp: 0.2 mL, Rfl: 0     pantoprazole (PROTONIX) 40 MG enteric coated tablet, Take 1 tablet by mouth daily, Disp: , Rfl:      pregabalin (LYRICA) 100 MG capsule,  pregabalin 100 mg capsule 2 times a day, Disp: 60 capsule, Rfl: 1     rosuvastatin (CRESTOR) 10 MG tablet, Take 1 tablet by mouth every 24 hours, Disp: , Rfl:      senna-docusate (SENOKOT-S;PERICOLACE) 8.6-50 MG per tablet, Take 1 tablet by mouth daily as needed for constipation, Disp: 7 tablet, Rfl: 0     triamcinolone (KENALOG) 0.1 % ointment, Apply topically as needed, Disp: , Rfl:      triamterene-hydrochlorothiazide (MAXZIDE-25) 37.5-25 MG per tablet, Take 1 tablet by mouth daily, Disp: , Rfl:     He is alert with a clear sensorium good eye contact.  Thought process logical.  Affect is congruent.  No respiratory distress.    Review of Systems         Objective    BP (!) 152/70   Pulse 62   Wt 103 kg (227 lb)   SpO2 98%   BMI 30.79 kg/m    Body mass index is 30.79 kg/m .  Physical Exam       Essman, abdominal pain, evaluated in the past with surgery, having trigger point injections of the abdominal wall, a visceral myofascial release which he described as very uncomfortable did not want to return.    At last appointment we discussed a tap procedure that may be more diagnostic and therapeutic.    Plan: We will add hydrocodone 5 mg to take once a day in between the Suboxone 2 mg 3 times a day     Total time more than 20 minutes             Again, thank you for allowing me to participate in the care of your patient.        Sincerely,        JUANITO DOOLEY MD

## 2021-11-21 NOTE — PROGRESS NOTES
Subjective   Molina is a 81 year old who presents for the following health issues    followed for abdominal pain.    HPI     He reviews with Suboxone 2 mg 3 times a day there can be a gap in coverage perhaps an hour and a half before each dose.  Also seems to take half an hour to kick in which is uncomfortable in the morning.    He did have an evaluation for visceral myofascial release.  Describes as it was a painful evaluation and for 3 days afterwards difficult to urinate with some retention.  He is not sure whether he wants to return.    He was seen by general surgery, thought he did not have a hernia.  However they reviewed his history gallbladder problems 8 years ago and may have another HIDA scan to see if there is any relationship.    He is scheduled for his tap procedure 1/11.    Ischial he has goal had been to get off opioids, though now at 81 years old he is a bit more concerned with comfort.  He wonders if he could add back some hydrocodone to help smooth out the coverage.  Describes that sometimes he is quite miserable.    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     reviewed as expected, urine drug test in October as expected      Current Outpatient Medications:      amLODIPine (NORVASC) 5 MG tablet, Take 1 tablet (5 mg) by mouth daily, Disp: 30 tablet, Rfl: 0     aspirin 81 MG tablet, Take 1 tablet by mouth daily, Disp: , Rfl:      buprenorphine HCl-naloxone HCl (SUBOXONE) 2-0.5 MG per film, Place 1 Film under the tongue 3 times daily One twice a day for 7 days, if needed one three times a day, Disp: 42 each, Rfl: 3     ergocalciferol (ERGOCALCIFEROL) 1.25 MG (33343 UT) capsule, Take 1 capsule by mouth, Disp: , Rfl:      FISH OIL, 1,400 mg daily, Disp: , Rfl:      gentamicin (GARAMYCIN) 0.1 % external ointment, APPLY TO EAR TWICE DAILY WITH WOUND CHECKS AS DIRECTED, Disp: , Rfl:      HYDROcodone-acetaminophen (NORCO) 5-325 MG  tablet, Take 1 tablet by mouth every 4 hours as needed for severe pain (Max of 6/day) (Patient not taking: Reported on 11/19/2021), Disp: 90 tablet, Rfl: 0     ketorolac (TORADOL) 10 MG tablet, ketorolac 10 mg tablet, Disp: , Rfl:      LATANOPROST OP, Place 1 drop into the right eye At Bedtime, Disp: , Rfl:      metoprolol succinate ER (TOPROL-XL) 100 MG 24 hr tablet, TAKE ONE AND ONE-HALF TABLETS ONCE DAILY, Disp: , Rfl:      Multiple Vitamin (DAILY MULTIVITAMIN PO), Take 1 tablet by mouth daily, Disp: , Rfl:      naloxone (NARCAN) 4 MG/0.1ML nasal spray, Spray 1 spray (4 mg) into one nostril alternating nostrils once as needed for opioid reversal every 2-3 minutes until assistance arrives, Disp: 0.2 mL, Rfl: 0     pantoprazole (PROTONIX) 40 MG enteric coated tablet, Take 1 tablet by mouth daily, Disp: , Rfl:      pregabalin (LYRICA) 100 MG capsule, pregabalin 100 mg capsule 2 times a day, Disp: 60 capsule, Rfl: 1     rosuvastatin (CRESTOR) 10 MG tablet, Take 1 tablet by mouth every 24 hours, Disp: , Rfl:      senna-docusate (SENOKOT-S;PERICOLACE) 8.6-50 MG per tablet, Take 1 tablet by mouth daily as needed for constipation, Disp: 7 tablet, Rfl: 0     triamcinolone (KENALOG) 0.1 % ointment, Apply topically as needed, Disp: , Rfl:      triamterene-hydrochlorothiazide (MAXZIDE-25) 37.5-25 MG per tablet, Take 1 tablet by mouth daily, Disp: , Rfl:     He is alert with a clear sensorium good eye contact.  Thought process logical.  Affect is congruent.  No respiratory distress.    Review of Systems         Objective    BP (!) 152/70   Pulse 62   Wt 103 kg (227 lb)   SpO2 98%   BMI 30.79 kg/m    Body mass index is 30.79 kg/m .  Physical Exam       Essman, abdominal pain, evaluated in the past with surgery, having trigger point injections of the abdominal wall, a visceral myofascial release which he described as very uncomfortable did not want to return.    At last appointment we discussed a tap procedure that may be  more diagnostic and therapeutic.    Plan: We will add hydrocodone 5 mg to take once a day in between the Suboxone 2 mg 3 times a day     Total time more than 20 minutes

## 2021-11-23 ENCOUNTER — HOSPITAL ENCOUNTER (OUTPATIENT)
Dept: NUCLEAR MEDICINE | Facility: HOSPITAL | Age: 81
Discharge: HOME | End: 2021-11-23
Attending: SURGERY | Admitting: SURGERY
Payer: COMMERCIAL

## 2021-11-23 DIAGNOSIS — G89.4 CHRONIC PAIN SYNDROME: ICD-10-CM

## 2021-11-23 PROCEDURE — 250N000011 HC RX IP 250 OP 636: Performed by: SURGERY

## 2021-11-23 PROCEDURE — 78227 HEPATOBIL SYST IMAGE W/DRUG: CPT

## 2021-11-23 PROCEDURE — 343N000001 HC RX 343: Performed by: SURGERY

## 2021-11-23 PROCEDURE — A9537 TC99M MEBROFENIN: HCPCS | Performed by: SURGERY

## 2021-11-23 RX ORDER — KIT FOR THE PREPARATION OF TECHNETIUM TC 99M MEBROFENIN 45 MG/10ML
4-12 INJECTION, POWDER, LYOPHILIZED, FOR SOLUTION INTRAVENOUS ONCE
Status: COMPLETED | OUTPATIENT
Start: 2021-11-23 | End: 2021-11-23

## 2021-11-23 RX ADMIN — SINCALIDE 2.1 MCG: 5 INJECTION, POWDER, LYOPHILIZED, FOR SOLUTION INTRAVENOUS at 11:43

## 2021-11-23 RX ADMIN — MEBROFENIN 8.5 MILLICURIE: 45 INJECTION, POWDER, LYOPHILIZED, FOR SOLUTION INTRAVENOUS at 10:28

## 2021-11-28 ENCOUNTER — HEALTH MAINTENANCE LETTER (OUTPATIENT)
Age: 81
End: 2021-11-28

## 2021-12-01 DIAGNOSIS — Z11.59 ENCOUNTER FOR SCREENING FOR OTHER VIRAL DISEASES: ICD-10-CM

## 2021-12-10 ENCOUNTER — LAB (OUTPATIENT)
Dept: LAB | Facility: CLINIC | Age: 81
End: 2021-12-10
Payer: COMMERCIAL

## 2021-12-10 DIAGNOSIS — Z11.59 ENCOUNTER FOR SCREENING FOR OTHER VIRAL DISEASES: ICD-10-CM

## 2021-12-10 PROCEDURE — U0005 INFEC AGEN DETEC AMPLI PROBE: HCPCS

## 2021-12-10 PROCEDURE — U0003 INFECTIOUS AGENT DETECTION BY NUCLEIC ACID (DNA OR RNA); SEVERE ACUTE RESPIRATORY SYNDROME CORONAVIRUS 2 (SARS-COV-2) (CORONAVIRUS DISEASE [COVID-19]), AMPLIFIED PROBE TECHNIQUE, MAKING USE OF HIGH THROUGHPUT TECHNOLOGIES AS DESCRIBED BY CMS-2020-01-R: HCPCS

## 2021-12-11 LAB — SARS-COV-2 RNA RESP QL NAA+PROBE: NEGATIVE

## 2021-12-14 ENCOUNTER — HOSPITAL ENCOUNTER (OUTPATIENT)
Facility: AMBULATORY SURGERY CENTER | Age: 81
Discharge: HOME | End: 2021-12-14
Payer: COMMERCIAL

## 2021-12-14 ENCOUNTER — ANCILLARY PROCEDURE (OUTPATIENT)
Dept: RADIOLOGY | Facility: AMBULATORY SURGERY CENTER | Age: 81
End: 2021-12-14
Payer: COMMERCIAL

## 2021-12-14 VITALS
HEIGHT: 72 IN | OXYGEN SATURATION: 96 % | SYSTOLIC BLOOD PRESSURE: 160 MMHG | BODY MASS INDEX: 30.75 KG/M2 | DIASTOLIC BLOOD PRESSURE: 78 MMHG | HEART RATE: 72 BPM | TEMPERATURE: 97 F | WEIGHT: 227 LBS | RESPIRATION RATE: 16 BRPM

## 2021-12-14 DIAGNOSIS — R52 PAIN: ICD-10-CM

## 2021-12-14 PROBLEM — L97.521 ULCER OF LEFT FOOT, LIMITED TO BREAKDOWN OF SKIN (H): Status: ACTIVE | Noted: 2021-12-14

## 2021-12-14 PROBLEM — I73.9 PERIPHERAL VASCULAR DISEASE (H): Status: ACTIVE | Noted: 2021-12-14

## 2021-12-14 PROCEDURE — 64488 TAP BLOCK BI INJECTION: CPT

## 2021-12-14 RX ORDER — ROPIVACAINE HYDROCHLORIDE 5 MG/ML
INJECTION, SOLUTION EPIDURAL; INFILTRATION; PERINEURAL PRN
Status: DISCONTINUED | OUTPATIENT
Start: 2021-12-14 | End: 2021-12-14 | Stop reason: HOSPADM

## 2021-12-14 RX ORDER — LIDOCAINE 40 MG/G
CREAM TOPICAL
Status: DISCONTINUED | OUTPATIENT
Start: 2021-12-14 | End: 2021-12-15 | Stop reason: HOSPADM

## 2021-12-14 RX ORDER — LIDOCAINE HYDROCHLORIDE 10 MG/ML
INJECTION, SOLUTION EPIDURAL; INFILTRATION; INTRACAUDAL; PERINEURAL PRN
Status: DISCONTINUED | OUTPATIENT
Start: 2021-12-14 | End: 2021-12-14 | Stop reason: HOSPADM

## 2021-12-14 RX ORDER — FLUTICASONE PROPIONATE 50 MCG
SPRAY, SUSPENSION (ML) NASAL
COMMUNITY
Start: 2021-11-29 | End: 2022-04-06

## 2021-12-14 ASSESSMENT — MIFFLIN-ST. JEOR: SCORE: 1777.67

## 2021-12-17 ENCOUNTER — VIRTUAL VISIT (OUTPATIENT)
Dept: SURGERY | Facility: CLINIC | Age: 81
End: 2021-12-17
Payer: COMMERCIAL

## 2021-12-17 DIAGNOSIS — R10.11 RIGHT UPPER QUADRANT ABDOMINAL PAIN: Primary | ICD-10-CM

## 2021-12-17 PROBLEM — E11.9 DIABETES MELLITUS, TYPE 2 (H): Status: ACTIVE | Noted: 2021-12-17

## 2021-12-17 PROCEDURE — 99441 PR PHYSICIAN TELEPHONE EVALUATION 5-10 MIN: CPT | Performed by: SURGERY

## 2021-12-17 RX ORDER — GENTAMICIN SULFATE 1 MG/G
OINTMENT TOPICAL
Status: CANCELLED | OUTPATIENT
Start: 2021-12-17

## 2021-12-17 NOTE — PROGRESS NOTES
"  The patient has been notified of following:     \"This telephone visit will be conducted via a call between you and your physician/provider. We have found that certain health care needs can be provided without the need for a physical exam.  This service lets us provide the care you need with a short phone conversation.  If a prescription is necessary we can send it directly to your pharmacy.  If lab work is needed we can place an order for that and you can then stop by our lab to have the test done at a later time.    Telephone visits are billed at different rates depending on your insurance coverage. During this emergency period, for some insurers they may be billed the same as an in-person visit.  Please reach out to your insurance provider with any questions.    If during the course of the call the physician/provider feels a telephone visit is not appropriate, you will not be charged for this service.\"    Patient has given verbal consent to a Telephone visit? Yes    What phone number would you like to be contacted at? [phone]    Patient would like to receive their AVS by [facility]    Additional provider notes: I spoke with the patient regarding the results of the HIDA scan.  Although the results were read as \"normal\" he does have a gallbladder ejection fraction of 84%.  I did explain to him that there is some literature examining hyperactive gallbladders and chronic right upper quadrant epigastric discomfort.  Whether or not this is the case it is difficult to assess.  Unfortunately it is 1 of those situations where we would perform surgery and see how he does afterwards to see if the pain resolves.  I explained to him that there is no real good answer at this time but surgical removal of his gallbladder is an option.  However, he I reiterated that he still may have pain after surgery.  I also explained to him that there is certainly no rush to undergo surgery at this time.  He would like to follow-up with " his gastroenterologist to discuss his options which I think is reasonable.  He will follow-up with me once that is complete to have a further discussion.    Bruno Huggins DO [facility]  [phone]      Phone call duration: 10 minutes

## 2021-12-17 NOTE — LETTER
"    12/17/2021         RE: Vernon Lemus  [address]  [place] [address]        Dear Colleague,    Thank you for referring your patient, Vernon Lemus, to the [facility] [place]. Please see a copy of my visit note below.      The patient has been notified of following:     \"This telephone visit will be conducted via a call between you and your physician/provider. We have found that certain health care needs can be provided without the need for a physical exam.  This service lets us provide the care you need with a short phone conversation.  If a prescription is necessary we can send it directly to your pharmacy.  If lab work is needed we can place an order for that and you can then stop by our lab to have the test done at a later time.    Telephone visits are billed at different rates depending on your insurance coverage. During this emergency period, for some insurers they may be billed the same as an in-person visit.  Please reach out to your insurance provider with any questions.    If during the course of the call the physician/provider feels a telephone visit is not appropriate, you will not be charged for this service.\"    Patient has given verbal consent to a Telephone visit? Yes    What phone number would you like to be contacted at? [phone]    Patient would like to receive [place] AV[place]    Additional provider notes: I spoke with the patient regarding the results of the HIDA scan.  Although the results were read as \"normal\" he does have a gallbladder ejection fraction of 84%.  I did explain to him that there is some literature examining hyperactive gallbladders and chronic right upper quadrant epigastric discomfort.  Whether or not this is the case it is difficult to assess.  Unfortunately it is 1 of those situations where we would perform surgery and see how he does afterwards to see if the pain resolves.  I explained to him that " there is no real good answer at this time but surgical removal of his gallbladder is an option.  However, he I reiterated that he still may have pain after surgery.  I also explained to him that there is certainly no rush to undergo surgery at this time.  He would like to follow-up with his gastroenterologist to discuss his options which I think is reasonable.  He will follow-up with me once that is complete to have a further discussion.    Bruno Huggins DO [facility]  [phone]      Phone call duration: 10 minutes        Again, thank you for allowing me to participate in the care of your patient.        Sincerely,        Messi Huggins, DO

## 2022-01-03 NOTE — PROCEDURES
PROCEDURE NOTE     ATTENDING CLINICIAN:    Chapin Lawson MD     PREPROCEDURE DIAGNOSES:  Chronic abdominal pain      POSTPROCEDURE DIAGNOSES:  Chronic abdominal pain       PROCEDURE(S) PERFORMED:  1.  Bilateral transverse abdominis plane block  2.  Ultrasound guidance for the above-named procedure(s)     Procedure Details:  The patient was met in the procedure room, where the patient was identified by name, medical record number and date of birth.  All of the patient s last minute questions were answered. Written informed consent was obtained and saved in the electronic medical record, after the risks, benefits, and alternatives were discussed with the patient.       A formal time-out procedure was performed, as per protocol, including patient name, title of procedure, and site of procedure, and all in the room concurred.  Routine monitors were applied.       The patient was placed in the supine position on the procedure room table.  All pressure points were checked and comfortably padded.  Routine monitors were placed.  Vital signs were stable.     A chlorhexidine prep was completed followed by sterile draping per standard procedure.      An ultrasound covered in sterile sleeve was then brought in and used to identify the region approximately 6 cm right lateral and superior to the umbilicus.  Using a 25-gauge 1.5 inch needle 1% lidocaine was injected to anesthetize the skin. We then placed a 22-gauge 100mm echogenic ultrasound needle in plane until reaching the plane between the internal oblique and transverse abdominal muscles. There was negative aspiration to blood.  At that point, 10 mL of injectate comprised of 1 ml (40 mg) of Depomedrol and 19 ml of 0.25% bupivacaine was  administered per side.     Light pressure was held at the puncture site(s) to prevent ecchymosis and oozing.  The patient's skin was cleansed, and hemostasis was confirmed.  Band-aids were applied to the needle injection site(s).        Condition:     The patient remained awake and alert throughout the procedure.  The patient tolerated the procedure well and was monitored for approximately 15 minutes afterward in the post procedure area.  There were no immediate post procedure complications noted.  The patient was then discharged to home as per protocol.     Pre-procedural pain: 5/10  Post-procedural pain: 0/10

## 2022-01-13 ENCOUNTER — OFFICE VISIT (OUTPATIENT)
Dept: PALLIATIVE MEDICINE | Facility: OTHER | Age: 82
End: 2022-01-13
Payer: COMMERCIAL

## 2022-01-13 VITALS
WEIGHT: 226 LBS | HEIGHT: 72 IN | SYSTOLIC BLOOD PRESSURE: 156 MMHG | HEART RATE: 64 BPM | TEMPERATURE: 97 F | BODY MASS INDEX: 30.61 KG/M2 | DIASTOLIC BLOOD PRESSURE: 72 MMHG

## 2022-01-13 DIAGNOSIS — R10.9 RECURRENT ABDOMINAL PAIN: ICD-10-CM

## 2022-01-13 DIAGNOSIS — G89.4 CHRONIC PAIN SYNDROME: ICD-10-CM

## 2022-01-13 PROCEDURE — G0463 HOSPITAL OUTPT CLINIC VISIT: HCPCS

## 2022-01-13 PROCEDURE — 99213 OFFICE O/P EST LOW 20 MIN: CPT | Performed by: ANESTHESIOLOGY

## 2022-01-13 RX ORDER — HYDROCODONE BITARTRATE AND ACETAMINOPHEN 5; 325 MG/1; MG/1
1 TABLET ORAL 2 TIMES DAILY PRN
Qty: 40 TABLET | Refills: 0 | Status: SHIPPED | OUTPATIENT
Start: 2022-01-13 | End: 2022-02-10

## 2022-01-13 RX ORDER — BUPRENORPHINE AND NALOXONE 2; .5 MG/1; MG/1
1 FILM, SOLUBLE BUCCAL; SUBLINGUAL 3 TIMES DAILY
Qty: 42 EACH | Refills: 3 | Status: SHIPPED | OUTPATIENT
Start: 2022-01-13 | End: 2022-04-06

## 2022-01-13 ASSESSMENT — MIFFLIN-ST. JEOR: SCORE: 1773.13

## 2022-01-13 ASSESSMENT — PAIN SCALES - GENERAL: PAINLEVEL: MILD PAIN (3)

## 2022-01-13 NOTE — LETTER
"    1/13/2022         RE: Vernon Lemus  [address]  [place] [address]        Dear Colleague,    Thank you for referring your patient, Vernon Lemus, to the [facility]. Please see a copy of my visit note below.      PEG Score 1/13/2022   PEG Total Score 2.33             Subjective   Molina is a 81 year old who presents for the following health issues     Follow-up for abdominal pain.  HPI     Chart reflects visit with surgeon 12/17, HIDA scan read as normal, they discussed some literature with hyperactive gallbladders and right upper quadrant discomfort.  Reviewed considering surgical removal of her gallbladder as an option however still risk of pain after surgery.  He is to follow-up with his gastroenterologist.    12/14, did have a Procedure with Dr. Lawson preprocedure pain 5, post procedure pain zero.      Today reviews the pain is \"still there\".  Reports the medication is fair control but could be an hour and a half gap between each dose with the Suboxone 2 mg 3 times a day.    Does use the hydrocodone 5 mg, one a day, would be better of 2 a day as needed.   reviewed, as expected last urine drug test in October    Reviews talking to the surgeon, told his gallbladder was normal, could be a hyperactive gallbladder would like to hold on surgery.    He did speak with the GI doctor recently, unclear whether the support of the gallbladder pain.  Suggested however not looking further to surgery with risks.  He asked about the use of amitriptyline or nortriptyline.  He thinks he may have tried some nortriptyline some years ago and will consider that.    Describes his tap procedure in December had no lasting benefit.    Reviewed the visceral myofascial release.  He did have one appointment, felt afterwards \"beat up\", and has had problems with appointments canceled since then.    Continue to note times where he is lifting or twisting can have more abdominal pain.  We discussed " concerns there may be some associated adhesions or fascial restrictions.      Current Outpatient Medications:      amLODIPine (NORVASC) 5 MG tablet, Take 1 tablet (5 mg) by mouth daily, Disp: 30 tablet, Rfl: 0     aspirin 81 MG tablet, Take 1 tablet by mouth daily, Disp: , Rfl:      buprenorphine HCl-naloxone HCl (SUBOXONE) 2-0.5 MG per film, Place 1 Film under the tongue 3 times daily One twice a day for 7 days, if needed one three times a day, Disp: 42 each, Rfl: 3     ergocalciferol (ERGOCALCIFEROL) 1.25 MG (10913 UT) capsule, Take 1 capsule by mouth, Disp: , Rfl:      FISH OIL, 1,400 mg daily, Disp: , Rfl:      fluticasone (FLONASE) 50 MCG/ACT nasal spray, 1-2 spray in each nostril, Disp: , Rfl:      gentamicin (GARAMYCIN) 0.1 % external ointment, APPLY TO EAR TWICE DAILY WITH WOUND CHECKS AS DIRECTED, Disp: , Rfl:      HYDROcodone-acetaminophen (NORCO) 5-325 MG tablet, Take 1 tablet by mouth 2 times daily as needed for severe pain (Max of 6/day), Disp: 40 tablet, Rfl: 0     LATANOPROST OP, Place 1 drop into the right eye At Bedtime, Disp: , Rfl:      metoprolol succinate ER (TOPROL-XL) 100 MG 24 hr tablet, TAKE ONE AND ONE-HALF TABLETS ONCE DAILY, Disp: , Rfl:      Multiple Vitamin (DAILY MULTIVITAMIN PO), Take 1 tablet by mouth daily, Disp: , Rfl:      naloxone (NARCAN) 4 MG/0.1ML nasal spray, Spray 1 spray (4 mg) into one nostril alternating nostrils once as needed for opioid reversal every 2-3 minutes until assistance arrives, Disp: 0.2 mL, Rfl: 0     pantoprazole (PROTONIX) 40 MG enteric coated tablet, Take 1 tablet by mouth daily, Disp: , Rfl:      pregabalin (LYRICA) 100 MG capsule, pregabalin 100 mg capsule 2 times a day, Disp: 60 capsule, Rfl: 1     rosuvastatin (CRESTOR) 10 MG tablet, Take 1 tablet by mouth every 24 hours, Disp: , Rfl:      senna-docusate (SENOKOT-S;PERICOLACE) 8.6-50 MG per tablet, Take 1 tablet by mouth daily as needed for constipation, Disp: 7 tablet, Rfl: 0     triamcinolone  (KENALOG) 0.1 % ointment, Apply topically as needed, Disp: , Rfl:      triamterene-hydrochlorothiazide (MAXZIDE-25) 37.5-25 MG per tablet, Take 1 tablet by mouth daily, Disp: , Rfl:      ketorolac (TORADOL) 10 MG tablet, ketorolac 10 mg tablet (Patient not taking: Reported on 1/13/2022), Disp: , Rfl:   Is alert with a clear sensorium good eye contact.  Thought process logical.  Affect is fairly full range.    No respiratory distress.  Ambulates with a cane.    Review of Systems         Objective    BP (!) 156/72 (BP Location: Right arm, Patient Position: Sitting, Cuff Size: Adult Regular)   Pulse 64   Temp 97  F (36.1  C) (Skin)   Ht 1.829 m (6')   Wt 102.5 kg (226 lb)   BMI 30.65 kg/m    Body mass index is 30.65 kg/m .  Physical Exam         Jeff: He will review his records at home and thoughts about amitriptyline or nortriptyline.    He will reschedule with the visceral myofascial release as it appears there evaluation had some sort of response.  He will review what occurred as above and they will see what information they can make from that.    Discussed frequency specific microcurrent as a modality that may have some applications for the abdominal adhesions to use in conjunction with the physical therapy.    Will increase the hydrocodone to be used twice a day as needed with the Suboxone.      Total time more than 20 minutes      Again, thank you for allowing me to participate in the care of your patient.        Sincerely,        JUANITO DOOLEY MD

## 2022-01-13 NOTE — PROGRESS NOTES
"      Jac Auguste is a 81 year old who presents for the following health issues     Follow-up for abdominal pain.  HPI     Chart reflects visit with surgeon 12/17, HIDA scan read as normal, they discussed some literature with hyperactive gallbladders and right upper quadrant discomfort.  Reviewed considering surgical removal of her gallbladder as an option however still risk of pain after surgery.  He is to follow-up with his gastroenterologist.    12/14, did have a Procedure with Dr. Lawson preprocedure pain 5, post procedure pain zero.      Today reviews the pain is \"still there\".  Reports the medication is fair control but could be an hour and a half gap between each dose with the Suboxone 2 mg 3 times a day.    Does use the hydrocodone 5 mg, one a day, would be better of 2 a day as needed.   reviewed, as expected last urine drug test in October    Reviews talking to the surgeon, told his gallbladder was normal, could be a hyperactive gallbladder would like to hold on surgery.    He did speak with the GI doctor recently, unclear whether the support of the gallbladder pain.  Suggested however not looking further to surgery with risks.  He asked about the use of amitriptyline or nortriptyline.  He thinks he may have tried some nortriptyline some years ago and will consider that.    Describes his tap procedure in December had no lasting benefit.    Reviewed the visceral myofascial release.  He did have one appointment, felt afterwards \"beat up\", and has had problems with appointments canceled since then.    Continue to note times where he is lifting or twisting can have more abdominal pain.  We discussed concerns there may be some associated adhesions or fascial restrictions.      Current Outpatient Medications:      amLODIPine (NORVASC) 5 MG tablet, Take 1 tablet (5 mg) by mouth daily, Disp: 30 tablet, Rfl: 0     aspirin 81 MG tablet, Take 1 tablet by mouth daily, Disp: , Rfl:      buprenorphine " HCl-naloxone HCl (SUBOXONE) 2-0.5 MG per film, Place 1 Film under the tongue 3 times daily One twice a day for 7 days, if needed one three times a day, Disp: 42 each, Rfl: 3     ergocalciferol (ERGOCALCIFEROL) 1.25 MG (61815 UT) capsule, Take 1 capsule by mouth, Disp: , Rfl:      FISH OIL, 1,400 mg daily, Disp: , Rfl:      fluticasone (FLONASE) 50 MCG/ACT nasal spray, 1-2 spray in each nostril, Disp: , Rfl:      gentamicin (GARAMYCIN) 0.1 % external ointment, APPLY TO EAR TWICE DAILY WITH WOUND CHECKS AS DIRECTED, Disp: , Rfl:      HYDROcodone-acetaminophen (NORCO) 5-325 MG tablet, Take 1 tablet by mouth 2 times daily as needed for severe pain (Max of 6/day), Disp: 40 tablet, Rfl: 0     LATANOPROST OP, Place 1 drop into the right eye At Bedtime, Disp: , Rfl:      metoprolol succinate ER (TOPROL-XL) 100 MG 24 hr tablet, TAKE ONE AND ONE-HALF TABLETS ONCE DAILY, Disp: , Rfl:      Multiple Vitamin (DAILY MULTIVITAMIN PO), Take 1 tablet by mouth daily, Disp: , Rfl:      naloxone (NARCAN) 4 MG/0.1ML nasal spray, Spray 1 spray (4 mg) into one nostril alternating nostrils once as needed for opioid reversal every 2-3 minutes until assistance arrives, Disp: 0.2 mL, Rfl: 0     pantoprazole (PROTONIX) 40 MG enteric coated tablet, Take 1 tablet by mouth daily, Disp: , Rfl:      pregabalin (LYRICA) 100 MG capsule, pregabalin 100 mg capsule 2 times a day, Disp: 60 capsule, Rfl: 1     rosuvastatin (CRESTOR) 10 MG tablet, Take 1 tablet by mouth every 24 hours, Disp: , Rfl:      senna-docusate (SENOKOT-S;PERICOLACE) 8.6-50 MG per tablet, Take 1 tablet by mouth daily as needed for constipation, Disp: 7 tablet, Rfl: 0     triamcinolone (KENALOG) 0.1 % ointment, Apply topically as needed, Disp: , Rfl:      triamterene-hydrochlorothiazide (MAXZIDE-25) 37.5-25 MG per tablet, Take 1 tablet by mouth daily, Disp: , Rfl:      ketorolac (TORADOL) 10 MG tablet, ketorolac 10 mg tablet (Patient not taking: Reported on 1/13/2022), Disp: , Rfl:    Is alert with a clear sensorium good eye contact.  Thought process logical.  Affect is fairly full range.    No respiratory distress.  Ambulates with a cane.    Review of Systems         Objective    BP (!) 156/72 (BP Location: Right arm, Patient Position: Sitting, Cuff Size: Adult Regular)   Pulse 64   Temp 97  F (36.1  C) (Skin)   Ht 1.829 m (6')   Wt 102.5 kg (226 lb)   BMI 30.65 kg/m    Body mass index is 30.65 kg/m .  Physical Exam         Jeff: He will review his records at home and thoughts about amitriptyline or nortriptyline.    He will reschedule with the visceral myofascial release as it appears there evaluation had some sort of response.  He will review what occurred as above and they will see what information they can make from that.    Discussed frequency specific microcurrent as a modality that may have some applications for the abdominal adhesions to use in conjunction with the physical therapy.    Will increase the hydrocodone to be used twice a day as needed with the Suboxone.      Total time more than 20 minutes

## 2022-01-13 NOTE — PATIENT INSTRUCTIONS
PLAN:  Continue the Suboxone 2 mg 3 times a day.    Hydrocodone 5 mg up to twice a day as needed for breakthrough pain.    You will reschedule for visceral myofascial release.    Discussed frequency specific microcurrent to help with possible abdominal adhesions, may contact [facility] [phone], [facility] [phone], or [facility] [phone]    You will explore whether you have taken amitriptyline or nortriptyline in the past, as this was suggested by your GI doctor    Follow-up with Dr. Colby in 3 months

## 2022-01-18 ENCOUNTER — HOSPITAL ENCOUNTER (OUTPATIENT)
Dept: PHYSICAL THERAPY | Facility: REHABILITATION | Age: 82
End: 2022-01-18
Payer: COMMERCIAL

## 2022-01-18 DIAGNOSIS — R10.11 RIGHT UPPER QUADRANT ABDOMINAL PAIN: ICD-10-CM

## 2022-01-18 DIAGNOSIS — G89.4 CHRONIC PAIN SYNDROME: Primary | ICD-10-CM

## 2022-01-18 DIAGNOSIS — M79.18 MYOFASCIAL PAIN: ICD-10-CM

## 2022-01-18 PROCEDURE — 97110 THERAPEUTIC EXERCISES: CPT | Mod: GP | Performed by: PHYSICAL THERAPIST

## 2022-01-18 PROCEDURE — 97140 MANUAL THERAPY 1/> REGIONS: CPT | Mod: GP | Performed by: PHYSICAL THERAPIST

## 2022-01-23 ENCOUNTER — HEALTH MAINTENANCE LETTER (OUTPATIENT)
Age: 82
End: 2022-01-23

## 2022-01-23 NOTE — PATIENT INSTRUCTIONS - HE
CALLED CHANDAN FOR READ. Patient Instructions by Lynda Newell RN at 10/30/2020  2:20 PM     Author: Lynda Newell RN Service: -- Author Type: Registered Nurse    Filed: 10/30/2020  2:25 PM Encounter Date: 10/30/2020 Status: Signed    : Lynda Newell RN (Registered Nurse)       Ankle-Brachial Index (CELSA) or Physiologic Test    Description  An ankle-brachial index test is relatively pain free. Blood pressure cuffs of various sizes are placed on your thigh, calf, foot and toes.  Similar to having your blood pressure checked with an arm cuff, as the technician inflates the cuffs, they progressively tighten and are then quickly released.  You may feel some discomfort, but generally for less than 60 seconds for each measurement. You will be asked to remove your socks and shoes and possibly your pants or shorts. Gowns will be provided. It usually takes about 30-60 minutes.   Depending on the initial readings and patient symptoms, you may be asked to perform a light walk on a treadmill.  The technician will apply ultrasound gel, usually warmed for your comfort, to your ankles and wrists. Through the gel, the technician will use a small hand-held device that emits sound waves.  Risks  There are typically no side effects or complications associated with a physiologic study.  How to Prepare  Eat and take medications as usual.  There is no preparation required for an ankle-brachial index (CELSA) or physiologic exam.  What Can I Expect After the Test?  The technician will send the ultrasound images to your vascular surgeon for evaluation. Typically, a report is available in 2-3 days. If anything critical is found, it is standard practice to notify the vascular surgeon immediately.  Reference: https://vascular.org/patient-resources/vascular-tests  Lower Extremity Arterial Ultrasound    Description  Ultrasound examinations are painless and easy for the patient. The vascular laboratory will contain a bed and just two or three pieces of equipment.  You will be asked to remove pants or shorts and gowns will be provided. It usually takes about 30 minutes.  The technician will tuck a towel under your underpants in the groin. The gel is water-soluble and will not stain your skin or clothes.  Ultrasound gel, usually warmed for your comfort, will be placed on the inner side of your legs.    Through the gel, the technician will apply to your legs a small hand-held device that emits sound waves.  When the test is completed, the technician will remove excess gel from your legs.    Risks  There are typically no side effects or complications associated with a lower extremity arterial duplex ultrasound.  How to Prepare  Eat and take medications as usual.  There is no preparation required for a lower extremity arterial duplex ultrasound.  What Can I Expect After the Test?  The technician will send the ultrasound images to your vascular surgeon for evaluation. Typically, a report is available in 2-3 days. If anything critical is found, it is standard practice to notify the vascular surgeon immediately.  Reference: https://vascular.org/patient-resources/vascular-tests

## 2022-01-31 ENCOUNTER — HOSPITAL ENCOUNTER (OUTPATIENT)
Dept: PHYSICAL THERAPY | Facility: REHABILITATION | Age: 82
End: 2022-01-31
Payer: COMMERCIAL

## 2022-01-31 DIAGNOSIS — M79.18 MYOFASCIAL PAIN: ICD-10-CM

## 2022-01-31 DIAGNOSIS — G89.4 CHRONIC PAIN SYNDROME: Primary | ICD-10-CM

## 2022-01-31 DIAGNOSIS — R10.11 RIGHT UPPER QUADRANT ABDOMINAL PAIN: ICD-10-CM

## 2022-01-31 PROCEDURE — 97110 THERAPEUTIC EXERCISES: CPT | Mod: GP | Performed by: PHYSICAL THERAPIST

## 2022-01-31 PROCEDURE — 97140 MANUAL THERAPY 1/> REGIONS: CPT | Mod: GP | Performed by: PHYSICAL THERAPIST

## 2022-02-01 ENCOUNTER — TELEPHONE (OUTPATIENT)
Dept: PALLIATIVE MEDICINE | Facility: OTHER | Age: 82
End: 2022-02-01
Payer: COMMERCIAL

## 2022-02-01 DIAGNOSIS — G89.4 CHRONIC PAIN SYNDROME: ICD-10-CM

## 2022-02-01 RX ORDER — PREGABALIN 100 MG/1
CAPSULE ORAL
Qty: 60 CAPSULE | Refills: 3 | Status: SHIPPED | OUTPATIENT
Start: 2022-02-01 | End: 2022-06-02

## 2022-02-01 NOTE — TELEPHONE ENCOUNTER
Received call from patient requesting refill(s) of    PREGABALIN 100 MG CAPSULES     SIG:  TAKE 1 CAPSULE BY MOUTH TWICE DAILY    Last dispensed from pharmacy on   11/28/2021    Patient's last office/virtual visit by prescribing provider on   1/13/2022    Follow up:  Follow-up with Dr. Colby in 3 months       ED:   NONE    PSYCH:  NONE      Next office/virtual appointment scheduled for   04/13/2022    Last urine drug screen date  10/2021    Current opioid agreement on file (completed within the last year) Yes    Date of opioid agreement:   10/2021    E-prescribe to [id number] - [place], [place] - [address] AT [facility] [address]  [phone] pharmacy    Will route to nursing [place] for review and preparation of prescription(s).       SELENE Olivares (AAMA).................... February 1, 2022  8:21 AM

## 2022-02-02 ENCOUNTER — LAB REQUISITION (OUTPATIENT)
Dept: LAB | Facility: CLINIC | Age: 82
End: 2022-02-02

## 2022-02-02 DIAGNOSIS — E78.49 OTHER HYPERLIPIDEMIA: ICD-10-CM

## 2022-02-02 DIAGNOSIS — Z85.46 PERSONAL HISTORY OF MALIGNANT NEOPLASM OF PROSTATE: ICD-10-CM

## 2022-02-02 DIAGNOSIS — I10 ESSENTIAL (PRIMARY) HYPERTENSION: ICD-10-CM

## 2022-02-02 LAB
ALBUMIN SERPL-MCNC: 3.8 G/DL (ref 3.5–5)
ALP SERPL-CCNC: 71 U/L (ref 45–120)
ALT SERPL W P-5'-P-CCNC: 23 U/L (ref 0–45)
ANION GAP SERPL CALCULATED.3IONS-SCNC: 12 MMOL/L (ref 5–18)
AST SERPL W P-5'-P-CCNC: 23 U/L (ref 0–40)
BILIRUB SERPL-MCNC: 0.4 MG/DL (ref 0–1)
BUN SERPL-MCNC: 25 MG/DL (ref 8–28)
CALCIUM SERPL-MCNC: 9.7 MG/DL (ref 8.5–10.5)
CHLORIDE BLD-SCNC: 102 MMOL/L (ref 98–107)
CHOLEST SERPL-MCNC: 105 MG/DL
CO2 SERPL-SCNC: 26 MMOL/L (ref 22–31)
CREAT SERPL-MCNC: 1.17 MG/DL (ref 0.7–1.3)
FASTING STATUS PATIENT QL REPORTED: ABNORMAL
GFR SERPL CREATININE-BSD FRML MDRD: 63 ML/MIN/1.73M2
GLUCOSE BLD-MCNC: 119 MG/DL (ref 70–125)
HDLC SERPL-MCNC: 39 MG/DL
LDLC SERPL CALC-MCNC: 38 MG/DL
POTASSIUM BLD-SCNC: 4.5 MMOL/L (ref 3.5–5)
PROT SERPL-MCNC: 6.8 G/DL (ref 6–8)
PSA SERPL-MCNC: 0.61 UG/L (ref 0–6.5)
SODIUM SERPL-SCNC: 140 MMOL/L (ref 136–145)
TRIGL SERPL-MCNC: 141 MG/DL

## 2022-02-02 PROCEDURE — 80061 LIPID PANEL: CPT | Performed by: FAMILY MEDICINE

## 2022-02-02 PROCEDURE — 84153 ASSAY OF PSA TOTAL: CPT | Performed by: FAMILY MEDICINE

## 2022-02-02 PROCEDURE — 80053 COMPREHEN METABOLIC PANEL: CPT | Performed by: FAMILY MEDICINE

## 2022-02-07 NOTE — TELEPHONE ENCOUNTER
Refill request: belbuca 900 mcg  Last apt with CM: 7/29/21  Next apt with CM: 9/3/21  UDT/CSA - 9/2020    Per : 7/30/21, #60 30 days    Pending Prescriptions:                       Disp   Refills    Buprenorphine HCl (BELBUCA) 900 MCG FILM *60 Film0            Sig: Place 1 Film (900 mcg) inside cheek every 12           hours    [facility]       Additional Notes: Patient consent was obtained to proceed with the visit and recommended plan of care after discussion of all risks and benefits, including the risks of COVID-19 exposure. Detail Level: Simple

## 2022-02-16 ENCOUNTER — HOSPITAL ENCOUNTER (OUTPATIENT)
Dept: PHYSICAL THERAPY | Facility: REHABILITATION | Age: 82
End: 2022-02-16
Payer: COMMERCIAL

## 2022-02-16 DIAGNOSIS — M79.18 MYOFASCIAL PAIN: ICD-10-CM

## 2022-02-16 DIAGNOSIS — R10.11 RIGHT UPPER QUADRANT ABDOMINAL PAIN: ICD-10-CM

## 2022-02-16 DIAGNOSIS — G89.4 CHRONIC PAIN SYNDROME: Primary | ICD-10-CM

## 2022-02-16 PROCEDURE — 97140 MANUAL THERAPY 1/> REGIONS: CPT | Mod: GP | Performed by: PHYSICAL THERAPIST

## 2022-02-16 PROCEDURE — 97110 THERAPEUTIC EXERCISES: CPT | Mod: GP | Performed by: PHYSICAL THERAPIST

## 2022-02-16 NOTE — PROGRESS NOTES
[facility]    OUTPATIENT PHYSICAL THERAPY  PLAN OF TREATMENT FOR OUTPATIENT REHABILITATION AND PROGRESS NOTE           Patient's Last Name, First Name, Vernon Gilman Date of Birth  1940   Provider's Name  [facility] Medical Record No.  [id number]    Onset Date  10/22/21 Start of Care Date  11/5/21   Type:     _X_PT   ___OT   ___SLP Medical Diagnosis  Chronic abdominal pain   PT Diagnosis  Chronic abdominal pain Plan of Treatment  Frequency/Duration: 1x/week  Certification date from 2/3/22 to 5/4/22     Goals:  Goal Identifier 1   Goal Description Pt will verbalize ability to sit for >30' without pain in 12 weeks.    Target Date 05/04/22   Date Met      Progress (detail required for progress note): Sitting time: 30 min - slow progression     Goal Identifier 2   Goal Description Pt will verbalize not waking from pain at night in 12 weeks.    Target Date 05/04/22   Date Met      Progress (detail required for progress note): Sleeping/waking up: 1-2x/night from pain. Slow progression     Goal Identifier 3   Goal Description Pt will decrease pain level from 1-9/10 to 1-6/10 with ADLs in 12 weeks.    Target Date 05/04/22   Date Met      Progress (detail required for progress note): Pain levels: 1-7/10 range - did show improvement in this     Goal Identifier     Goal Description     Target Date     Date Met      Progress (detail required for progress note):       Goal Identifier     Goal Description     Target Date     Date Met      Progress (detail required for progress note):       Goal Identifier     Goal Description     Target Date     Date Met      Progress (detail required for progress note):       Goal Identifier     Goal Description     Target Date     Date Met      Progress (detail required for progress note):       Goal Identifier     Goal  Description     Target Date     Date Met      Progress (detail required for progress note):              I CERTIFY THE NEED FOR THESE SERVICES FURNISHED UNDER        THIS PLAN OF TREATMENT AND WHILE UNDER MY CARE     (Physician co-signature of this document indicates review and certification of the therapy plan).                Referring Provider: Dr. Hira PASTRANA, PT         02/16/22 0700   Signing Clinician's Name / Credentials   Signing clinician's name / credentials Thiago Pastrana PT   Session Number   Session Number 4   Progress Note/Recertification   Recertification Due Date 05/04/22   Adult Goals   PT Ortho Eval Goals 1;2;3   Ortho Goal 1   Goal Identifier 1   Goal Description Pt will verbalize ability to sit for >30' without pain in 12 weeks.    Goal Progress Sitting time: 30 min - slow progression   Target Date 05/04/22   Ortho Goal 2   Goal Identifier 2   Goal Description Pt will verbalize not waking from pain at night in 12 weeks.    Goal Progress Sleeping/waking up: 1-2x/night from pain. Slow progression   Target Date 05/04/22   Ortho Goal 3   Goal Identifier 3   Goal Description Pt will decrease pain level from 1-9/10 to 1-6/10 with ADLs in 12 weeks.    Goal Progress Pain levels: 1-7/10 range - did show improvement in this   Target Date 05/04/22   Subjective Report   Subjective Report He does feel like things have stayed about the same for him. It doesn't seem to change much for him. He did just have labs in since the last visit and there has been more normal values for that.    Objective Measures   Objective Measures Objective Measure 1   Objective Measure 1   Objective Measure Art, ADP, visc fascial tensions.    Treatment Interventions   Interventions Therapeutic Procedure/Exercise;Manual Therapy   Therapeutic Procedure/exercise   Therapeutic Procedures: strength, endurance, ROM, flexibillity minutes (02016) 15   Skilled Intervention AA/PROM to TL spine, ribs, pelvis. BLE    Manual Therapy   Manual Therapy: Mobilization, MFR, MLD, friction massage minutes (97468) 40   Skilled Intervention Fascial release using Strain-Counterstrain of B posterior lower visc-ADP, B VIR-A, MFR: sphincter of Oddi   Assessments Completed   Assessments Completed He did make a slight improvement in his pain levels from evaluation. There was good release of fasxcial tensions today which can help with abdominal issues as well as improving his anti-inflammatory vagal reflex. He does remain appropriate for skilled PT. We have been limited inour number of visits as well which does not help with his progress as we have only had 4 total visits in the previous 3 months.    Plan   Plan for next session Plan to con't with manual therapy to decrease fascial tension/tone to normalize ROM/decrease inflammation/decrease mm tone and improve proprioception.     Total Session Time   Timed Code Treatment Minutes 55   Total Treatment Time (sum of timed and untimed services) 55   AMBULATORY CLINICS ONLY-MEDICAL AND TREATMENT DIAGNOSIS   Medical Diagnosis chronic abdominal pain   PT Diagnosis chronic abdominal pain

## 2022-04-06 ENCOUNTER — OFFICE VISIT (OUTPATIENT)
Dept: PALLIATIVE MEDICINE | Facility: OTHER | Age: 82
End: 2022-04-06
Payer: COMMERCIAL

## 2022-04-06 VITALS
BODY MASS INDEX: 30.65 KG/M2 | OXYGEN SATURATION: 95 % | WEIGHT: 226 LBS | DIASTOLIC BLOOD PRESSURE: 63 MMHG | SYSTOLIC BLOOD PRESSURE: 149 MMHG | HEART RATE: 69 BPM

## 2022-04-06 DIAGNOSIS — G89.4 CHRONIC PAIN SYNDROME: ICD-10-CM

## 2022-04-06 PROCEDURE — 99213 OFFICE O/P EST LOW 20 MIN: CPT | Performed by: ANESTHESIOLOGY

## 2022-04-06 PROCEDURE — G0463 HOSPITAL OUTPT CLINIC VISIT: HCPCS

## 2022-04-06 RX ORDER — HYDROCODONE BITARTRATE AND ACETAMINOPHEN 5; 325 MG/1; MG/1
1 TABLET ORAL 2 TIMES DAILY PRN
Qty: 60 TABLET | Refills: 0 | Status: SHIPPED | OUTPATIENT
Start: 2022-04-10 | End: 2022-05-10

## 2022-04-06 RX ORDER — BUPRENORPHINE AND NALOXONE 2; .5 MG/1; MG/1
1 FILM, SOLUBLE BUCCAL; SUBLINGUAL 3 TIMES DAILY
Qty: 42 EACH | Refills: 3 | Status: SHIPPED | OUTPATIENT
Start: 2022-04-14 | End: 2022-07-01

## 2022-04-06 ASSESSMENT — PAIN SCALES - GENERAL: PAINLEVEL: MILD PAIN (3)

## 2022-04-06 NOTE — PROGRESS NOTES
Patient presents to the clinic today for a follow up with JUANITO DOOLEY MD regarding Pain Management.      PEG Score 1/13/2022 4/6/2022   PEG Total Score 2.33 3.33         UDT/CSA - 10/2021      Medications- refill HYDROcodone-acetaminophen (NORCO) 5-325 MG tablet          QUESTIONS:              Maryjo Abbasi  [facility] Patient Facilitator

## 2022-04-06 NOTE — PATIENT INSTRUCTIONS
PLAN:    Discussed you have an appointment with the nurse practitioner at [facility] in May, you will discuss with them your continued abdominal pain, your response to physical therapy, and your question about yearly monitoring with an MRI.  You may asked that they contact Dr. Ball your surgeon for perspective as well.  Please asked them to send a copy of your evaluation to Dr. Colby.    Dr. Colby to contact your physical therapist regarding her response to physical therapy.    Continue the Suboxone 2 mg film 3 times a day and the hydrocodone 1 to 2 tablets daily as needed.  Will review medication management after your May [facility] visit    Follow with Dr. Colby in the office in 3 months

## 2022-04-06 NOTE — LETTER
4/6/2022         RE: Vernon Lemus  [address] [place] [address]        Dear Colleague,    Thank you for referring your patient, Vernon Lemus, to the [facility]. Please see a copy of my visit note below.      Patient presents to the clinic today for a follow up with JUANITO DOOLEY MD regarding Pain Management.      PEG Score 1/13/2022 4/6/2022   PEG Total Score 2.33 3.33         UDT/CSA - 10/2021      Medications- refill HYDROcodone-acetaminophen (NORCO) 5-325 MG tablet          QUESTIONS:              Maryjo Abbasi  [facility] Patient Facilitator    [facility] - Office Visit    ASSESSMENT & PLAN     Vernon was seen today for pain.    Diagnoses and all orders for this visit:    Chronic pain syndrome  -     buprenorphine HCl-naloxone HCl (SUBOXONE) 2-0.5 MG per film; Place 1 Film under the tongue 3 times daily MAY FILL4/12 FOR 4/14  -     HYDROcodone-acetaminophen (NORCO) 5-325 MG tablet; Take 1 tablet by mouth 2 times daily as needed for severe pain Fill 3/11 for use starting 3/13 or later        Patient Instructions   PLAN:    Adriel you have an appointment with the nurse practitioner at [facility] in May, you will discuss with them your continued abdominal pain, your response to physical therapy, and your question about yearly monitoring with an MRI.  You may asked that they contact Dr. Ball your surgeon for perspective as well.  Please asked them to send a copy of your evaluation to Dr. Dooley.    Dr. Dooley to contact your physical therapist regarding her response to physical therapy.    Continue the Suboxone 2 mg film 3 times a day and the hydrocodone 1 to 2 tablets daily as needed.  Will review medication management after your May [facility] visit    Follow with Dr. Dooley in the office in 3 months            -----  JUANITO DOOLEY MD  [facility]       SUBJECTIVE      Vernon Lemus is a 81 year old  year old male who presents to clinic today for the following:     Follow-up for abdominal pain.    Describes his pain seems to vary day-to-day if not smooth.    Reviews that at our last appointment he described going to physical therapy appointment, having increased pain for several days.  He did return to the physical therapist and talk to them about the above.  The next session did not seem to touch that area there was no benefit.  The second session again touched right in that area, indicates his right upper quadrant, held about 30 seconds and for the next 4 days he had increased pain.    He recalls with past evaluations for the GI doctors having an ultrasound instrument placed over that area at [place] similarly had pain for 4 days.    Recalls used to get a MRI yearly to monitor after his  IPDM  cancer.  Reports at the last visit the GI doctor for some reason canceled that in January and did not discuss further monitoring.  He is concerned.  He recalls seeing the surgeon 6 months ago Dr. Campos who said there may be other syndromes with his gallbladder that can account for the upper quadrant pain but they did not follow-up on that.    He does have an appointment with [facility] with the nurse practitioner in May.    Describes noting more pain after meals that he recalls.  Not clear if he is just paying more attention.  He has had a decreased appetite which may be a part with apprehension with the pain.  Weight has not changed.    He reviewed his records at home and had taken nortriptyline and imipramine.  Thinks the nortriptyline initially helped a couple weeks and stop working at increasing doses never did help.    He continues with the Suboxone 2 mg 3 times a day.  Each dose lasts about 7 hours.  Uses hydrocodone taking a 5 mg tablets 2 tablets at a time during the day and he notes he could benefit from another dosage.  He notes more rapid onset with the hydrocodone though each dose only last about 3 hours.   Reviewed he been taking this 4 times a day in the past, though concerned that he developed tolerance.        Current Outpatient Medications:      amLODIPine (NORVASC) 5 MG tablet, Take 1 tablet (5 mg) by mouth daily, Disp: 30 tablet, Rfl: 0     aspirin 81 MG tablet, Take 1 tablet by mouth daily, Disp: , Rfl:      [START ON 4/14/2022] buprenorphine HCl-naloxone HCl (SUBOXONE) 2-0.5 MG per film, Place 1 Film under the tongue 3 times daily MAY FILL4/12 FOR 4/14, Disp: 42 each, Rfl: 3     ergocalciferol (ERGOCALCIFEROL) 1.25 MG (28035 UT) capsule, Take 1 capsule by mouth, Disp: , Rfl:      FISH OIL, 1,400 mg daily, Disp: , Rfl:      gentamicin (GARAMYCIN) 0.1 % external ointment, APPLY TO EAR TWICE DAILY WITH WOUND CHECKS AS DIRECTED, Disp: , Rfl:      [START ON 4/10/2022] HYDROcodone-acetaminophen (NORCO) 5-325 MG tablet, Take 1 tablet by mouth 2 times daily as needed for severe pain Fill 3/11 for use starting 3/13 or later, Disp: 60 tablet, Rfl: 0     LATANOPROST OP, Place 1 drop into the right eye At Bedtime, Disp: , Rfl:      metoprolol succinate ER (TOPROL-XL) 100 MG 24 hr tablet, TAKE ONE AND ONE-HALF TABLETS ONCE DAILY, Disp: , Rfl:      Multiple Vitamin (DAILY MULTIVITAMIN PO), Take 1 tablet by mouth daily, Disp: , Rfl:      naloxone (NARCAN) 4 MG/0.1ML nasal spray, Spray 1 spray (4 mg) into one nostril alternating nostrils once as needed for opioid reversal every 2-3 minutes until assistance arrives, Disp: 0.2 mL, Rfl: 0     pantoprazole (PROTONIX) 40 MG enteric coated tablet, Take 1 tablet by mouth daily, Disp: , Rfl:      pregabalin (LYRICA) 100 MG capsule, pregabalin 100 mg capsule 2 times a day, Disp: 60 capsule, Rfl: 3     rosuvastatin (CRESTOR) 10 MG tablet, Take 1 tablet by mouth every 24 hours, Disp: , Rfl:      senna-docusate (SENOKOT-S;PERICOLACE) 8.6-50 MG per tablet, Take 1 tablet by mouth daily as needed for constipation, Disp: 7 tablet, Rfl: 0     triamcinolone (KENALOG) 0.1 % ointment, Apply  "topically as needed, Disp: , Rfl:      triamterene-hydrochlorothiazide (MAXZIDE-25) 37.5-25 MG per tablet, Take 1 tablet by mouth daily, Disp: , Rfl:      ketorolac (TORADOL) 10 MG tablet, ketorolac 10 mg tablet (Patient not taking: Reported on 1/13/2022), Disp: , Rfl:       Review of Systems   General, psych, musculoskeletal, bowels and bladder otherwise normal other than above.          OBJECTIVE   BP (!) 171/102   Pulse 106   Ht 1.829 m (6')   Wt 103.8 kg (228 lb 14.4 oz)   SpO2 97%   BMI 31.04 kg/m        Physical Exam  General: Alert clear sensorium.  Cardiovascular: Normal rate  Lungs: Pulmonary effort is normal, speaking in full sentences  Abdominal exam deferred as he is concerned it always flares up his pain.  He indicates the right upper quadrant as the area where to press to reproduce the pain.  Skin: Warm and dry. No concerning rashes or lesions.  Neurologic: No focal deficit, alert and oriented x3  Psychiatric: normal mood and affect, cooperative      Assessment: Abdominal pain history of pancreatitis \"pancreas divisum\".  Also surgery for prostate, possibly associated with hernia repair pain.    Very frustrated that there are not better answers about his pain.  Pain was exacerbated with recent visceral myofascial release.    The plan developed is that he will review above when he returns the [facility] nurse practitioner.  Concerned there is not the yearly monitoring for the previous cancer.  We discussed after that evaluation may discuss dosing change which she accepts.    He did look into frequency specific microcurrent, told they did not take his insurance, and with abdominal pain might require 6 sessions to have a better understanding and he did not wish to invest in that.    Total time more than 20 minutes        Again, thank you for allowing me to participate in the care of your patient.        Sincerely,        JUANITO DOOLEY MD    "

## 2022-04-06 NOTE — PROGRESS NOTES
[facility] - Office Visit    ASSESSMENT & PLAN     Vernon was seen today for pain.    Diagnoses and all orders for this visit:    Chronic pain syndrome  -     buprenorphine HCl-naloxone HCl (SUBOXONE) 2-0.5 MG per film; Place 1 Film under the tongue 3 times daily MAY FILL4/12 FOR 4/14  -     HYDROcodone-acetaminophen (NORCO) 5-325 MG tablet; Take 1 tablet by mouth 2 times daily as needed for severe pain Fill 3/11 for use starting 3/13 or later        Patient Instructions   PLAN:    Adriel you have an appointment with the nurse practitioner at [facility] in May, you will discuss with them your continued abdominal pain, your response to physical therapy, and your question about yearly monitoring with an MRI.  You may asked that they contact Dr. Ball your surgeon for perspective as well.  Please asked them to send a copy of your evaluation to Dr. Dooley.    Dr. Dooley to contact your physical therapist regarding her response to physical therapy.    Continue the Suboxone 2 mg film 3 times a day and the hydrocodone 1 to 2 tablets daily as needed.  Will review medication management after your May [facility] visit    Follow with Dr. Dooley in the office in 3 months            -----  JUANITO DOOLEY MD  [facility]       SUBJECTIVE      Vernon Lemus is a 81 year old year old male who presents to clinic today for the following:     Follow-up for abdominal pain.    Describes his pain seems to vary day-to-day if not smooth.    Reviews that at our last appointment he described going to physical therapy appointment, having increased pain for several days.  He did return to the physical therapist and talk to them about the above.  The next session did not seem to touch that area there was no benefit.  The second session again touched right in that area, indicates his right upper quadrant, held about 30 seconds and for the next 4 days he had increased pain.    He recalls with  past evaluations for the GI doctors having an ultrasound instrument placed over that area at [place] similarly had pain for 4 days.    Recalls used to get a MRI yearly to monitor after his  IPDM  cancer.  Reports at the last visit the GI doctor for some reason canceled that in January and did not discuss further monitoring.  He is concerned.  He recalls seeing the surgeon 6 months ago Dr. Campos who said there may be other syndromes with his gallbladder that can account for the upper quadrant pain but they did not follow-up on that.    He does have an appointment with [facility] with the nurse practitioner in May.    Describes noting more pain after meals that he recalls.  Not clear if he is just paying more attention.  He has had a decreased appetite which may be a part with apprehension with the pain.  Weight has not changed.    He reviewed his records at home and had taken nortriptyline and imipramine.  Thinks the nortriptyline initially helped a couple weeks and stop working at increasing doses never did help.    He continues with the Suboxone 2 mg 3 times a day.  Each dose lasts about 7 hours.  Uses hydrocodone taking a 5 mg tablets 2 tablets at a time during the day and he notes he could benefit from another dosage.  He notes more rapid onset with the hydrocodone though each dose only last about 3 hours.  Reviewed he been taking this 4 times a day in the past, though concerned that he developed tolerance.        Current Outpatient Medications:      amLODIPine (NORVASC) 5 MG tablet, Take 1 tablet (5 mg) by mouth daily, Disp: 30 tablet, Rfl: 0     aspirin 81 MG tablet, Take 1 tablet by mouth daily, Disp: , Rfl:      [START ON 4/14/2022] buprenorphine HCl-naloxone HCl (SUBOXONE) 2-0.5 MG per film, Place 1 Film under the tongue 3 times daily MAY FILL4/12 FOR 4/14, Disp: 42 each, Rfl: 3     ergocalciferol (ERGOCALCIFEROL) 1.25 MG (49523 UT) capsule, Take 1 capsule by mouth, Disp: , Rfl:      FISH OIL, 1,400 mg  daily, Disp: , Rfl:      gentamicin (GARAMYCIN) 0.1 % external ointment, APPLY TO EAR TWICE DAILY WITH WOUND CHECKS AS DIRECTED, Disp: , Rfl:      [START ON 4/10/2022] HYDROcodone-acetaminophen (NORCO) 5-325 MG tablet, Take 1 tablet by mouth 2 times daily as needed for severe pain Fill 3/11 for use starting 3/13 or later, Disp: 60 tablet, Rfl: 0     LATANOPROST OP, Place 1 drop into the right eye At Bedtime, Disp: , Rfl:      metoprolol succinate ER (TOPROL-XL) 100 MG 24 hr tablet, TAKE ONE AND ONE-HALF TABLETS ONCE DAILY, Disp: , Rfl:      Multiple Vitamin (DAILY MULTIVITAMIN PO), Take 1 tablet by mouth daily, Disp: , Rfl:      naloxone (NARCAN) 4 MG/0.1ML nasal spray, Spray 1 spray (4 mg) into one nostril alternating nostrils once as needed for opioid reversal every 2-3 minutes until assistance arrives, Disp: 0.2 mL, Rfl: 0     pantoprazole (PROTONIX) 40 MG enteric coated tablet, Take 1 tablet by mouth daily, Disp: , Rfl:      pregabalin (LYRICA) 100 MG capsule, pregabalin 100 mg capsule 2 times a day, Disp: 60 capsule, Rfl: 3     rosuvastatin (CRESTOR) 10 MG tablet, Take 1 tablet by mouth every 24 hours, Disp: , Rfl:      senna-docusate (SENOKOT-S;PERICOLACE) 8.6-50 MG per tablet, Take 1 tablet by mouth daily as needed for constipation, Disp: 7 tablet, Rfl: 0     triamcinolone (KENALOG) 0.1 % ointment, Apply topically as needed, Disp: , Rfl:      triamterene-hydrochlorothiazide (MAXZIDE-25) 37.5-25 MG per tablet, Take 1 tablet by mouth daily, Disp: , Rfl:      ketorolac (TORADOL) 10 MG tablet, ketorolac 10 mg tablet (Patient not taking: Reported on 1/13/2022), Disp: , Rfl:       Review of Systems   General, psych, musculoskeletal, bowels and bladder otherwise normal other than above.          OBJECTIVE   BP (!) 171/102   Pulse 106   Ht 1.829 m (6')   Wt 103.8 kg (228 lb 14.4 oz)   SpO2 97%   BMI 31.04 kg/m        Physical Exam  General: Alert clear sensorium.  Cardiovascular: Normal rate  Lungs: Pulmonary  "effort is normal, speaking in full sentences  Abdominal exam deferred as he is concerned it always flares up his pain.  He indicates the right upper quadrant as the area where to press to reproduce the pain.  Skin: Warm and dry. No concerning rashes or lesions.  Neurologic: No focal deficit, alert and oriented x3  Psychiatric: normal mood and affect, cooperative      Assessment: Abdominal pain history of pancreatitis \"pancreas divisum\".  Also surgery for prostate, possibly associated with hernia repair pain.    Very frustrated that there are not better answers about his pain.  Pain was exacerbated with recent visceral myofascial release.    The plan developed is that he will review above when he returns the [facility] nurse practitioner.  Concerned there is not the yearly monitoring for the previous cancer.  We discussed after that evaluation may discuss dosing change which she accepts.    He did look into frequency specific microcurrent, told they did not take his insurance, and with abdominal pain might require 6 sessions to have a better understanding and he did not wish to invest in that.    Total time more than 20 minutes    "

## 2022-05-05 ENCOUNTER — OFFICE VISIT (OUTPATIENT)
Dept: VASCULAR SURGERY | Facility: CLINIC | Age: 82
End: 2022-05-05
Attending: SURGERY
Payer: COMMERCIAL

## 2022-05-05 ENCOUNTER — ANCILLARY PROCEDURE (OUTPATIENT)
Dept: VASCULAR ULTRASOUND | Facility: CLINIC | Age: 82
End: 2022-05-05
Attending: SURGERY
Payer: COMMERCIAL

## 2022-05-05 VITALS — HEART RATE: 64 BPM | SYSTOLIC BLOOD PRESSURE: 146 MMHG | DIASTOLIC BLOOD PRESSURE: 68 MMHG | TEMPERATURE: 97.6 F

## 2022-05-05 DIAGNOSIS — I73.9 PAD (PERIPHERAL ARTERY DISEASE) (H): Primary | ICD-10-CM

## 2022-05-05 DIAGNOSIS — I73.9 CLAUDICATION (H): ICD-10-CM

## 2022-05-05 DIAGNOSIS — I73.9 PAD (PERIPHERAL ARTERY DISEASE) (H): ICD-10-CM

## 2022-05-05 DIAGNOSIS — I70.213 ATHEROSCLEROSIS OF NATIVE ARTERIES OF EXTREMITIES WITH INTERMITTENT CLAUDICATION, BILATERAL LEGS (H): ICD-10-CM

## 2022-05-05 PROCEDURE — 93923 UPR/LXTR ART STDY 3+ LVLS: CPT

## 2022-05-05 PROCEDURE — 93926 LOWER EXTREMITY STUDY: CPT | Mod: LT

## 2022-05-05 PROCEDURE — 93923 UPR/LXTR ART STDY 3+ LVLS: CPT | Mod: 26 | Performed by: INTERNAL MEDICINE

## 2022-05-05 PROCEDURE — 99214 OFFICE O/P EST MOD 30 MIN: CPT | Performed by: PHYSICIAN ASSISTANT

## 2022-05-05 PROCEDURE — 93926 LOWER EXTREMITY STUDY: CPT | Mod: 26 | Performed by: INTERNAL MEDICINE

## 2022-05-05 RX ORDER — IPRATROPIUM BROMIDE 21 UG/1
SPRAY, METERED NASAL
COMMUNITY
Start: 2022-02-02 | End: 2023-01-28

## 2022-05-05 NOTE — PROGRESS NOTES
VASCULAR SURGERY PROGRESS NOTE    LOCATION:  [facility]     Vernon Lemus  Medical Record #:  [id number]  YOB: 1940  Age:  81 year old     Date of Service: 5/5/2022    PRIMARY CARE PROVIDER: Vicente De Luna    Reason for visit:  PAD/claudication follow up     IMPRESSION:  82 YO male presenting for PVD/claudication follow up s/p left lower extremity angiogram with balloon angioplasty of SFA and popliteal artery in October of 2020. Overall doing well. ABIs today are 0.96 on the right with toe pressures of 72 and CELSA of 0.77 on the left with toe pressures of 87, both sides adequate for healing. Duplex ultrasound shows improved velocities in the SFA compared to imaging 1 year ago.     RECOMMENDATION/RISKS:  Continue best medical management with aspirin and statin therapy. We will follow up in 1 year for surveillance with repeat ABIs and duplex.     HPI:  Vernon Lemus is a 81 year old male with past medical history significant for hypertension, hyperlipidemia, chronic pancreatitis, type 2 diabetes mellitus, history of squamous cell carcinoma, and peripheral vascular disease. He underwent left lower extremity angiogram with balloon angioplasty of SFA and popliteal artery in October of 2020 due to lifestyle limiting claudication claudication. Mr. Lemus was last evaluated in the vascular clinic on 4/26/21 for follow up and was noted to be doing well. His ABIs at this time were relatively normal with normal toe pressures.  Ultrasound did show recurrent stenosis at the level of the distal SFA which was stable compared to the previous ultrasound.     Today, Mr. Lemus is doing well. He has cramping in his legs that wakes him up at night but denies any leg cramping when he walks. He is able to do his activities of daily living. Mr. Lemus notes the cramping is bilateral and thinks it is related to his neuropathy. He also notes a small wound to his left foot. He has been  to an orthopedist for evaluation of the wound. Patient notes they thought the wound was related to pressure and set him up with an orthotic. He does not have follow up with them at this time but will plan to schedule an appointment. He is tolerating aspirin and statin therapy. No other concerns today.    REVIEW OF SYSTEMS:    A 12 point ROS was reviewed and is negative except for what is listed above in HPI.    PHH:    Past Medical History:   Diagnosis Date     Abdominal pain      Abdominal pain      Arthritis     hands     Cancer (H)     1997 prostate cancer     Cancer (H)     prostate     Chronic pancreatitis (H)      GERD (gastroesophageal reflux disease)      Glaucoma     right eye     History of basal cell cancer     left arm     Hypertension      Hypertension      Melanoma (H)     removed from neck     Mixed hyperlipidemia 10/7/2013     Polyneuropathy     both ankles and feet     Recurrent pancreatitis      Seasonal allergies      Shingles     right leg        Past Surgical History:   Procedure Laterality Date     CARPAL TUNNEL RELEASE RT/LT      right     CATARACT EXTRACTION Bilateral      CATARACT IOL, RT/LT      left eye     ENDOSCOPIC RETROGRADE CHOLANGIOPANCREATOGRAM       ENDOSCOPIC RETROGRADE CHOLANGIOPANCREATOGRAM  1/30/2014    Procedure: ENDOSCOPIC RETROGRADE CHOLANGIOPANCREATOGRAM;  Endoscopic Retrograde Cholangopancreatogram with  biliary and pancreatic sphincterotomy, pancreatic duct dilation, and pancreatic and biliary stent placement;  Surgeon: Julius Agosto MD;  Location: [facility] OR     ENDOSCOPIC RETROGRADE CHOLANGIOPANCREATOGRAM  2/18/2014    Procedure: ENDOSCOPIC RETROGRADE CHOLANGIOPANCREATOGRAM;  endoscopic retrograde cholangiopancreatogram with minor sphincterotomy, stent placement;  Surgeon: Julius Agosto MD;  Location: [facility] OR     ENDOSCOPIC RETROGRADE CHOLANGIOPANCREATOGRAM       ESOPHAGOSCOPY, GASTROSCOPY, DUODENOSCOPY (EGD), COMBINED  3/18/2014    Procedure: COMBINED  ESOPHAGOSCOPY, GASTROSCOPY, DUODENOSCOPY (EGD), REMOVE FOREIGN BODY;;  Surgeon: Concepcion Keen MD;  Location: [facility]     ESOPHAGOSCOPY, GASTROSCOPY, DUODENOSCOPY (EGD), COMBINED N/A 8/11/2017    Procedure: ENDOSCOPIC ULTRASOUND;  Surgeon: Eleazar Verma MD;  Location: [facility] OR;  Service:       [facility] ENDOSCOPY W EUS  1/16/2014    Procedure: COMBINED ENDOSCOPIC ULTRASOUND, ESOPHAGOSCOPY, GASTROSCOPY, DUODENOSCOPY (EGD);  Surgeon: Michael Monahan MD;  Location: [facility]     IMPLANT PROSTHESIS SPHINCTER URINARY      urethral sphincter     INCONTINENCE SURGERY       INJECT TRANSVERSUS ABDOMINIS PLANE (TAP) BLOCK BILATERAL Bilateral 12/14/2021    Procedure: BLOCK, TRANSVERSUS ABDOMINIS PLANE;  Surgeon: Chapin Lawson MD;  Location: [facility] OR     IR EXTREMITY ANGIOGRAM LEFT  10/7/2020     IR LOWER EXTREMITY ANGIOGRAM LEFT  10/7/2020     OTHER SURGICAL HISTORY      urinary sphincter transplantwith 2 subsequent revisions     OTHER SURGICAL HISTORY      removal of melanoma     PROSTATE SURGERY       PROSTATECTOMY       RELEASE CARPAL TUNNEL Right      UPPER EUS         ALLERGIES:  Atorvastatin, Cat dander [animal dander], Cilostazol, Gabapentin, Morphine, Pollen extracts [pollen extract], and Tizanidine    MEDS:    Current Outpatient Medications:      amLODIPine (NORVASC) 5 MG tablet, Take 1 tablet (5 mg) by mouth daily, Disp: 30 tablet, Rfl: 0     aspirin 81 MG tablet, Take 1 tablet by mouth daily, Disp: , Rfl:      buprenorphine HCl-naloxone HCl (SUBOXONE) 2-0.5 MG per film, Place 1 Film under the tongue 3 times daily MAY FILL4/12 FOR 4/14, Disp: 42 each, Rfl: 3     ergocalciferol (ERGOCALCIFEROL) 1.25 MG (29203 UT) capsule, Take 1 capsule by mouth, Disp: , Rfl:      FISH OIL, 1,400 mg daily, Disp: , Rfl:      gentamicin (GARAMYCIN) 0.1 % external ointment, APPLY TO EAR TWICE DAILY WITH WOUND CHECKS AS DIRECTED, Disp: , Rfl:      HYDROcodone-acetaminophen (NORCO) 5-325 MG tablet, Take 1 tablet by mouth 2  times daily as needed for severe pain Fill 3/11 for use starting 3/13 or later, Disp: 60 tablet, Rfl: 0     ipratropium (ATROVENT) 0.03 % nasal spray, 2 sprays in each nostril, Disp: , Rfl:      ketorolac (TORADOL) 10 MG tablet, ketorolac 10 mg tablet, Disp: , Rfl:      LATANOPROST OP, Place 1 drop into the right eye At Bedtime, Disp: , Rfl:      metoprolol succinate ER (TOPROL-XL) 100 MG 24 hr tablet, TAKE ONE AND ONE-HALF TABLETS ONCE DAILY, Disp: , Rfl:      Multiple Vitamin (DAILY MULTIVITAMIN PO), Take 1 tablet by mouth daily, Disp: , Rfl:      naloxone (NARCAN) 4 MG/0.1ML nasal spray, Spray 1 spray (4 mg) into one nostril alternating nostrils once as needed for opioid reversal every 2-3 minutes until assistance arrives, Disp: 0.2 mL, Rfl: 0     pantoprazole (PROTONIX) 40 MG enteric coated tablet, Take 1 tablet by mouth daily, Disp: , Rfl:      pregabalin (LYRICA) 100 MG capsule, pregabalin 100 mg capsule 2 times a day, Disp: 60 capsule, Rfl: 3     rosuvastatin (CRESTOR) 10 MG tablet, Take 1 tablet by mouth every 24 hours, Disp: , Rfl:      senna-docusate (SENOKOT-S;PERICOLACE) 8.6-50 MG per tablet, Take 1 tablet by mouth daily as needed for constipation, Disp: 7 tablet, Rfl: 0     triamcinolone (KENALOG) 0.1 % ointment, Apply topically as needed, Disp: , Rfl:      triamterene-hydrochlorothiazide (MAXZIDE-25) 37.5-25 MG per tablet, Take 1 tablet by mouth daily, Disp: , Rfl:     SOCIAL HABITS:    History   Smoking Status     Former Smoker     Quit date: 8/10/1986   Smokeless Tobacco     Never Used     Social History    Substance and Sexual Activity      Alcohol use: No        Comment: 4 drinks a week/socially; 12/30/2013 stopped drinking      History   Drug Use No       FAMILY HISTORY:    Family History   Problem Relation Age of Onset     Prostate Cancer Brother      C.A.D. Father      Prostate Cancer Brother      Hypertension Mother      Coronary Artery Disease Father      Hypertension Father        PE:  BP (!)  146/68   Pulse 64   Temp 97.6  F (36.4  C)   Wt Readings from Last 1 Encounters:   04/06/22 102.5 kg (226 lb)     There is no height or weight on file to calculate BMI.    EXAM:  GENERAL: well-developed 81 year old male who appears his stated age  CARDIAC: normal   CHEST/LUNG: normal respiratory effort   MUSCULOSKELETAL: grossly normal and both lower extremities are intact, no lower extremity edema  NEUROLOGIC: focally intact, alert and oriented x 3  PSYCH: appropriate affect  VASCULAR: left foot is warm and perfused with palpable pulses, small, dry wound to the left medial foot with no associated warmth, drainage, or odor      DIAGNOSTIC STUDIES:     Images:  US Low Ext Arterial Dop Seg Pres w/o Exercise  1. RIGHT LOWER EXTREMITY: CELSA is Abnormal with an CELSA of 0.94. and Mildly abnormal, but adequate for healing toe pressures of 72 mmHg.  2. LEFT LOWER EXTREMITY: CELSA is Abnormal with an CELSA of 0.77. and Mildly abnormal, but adequate for healing toe pressures of 87 mmHg.    US Lower Extremity Arterial Duplex Left  Right Lower Extremity: N/A  Left Lower Extremity: Left lower extremity arterial system surveillance study status post angioplasty on April 26, 2021, multiphasic waveform pattern all through the arterial system with more than doubling of the velocity with turbulent flow at the distal SFA in comparison to the mid SFA indicating more than 50% stenosis, clinical correlation is recommended.      LABS:      Sodium   Date Value Ref Range Status   02/02/2022 140 136 - 145 mmol/L Final   06/09/2021 141 136 - 145 mmol/L Final   01/22/2021 138 136 - 145 mmol/L Final   02/18/2014 138 133 - 144 mmol/L Final   02/05/2014 132 (L) 133 - 144 mmol/L Final   02/04/2014 128 (L) 133 - 144 mmol/L Final     Urea Nitrogen   Date Value Ref Range Status   02/02/2022 25 8 - 28 mg/dL Final   06/09/2021 27 8 - 28 mg/dL Final   01/22/2021 27 8 - 28 mg/dL Final   02/18/2014 19 7 - 30 mg/dL Final   02/05/2014 16 7 - 30 mg/dL Final    02/04/2014 15 7 - 30 mg/dL Final     Hemoglobin   Date Value Ref Range Status   10/07/2020 12.6 (L) 14.0 - 18.0 g/dL Final   01/29/2018 13.7 (L) 14.0 - 18.0 g/dL Final   02/18/2014 14.5 13.3 - 17.7 g/dL Final   02/05/2014 11.8 (L) 13.3 - 17.7 g/dL Final   02/04/2014 11.7 (L) 13.3 - 17.7 g/dL Final     Platelet Count   Date Value Ref Range Status   10/07/2020 268 140 - 440 thou/uL Final   02/18/2014 330 150 - 450 10e9/L Final   02/05/2014 222 150 - 450 10e9/L Final   02/04/2014 212 150 - 450 10e9/L Final     INR   Date Value Ref Range Status   10/07/2020 1.03 0.90 - 1.10 Final   02/18/2014 1.07 0.86 - 1.14 Final   01/30/2014 1.05 0.86 - 1.14 Final   01/16/2014 1.11 0.86 - 1.14 Final       30 minutes spent on the day of encounter doing chart review, history and exam, documentation, and further activities as noted.      Anabella Medina PA-C  VASCULAR SURGERY

## 2022-05-05 NOTE — NURSING NOTE
[facility]        Patient is here for a 1 year follow up  to discuss Peripheral artery disease (PAD). Symptoms include numbness in both lower extremities. L>R    Pt is currently taking Aspirin and Statin.    BP (!) 146/68   Pulse 64   Temp 97.6  F (36.4  C)     The provider has been notified that the patient has concerns of numbness in legs when he wakes up. And intermittent cramping    Questions patient would like addressed today are: N/A.    Refills are needed: No    Has homecare services and agency name:  Cecilia Encinas

## 2022-05-15 ENCOUNTER — HEALTH MAINTENANCE LETTER (OUTPATIENT)
Age: 82
End: 2022-05-15

## 2022-06-02 DIAGNOSIS — G89.4 CHRONIC PAIN SYNDROME: ICD-10-CM

## 2022-06-02 RX ORDER — PREGABALIN 100 MG/1
CAPSULE ORAL
Qty: 60 CAPSULE | Refills: 3 | Status: SHIPPED | OUTPATIENT
Start: 2022-06-02

## 2022-06-02 NOTE — TELEPHONE ENCOUNTER
Patient requesting refill pregabalin (LYRICA) 100 MG capsule    Pharmacy [id number] [address]    Fax from pharmacy    [facility] Patient Facilitator     Last dispensed: 5/4/22-30 days    Last apt with BE: 4/6/22    Next apt with BE: 7/11/22    Pending Prescriptions:                       Disp   Refills    pregabalin (LYRICA) 100 MG capsule        60 cap*3            Sig: pregabalin 100 mg capsule 2 times a day    [facility]

## 2022-06-08 ENCOUNTER — HOSPITAL ENCOUNTER (OUTPATIENT)
Dept: MRI IMAGING | Facility: CLINIC | Age: 82
Discharge: HOME | End: 2022-06-08
Attending: PHYSICIAN ASSISTANT | Admitting: PHYSICIAN ASSISTANT
Payer: COMMERCIAL

## 2022-06-08 DIAGNOSIS — K86.1 IDIOPATHIC CHRONIC PANCREATITIS (H): ICD-10-CM

## 2022-06-08 DIAGNOSIS — D49.0 IPMN (INTRADUCTAL PAPILLARY MUCINOUS NEOPLASM): ICD-10-CM

## 2022-06-08 DIAGNOSIS — R10.13 EPIGASTRIC PAIN: ICD-10-CM

## 2022-06-08 PROCEDURE — 255N000002 HC RX 255 OP 636: Performed by: PHYSICIAN ASSISTANT

## 2022-06-08 PROCEDURE — 74183 MRI ABD W/O CNTR FLWD CNTR: CPT

## 2022-06-08 PROCEDURE — A9581 GADOXETATE DISODIUM INJ: HCPCS | Performed by: PHYSICIAN ASSISTANT

## 2022-06-08 RX ORDER — GADOBUTROL 604.72 MG/ML
10 INJECTION INTRAVENOUS ONCE
Status: DISCONTINUED | OUTPATIENT
Start: 2022-06-08 | End: 2022-06-08 | Stop reason: CLARIF

## 2022-06-08 RX ADMIN — GADOXETATE DISODIUM 20 ML: 181.43 INJECTION, SOLUTION INTRAVENOUS at 12:51

## 2022-06-13 ENCOUNTER — MYC REFILL (OUTPATIENT)
Dept: PALLIATIVE MEDICINE | Facility: OTHER | Age: 82
End: 2022-06-13
Payer: COMMERCIAL

## 2022-06-13 DIAGNOSIS — G89.4 CHRONIC PAIN SYNDROME: ICD-10-CM

## 2022-06-13 RX ORDER — HYDROCODONE BITARTRATE AND ACETAMINOPHEN 5; 325 MG/1; MG/1
1 TABLET ORAL 2 TIMES DAILY PRN
Qty: 60 TABLET | Refills: 0 | OUTPATIENT
Start: 2022-06-13

## 2022-06-22 NOTE — ADDENDUM NOTE
Encounter addended by: Thiago Kevin, PT on: 6/22/2022 8:37 AM   Actions taken: Episode resolved, Clinical Note Signed

## 2022-06-22 NOTE — PROGRESS NOTES
[facility]    Outpatient Physical Therapy Discharge Note  Patient: Vernon Lemus  : 1940    Beginning/End Dates of Reporting Period:  21 to 22    Referring Provider: Hira Fajardo Diagnosis: abdominal pain     Client Self Report: He does feel like things have stayed about the same for him. It doesn't seem to change much for him. He did just have labs in since the last visit and there has been more normal values for that.     Objective Measurements:  Objective Measure: Art, ADP, visc fascial tensions.       Goals:  Goal Identifier 1   Goal Description Pt will verbalize ability to sit for >30' without pain in 12 weeks.    Target Date 22   Date Met      Progress (detail required for progress note): Sitting time: 30 min - slow progression     Goal Identifier 2   Goal Description Pt will verbalize not waking from pain at night in 12 weeks.    Target Date 22   Date Met      Progress (detail required for progress note): Sleeping/waking up: 1-2x/night from pain. Slow progression     Goal Identifier 3   Goal Description Pt will decrease pain level from 1-9/10 to 1-6/10 with ADLs in 12 weeks.    Target Date 22   Date Met      Progress (detail required for progress note): Pain levels: 1-7/10 range - did show improvement in this     Goal Identifier     Goal Description     Target Date     Date Met      Progress (detail required for progress note):       Goal Identifier     Goal Description     Target Date     Date Met      Progress (detail required for progress note):       Goal Identifier     Goal Description     Target Date     Date Met      Progress (detail required for progress note):       Goal Identifier     Goal Description     Target Date     Date Met      Progress (detail required for progress note):       Goal Identifier     Goal Description     Target Date      Date Met      Progress (detail required for progress note):             Plan:  Discharge from therapy.    Discharge:    Reason for Discharge: Patient chooses to discontinue therapy.    Equipment Issued:     Discharge Plan: Patient to continue home program.

## 2022-07-01 DIAGNOSIS — G89.4 CHRONIC PAIN SYNDROME: ICD-10-CM

## 2022-07-01 RX ORDER — BUPRENORPHINE AND NALOXONE 2; .5 MG/1; MG/1
1 FILM, SOLUBLE BUCCAL; SUBLINGUAL 3 TIMES DAILY
Qty: 42 EACH | Refills: 0 | Status: SHIPPED | OUTPATIENT
Start: 2022-07-01 | End: 2022-07-11

## 2022-07-01 NOTE — TELEPHONE ENCOUNTER
Received call from patient requesting refill(s) of Suboxone     Last dispensed from pharmacy on 6/13/22    Patient's last office/virtual visit by prescribing provider on 4/6/22  Next office/virtual appointment scheduled for 7/11/22    Last urine drug screen date 10/2021  Current opioid agreement on file (completed within the last year) Yes Date of opioid agreement: 10/2021    E-prescribe to [facility] pharmacy  Pending Prescriptions:                       Disp   Refills    buprenorphine HCl-naloxone HCl (SUBOXONE)*42 each0            Sig: Place 1 Film under the tongue 3 times daily

## 2022-07-01 NOTE — TELEPHONE ENCOUNTER
Patient requesting refill buprenorphine HCl-naloxone HCl (SUBOXONE) 2-0.5 MG per film    Date last filled 06.13.2022    Pharmacy [facility] #[id number] [place], [place] - [facility] [address]    Fax from pharmacy      Maryjo Abbasi  [facility] Patient Facilitator

## 2022-07-11 ENCOUNTER — OFFICE VISIT (OUTPATIENT)
Dept: PALLIATIVE MEDICINE | Facility: OTHER | Age: 82
End: 2022-07-11
Payer: COMMERCIAL

## 2022-07-11 VITALS
WEIGHT: 221 LBS | DIASTOLIC BLOOD PRESSURE: 56 MMHG | HEIGHT: 72 IN | BODY MASS INDEX: 29.93 KG/M2 | SYSTOLIC BLOOD PRESSURE: 136 MMHG | HEART RATE: 64 BPM

## 2022-07-11 DIAGNOSIS — G89.4 CHRONIC PAIN SYNDROME: ICD-10-CM

## 2022-07-11 DIAGNOSIS — Z79.891 LONG TERM (CURRENT) USE OF OPIATE ANALGESIC: ICD-10-CM

## 2022-07-11 PROCEDURE — G0463 HOSPITAL OUTPT CLINIC VISIT: HCPCS

## 2022-07-11 PROCEDURE — 99213 OFFICE O/P EST LOW 20 MIN: CPT | Performed by: ANESTHESIOLOGY

## 2022-07-11 RX ORDER — BUPRENORPHINE AND NALOXONE 2; .5 MG/1; MG/1
1 FILM, SOLUBLE BUCCAL; SUBLINGUAL 3 TIMES DAILY
Qty: 42 EACH | Refills: 0 | Status: SHIPPED | OUTPATIENT
Start: 2022-07-11 | End: 2022-07-27

## 2022-07-11 RX ORDER — HYDROCODONE BITARTRATE AND ACETAMINOPHEN 5; 325 MG/1; MG/1
1 TABLET ORAL 2 TIMES DAILY PRN
Qty: 60 TABLET | Refills: 0 | Status: SHIPPED | OUTPATIENT
Start: 2022-07-11 | End: 2022-08-10

## 2022-07-11 ASSESSMENT — PAIN SCALES - GENERAL: PAINLEVEL: MODERATE PAIN (4)

## 2022-07-11 NOTE — PROGRESS NOTES
07/11/22 0944   PEG: A Thee-Item Scale Assessing Pain Intensity and Interference        0 = No pain / No interference    10 = Pain as bad as you can imagine / Completely interferes   What number best describes your pain on average in the past week? 4   What number best describes how, during the past week, pain has interfered with your enjoyment of life? 7   What number best describes how, during the past week, pain has interfered with your general activity? 4   PEG Total Score 5

## 2022-07-11 NOTE — NURSING NOTE
PEG Score 1/13/2022 4/6/2022 7/11/2022   PEG Total Score 2.33 3.33 5     [facility]    Clinic Number:  [phone]    Call with any questions about your care and for scheduling assistance.     Calls are returned Monday through Friday between 8 AM and 4:30 PM. We usually get back to you within 2 business days depending on the issue/request.    If we are prescribing your medications:    For opioid medication refills, call the clinic or send a [employer] message 7 days in advance.  Please include:    Name of requested medication    Name of the pharmacy.    For non-opioid medications, call your pharmacy directly to request a refill. Please allow 3-4 days to be processed.     Per [place] State Law:    All controlled substance prescriptions must be filled within 30 days of being written.      For those controlled substances allowing refills, pickup must occur within 30 days of last fill.      We believe regular attendance is key to your success in our program!      Any time you are unable to keep your appointment we ask that you call us at least 24 hours in advance to cancel.This will allow us to offer the appointment time to another patient.     Multiple missed appointments may lead to dismissal from the clinic.

## 2022-07-11 NOTE — PROGRESS NOTES
"[facility] - Office Visit    ASSESSMENT & PLAN     Vernon was seen today for pain.    Diagnoses and all orders for this visit:    Long term (current) use of opiate analgesic    Chronic pain syndrome  -     buprenorphine HCl-naloxone HCl (SUBOXONE) 2-0.5 MG per film; Place 1 Film under the tongue 3 times daily  -     HYDROcodone-acetaminophen (NORCO) 5-325 MG tablet; Take 1 tablet by mouth 2 times daily as needed for severe pain        Patient Instructions   PLAN:    You have an appointment to see a GI surgeon in September.    You will continue with visceral myofascial release physical therapy with Yves in August.    Continue with Suboxone 2 mg 3 times a day.    Continue with hydrocodone 5 mg daily, reviewed with flareups may take an extra dose during the day.    You are going to physical therapy for your left hip.    Reviewed with changes in staffing at [facility] you may discuss with your primary care provider regarding taking over management with your opioids, including going back to her previous forms of buprenorphine.    Otherwise follow-up with Dr. Dooley in 3 months        -----  JUANITO DOOLEY MD  [facility]       SUBJECTIVE      Vernon Lemus is a 82 year old year old male who presents to clinic today for the following:     For chronic abdominal pain.    Reviews medications are allowing him to handle his pain for the most extent.  He uses Suboxone 2 mg tablets every 8 hours.  If he goes longer than 8hours, 2 hours later can tell with pain and some withdrawal symptoms.  Uses hydrocodone 5 mg once a day.  Takes it during the worst time of the day.  There is been a few times where his been \"latrell\" and he thought he needed to go to the emergency room.    Reviews working with [facility], was told there was nothing more that they could do.  They did have an MRI to monitor the mass near his pancreas.  He will see a surgeon in September to assess that.    He " had been doing some visceral myofascial release, had a some mixup appointments will resume in August.    He tries to remain active going to the swimming pool his children have.  He also tries to maintain working on his yard doing the lawn mowing, watering.    IMPRESSION:  1.  Slight increase in the size of the thin-walled nonenhancing multicystic mass (4.9 cm ML, 3.7 cm CC, 2.8 cm AP) anterior aspect pancreatic head and neck related to dilatation of the accessory dorsal pancreatic duct. 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  2.  Mild pancreatic ductal dilatation unchanged.     REFERENCE:  Revisions of international consensus [place] guidelines for the management of IPMN of the pancreas. Pancreatology 2017;17(5):738-753.     All cysts with worrisome features (including those at least 30 mm): EUS and 12 months imaging follow-up.         BMP reviewed as expected last U DT in October as expected            Review of Systems   General, psych, musculoskeletal, bowels and bladder otherwise normal other than above.          OBJECTIVE   BP (!) 171/102   Pulse 106   Ht 1.829 m (6')   Wt 103.8 kg (228 lb 14.4 oz)   SpO2 97%   BMI 31.04 kg/m        Physical Exam  General:  Normal appearance, no apparent distress  Cardiovascular: Normal rate  Lungs: Pulmonary effort is normal, speaking in full sentences  Abdomen, he is wearing suspenders, with his pants unzipped, as he notes the most tender area even to the zippered pants is just to the right of his midline.  Tender to palpation, no rebound or guarding.  Skin: Warm and dry. No concerning rashes or lesions.  Neurologic: No focal deficit, alert and oriented x3  Psychiatric: normal mood and affect, cooperative      Assessment: Abdominal pain, history of monitoring for mass on the pancreas, notes there was some discussion of a Whipple procedure at some point.    Plan as above.    I did review with him given our changes in  staffing at the [facility] pain center, recommendation has been for those with primary care providers outside [facility] to return to those providers.  Discussed he is on a somewhat complicated regimen with Suboxone and other opioid, heme will review this with his primary care provider.    Total time more than 20 minutes

## 2022-07-11 NOTE — PATIENT INSTRUCTIONS
PLAN:    You have an appointment to see a GI surgeon in September.    You will continue with visceral myofascial release physical therapy with Yves in August.    Continue with Suboxone 2 mg 3 times a day.    Continue with hydrocodone 5 mg daily, reviewed with flareups may take an extra dose during the day.    You are going to physical therapy for your left hip.    Reviewed with changes in staffing at [facility] you may discuss with your primary care provider regarding taking over management with your opioids, including going back to her previous forms of buprenorphine.    Otherwise follow-up with Dr. Colby in 3 months

## 2022-08-08 ENCOUNTER — HOSPITAL ENCOUNTER (OUTPATIENT)
Dept: PHYSICAL THERAPY | Facility: REHABILITATION | Age: 82
Discharge: HOME | End: 2022-08-08
Payer: COMMERCIAL

## 2022-08-08 DIAGNOSIS — G89.4 CHRONIC PAIN SYNDROME: Primary | ICD-10-CM

## 2022-08-08 DIAGNOSIS — M79.18 MYOFASCIAL PAIN: ICD-10-CM

## 2022-08-08 DIAGNOSIS — R10.11 RIGHT UPPER QUADRANT ABDOMINAL PAIN: ICD-10-CM

## 2022-08-08 PROCEDURE — 97140 MANUAL THERAPY 1/> REGIONS: CPT | Mod: GP | Performed by: PHYSICAL THERAPIST

## 2022-08-08 PROCEDURE — 97161 PT EVAL LOW COMPLEX 20 MIN: CPT | Mod: GP | Performed by: PHYSICAL THERAPIST

## 2022-08-09 NOTE — PROGRESS NOTES
"                                                                           [facility]    OUTPATIENT PHYSICAL THERAPY ORTHOPEDIC EVALUATION  PLAN OF TREATMENT FOR OUTPATIENT REHABILITATION  (COMPLETE FOR INITIAL CLAIMS ONLY)  Patient's Last Name, First Name, M.I.  YOB: 1940  Vernon Lemus    Provider s Name:  [facility]   Medical Record No.  [id number]   Start of Care Date:  08/08/22   Onset Date:  07/11/22   Type:     _X__PT   ___OT   ___SLP Medical Diagnosis:  chronic abdominal pain     PT Diagnosis:  chronic abdominal pain   Visits from SOC:  1      _________________________________________________________________________________  Plan of Treatment/Functional Goals:  stretching, strengthening, ROM, neuromuscular re-education, manual therapy           Goals  Goal Identifier: 1  Goal Description: Pt will decrease pain from 0-9/10 to 0-6/10 with ADLs in 90 days.  Target Date: 11/06/22    Goal Identifier: 2  Goal Description: Pt will verbalize ability to sit >45\" without an increase in pain in 90 days.  Target Date: 11/06/22    Goal Identifier: 3  Goal Description: Pt will decrease need for daily medications in 90 days.  Target Date: 11/06/22                                                           Therapy Frequency:  1 time/week  Predicted Duration of Therapy Intervention:  90 days    DORENE PASTRANA, PT                 I CERTIFY THE NEED FOR THESE SERVICES FURNISHED UNDER        THIS PLAN OF TREATMENT AND WHILE UNDER MY CARE     (Physician co-signature of this document indicates review and certification of the therapy plan).                     Certification Date From:  08/08/22   Certification Date To:  11/06/22    Referring Provider:       Initial Assessment        See Epic Evaluation Start of Care Date: 08/08/22 08/08/22 0900   General Information   Type of Visit Initial OP Ortho PT Evaluation   Start of Care " Date 08/08/22   Date of Order 10/22/21   Certification Required? Yes   Medical Diagnosis chronic abominal pain   Body Part(s)   Body Part(s) Lumbar Spine/SI   Presentation and Etiology   Pertinent history of current problem (include personal factors and/or comorbidities that impact the POC) He has had abdominal pain for the last 8 yrs or so without any particular LORNA. The pain is still in the upper R side and pretty much stays in the front and not into the back. 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 His last MRI did show an neoplasm on the pancreas which did increase in size. He has been using opioids and does find some success with them and does use them daily. He would like to get off of them and still have some relief. He did try some PT previously but is unaware if that helped or not. There were a couple visits that did flare him up for a few days.  He has tried trigger point injections and a celiac nerve block but that did not have an effect on him. He has had a number of MRI/ERCP and they really haven't come back with anything. Function: cannot sit for >30', standing seems ok for him, mostly yard work and house work is mostly ok - feels like he does fine for his age, sleeping is pretty good but sometimes if not having meds on board he will wake up with a lot of pain in the middle of the night, wearing pants is difficult for him especially when they are buttoned, when he is in pain he really doesn't feel like having social encounters. For the most part though when the meds are good he is able to do quite a bit.  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 Bowel/bladder patterns do seem him normal - constipation is an issue due to the opioids.   Onset date of current episode/exacerbation 07/11/22   Chronicity Chronic   Pain rating (0-10 point scale) Best (/10);Worst (/10);Other   Best (/10) 0 with meds on board   Worst (/10) 9 - can be random   Pain rating  comment today: 4   Pain quality B. Dull;C. Aching;H. Other   Pain quality comment nauseous   Frequency of pain/symptoms A. Constant   Pain/symptoms eased by C. Rest;K. Other   Pain eased by comment meds   Progression of symptoms since onset: Worsened   Fall Risk Screen   Fall screen completed by PT   Have you fallen 2 or more times in the past year? No   Have you fallen and had an injury in the past year? No   Is patient a fall risk? No   Abuse Screen (yes response referral indicated)   Feels Unsafe at Home or Work/School no   Feels Threatened by Someone no   Does Anyone Try to Keep You From Having Contact with Others or Doing Things Outside Your Home? no   Physical Signs of Abuse Present no   Lumbar Spine/SI Objective Findings   Lumbar/SI Flexibility Comments hip mobility (flex/ER/IR) are WFL - mod limited but WFL   Lumbar ROM Comment T-rot: 46 B     C-rot: 48/50   Hip Flexion (L2) Strength 4+ B   Knee Flexion Strength 4+ B   Knee Extension (L3) Strength 4+ B   Palpation increased tone in B paraspinals, pain over Oddi in abdomen   Observation Seated posture is slouched with FHP   Posture standing: shift to R with thorax with high R shoulder/L innom.   Planned Therapy Interventions   Planned Therapy Interventions stretching;strengthening;ROM;neuromuscular re-education;manual therapy   Clinical Impression   Criteria for Skilled Therapeutic Interventions Met yes, treatment indicated   PT Diagnosis chronic abdominal pain   Clinical Presentation Stable/Uncomplicated   Clinical Decision Making (Complexity) Low complexity   Therapy Frequency 1 time/week   Predicted Duration of Therapy Intervention (days/wks) 90 days   Risk & Benefits of therapy have been explained Yes   Patient, Family & other staff in agreement with plan of care Yes   Clinical Impression Comments Pt is an 81 y/o male being referred for chronic abdominal pain. This has been an ongoing issue for him for quite some time without any particular LORNA. His pain  "is very limiting to him and he would also like to stop taking opioids for his pain. He does have fascial restrictions present which will contribute to his current symptoms. He is appropriate for skilled PT to reach all stated goals   ORTHO GOALS   PT Ortho Eval Goals 1;2;3   Ortho Goal 1   Goal Identifier 1   Goal Description Pt will decrease pain from 0-9/10 to 0-6/10 with ADLs in 90 days.   Target Date 11/06/22   Ortho Goal 2   Goal Identifier 2   Goal Description Pt will verbalize ability to sit >45\" without an increase in pain in 90 days.   Target Date 11/06/22   Ortho Goal 3   Goal Identifier 3   Goal Description Pt will decrease need for daily medications in 90 days.   Target Date 11/06/22   Total Evaluation Time   PT Eval, Low Complexity Minutes (56986) 20   Therapy Certification   Certification date from 08/08/22   Certification date to 11/06/22   Medical Diagnosis chronic abdominal pain                                                                  "

## 2022-08-22 ENCOUNTER — LAB REQUISITION (OUTPATIENT)
Dept: LAB | Facility: CLINIC | Age: 82
End: 2022-08-22

## 2022-08-22 DIAGNOSIS — I10 ESSENTIAL (PRIMARY) HYPERTENSION: ICD-10-CM

## 2022-08-22 LAB
ANION GAP SERPL CALCULATED.3IONS-SCNC: 10 MMOL/L (ref 7–15)
BUN SERPL-MCNC: 33.1 MG/DL (ref 8–23)
CALCIUM SERPL-MCNC: 9.8 MG/DL (ref 8.8–10.2)
CHLORIDE SERPL-SCNC: 99 MMOL/L (ref 98–107)
CREAT SERPL-MCNC: 1.22 MG/DL (ref 0.67–1.17)
DEPRECATED HCO3 PLAS-SCNC: 29 MMOL/L (ref 22–29)
GFR SERPL CREATININE-BSD FRML MDRD: 60 ML/MIN/1.73M2
GLUCOSE SERPL-MCNC: 103 MG/DL (ref 70–99)
POTASSIUM SERPL-SCNC: 4.9 MMOL/L (ref 3.4–5.3)
SODIUM SERPL-SCNC: 138 MMOL/L (ref 136–145)

## 2022-08-22 PROCEDURE — 82310 ASSAY OF CALCIUM: CPT | Performed by: FAMILY MEDICINE

## 2022-08-23 ENCOUNTER — TRANSCRIBE ORDERS (OUTPATIENT)
Dept: VASCULAR SURGERY | Facility: CLINIC | Age: 82
End: 2022-08-23

## 2022-08-23 DIAGNOSIS — L97.521 ULCER OF TOE OF LEFT FOOT, LIMITED TO BREAKDOWN OF SKIN (H): Primary | ICD-10-CM

## 2022-09-06 ENCOUNTER — OFFICE VISIT (OUTPATIENT)
Dept: VASCULAR SURGERY | Facility: CLINIC | Age: 82
End: 2022-09-06
Attending: FAMILY MEDICINE
Payer: COMMERCIAL

## 2022-09-06 VITALS — OXYGEN SATURATION: 95 % | WEIGHT: 221 LBS | HEART RATE: 64 BPM | HEIGHT: 72 IN | BODY MASS INDEX: 29.93 KG/M2

## 2022-09-06 DIAGNOSIS — M25.372 LEFT ANKLE INSTABILITY: Primary | ICD-10-CM

## 2022-09-06 DIAGNOSIS — E11.610 CHARCOT FOOT DUE TO DIABETES MELLITUS (H): ICD-10-CM

## 2022-09-06 DIAGNOSIS — Q66.6 PES PLANOVALGUS: ICD-10-CM

## 2022-09-06 DIAGNOSIS — L97.421 DIABETIC ULCER OF LEFT MIDFOOT ASSOCIATED WITH TYPE 2 DIABETES MELLITUS, LIMITED TO BREAKDOWN OF SKIN (H): ICD-10-CM

## 2022-09-06 DIAGNOSIS — E11.621 DIABETIC ULCER OF LEFT MIDFOOT ASSOCIATED WITH TYPE 2 DIABETES MELLITUS, LIMITED TO BREAKDOWN OF SKIN (H): ICD-10-CM

## 2022-09-06 PROCEDURE — 11042 DBRDMT SUBQ TIS 1ST 20SQCM/<: CPT | Performed by: PODIATRIST

## 2022-09-06 PROCEDURE — 99203 OFFICE O/P NEW LOW 30 MIN: CPT | Mod: 25 | Performed by: PODIATRIST

## 2022-09-06 RX ORDER — OXYBUTYNIN CHLORIDE 5 MG/1
TABLET ORAL
COMMUNITY
End: 2023-04-11

## 2022-09-06 RX ORDER — LATANOPROST 50 UG/ML
1 SOLUTION/ DROPS OPHTHALMIC DAILY
COMMUNITY
Start: 2022-07-28

## 2022-09-06 RX ORDER — TRIAMCINOLONE ACETONIDE 1 MG/G
CREAM TOPICAL
COMMUNITY

## 2022-09-06 RX ORDER — FLUTICASONE PROPIONATE 50 MCG
SPRAY, SUSPENSION (ML) NASAL
COMMUNITY
End: 2023-04-11

## 2022-09-06 ASSESSMENT — PAIN SCALES - GENERAL: PAINLEVEL: NO PAIN (0)

## 2022-09-06 NOTE — LETTER
2022             [facility]             Wound Dressing Rx and Order Form             Order Status:New Order             Verbal: [facility]illa[facility]   Fax: [phone]  Customer Service: [phone]          Patient Info:  Name: Vernon Lemus  : 1940  Address:   [address]  [place] [address]  Phone: [phone]      Insurance Info:  PRIMARY INSURANCE: Payor: [employer] / Plan: [employer] MEDICARE / Product Type: HMO /   Primary Policy ID#: [id number]  SECONDARY INSURANCE:  N/A   Secondary Policy ID#: N/A    Physician Info:   Name:  Gorge Candelario DPM   Dept Address/Phones:   [address] [address] [phone]  Dept: [phone]  Fax: [phone]      Wound info:  Encounter Diagnoses   Name Primary?     Diabetic ulcer of left midfoot associated with type 2 diabetes mellitus, limited to breakdown of skin (H)      Pes planovalgus      Charcot foot due to diabetes mellitus (H)      Left ankle instability Yes     VASC Wound Left foot (Active)   Pre Size Length 0.5 22 1500   Pre Size Width 0.5 22 1500   Pre Size Depth 0.2 22 1500   Pre Total Sq cm 0.25 22 1500     Drainage: light  Thickness:  Full  Duration of Need: 30  Days Supply: 30  Start Date: 2022  Starter Kit: Ancillary Kit (saline, gloves, gauze)  Qualifying wound/Debridement: Yes     Dressing Type Brand Size Days Supply  15/30 Quantity  changes/week   Primary Manuka honey HD Supra lite   4''x5'' 30 Every other day           Secondary Square gauze   4''x4'' 30 Every other day    Sof form roll gauze   4''x75'' 30 Every other day           Tape Papertape  1in 30 Every other day     OK to forward to covered supplier.    Electronically Signed Physician: Gorge Candelario DPM Date: 2022

## 2022-09-06 NOTE — PATIENT INSTRUCTIONS
"Important lnstructions          WEIGHT BEARING STATUS: You are to remain NON WEIGHT BEARING on your left foot. NON WEIGHT BEARING MEANS NO PRESSURE ON YOUR FOOT OR HEEL AT ANY TIME FOR ANY REASON!    2. OFFLOADING DEVICE: Must use a A ROLLING KNEE WALKER at all times! (do not use affected foot to push wheelchair)    3. STABILIZATION DEVICE: Use a CAM BOOT . You will need to WEAR THIS ANYTIME YOU ARE UP AND OUT OF BED, IT IS OKAY TO REMOVE WHEN YOU ARE SLEEPING..       4. ELEVATE: Elevating your leg means laying with your head on a pillow and your foot ABOVE YOUR WAIST.     5. DO NOT MOVE YOUR FOOT.  There is a risk of worsening the wound or incision. To give yourself a higher chance of healing, please DO NOT swing foot back and forth and wiggle foot/toes especially when inside a stabilization device.        Dressing Change lnstructions                 3 TIMES PER WEEK and as needed, Cleanse your foot wound(s) with Normal saline.    Pat Dry with non-sterile gauze    Primary Dressing: Apply Medihoney or Manuka honey into/onto the wounds    Secondary dressing: Cover with dry gauze    Secure with non-sterile roll gauze (4\" x 75\" roll) and tape (1\" roll tape) as needed; avoid adhesive directly on the skin    It IS NOT ok to get your wound wet in the bath or shower    SEEK MEDICAL CARE IF:  You have an increase in swelling, pain, or redness around the wound.  You have an increase in the amount of pus coming from the wound.  There is a bad smell coming from the wound.  The wound appears to be worsening/enlarging  You have a fever greater than 101.5 F      It is ok to continue current wound care treatment/products for the next 2-3 days until new wound care supplies are ordered and arrive. If longer than this please contact our office at [phone].        We want to hear from you!   In the next few weeks, you should receive a call or email to complete a survey about your visit at [facility]. Please help us " improve your appointment experience by letting us know how we did today. We strive to make your experience good and value any ways in which we could do better.      We value your input and suggestions.    Thank you for choosing the [facility]!

## 2022-09-06 NOTE — PROGRESS NOTES
FOOT AND ANKLE SURGERY/PODIATRY CONSULT NOTE        ASSESSMENT:   Diabetic Ulceration left foot  Pes Planovalgus  Charcot       TREATMENT:  -The left foot ulceration is stable, no signs of infection.     -I discussed the principles of wound healing today including the importance of limited walking on the involved limb, good vascular perfusion, good glycemic control and the absence of infection.     -I have referred him for a CAM boot and knee walker.     -Also request most recent HbA1c.     -After discussion of risk factors and consent obtained 2% Lidocaine HCL jelly was applied, under clean conditions, the left and foot ulceration(s) were debrided using .into the subcutaneous tissue.  Devitalized and nonviable tissue, along with any fibrin and slough, was removed to improve granulation tissue formation, stimulate wound healing, decrease overall bacteria load, disrupt biofilm formation and decrease edge senescence. Wound drainage was scant No. Total excisional debridement was 0.25 sq cm into the subcutaneous tissue with a depth of 0.2 cm.   Ulcers were improved afterwards and .  Measures were as noted on the flow sheet. Medi-honey with a gauze dressing was applied. He will continue to apply Medi-honey with a gauze dressing qoday.    -He will follow-up in 3 weeks.    Gorge Candelario DPM  [facility] [place]      HPI: Vernon Lemus was seen today at the request of Dr. De Luna. The patient states he developed the wound one year ago after removing a callus. He has been applying hydrogen peroxide and band aids. Currently walking in gym shoes. PMH significant for DM2.       Past Medical History:   Diagnosis Date     Abdominal pain      Abdominal pain      Arthritis     hands     Cancer (H)     1997 prostate cancer     Cancer (H)     prostate     Chronic pancreatitis (H)      GERD (gastroesophageal reflux disease)      Glaucoma     right eye     History of basal cell cancer     left arm      Hypertension      Hypertension      Melanoma (H)     removed from neck     Mixed hyperlipidemia 10/7/2013     Polyneuropathy     both ankles and feet     Recurrent pancreatitis      Seasonal allergies      Shingles     right leg       Past Surgical History:   Procedure Laterality Date     CARPAL TUNNEL RELEASE RT/LT      right     CATARACT EXTRACTION Bilateral      CATARACT IOL, RT/LT      left eye     ENDOSCOPIC RETROGRADE CHOLANGIOPANCREATOGRAM       ENDOSCOPIC RETROGRADE CHOLANGIOPANCREATOGRAM  1/30/2014    Procedure: ENDOSCOPIC RETROGRADE CHOLANGIOPANCREATOGRAM;  Endoscopic Retrograde Cholangopancreatogram with  biliary and pancreatic sphincterotomy, pancreatic duct dilation, and pancreatic and biliary stent placement;  Surgeon: Julius Agosto MD;  Location: [facility] OR     ENDOSCOPIC RETROGRADE CHOLANGIOPANCREATOGRAM  2/18/2014    Procedure: ENDOSCOPIC RETROGRADE CHOLANGIOPANCREATOGRAM;  endoscopic retrograde cholangiopancreatogram with minor sphincterotomy, stent placement;  Surgeon: Julius Agosto MD;  Location: [facility] OR     ENDOSCOPIC RETROGRADE CHOLANGIOPANCREATOGRAM       ESOPHAGOSCOPY, GASTROSCOPY, DUODENOSCOPY (EGD), COMBINED  3/18/2014    Procedure: COMBINED ESOPHAGOSCOPY, GASTROSCOPY, DUODENOSCOPY (EGD), REMOVE FOREIGN BODY;;  Surgeon: Concepcion Keen MD;  Location:  GI     ESOPHAGOSCOPY, GASTROSCOPY, DUODENOSCOPY (EGD), COMBINED N/A 8/11/2017    Procedure: ENDOSCOPIC ULTRASOUND;  Surgeon: Eleazar Verma MD;  Location: [facility];  Service:       [facility] ENDOSCOPY W EUS  1/16/2014    Procedure: COMBINED ENDOSCOPIC ULTRASOUND, ESOPHAGOSCOPY, GASTROSCOPY, DUODENOSCOPY (EGD);  Surgeon: Michael Monahan MD;  Location:  GI     IMPLANT PROSTHESIS SPHINCTER URINARY      urethral sphincter     INCONTINENCE SURGERY       INJECT TRANSVERSUS ABDOMINIS PLANE (TAP) BLOCK BILATERAL Bilateral 12/14/2021    Procedure: BLOCK, TRANSVERSUS ABDOMINIS PLANE;  Surgeon: Chapin Lawson MD;   Location: [facility] OR     IR EXTREMITY ANGIOGRAM LEFT  10/7/2020     IR LOWER EXTREMITY ANGIOGRAM LEFT  10/7/2020     OTHER SURGICAL HISTORY      urinary sphincter transplantwith 2 subsequent revisions     OTHER SURGICAL HISTORY      removal of melanoma     PROSTATE SURGERY       PROSTATECTOMY       RELEASE CARPAL TUNNEL Right      UPPER EUS          Allergies   Allergen Reactions     Atorvastatin      Other reaction(s): myalgias     Cat Dander [Animal Dander] Itching     Sneezing     Cilostazol Other (See Comments)     Gabapentin Other (See Comments)     Morphine Other (See Comments)     Causes agitation     Pollen Extracts [Pollen Extract] Itching     sneezing     Tizanidine Dizziness     At higher doses         Current Outpatient Medications:      amLODIPine (NORVASC) 5 MG tablet, Take 1 tablet (5 mg) by mouth daily, Disp: 30 tablet, Rfl: 0     aspirin 81 MG tablet, Take 1 tablet by mouth daily, Disp: , Rfl:      Buprenorphine HCl (BELBUCA) 900 MCG FILM buccal film, Belbuca 900 mcg buccal film  PLACE 1 FILM INSIDE CHEEK EVERY 12 HOURS, Disp: , Rfl:      buprenorphine HCl-naloxone HCl (SUBOXONE) 2-0.5 MG per film, Place 1 Film under the tongue 3 times daily May fill/start 8/11/22, Disp: 42 each, Rfl: 0     ergocalciferol (ERGOCALCIFEROL) 1.25 MG (79917 UT) capsule, Take 1 capsule by mouth, Disp: , Rfl:      FISH OIL, 1,400 mg daily, Disp: , Rfl:      fluticasone (FLONASE) 50 MCG/ACT nasal spray, fluticasone propionate 50 mcg/actuation nasal spray,suspension  SHAKE LIQUID AND USE 1 TO 2 SPRAYS IN EACH NOSTRIL EVERY DAY, Disp: , Rfl:      gentamicin (GARAMYCIN) 0.1 % external ointment, , Disp: , Rfl:      HYDROcodone-acetaminophen (NORCO) 5-325 MG tablet, Take 1 tablet by mouth 2 times daily as needed for severe pain, Disp: 60 tablet, Rfl: 0     ipratropium (ATROVENT) 0.03 % nasal spray, 2 sprays in each nostril, Disp: , Rfl:      ketorolac (TORADOL) 10 MG tablet, , Disp: , Rfl:      latanoprost (XALATAN) 0.005 %  ophthalmic solution, , Disp: , Rfl:      LATANOPROST OP, Place 1 drop into the right eye At Bedtime, Disp: , Rfl:      metoprolol succinate ER (TOPROL-XL) 100 MG 24 hr tablet, TAKE ONE AND ONE-HALF TABLETS ONCE DAILY, Disp: , Rfl:      Multiple Vitamin (DAILY MULTIVITAMIN PO), Take 1 tablet by mouth daily, Disp: , Rfl:      naloxone (NARCAN) 4 MG/0.1ML nasal spray, Spray 1 spray (4 mg) into one nostril alternating nostrils once as needed for opioid reversal every 2-3 minutes until assistance arrives, Disp: 0.2 mL, Rfl: 0     oxybutynin (DITROPAN) 5 MG tablet, oxybutynin chloride 5 mg tablet  TAKE 1 TABLET BY MOUTH TWICE DAILY AS NEEDED, Disp: , Rfl:      pantoprazole (PROTONIX) 40 MG enteric coated tablet, Take 1 tablet by mouth daily, Disp: , Rfl:      pregabalin (LYRICA) 100 MG capsule, pregabalin 100 mg capsule 2 times a day, Disp: 60 capsule, Rfl: 3     rosuvastatin (CRESTOR) 10 MG tablet, Take 1 tablet by mouth every 24 hours, Disp: , Rfl:      senna-docusate (SENOKOT-S;PERICOLACE) 8.6-50 MG per tablet, Take 1 tablet by mouth daily as needed for constipation, Disp: 7 tablet, Rfl: 0     triamcinolone (KENALOG) 0.1 % external cream, triamcinolone acetonide 0.1 % topical cream  APPLY TO RASH TOPICALLY AS NEEDED, Disp: , Rfl:      triamcinolone (KENALOG) 0.1 % ointment, Apply topically as needed, Disp: , Rfl:      triamterene-hydrochlorothiazide (MAXZIDE-25) 37.5-25 MG per tablet, Take 1 tablet by mouth daily, Disp: , Rfl:     Social History     Social History Narrative     Not on file       Family History   Problem Relation Age of Onset     Prostate Cancer Brother      C.A.D. Father      Prostate Cancer Brother      Hypertension Mother      Coronary Artery Disease Father      Hypertension Father        Review of Systems - 10 point Review of Systems is negative except for left foot ulcer which is noted in HPI.      OBJECTIVE:  Appearance: alert, well appearing, and in no distress.    Pulse 64   Ht 6' (1.829 m)    Wt 221 lb (100.2 kg)   SpO2 95%   BMI 29.97 kg/m      BMI= Body mass index is 29.97 kg/m .    General appearance: Patient is alert and fully cooperative with history & exam.  No sign of distress is noted during the visit.     Psychiatric: Affect is pleasant & appropriate.  Patient appears motivated to improve health.     Respiratory: Breathing is regular & unlabored while sitting.     HEENT: Hearing is intact to spoken word.  Speech is clear.  No gross evidence of visual impairment that would impact ambulation.    Vascular: Dorsalis pedis non-palpableLeft.  Dermatologic:   VASC Wound Left foot (Active)   Pre Size Length 0.5 09/06/22 1500   Pre Size Width 0.5 09/06/22 1500   Pre Size Depth 0.2 09/06/22 1500   Pre Total Sq cm 0.25 09/06/22 1500   Mixed granular/fibrotic tissue ulceration plantar medial left midfoot. No erythema left foot.   Neurologic: Diminished to light touch Left.  Musculoskeletal: Contracted digits noted Left.    Imaging:     No results found.

## 2022-09-09 ENCOUNTER — TELEPHONE (OUTPATIENT)
Dept: VASCULAR SURGERY | Facility: CLINIC | Age: 82
End: 2022-09-09

## 2022-09-09 NOTE — TELEPHONE ENCOUNTER
Patient called stating that Dr. Candelario wanted his hemaglobin levels, but the most recent he can find is from a year or longer ago.  He states Maximo was going to send Dr. Candelario a note instructing him to place the orders to get the labs done at our clinic.  He would like a call back to discuss and confirm that [facility] contacted Dr. Candelario.  Molina can be reached at: [phone]

## 2022-09-09 NOTE — TELEPHONE ENCOUNTER
Still have no received A1C yet.  Called [facility] and they will be faxing to [phone].    Trang Encinas LPN on 9/9/2022 at 1:12 PM

## 2022-09-10 ENCOUNTER — HEALTH MAINTENANCE LETTER (OUTPATIENT)
Age: 82
End: 2022-09-10

## 2022-09-13 ENCOUNTER — MYC MEDICAL ADVICE (OUTPATIENT)
Dept: VASCULAR SURGERY | Facility: CLINIC | Age: 82
End: 2022-09-13

## 2022-09-13 DIAGNOSIS — L97.421 DIABETIC ULCER OF LEFT MIDFOOT ASSOCIATED WITH TYPE 2 DIABETES MELLITUS, LIMITED TO BREAKDOWN OF SKIN (H): Primary | ICD-10-CM

## 2022-09-13 DIAGNOSIS — E11.621 DIABETIC ULCER OF LEFT MIDFOOT ASSOCIATED WITH TYPE 2 DIABETES MELLITUS, LIMITED TO BREAKDOWN OF SKIN (H): Primary | ICD-10-CM

## 2022-09-27 ENCOUNTER — OFFICE VISIT (OUTPATIENT)
Dept: VASCULAR SURGERY | Facility: CLINIC | Age: 82
End: 2022-09-27
Attending: PODIATRIST
Payer: COMMERCIAL

## 2022-09-27 ENCOUNTER — LAB (OUTPATIENT)
Dept: LAB | Facility: CLINIC | Age: 82
End: 2022-09-27
Attending: PODIATRIST
Payer: COMMERCIAL

## 2022-09-27 VITALS — DIASTOLIC BLOOD PRESSURE: 75 MMHG | HEART RATE: 64 BPM | SYSTOLIC BLOOD PRESSURE: 150 MMHG | RESPIRATION RATE: 16 BRPM

## 2022-09-27 DIAGNOSIS — E11.621 DIABETIC ULCER OF LEFT MIDFOOT ASSOCIATED WITH TYPE 2 DIABETES MELLITUS, LIMITED TO BREAKDOWN OF SKIN (H): ICD-10-CM

## 2022-09-27 DIAGNOSIS — L97.421 DIABETIC ULCER OF LEFT MIDFOOT ASSOCIATED WITH TYPE 2 DIABETES MELLITUS, LIMITED TO BREAKDOWN OF SKIN (H): ICD-10-CM

## 2022-09-27 DIAGNOSIS — E11.621 DIABETIC ULCER OF LEFT MIDFOOT ASSOCIATED WITH TYPE 2 DIABETES MELLITUS, LIMITED TO BREAKDOWN OF SKIN (H): Primary | ICD-10-CM

## 2022-09-27 DIAGNOSIS — L97.421 DIABETIC ULCER OF LEFT MIDFOOT ASSOCIATED WITH TYPE 2 DIABETES MELLITUS, LIMITED TO BREAKDOWN OF SKIN (H): Primary | ICD-10-CM

## 2022-09-27 LAB — HBA1C MFR BLD: 6.5 %

## 2022-09-27 PROCEDURE — G0463 HOSPITAL OUTPT CLINIC VISIT: HCPCS | Mod: 25 | Performed by: PODIATRIST

## 2022-09-27 PROCEDURE — 11042 DBRDMT SUBQ TIS 1ST 20SQCM/<: CPT | Performed by: PODIATRIST

## 2022-09-27 PROCEDURE — 83036 HEMOGLOBIN GLYCOSYLATED A1C: CPT

## 2022-09-27 PROCEDURE — 36415 COLL VENOUS BLD VENIPUNCTURE: CPT

## 2022-09-27 ASSESSMENT — PAIN SCALES - GENERAL: PAINLEVEL: NO PAIN (0)

## 2022-09-27 NOTE — PROGRESS NOTES
FOOT AND ANKLE SURGERY/PODIATRY Progress Note      ASSESSMENT:   Diabetic Ulceration left foot  Pes Planovalgus  Charcot       TREATMENT:  -The left foot ulcer is measuring smaller today. I recommend he continue with non-weight bearing with the knee walker, CAM boot for stability.     -After discussion of risk factors and consent obtained 2% Lidocaine HCL jelly was applied, under clean conditions, the left and foot ulceration(s) were debrided using #15 blade scalpel.  Devitalized and nonviable tissue, along with any fibrin and slough, was removed to improve granulation tissue formation, stimulate wound healing, decrease overall bacteria load, disrupt biofilm formation and decrease edge senescence. Wound drainage was scant No. Total excisional debridement was 0.04 sq cm into the subcutaneous tissue with a depth of 0.2 cm.   Ulcers were improved afterwards and .  Measures were as noted on the flow sheet. Medi-honey with a gauze dressing was applied. He will continue to apply Medi-honey with a gauze dressing qoday.    -He will follow-up in 3 weeks.    Gorge Candelario DPM  [facility] [place]      HPI: Vernon GRACIE Lemus was seen again today for a left foot ulceration. He has used the knee walker to remain mostly non-weight bearing, CAM boot for stability.       Past Medical History:   Diagnosis Date     Abdominal pain      Abdominal pain      Arthritis     hands     Cancer (H)     1997 prostate cancer     Cancer (H)     prostate     Chronic pancreatitis (H)      GERD (gastroesophageal reflux disease)      Glaucoma     right eye     History of basal cell cancer     left arm     Hypertension      Hypertension      Melanoma (H)     removed from neck     Mixed hyperlipidemia 10/7/2013     Polyneuropathy     both ankles and feet     Recurrent pancreatitis      Seasonal allergies      Shingles     right leg       Past Surgical History:   Procedure Laterality Date     CARPAL TUNNEL RELEASE RT/LT       right     CATARACT EXTRACTION Bilateral      CATARACT IOL, RT/LT      left eye     ENDOSCOPIC RETROGRADE CHOLANGIOPANCREATOGRAM       ENDOSCOPIC RETROGRADE CHOLANGIOPANCREATOGRAM  1/30/2014    Procedure: ENDOSCOPIC RETROGRADE CHOLANGIOPANCREATOGRAM;  Endoscopic Retrograde Cholangopancreatogram with  biliary and pancreatic sphincterotomy, pancreatic duct dilation, and pancreatic and biliary stent placement;  Surgeon: Julius Agosto MD;  Location: [facility] OR     ENDOSCOPIC RETROGRADE CHOLANGIOPANCREATOGRAM  2/18/2014    Procedure: ENDOSCOPIC RETROGRADE CHOLANGIOPANCREATOGRAM;  endoscopic retrograde cholangiopancreatogram with minor sphincterotomy, stent placement;  Surgeon: Julius Agosto MD;  Location: [facility] OR     ENDOSCOPIC RETROGRADE CHOLANGIOPANCREATOGRAM       ESOPHAGOSCOPY, GASTROSCOPY, DUODENOSCOPY (EGD), COMBINED  3/18/2014    Procedure: COMBINED ESOPHAGOSCOPY, GASTROSCOPY, DUODENOSCOPY (EGD), REMOVE FOREIGN BODY;;  Surgeon: Concepcion Keen MD;  Location: [facility] GI     ESOPHAGOSCOPY, GASTROSCOPY, DUODENOSCOPY (EGD), COMBINED N/A 8/11/2017    Procedure: ENDOSCOPIC ULTRASOUND;  Surgeon: Eleazar Verma MD;  Location: [facility];  Service:       UGI ENDOSCOPY W EUS  1/16/2014    Procedure: COMBINED ENDOSCOPIC ULTRASOUND, ESOPHAGOSCOPY, GASTROSCOPY, DUODENOSCOPY (EGD);  Surgeon: Michael Monahan MD;  Location:  GI     IMPLANT PROSTHESIS SPHINCTER URINARY      urethral sphincter     INCONTINENCE SURGERY       INJECT TRANSVERSUS ABDOMINIS PLANE (TAP) BLOCK BILATERAL Bilateral 12/14/2021    Procedure: BLOCK, TRANSVERSUS ABDOMINIS PLANE;  Surgeon: Chapin Lawson MD;  [place] [facility] [place]     [place] EXTREMITY ANGIOGRAM LEFT  10/7/2020     IR LOWER EXTREMITY ANGIOGRAM LEFT  10/7/2020     OTHER SURGICAL HISTORY      urinary sphincter transplantwith 2 subsequent revisions     OTHER SURGICAL HISTORY      removal of melanoma     PROSTATE SURGERY       PROSTATECTOMY       RELEASE CARPAL TUNNEL Right       UPPER EUS         Allergies   Allergen Reactions     Atorvastatin      Other reaction(s): myalgias     Cat Dander [Animal Dander] Itching     Sneezing     Cilostazol Other (See Comments)     Gabapentin Other (See Comments)     Morphine Other (See Comments)     Causes agitation     Pollen Extracts [Pollen Extract] Itching     sneezing     Tizanidine Dizziness     At higher doses         Current Outpatient Medications:      amLODIPine (NORVASC) 5 MG tablet, Take 1 tablet (5 mg) by mouth daily, Disp: 30 tablet, Rfl: 0     aspirin 81 MG tablet, Take 1 tablet by mouth daily, Disp: , Rfl:      Buprenorphine HCl (BELBUCA) 900 MCG FILM buccal film, Belbuca 900 mcg buccal film  PLACE 1 FILM INSIDE CHEEK EVERY 12 HOURS, Disp: , Rfl:      buprenorphine HCl-naloxone HCl (SUBOXONE) 2-0.5 MG per film, Place 1 Film under the tongue 3 times daily May fill/start 8/11/22, Disp: 42 each, Rfl: 0     ergocalciferol (ERGOCALCIFEROL) 1.25 MG (23145 UT) capsule, Take 1 capsule by mouth, Disp: , Rfl:      FISH OIL, 1,400 mg daily, Disp: , Rfl:      fluticasone (FLONASE) 50 MCG/ACT nasal spray, fluticasone propionate 50 mcg/actuation nasal spray,suspension  SHAKE LIQUID AND USE 1 TO 2 SPRAYS IN EACH NOSTRIL EVERY DAY, Disp: , Rfl:      gentamicin (GARAMYCIN) 0.1 % external ointment, , Disp: , Rfl:      HYDROcodone-acetaminophen (NORCO) 5-325 MG tablet, Take 1 tablet by mouth 2 times daily as needed for severe pain, Disp: 60 tablet, Rfl: 0     ipratropium (ATROVENT) 0.03 % nasal spray, 2 sprays in each nostril, Disp: , Rfl:      ketorolac (TORADOL) 10 MG tablet, , Disp: , Rfl:      latanoprost (XALATAN) 0.005 % ophthalmic solution, , Disp: , Rfl:      LATANOPROST OP, Place 1 drop into the right eye At Bedtime, Disp: , Rfl:      metoprolol succinate ER (TOPROL-XL) 100 MG 24 hr tablet, TAKE ONE AND ONE-HALF TABLETS ONCE DAILY, Disp: , Rfl:      Multiple Vitamin (DAILY MULTIVITAMIN PO), Take 1 tablet by mouth daily, Disp: , Rfl:       naloxone (NARCAN) 4 MG/0.1ML nasal spray, Spray 1 spray (4 mg) into one nostril alternating nostrils once as needed for opioid reversal every 2-3 minutes until assistance arrives, Disp: 0.2 mL, Rfl: 0     oxybutynin (DITROPAN) 5 MG tablet, oxybutynin chloride 5 mg tablet  TAKE 1 TABLET BY MOUTH TWICE DAILY AS NEEDED, Disp: , Rfl:      pantoprazole (PROTONIX) 40 MG enteric coated tablet, Take 1 tablet by mouth daily, Disp: , Rfl:      pregabalin (LYRICA) 100 MG capsule, pregabalin 100 mg capsule 2 times a day, Disp: 60 capsule, Rfl: 3     rosuvastatin (CRESTOR) 10 MG tablet, Take 1 tablet by mouth every 24 hours, Disp: , Rfl:      senna-docusate (SENOKOT-S;PERICOLACE) 8.6-50 MG per tablet, Take 1 tablet by mouth daily as needed for constipation, Disp: 7 tablet, Rfl: 0     triamcinolone (KENALOG) 0.1 % external cream, triamcinolone acetonide 0.1 % topical cream  APPLY TO RASH TOPICALLY AS NEEDED, Disp: , Rfl:      triamcinolone (KENALOG) 0.1 % ointment, Apply topically as needed, Disp: , Rfl:      triamterene-hydrochlorothiazide (MAXZIDE-25) 37.5-25 MG per tablet, Take 1 tablet by mouth daily, Disp: , Rfl:     Review of Systems - 10 point Review of Systems is negative except for left foot ulcer which is noted in HPI.      OBJECTIVE:  BP (!) 150/75   Pulse 64   Resp 16   General appearance: Patient is alert and fully cooperative with history & exam.  No sign of distress is noted during the visit.    Vascular: Dorsalis pedis palpableLeft.  Dermatologic:    VASC Wound Left foot (Active)   Pre Size Length 0 09/27/22 1400   Pre Size Width 0 09/27/22 1400   Pre Size Depth 0 09/27/22 1400   Pre Total Sq cm 0 09/27/22 1400   Post Size Length 0.2 09/27/22 1400   Post Size Width 0.2 09/27/22 1400   Post Size Depth 0.2 09/27/22 1400   Post Total Sq cm 0.04 09/27/22 1400   Description callous 09/27/22 1400   Product Used Medihoney 09/27/22 1400   Mixed granular/fibrotic tissue left foot ulcer, no erythema.   Neurologic:  Diminished to light touch Left.  Musculoskeletal: Contracted digits noted Left.    Imaging:     No results found.       Picture: None

## 2022-09-27 NOTE — PATIENT INSTRUCTIONS
"Important lnstructions          WEIGHT BEARING STATUS: You are to remain NON WEIGHT BEARING on your left foot. NON WEIGHT BEARING MEANS NO PRESSURE ON YOUR FOOT OR HEEL AT ANY TIME FOR ANY REASON!    2. OFFLOADING DEVICE: Must use a A ROLLING KNEE WALKER at all times! (do not use affected foot to push wheelchair)    3. STABILIZATION DEVICE: Use a CAM BOOT . You will need to WEAR THIS ANYTIME YOU ARE UP AND OUT OF BED, IT IS OKAY TO REMOVE WHEN YOU ARE SLEEPING..       4. ELEVATE: Elevating your leg means laying with your head on a pillow and your foot ABOVE YOUR WAIST.     5. DO NOT MOVE YOUR FOOT.  There is a risk of worsening the wound or incision. To give yourself a higher chance of healing, please DO NOT swing foot back and forth and wiggle foot/toes especially when inside a stabilization device.        Dressing Change lnstructions                 3 TIMES PER WEEK and as needed, Cleanse your foot wound(s) with Normal saline.    Pat Dry with non-sterile gauze    Primary Dressing: Apply Medihoney or Manuka honey into/onto the wounds    Secondary dressing: Cover with dry gauze    Secure with non-sterile roll gauze (4\" x 75\" roll) and tape (1\" roll tape) as needed; avoid adhesive directly on the skin    It IS NOT ok to get your wound wet in the bath or shower    SEEK MEDICAL CARE IF:  You have an increase in swelling, pain, or redness around the wound.  You have an increase in the amount of pus coming from the wound.  There is a bad smell coming from the wound.  The wound appears to be worsening/enlarging  You have a fever greater than 101.5 F      It is ok to continue current wound care treatment/products for the next 2-3 days until new wound care supplies are ordered and arrive. If longer than this please contact our office at [phone].        We want to hear from you!   In the next few weeks, you should receive a call or email to complete a survey about your visit at [facility]. Please help us " improve your appointment experience by letting us know how we did today. We strive to make your experience good and value any ways in which we could do better.      We value your input and suggestions.    Thank you for choosing the [facility]!

## 2022-10-12 NOTE — ADDENDUM NOTE
Encounter addended by: Thiago Kevin, PT on: 10/12/2022 12:31 PM   Actions taken: Episode resolved, Clinical Note Signed

## 2022-10-12 NOTE — PROGRESS NOTES
"[facility]    Outpatient Physical Therapy Discharge Note  Patient: Vernon Lemus  : 1940    Beginning/End Dates of Reporting Period:  Evaluation only on 22    Referring Provider: Dr. Colby    Therapy Diagnosis: chronic abdominal pain     Client Self Report:      Objective Measurements:      Outcome Measures (most recent score):      Goals:  Goal Identifier 1   Goal Description Pt will decrease pain from 0-9/10 to 0-6/10 with ADLs in 90 days.   Target Date 22   Date Met      Progress (detail required for progress note):       Goal Identifier 2   Goal Description Pt will verbalize ability to sit >45\" without an increase in pain in 90 days.   Target Date 22   Date Met      Progress (detail required for progress note):       Goal Identifier 3   Goal Description Pt will decrease need for daily medications in 90 days.   Target Date 22   Date Met      Progress (detail required for progress note):       Goal Identifier     Goal Description     Target Date     Date Met      Progress (detail required for progress note):       Goal Identifier     Goal Description     Target Date     Date Met      Progress (detail required for progress note):       Goal Identifier     Goal Description     Target Date     Date Met      Progress (detail required for progress note):       Goal Identifier     Goal Description     Target Date     Date Met      Progress (detail required for progress note):       Goal Identifier     Goal Description     Target Date     Date Met      Progress (detail required for progress note):             Plan:  Discharge from therapy.    Discharge:    Reason for Discharge: Patient has failed to schedule further appointments.    Equipment Issued:     Discharge Plan: Patient to continue home program.  "

## 2022-10-18 ENCOUNTER — OFFICE VISIT (OUTPATIENT)
Dept: VASCULAR SURGERY | Facility: CLINIC | Age: 82
End: 2022-10-18
Attending: PODIATRIST
Payer: COMMERCIAL

## 2022-10-18 VITALS
RESPIRATION RATE: 18 BRPM | TEMPERATURE: 97.6 F | SYSTOLIC BLOOD PRESSURE: 138 MMHG | DIASTOLIC BLOOD PRESSURE: 68 MMHG | HEART RATE: 68 BPM

## 2022-10-18 DIAGNOSIS — L97.421 DIABETIC ULCER OF LEFT MIDFOOT ASSOCIATED WITH TYPE 2 DIABETES MELLITUS, LIMITED TO BREAKDOWN OF SKIN (H): Primary | ICD-10-CM

## 2022-10-18 DIAGNOSIS — E11.621 DIABETIC ULCER OF LEFT MIDFOOT ASSOCIATED WITH TYPE 2 DIABETES MELLITUS, LIMITED TO BREAKDOWN OF SKIN (H): Primary | ICD-10-CM

## 2022-10-18 PROCEDURE — 11042 DBRDMT SUBQ TIS 1ST 20SQCM/<: CPT | Performed by: PODIATRIST

## 2022-10-18 NOTE — PATIENT INSTRUCTIONS
Important lnstructions          WEIGHT BEARING STATUS: You are to remain NON WEIGHT BEARING on your left foot. NON WEIGHT BEARING MEANS NO PRESSURE ON YOUR FOOT OR HEEL AT ANY TIME FOR ANY REASON!    2. OFFLOADING DEVICE: Must use a A ROLLING KNEE WALKER at all times! (do not use affected foot to push wheelchair)    3. STABILIZATION DEVICE: Use a CAM BOOT . You will need to WEAR THIS ANYTIME YOU ARE UP AND OUT OF BED, IT IS OKAY TO REMOVE WHEN YOU ARE SLEEPING..       4. ELEVATE: Elevating your leg means laying with your head on a pillow and your foot ABOVE YOUR WAIST.     5. DO NOT MOVE YOUR FOOT.  There is a risk of worsening the wound or incision. To give yourself a higher chance of healing, please DO NOT swing foot back and forth and wiggle foot/toes especially when inside a stabilization device.        Dressing Change lnstructions                 - Dressing Application of  Endoform    1. Endoform should be cut to the size of the wound.  It should touch the edges of the ulcer. It is okay if it overlap the edges a small amount.  Then, moisten the Endoform with saline.(If it is easier for you, you can moisten it before laying it in the wound)    2. Cover the wound with Endoform, followed by dry gauze, and secure with roll gauze if needed.     3. Endoform is naturally used by the body over time so you may not find it in the wound when you change your dressing.  If you do find some, gently cleanse the wound with saline. Do not remove the remaining Endoform, which may appear as an off-white to cronin gel, just add Endoform on top.  Usually, more Endoform will need to be added every 5-7 days.     4. Change your top dressing every 1-2 days or as needed depending on drainage.    -Endoform is a collagen dressing created from ovine (sheep) fore-stomach tissue. The collagen extracellular matrix transforms into a soft conforming sheet, which is naturally incorporated into the wound over time.  Collagen dressings are used to  stimulate wound healing.   ,       It IS NOT ok to get your wound wet in the bath or shower    SEEK MEDICAL CARE IF:  You have an increase in swelling, pain, or redness around the wound.  You have an increase in the amount of pus coming from the wound.  There is a bad smell coming from the wound.  The wound appears to be worsening/enlarging  You have a fever greater than 101.5 F      It is ok to continue current wound care treatment/products for the next 2-3 days until new wound care supplies are ordered and arrive. If longer than this please contact our office at [phone].        We want to hear from you!   In the next few weeks, you should receive a call or email to complete a survey about your visit at [facility]. Please help us improve your appointment experience by letting us know how we did today. We strive to make your experience good and value any ways in which we could do better.      We value your input and suggestions.    Thank you for choosing the [facility]!

## 2022-10-18 NOTE — PROGRESS NOTES
FOOT AND ANKLE SURGERY/PODIATRY Progress Note      ASSESSMENT:   Diabetic Ulceration left foot  Pes Planovalgus  Charcot       TREATMENT:  -The left foot ulcer is measuring similar to his previous visit. We discussed the importance of non-weight bearing with the knee walker, CAM boot for stability.     -After discussion of risk factors and consent obtained 2% Lidocaine HCL jelly was applied, under clean conditions, the left and foot ulceration(s) were debrided using #15 blade scalpel.  Devitalized and nonviable tissue, along with any fibrin and slough, was removed to improve granulation tissue formation, stimulate wound healing, decrease overall bacteria load, disrupt biofilm formation and decrease edge senescence. Wound drainage was scant No. Total excisional debridement was 0.04 sq cm into the subcutaneous tissue with a depth of 0.2 cm.   Ulcers were improved afterwards and .  Measures were as noted on the flow sheet. Endoform applied today with a gauze dressing which he will continue to apply.     -He will follow-up in 3 weeks.    Gorge Candelario DPM  [facility]      HPI: Vernon Lemus was seen again today for a left foot ulceration. He has used the knee walker to remain mostly non-weight bearing, CAM boot for stability.       Past Medical History:   Diagnosis Date     Abdominal pain      Abdominal pain      Arthritis     hands     Cancer (H)     1997 prostate cancer     Cancer (H)     prostate     Chronic pancreatitis (H)      GERD (gastroesophageal reflux disease)      Glaucoma     right eye     History of basal cell cancer     left arm     Hypertension      Hypertension      Melanoma (H)     removed from neck     Mixed hyperlipidemia 10/7/2013     Polyneuropathy     both ankles and feet     Recurrent pancreatitis      Seasonal allergies      Shingles     right leg       Past Surgical History:   Procedure Laterality Date     CARPAL TUNNEL RELEASE RT/LT      right     CATARACT  EXTRACTION Bilateral      CATARACT IOL, RT/LT      left eye     ENDOSCOPIC RETROGRADE CHOLANGIOPANCREATOGRAM       ENDOSCOPIC RETROGRADE CHOLANGIOPANCREATOGRAM  1/30/2014    Procedure: ENDOSCOPIC RETROGRADE CHOLANGIOPANCREATOGRAM;  Endoscopic Retrograde Cholangopancreatogram with  biliary and pancreatic sphincterotomy, pancreatic duct dilation, and pancreatic and biliary stent placement;  Surgeon: Julius Agosto MD;  Location: [facility] OR     ENDOSCOPIC RETROGRADE CHOLANGIOPANCREATOGRAM  2/18/2014    Procedure: ENDOSCOPIC RETROGRADE CHOLANGIOPANCREATOGRAM;  endoscopic retrograde cholangiopancreatogram with minor sphincterotomy, stent placement;  Surgeon: Julius Agosto MD;  Location: [facility] OR     ENDOSCOPIC RETROGRADE CHOLANGIOPANCREATOGRAM       ESOPHAGOSCOPY, GASTROSCOPY, DUODENOSCOPY (EGD), COMBINED  3/18/2014    Procedure: COMBINED ESOPHAGOSCOPY, GASTROSCOPY, DUODENOSCOPY (EGD), REMOVE FOREIGN BODY;;  Surgeon: Concepcion Keen MD;  Location:  GI     ESOPHAGOSCOPY, GASTROSCOPY, DUODENOSCOPY (EGD), COMBINED N/A 8/11/2017    Procedure: ENDOSCOPIC ULTRASOUND;  Surgeon: Eleazar Verma MD;  Location: [facility] [place];  Service:       UGI ENDOSCOPY W EUS  1/16/2014    Procedure: COMBINED ENDOSCOPIC ULTRASOUND, ESOPHAGOSCOPY, GASTROSCOPY, DUODENOSCOPY (EGD);  Surgeon: Michael Monahan MD;  Location:  GI     IMPLANT PROSTHESIS SPHINCTER URINARY      urethral sphincter     INCONTINENCE SURGERY       INJECT TRANSVERSUS ABDOMINIS PLANE (TAP) BLOCK BILATERAL Bilateral 12/14/2021    Procedure: BLOCK, TRANSVERSUS ABDOMINIS PLANE;  Surgeon: Chapin Lawson MD;  [place] [facility] [place]     IR EXTREMITY ANGIOGRAM LEFT  10/7/2020     IR LOWER EXTREMITY ANGIOGRAM LEFT  10/7/2020     OTHER SURGICAL HISTORY      urinary sphincter transplantwith 2 subsequent revisions     OTHER SURGICAL HISTORY      removal of melanoma     PROSTATE SURGERY       PROSTATECTOMY       RELEASE CARPAL TUNNEL Right      UPPER EUS          Allergies   Allergen Reactions     Atorvastatin      Other reaction(s): myalgias     Cat Dander [Animal Dander] Itching     Sneezing     Cilostazol Other (See Comments)     Gabapentin Other (See Comments)     Morphine Other (See Comments)     Causes agitation     Pollen Extracts [Pollen Extract] Itching     sneezing     Tizanidine Dizziness     At higher doses         Current Outpatient Medications:      amLODIPine (NORVASC) 5 MG tablet, Take 1 tablet (5 mg) by mouth daily, Disp: 30 tablet, Rfl: 0     aspirin 81 MG tablet, Take 1 tablet by mouth daily, Disp: , Rfl:      Buprenorphine HCl (BELBUCA) 900 MCG FILM buccal film, Belbuca 900 mcg buccal film  PLACE 1 FILM INSIDE CHEEK EVERY 12 HOURS, Disp: , Rfl:      buprenorphine HCl-naloxone HCl (SUBOXONE) 2-0.5 MG per film, Place 1 Film under the tongue 3 times daily May fill/start 8/11/22, Disp: 42 each, Rfl: 0     ergocalciferol (ERGOCALCIFEROL) 1.25 MG (29514 UT) capsule, Take 1 capsule by mouth, Disp: , Rfl:      FISH OIL, 1,400 mg daily, Disp: , Rfl:      fluticasone (FLONASE) 50 MCG/ACT nasal spray, fluticasone propionate 50 mcg/actuation nasal spray,suspension  SHAKE LIQUID AND USE 1 TO 2 SPRAYS IN EACH NOSTRIL EVERY DAY, Disp: , Rfl:      gentamicin (GARAMYCIN) 0.1 % external ointment, , Disp: , Rfl:      HYDROcodone-acetaminophen (NORCO) 5-325 MG tablet, Take 1 tablet by mouth 2 times daily as needed for severe pain, Disp: 60 tablet, Rfl: 0     ipratropium (ATROVENT) 0.03 % nasal spray, 2 sprays in each nostril, Disp: , Rfl:      ketorolac (TORADOL) 10 MG tablet, , Disp: , Rfl:      latanoprost (XALATAN) 0.005 % ophthalmic solution, , Disp: , Rfl:      LATANOPROST OP, Place 1 drop into the right eye At Bedtime, Disp: , Rfl:      metoprolol succinate ER (TOPROL-XL) 100 MG 24 hr tablet, TAKE ONE AND ONE-HALF TABLETS ONCE DAILY, Disp: , Rfl:      Multiple Vitamin (DAILY MULTIVITAMIN PO), Take 1 tablet by mouth daily, Disp: , Rfl:      naloxone (NARCAN) 4  MG/0.1ML nasal spray, Spray 1 spray (4 mg) into one nostril alternating nostrils once as needed for opioid reversal every 2-3 minutes until assistance arrives, Disp: 0.2 mL, Rfl: 0     oxybutynin (DITROPAN) 5 MG tablet, oxybutynin chloride 5 mg tablet  TAKE 1 TABLET BY MOUTH TWICE DAILY AS NEEDED, Disp: , Rfl:      pantoprazole (PROTONIX) 40 MG enteric coated tablet, Take 1 tablet by mouth daily, Disp: , Rfl:      pregabalin (LYRICA) 100 MG capsule, pregabalin 100 mg capsule 2 times a day, Disp: 60 capsule, Rfl: 3     rosuvastatin (CRESTOR) 10 MG tablet, Take 1 tablet by mouth every 24 hours, Disp: , Rfl:      senna-docusate (SENOKOT-S;PERICOLACE) 8.6-50 MG per tablet, Take 1 tablet by mouth daily as needed for constipation, Disp: 7 tablet, Rfl: 0     triamcinolone (KENALOG) 0.1 % external cream, triamcinolone acetonide 0.1 % topical cream  APPLY TO RASH TOPICALLY AS NEEDED, Disp: , Rfl:      triamcinolone (KENALOG) 0.1 % ointment, Apply topically as needed, Disp: , Rfl:      triamterene-hydrochlorothiazide (MAXZIDE-25) 37.5-25 MG per tablet, Take 1 tablet by mouth daily, Disp: , Rfl:     Review of Systems - 10 point Review of Systems is negative except for left foot ulcer which is noted in HPI.      OBJECTIVE:  /68   Pulse 68   Temp 97.6  F (36.4  C) (Oral)   Resp 18   General appearance: Patient is alert and fully cooperative with history & exam.  No sign of distress is noted during the visit.    Vascular: Dorsalis pedis palpableLeft.  Dermatologic:    VASC Wound Left foot (Active)   Pre Size Length 0 09/27/22 1400   Pre Size Width 0 09/27/22 1400   Pre Size Depth 0 09/27/22 1400   Pre Total Sq cm 0 09/27/22 1400   Post Size Length 0.2 09/27/22 1400   Post Size Width 0.2 09/27/22 1400   Post Size Depth 0.2 09/27/22 1400   Post Total Sq cm 0.04 09/27/22 1400   Description callous 09/27/22 1400   Product Used Medihoney 09/27/22 1400   Granular tissue left foot ulcer, no erythema.   Neurologic: Diminished to  light touch Left.  Musculoskeletal: Contracted digits noted Left.    Imaging:     No results found.

## 2022-11-08 ENCOUNTER — OFFICE VISIT (OUTPATIENT)
Dept: VASCULAR SURGERY | Facility: CLINIC | Age: 82
End: 2022-11-08
Attending: PODIATRIST
Payer: COMMERCIAL

## 2022-11-08 VITALS — HEART RATE: 80 BPM | RESPIRATION RATE: 16 BRPM | SYSTOLIC BLOOD PRESSURE: 140 MMHG | DIASTOLIC BLOOD PRESSURE: 80 MMHG

## 2022-11-08 DIAGNOSIS — E11.621 DIABETIC ULCER OF LEFT MIDFOOT ASSOCIATED WITH TYPE 2 DIABETES MELLITUS, LIMITED TO BREAKDOWN OF SKIN (H): Primary | ICD-10-CM

## 2022-11-08 DIAGNOSIS — L97.421 DIABETIC ULCER OF LEFT MIDFOOT ASSOCIATED WITH TYPE 2 DIABETES MELLITUS, LIMITED TO BREAKDOWN OF SKIN (H): Primary | ICD-10-CM

## 2022-11-08 DIAGNOSIS — E11.610 CHARCOT FOOT DUE TO DIABETES MELLITUS (H): ICD-10-CM

## 2022-11-08 PROCEDURE — G0463 HOSPITAL OUTPT CLINIC VISIT: HCPCS

## 2022-11-08 PROCEDURE — 99212 OFFICE O/P EST SF 10 MIN: CPT | Performed by: PODIATRIST

## 2022-11-08 ASSESSMENT — PAIN SCALES - GENERAL: PAINLEVEL: NO PAIN (0)

## 2022-11-08 NOTE — PATIENT INSTRUCTIONS
Congratulations! Your wound has healed.    For the next 2 weeks Dr. Candelario would like you to remain NON WEIGHT BEARING MEANS NO PRESSURE ON YOUR FOOT OR HEEL AT ANY TIME FOR ANY REASON! on your right foot with the use of your CAM BOOT, to allow the newly healed skin to become stronger.     After 11/22 you can begin walking in your CAM boot until your new shoes arrive.    You may begin showering as normal in 1 weeks    You should avoid soaking your right foot for the next  4 weeks, this includes swimming and hot tubs.    Continue to monitor the area for breakdown & call us if your wound reopens.    Allow the steri strips to fall off on their own, do not pull these off.     We want to hear from you!   In the next few weeks, you should receive a call or email to complete a survey about your visit at [facility]. Please help us improve your appointment experience by letting us know how we did today. We strive to make your experience good and value any ways in which we could do better.      We value your input and suggestions.    Thank you for choosing the [facility]!      [facility] LOC[facility]  [facility]  [address]  [place] [address]  Phone: [phone]  Fax: [phone] [facility]  [address] [place] [address]  Home Medical Equipment, [address] Phone: [phone]  Orthotics and Prosthetics, [address]  Phone [phone] [facility]  [address]  [place] [address]  Phone: [phone]   Fax: [phone]   [facility]  [address]  [place] [address]  Phone: [phone]  Fax: [phone]  [facility]  [address]  [place] [address]  Phone: [phone]   Fax: [phone]   [facility]   [address]  [place] [address]  Phone:  [phone]  Fax: [phone] [facility]   [address]  [place] [address]  Phone: [phone] [place]  [address]  [facility] [place] [address]  Phone :[phone]             WEARING YOUR CUSTOM FOOT ORTHOTICS   Most insurance plans cover one pair of orthotics per year. You must check with your   insurance plan to see what your payment responsibility will be. Please call your   insurance company by calling the number on the back of your insurance card.   Orthotic's are non-refundable and non-returnable.   Orthotics are made of various designs. Some orthotics are covered with material that extends beyond your toes. If your orthotic is of this design, you will likely need to trim the toe end to get a proper fit. The insole from your shoe can be used as a template. Simply overlay the shoe insert on top of the custom orthotic. Align the heel end while tracing the length of the insert onto the custom orthotic. Use a large scissor to trim the toe end until you get a proper fit in the shoe.   The orthotic needs to be pushed as far back in the shoe as possible. The heel portion should not ride forward so as not to irritate your heel.   Orthotics are designed to work with socks. Excessive perspiration will shorten the life span of the orthotics. Remove the orthotic from the shoe frequently for proper drying.   The break-in period lasts for weeks. People new to orthotics will likely experience new aches and pains. The orthotic is forcing your foot into a new position. Arch, foot and leg muscle aches and fatigue are common during these weeks. Minor discomfort can be considered normal break in phenomenon. Start wearing your orthotic around your home your first day. Limited activity for one to two hours is recommended. You can increase one or two additional hours each day provided the aches and pains are subsiding. The degree of discomfort, fatigue and problems will dictate  the speed of break in. You may require multiple weeks to work up to full time use.   Do not continue wearing your orthotics if they are creating problems such as blisters or sores. Do not hesitate to call the clinic to speak with a nurse regarding orthotic   break in, fit, trimming, etc. You may also need to see the doctor if the orthotics are   simply not working out. Adjustments are sometimes made to improve orthotic   function.     Orthotics will only work in certain styles and types of shoes. Orthotics rarely work in dress shoes. Slip-ons, clogs, sandals and heels are particularly troublesome. Specially designed orthotics may be necessary for these types of shoes. Your custom orthotic was designed for activities that require appropriate walking or running shoes. Lace up athletic shoes, walking shoes or work boots should work appropriately. You may need a wider or longer shoe. Shoes with a removable  or insert work best. In general, you want to remove an insert from the shoe before placing the orthotic into the shoe. Shoes without a removable liner may not work as well.     When purchasing new shoes, bring your orthotics along to get a proper fit. Shop at stores that are familiar with orthotics.   Frequent washing of the orthotic may shorten the life span of the top cover. The top cover can be replaced but will generally last one to five years depending on use and foot perspiration.

## 2022-11-08 NOTE — PROGRESS NOTES
FOOT AND ANKLE SURGERY/PODIATRY Progress Note      ASSESSMENT:   Diabetic Ulceration left foot  Pes Planovalgus  Charcot        TREATMENT:  -The left foot ulceration has resolved.     -We discussed that due to his anatomy, there is a pressure point which will continue to build callus tissue. If the callus tissue becomes too thick, skin breakdown will likely occur.     -I recommend use of a pumice stone x2-3 per week to remove callus tissue. I have asked that he return for sharp debridement of the callus prn.     -I have referred him to [facility] for diabetic shoes and inserts to offload the area of increased skin pressure. He will continue to remain non-weight bearing x2 weeks, then use the CAM boot until the diabetic inserts/shoes are available.     -He is discharged from my care at this time but encouraged to return as concerns develop.     Gorge Candelario DPM  [facility]      HPI: Vernon Lemus was seen again today for a left foot ulceration. He has remained mostly non-weight bearing with a knee walker.       Past Medical History:   Diagnosis Date     Abdominal pain      Abdominal pain      Arthritis     hands     Cancer (H)     1997 prostate cancer     Cancer (H)     prostate     Chronic pancreatitis (H)      GERD (gastroesophageal reflux disease)      Glaucoma     right eye     History of basal cell cancer     left arm     Hypertension      Hypertension      Melanoma (H)     removed from neck     Mixed hyperlipidemia 10/7/2013     Polyneuropathy     both ankles and feet     Recurrent pancreatitis      Seasonal allergies      Shingles     right leg       Past Surgical History:   Procedure Laterality Date     CARPAL TUNNEL RELEASE RT/LT      right     CATARACT EXTRACTION Bilateral      CATARACT IOL, RT/LT      left eye     ENDOSCOPIC RETROGRADE CHOLANGIOPANCREATOGRAM       ENDOSCOPIC RETROGRADE CHOLANGIOPANCREATOGRAM  1/30/2014    Procedure: ENDOSCOPIC RETROGRADE  CHOLANGIOPANCREATOGRAM;  Endoscopic Retrograde Cholangopancreatogram with  biliary and pancreatic sphincterotomy, pancreatic duct dilation, and pancreatic and biliary stent placement;  Surgeon: Julius Agotso MD;  Location: [facility] OR     ENDOSCOPIC RETROGRADE CHOLANGIOPANCREATOGRAM  2/18/2014    Procedure: ENDOSCOPIC RETROGRADE CHOLANGIOPANCREATOGRAM;  endoscopic retrograde cholangiopancreatogram with minor sphincterotomy, stent placement;  Surgeon: Julius Agosto MD;  Location: [facility] OR     ENDOSCOPIC RETROGRADE CHOLANGIOPANCREATOGRAM       ESOPHAGOSCOPY, GASTROSCOPY, DUODENOSCOPY (EGD), COMBINED  3/18/2014    Procedure: COMBINED ESOPHAGOSCOPY, GASTROSCOPY, DUODENOSCOPY (EGD), REMOVE FOREIGN BODY;;  Surgeon: Concepcion Keen MD;  Location: [facility]     ESOPHAGOSCOPY, GASTROSCOPY, DUODENOSCOPY (EGD), COMBINED N/A 8/11/2017    Procedure: ENDOSCOPIC ULTRASOUND;  Surgeon: Eelazar Verma MD;  [place] [facility]  Service:       [facility] END[facility]COPY W EUS  1/16/2014    Procedure: COMBINED ENDOSCOPIC ULTRASOUND, ESOPHAGOSCOPY, GASTROSCOPY, DUODENOSCOPY (EGD);  Surgeon: Michael Monahan MD;  Location: [facility]     IMPLANT PROSTHESIS SPHINCTER URINARY      urethral sphincter     INCONTINENCE SURGERY       INJECT TRANSVERSUS ABDOMINIS PLANE (TAP) BLOCK BILATERAL Bilateral 12/14/2021    Procedure: BLOCK, TRANSVERSUS ABDOMINIS PLANE;  Surgeon: Chapin Lawson MD;  Location: [facility] OR     IR EXTREMITY ANGIOGRAM LEFT  10/7/2020     IR LOWER EXTREMITY ANGIOGRAM LEFT  10/7/2020     OTHER SURGICAL HISTORY      urinary sphincter transplantwith 2 subsequent revisions     OTHER SURGICAL HISTORY      removal of melanoma     PROSTATE SURGERY       PROSTATECTOMY       RELEASE CARPAL TUNNEL Right      UPPER EUS         Allergies   Allergen Reactions     Atorvastatin      Other reaction(s): myalgias     Cat Dander [Animal Dander] Itching     Sneezing     Cilostazol Other (See Comments)     Gabapentin Other (See Comments)      Morphine Other (See Comments)     Causes agitation     Pollen Extracts [Pollen Extract] Itching     sneezing     Tizanidine Dizziness     At higher doses         Current Outpatient Medications:      amLODIPine (NORVASC) 5 MG tablet, Take 1 tablet (5 mg) by mouth daily, Disp: 30 tablet, Rfl: 0     aspirin 81 MG tablet, Take 1 tablet by mouth daily, Disp: , Rfl:      Buprenorphine HCl (BELBUCA) 900 MCG FILM buccal film, Belbuca 900 mcg buccal film  PLACE 1 FILM INSIDE CHEEK EVERY 12 HOURS, Disp: , Rfl:      buprenorphine HCl-naloxone HCl (SUBOXONE) 2-0.5 MG per film, Place 1 Film under the tongue 3 times daily May fill/start 8/11/22, Disp: 42 each, Rfl: 0     ergocalciferol (ERGOCALCIFEROL) 1.25 MG (45065 UT) capsule, Take 1 capsule by mouth, Disp: , Rfl:      FISH OIL, 1,400 mg daily, Disp: , Rfl:      fluticasone (FLONASE) 50 MCG/ACT nasal spray, fluticasone propionate 50 mcg/actuation nasal spray,suspension  SHAKE LIQUID AND USE 1 TO 2 SPRAYS IN EACH NOSTRIL EVERY DAY, Disp: , Rfl:      gentamicin (GARAMYCIN) 0.1 % external ointment, , Disp: , Rfl:      HYDROcodone-acetaminophen (NORCO) 5-325 MG tablet, Take 1 tablet by mouth 2 times daily as needed for severe pain, Disp: 60 tablet, Rfl: 0     ipratropium (ATROVENT) 0.03 % nasal spray, 2 sprays in each nostril, Disp: , Rfl:      ketorolac (TORADOL) 10 MG tablet, , Disp: , Rfl:      latanoprost (XALATAN) 0.005 % ophthalmic solution, , Disp: , Rfl:      LATANOPROST OP, Place 1 drop into the right eye At Bedtime, Disp: , Rfl:      metoprolol succinate ER (TOPROL-XL) 100 MG 24 hr tablet, TAKE ONE AND ONE-HALF TABLETS ONCE DAILY, Disp: , Rfl:      Multiple Vitamin (DAILY MULTIVITAMIN PO), Take 1 tablet by mouth daily, Disp: , Rfl:      naloxone (NARCAN) 4 MG/0.1ML nasal spray, Spray 1 spray (4 mg) into one nostril alternating nostrils once as needed for opioid reversal every 2-3 minutes until assistance arrives, Disp: 0.2 mL, Rfl: 0     oxybutynin (DITROPAN) 5 MG  tablet, oxybutynin chloride 5 mg tablet  TAKE 1 TABLET BY MOUTH TWICE DAILY AS NEEDED, Disp: , Rfl:      pantoprazole (PROTONIX) 40 MG enteric coated tablet, Take 1 tablet by mouth daily, Disp: , Rfl:      pregabalin (LYRICA) 100 MG capsule, pregabalin 100 mg capsule 2 times a day, Disp: 60 capsule, Rfl: 3     rosuvastatin (CRESTOR) 10 MG tablet, Take 1 tablet by mouth every 24 hours, Disp: , Rfl:      senna-docusate (SENOKOT-S;PERICOLACE) 8.6-50 MG per tablet, Take 1 tablet by mouth daily as needed for constipation, Disp: 7 tablet, Rfl: 0     triamcinolone (KENALOG) 0.1 % external cream, triamcinolone acetonide 0.1 % topical cream  APPLY TO RASH TOPICALLY AS NEEDED, Disp: , Rfl:      triamcinolone (KENALOG) 0.1 % ointment, Apply topically as needed, Disp: , Rfl:      triamterene-hydrochlorothiazide (MAXZIDE-25) 37.5-25 MG per tablet, Take 1 tablet by mouth daily, Disp: , Rfl:     Review of Systems - 10 point Review of Systems is negative except for left foot ulcer which is noted in HPI.      OBJECTIVE:  BP (!) 140/80   Pulse 80   Resp 16   General appearance: Patient is alert and fully cooperative with history & exam.  No sign of distress is noted during the visit.    Vascular: Dorsalis pedis non-palpableLeft.  Dermatologic:    VASC Wound Left foot (Active)   Pre Size Length 0 11/08/22 1300   Pre Size Width 0 11/08/22 1300   Pre Size Depth 0 11/08/22 1300   Pre Total Sq cm 0 11/08/22 1300   Post Size Length 0 11/08/22 1300   Post Size Width 0 11/08/22 1300   Post Size Depth 0 11/08/22 1300   Post Total Sq cm 0 11/08/22 1300   Description callous 09/27/22 1400   Product Used Medihoney 09/27/22 1400     Neurologic: Diminished to light touch Left.  Musculoskeletal: Contracted digits noted Left.    Imaging:     No results found.       Picture:

## 2022-12-09 ENCOUNTER — LAB REQUISITION (OUTPATIENT)
Dept: LAB | Facility: CLINIC | Age: 82
End: 2022-12-09

## 2022-12-09 ENCOUNTER — LAB REQUISITION (OUTPATIENT)
Dept: LAB | Facility: CLINIC | Age: 82
End: 2022-12-09
Payer: COMMERCIAL

## 2022-12-09 DIAGNOSIS — I10 ESSENTIAL (PRIMARY) HYPERTENSION: ICD-10-CM

## 2022-12-09 DIAGNOSIS — Z01.812 ENCOUNTER FOR PREPROCEDURAL LABORATORY EXAMINATION: ICD-10-CM

## 2022-12-09 LAB
ANION GAP SERPL CALCULATED.3IONS-SCNC: 16 MMOL/L (ref 7–15)
BUN SERPL-MCNC: 30.9 MG/DL (ref 8–23)
CALCIUM SERPL-MCNC: 9.5 MG/DL (ref 8.8–10.2)
CHLORIDE SERPL-SCNC: 102 MMOL/L (ref 98–107)
CREAT SERPL-MCNC: 1.28 MG/DL (ref 0.67–1.17)
DEPRECATED HCO3 PLAS-SCNC: 24 MMOL/L (ref 22–29)
GFR SERPL CREATININE-BSD FRML MDRD: 56 ML/MIN/1.73M2
GLUCOSE SERPL-MCNC: 159 MG/DL (ref 70–99)
POTASSIUM SERPL-SCNC: 4.4 MMOL/L (ref 3.4–5.3)
SARS-COV-2 RNA RESP QL NAA+PROBE: NEGATIVE
SODIUM SERPL-SCNC: 142 MMOL/L (ref 136–145)

## 2022-12-09 PROCEDURE — U0003 INFECTIOUS AGENT DETECTION BY NUCLEIC ACID (DNA OR RNA); SEVERE ACUTE RESPIRATORY SYNDROME CORONAVIRUS 2 (SARS-COV-2) (CORONAVIRUS DISEASE [COVID-19]), AMPLIFIED PROBE TECHNIQUE, MAKING USE OF HIGH THROUGHPUT TECHNOLOGIES AS DESCRIBED BY CMS-2020-01-R: HCPCS | Mod: ORL | Performed by: STUDENT IN AN ORGANIZED HEALTH CARE EDUCATION/TRAINING PROGRAM

## 2022-12-09 PROCEDURE — 80048 BASIC METABOLIC PNL TOTAL CA: CPT | Performed by: STUDENT IN AN ORGANIZED HEALTH CARE EDUCATION/TRAINING PROGRAM

## 2023-01-21 ENCOUNTER — HEALTH MAINTENANCE LETTER (OUTPATIENT)
Age: 83
End: 2023-01-21

## 2023-02-15 ENCOUNTER — LAB REQUISITION (OUTPATIENT)
Dept: LAB | Facility: CLINIC | Age: 83
End: 2023-02-15

## 2023-02-15 ENCOUNTER — TRANSFERRED RECORDS (OUTPATIENT)
Dept: HEALTH INFORMATION MANAGEMENT | Facility: CLINIC | Age: 83
End: 2023-02-15

## 2023-02-15 DIAGNOSIS — I12.9 HYPERTENSIVE CHRONIC KIDNEY DISEASE WITH STAGE 1 THROUGH STAGE 4 CHRONIC KIDNEY DISEASE, OR UNSPECIFIED CHRONIC KIDNEY DISEASE: ICD-10-CM

## 2023-02-15 DIAGNOSIS — E55.9 VITAMIN D DEFICIENCY, UNSPECIFIED: ICD-10-CM

## 2023-02-15 DIAGNOSIS — E78.2 MIXED HYPERLIPIDEMIA: ICD-10-CM

## 2023-02-15 LAB
ALBUMIN SERPL BCG-MCNC: 4.1 G/DL (ref 3.5–5.2)
ALP SERPL-CCNC: 67 U/L (ref 40–129)
ALT SERPL W P-5'-P-CCNC: 21 U/L (ref 10–50)
ANION GAP SERPL CALCULATED.3IONS-SCNC: 16 MMOL/L (ref 7–15)
AST SERPL W P-5'-P-CCNC: 23 U/L (ref 10–50)
BILIRUB SERPL-MCNC: 0.3 MG/DL
BUN SERPL-MCNC: 38.3 MG/DL (ref 8–23)
CALCIUM SERPL-MCNC: 9 MG/DL (ref 8.8–10.2)
CHLORIDE SERPL-SCNC: 101 MMOL/L (ref 98–107)
CHOLEST SERPL-MCNC: 94 MG/DL
CREAT SERPL-MCNC: 1.41 MG/DL (ref 0.67–1.17)
DEPRECATED HCO3 PLAS-SCNC: 22 MMOL/L (ref 22–29)
GFR SERPL CREATININE-BSD FRML MDRD: 50 ML/MIN/1.73M2
GLUCOSE SERPL-MCNC: 157 MG/DL (ref 70–99)
HDLC SERPL-MCNC: 40 MG/DL
LDLC SERPL CALC-MCNC: 19 MG/DL
NONHDLC SERPL-MCNC: 54 MG/DL
POTASSIUM SERPL-SCNC: 4.3 MMOL/L (ref 3.4–5.3)
PROT SERPL-MCNC: 6.5 G/DL (ref 6.4–8.3)
SODIUM SERPL-SCNC: 139 MMOL/L (ref 136–145)
TRIGL SERPL-MCNC: 174 MG/DL

## 2023-02-15 PROCEDURE — 82306 VITAMIN D 25 HYDROXY: CPT | Performed by: FAMILY MEDICINE

## 2023-02-15 PROCEDURE — 80061 LIPID PANEL: CPT | Performed by: FAMILY MEDICINE

## 2023-02-15 PROCEDURE — 80053 COMPREHEN METABOLIC PANEL: CPT | Performed by: FAMILY MEDICINE

## 2023-02-16 LAB — DEPRECATED CALCIDIOL+CALCIFEROL SERPL-MC: 37 UG/L (ref 20–75)

## 2023-03-09 ENCOUNTER — TRANSFERRED RECORDS (OUTPATIENT)
Dept: HEALTH INFORMATION MANAGEMENT | Facility: CLINIC | Age: 83
End: 2023-03-09
Payer: COMMERCIAL

## 2023-03-09 LAB — EJECTION FRACTION: 68 %

## 2023-03-14 ENCOUNTER — MEDICAL CORRESPONDENCE (OUTPATIENT)
Dept: HEALTH INFORMATION MANAGEMENT | Facility: CLINIC | Age: 83
End: 2023-03-14
Payer: COMMERCIAL

## 2023-03-15 ENCOUNTER — TRANSCRIBE ORDERS (OUTPATIENT)
Dept: OTHER | Age: 83
End: 2023-03-15

## 2023-03-15 DIAGNOSIS — R93.1 ABNORMAL ECHOCARDIOGRAM: Primary | ICD-10-CM

## 2023-03-22 ENCOUNTER — OFFICE VISIT (OUTPATIENT)
Dept: CARDIOLOGY | Facility: CLINIC | Age: 83
End: 2023-03-22
Attending: FAMILY MEDICINE
Payer: COMMERCIAL

## 2023-03-22 VITALS
HEART RATE: 67 BPM | DIASTOLIC BLOOD PRESSURE: 64 MMHG | RESPIRATION RATE: 16 BRPM | OXYGEN SATURATION: 97 % | BODY MASS INDEX: 29.84 KG/M2 | SYSTOLIC BLOOD PRESSURE: 160 MMHG | WEIGHT: 220 LBS

## 2023-03-22 DIAGNOSIS — I25.10 CORONARY ARTERY DISEASE INVOLVING NATIVE CORONARY ARTERY OF NATIVE HEART WITHOUT ANGINA PECTORIS: Primary | ICD-10-CM

## 2023-03-22 PROCEDURE — 99204 OFFICE O/P NEW MOD 45 MIN: CPT | Performed by: INTERNAL MEDICINE

## 2023-03-22 RX ORDER — IPRATROPIUM BROMIDE 21 UG/1
1 SPRAY, METERED NASAL PRN
COMMUNITY
Start: 2023-02-18

## 2023-03-22 RX ORDER — SOLIFENACIN SUCCINATE 5 MG/1
1 TABLET, FILM COATED ORAL DAILY
COMMUNITY
Start: 2023-01-24

## 2023-03-22 NOTE — PATIENT INSTRUCTIONS
Lexiscan Myocardial perfusion scan to rule out significant coronary artery disease  No new medicines

## 2023-03-24 NOTE — H&P (VIEW-ONLY)
HEART CARE ENCOUNTER NOTE      [facility]  [phone]      Assessment/Recommendations   Assessment:   1.Abnormal LV function on echocardiogram,suspicious for ischemic heart disease.  This is the most difficult area of the left ventricle to visualize with an echo and this finding may be artifact.  However, the likelihood of underlying ischemic heart disease is significant.  He has multiple risk factors for coronary disease  including a positive family history, former history of heavy tobacco use, type II DM, hypertension, and hypertriglyceridemia.  He may need surgery or endoscopic procedures in the near future to evaluate this new finding of a pancreatic cyst.  So it will be important to know if he has significant underlying ischemic heart disease.    Plan:   1.  We will proceed with a Lexiscan myocardial perfusion study as suggested by Dr. Roblero.  Further cardiac evaluation will depend upon the results of the study.  No new medications at this time.           History of Present Illness/Subjective    HPI: Vernon Lemus is a 82 year old male without prior cardiac history who saw Dr. Angelo in follow-up in February.  He was heard to have a murmur which seem more significant than previous and an echocardiogram was ordered.  The echo study suggested mild aortic stenosis but with some inferobasilar hypokinesia which was new compared to a previous study.  Cardiac evaluation has been requested.  Mr. Lemus denies any exertional chest pain symptoms.  He fatigues more easily than previously but has not had a sudden falloff in exercise tolerance.  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  Cardiac risk factor profile includes former smoking history of 20 years use but he quit more than 30 years ago.  There is a history of cardiac or coronary disease in  his father which began at age 55.  There is a history of type 2 diabetes as well as hypertension.    Studies reviewed:  ECG: Pending  Echo: Performed 3/9/2023: Report personally reviewed.  Mild aortic stenosis.  Good left ventricular function.  Inferobasilar hypokinesia.  GXT: Pending         Physical Examination  Review of Systems   BP (!) 160/64 (BP Location: Right arm, Patient Position: Sitting, Cuff Size: Adult Regular)   Pulse 67   Resp 16   Wt 99.8 kg (220 lb)   SpO2 97%   BMI 29.84 kg/m    Body mass index is 29.84 kg/m .  Wt Readings from Last 3 Encounters:   03/22/23 99.8 kg (220 lb)   09/06/22 100.2 kg (221 lb)   07/11/22 100.2 kg (221 lb)       General Appearance:   Pleasant elderly male appears stated age. no acute distress, normal body habitus   ENT/Mouth: Oropharynx normal    EYES:  no scleral icterus, normal conjunctivae. No eyelid xanthelasma   Neck: No cervical lymphadenopathy  Thyroid not enlarged or nodular  No JVD   Respiratory:   lungs clear , no rales or wheezing, Normal chest wall expansion    Cardiovascular:   Regular rhythm, normal rate. Normal 1st and 2nd heart sounds.  2/6 systolic murmur, no rubs or gallops;   radial pulses are full and equal   Jugular venous pressure is not elevated   Abdomen/GI:  No tenderness, no organomegaly, no pulsatile masses. NBS.   Extremities: No cyanosis or clubbing.  Trace peripheral edema   Skin: No rash or lesions   Heme/lymph/ Immunology No lymphadenopathy, petechiae   Neurologic: Alert and oriented. No focal motor weakness, gait appears normal.       Psychiatric: Pleasant, calm, appropriate affect.          No known hepatic, renal, pulmonary, or CNS disorders. The remainder of his complete ROS is negative except as noted above.         Medical History  Surgical History Family History Social History   Past Medical History:   Diagnosis Date     Abdominal pain      Abdominal pain      Arthritis     hands     Cancer (H)     1997 prostate cancer     Cancer  (H)     prostate     Chronic pancreatitis (H)      GERD (gastroesophageal reflux disease)      Glaucoma     right eye     History of basal cell cancer     left arm     Hypertension      Hypertension      Melanoma (H)     removed from neck     Mixed hyperlipidemia 10/7/2013     Polyneuropathy     both ankles and feet     Recurrent pancreatitis      Seasonal allergies      Shingles     right leg     Past Surgical History:   Procedure Laterality Date     CARPAL TUNNEL RELEASE RT/LT      right     CATARACT EXTRACTION Bilateral      CATARACT IOL, RT/LT      left eye     ENDOSCOPIC RETROGRADE CHOLANGIOPANCREATOGRAM       ENDOSCOPIC RETROGRADE CHOLANGIOPANCREATOGRAM  1/30/2014    Procedure: ENDOSCOPIC RETROGRADE CHOLANGIOPANCREATOGRAM;  Endoscopic Retrograde Cholangopancreatogram with  biliary and pancreatic sphincterotomy, pancreatic duct dilation, and pancreatic and biliary stent placement;  Surgeon: Julius Agosto MD;  Location: [facility] OR     ENDOSCOPIC RETROGRADE CHOLANGIOPANCREATOGRAM  2/18/2014    Procedure: ENDOSCOPIC RETROGRADE CHOLANGIOPANCREATOGRAM;  endoscopic retrograde cholangiopancreatogram with minor sphincterotomy, stent placement;  Surgeon: Julius Agosto MD;  Location: [facility] OR     ENDOSCOPIC RETROGRADE CHOLANGIOPANCREATOGRAM       ESOPHAGOSCOPY, GASTROSCOPY, DUODENOSCOPY (EGD), COMBINED  3/18/2014    Procedure: COMBINED ESOPHAGOSCOPY, GASTROSCOPY, DUODENOSCOPY (EGD), REMOVE FOREIGN BODY;;  Surgeon: Concepcion Keen MD;  Location:  GI     ESOPHAGOSCOPY, GASTROSCOPY, DUODENOSCOPY (EGD), COMBINED N/A 8/11/2017    Procedure: ENDOSCOPIC ULTRASOUND;  Surgeon: Eleazar Verma MD;  Location: [facility];  Service:       [facility] ENDOSCOPY W EUS  1/16/2014    Procedure: COMBINED ENDOSCOPIC ULTRASOUND, ESOPHAGOSCOPY, GASTROSCOPY, DUODENOSCOPY (EGD);  Surgeon: Michael Monahan MD;  Location:  GI     IMPLANT PROSTHESIS SPHINCTER URINARY      urethral sphincter     INCONTINENCE SURGERY        INJECT TRANSVERSUS ABDOMINIS PLANE (TAP) BLOCK BILATERAL Bilateral 2021    Procedure: BLOCK, TRANSVERSUS ABDOMINIS PLANE;  Surgeon: Chapin Lawson MD;  Location: [facility] OR     IR EXTREMITY ANGIOGRAM LEFT  10/7/2020     IR LOWER EXTREMITY ANGIOGRAM LEFT  10/7/2020     OTHER SURGICAL HISTORY      urinary sphincter transplantwith 2 subsequent revisions     OTHER SURGICAL HISTORY      removal of melanoma     PROSTATE SURGERY       PROSTATECTOMY       RELEASE CARPAL TUNNEL Right      UPPER EUS       Family History   Problem Relation Age of Onset     Prostate Cancer Brother      C.A.D. Father      Prostate Cancer Brother      Hypertension Mother      Coronary Artery Disease Father      Hypertension Father         Social History     Socioeconomic History     Marital status:      Spouse name: Not on file     Number of children: Not on file     Years of education: Not on file     Highest education level: Not on file   Occupational History     Not on file   Tobacco Use     Smoking status: Former     Types: Cigarettes     Quit date: 8/10/1986     Years since quittin.6     Smokeless tobacco: Never   Substance and Sexual Activity     Alcohol use: No     Comment: 4 drinks a week/socially; 2013 stopped drinking     Drug use: No     Sexual activity: Not on file   Other Topics Concern     Parent/sibling w/ CABG, MI or angioplasty before 65F 55M? Not Asked   Social History Narrative     Not on file     Social Determinants of Health     Financial Resource Strain: Not on file   Food Insecurity: Not on file   Transportation Needs: Not on file   Physical Activity: Not on file   Stress: Not on file   Social Connections: Not on file   Intimate Partner Violence: Not on file   Housing Stability: Not on file           Medications  Allergies   Current Outpatient Medications   Medication Sig Dispense Refill     aspirin 81 MG tablet Take 1 tablet by mouth daily       buprenorphine HCl-naloxone HCl (SUBOXONE) 2-0.5 MG  per film Place 1 Film under the tongue 3 times daily May fill/start 8/11/22 42 each 0     FISH OIL 1,400 mg 2 times daily       HYDROcodone-acetaminophen (NORCO) 5-325 MG tablet Take 1 tablet by mouth 2 times daily as needed for severe pain 60 tablet 0     ipratropium (ATROVENT) 0.03 % nasal spray Spray 1 spray into both nostrils as needed       latanoprost (XALATAN) 0.005 % ophthalmic solution Place 1 drop into both eyes daily       metoprolol succinate ER (TOPROL-XL) 100 MG 24 hr tablet TAKE ONE AND ONE-HALF TABLETS ONCE DAILY       Multiple Vitamin (DAILY MULTIVITAMIN PO) Take 1 tablet by mouth daily       pantoprazole (PROTONIX) 40 MG enteric coated tablet Take 1 tablet by mouth daily       pregabalin (LYRICA) 100 MG capsule pregabalin 100 mg capsule 2 times a day 60 capsule 3     rosuvastatin (CRESTOR) 10 MG tablet Take 1 tablet by mouth every 24 hours       senna-docusate (SENOKOT-S;PERICOLACE) 8.6-50 MG per tablet Take 1 tablet by mouth daily as needed for constipation 7 tablet 0     solifenacin (VESICARE) 5 MG tablet Take 1 tablet by mouth daily at 2 pm       triamcinolone (KENALOG) 0.1 % external cream triamcinolone acetonide 0.1 % topical cream   APPLY TO RASH TOPICALLY AS NEEDED       triamcinolone (KENALOG) 0.1 % ointment Apply topically as needed       triamterene-hydrochlorothiazide (MAXZIDE-25) 37.5-25 MG per tablet Take 1 tablet by mouth daily       amLODIPine (NORVASC) 5 MG tablet Take 1 tablet (5 mg) by mouth daily (Patient not taking: Reported on 3/22/2023) 30 tablet 0     Buprenorphine HCl (BELBUCA) 900 MCG FILM buccal film Belbuca 900 mcg buccal film   PLACE 1 FILM INSIDE CHEEK EVERY 12 HOURS       ergocalciferol (ERGOCALCIFEROL) 1.25 MG (62582 UT) capsule Take 1 capsule by mouth       fluticasone (FLONASE) 50 MCG/ACT nasal spray fluticasone propionate 50 mcg/actuation nasal spray,suspension   SHAKE LIQUID AND USE 1 TO 2 SPRAYS IN EACH NOSTRIL EVERY DAY       gentamicin (GARAMYCIN) 0.1 %  external ointment        ketorolac (TORADOL) 10 MG tablet        LATANOPROST OP Place 1 drop into the right eye At Bedtime       naloxone (NARCAN) 4 MG/0.1ML nasal spray Spray 1 spray (4 mg) into one nostril alternating nostrils once as needed for opioid reversal every 2-3 minutes until assistance arrives 0.2 mL 0     oxybutynin (DITROPAN) 5 MG tablet oxybutynin chloride 5 mg tablet   TAKE 1 TABLET BY MOUTH TWICE DAILY AS NEEDED         Allergies   Allergen Reactions     Atorvastatin      Other reaction(s): myalgias     Cat Dander [Animal Dander] Itching     Sneezing     Cilostazol Other (See Comments)     Gabapentin Other (See Comments)     Morphine Other (See Comments)     Causes agitation     Pollen Extracts [Pollen Extract] Itching     sneezing     Tizanidine Dizziness     At higher doses          Lab Results    Chemistry/lipid CBC Cardiac Enzymes/BNP/TSH/INR   Recent Labs   Lab Test 02/15/23  0938   CHOL 94   HDL 40   LDL 19   TRIG 174*     Recent Labs   Lab Test 02/15/23  0938 02/02/22  1139 01/22/21  0818   LDL 19 38 27     Recent Labs   Lab Test 02/15/23  0938      POTASSIUM 4.3   CHLORIDE 101   CO2 22   *   BUN 38.3*   CR 1.41*   GFRESTIMATED 50*   TAMRA 9.0     Recent Labs   Lab Test 02/15/23  0938 12/09/22  0939 08/22/22  1156   CR 1.41* 1.28* 1.22*     Recent Labs   Lab Test 09/27/22  1350 08/28/19  0947   A1C 6.5* 6.6*          Recent Labs   Lab Test 10/07/20  0706   HGB 12.6*        Recent Labs   Lab Test 10/07/20  0706 01/29/18  1031   HGB 12.6* 13.7*    No results for input(s): TROPONINI in the last 59635 hours.  No results for input(s): BNP, NTBNPI, NTBNP in the last 17674 hours.  Recent Labs   Lab Test 01/31/19  1153   TSH 1.06     Recent Labs   Lab Test 10/07/20  0706   INR 1.03        John Odell MD

## 2023-03-24 NOTE — PROGRESS NOTES
HEART CARE ENCOUNTER NOTE      [facility]  [phone]      Assessment/Recommendations   Assessment:   1.Abnormal LV function on echocardiogram,suspicious for ischemic heart disease.  This is the most difficult area of the left ventricle to visualize with an echo and this finding may be artifact.  However, the likelihood of underlying ischemic heart disease is significant.  He has multiple risk factors for coronary disease  including a positive family history, former history of heavy tobacco use, type II DM, hypertension, and hypertriglyceridemia.  He may need surgery or endoscopic procedures in the near future to evaluate this new finding of a pancreatic cyst.  So it will be important to know if he has significant underlying ischemic heart disease.    Plan:   1.  We will proceed with a Lexiscan myocardial perfusion study as suggested by Dr. Roblero.  Further cardiac evaluation will depend upon the results of the study.  No new medications at this time.           History of Present Illness/Subjective    HPI: Vernon Lemus is a 82 year old male without prior cardiac history who saw Dr. Angelo in follow-up in February.  He was heard to have a murmur which seem more significant than previous and an echocardiogram was ordered.  The echo study suggested mild aortic stenosis but with some inferobasilar hypokinesia which was new compared to a previous study.  Cardiac evaluation has been requested.  Mr. Lemus denies any exertional chest pain symptoms.  He fatigues more easily than previously but has not had a sudden falloff in exercise tolerance.  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  Cardiac risk factor profile includes former smoking history of 20 years use but he quit more than 30 years ago.  There is a history of cardiac or coronary disease in  his father which began at age 55.  There is a history of type 2 diabetes as well as hypertension.    Studies reviewed:  ECG: Pending  Echo: Performed 3/9/2023: Report personally reviewed.  Mild aortic stenosis.  Good left ventricular function.  Inferobasilar hypokinesia.  GXT: Pending         Physical Examination  Review of Systems   BP (!) 160/64 (BP Location: Right arm, Patient Position: Sitting, Cuff Size: Adult Regular)   Pulse 67   Resp 16   Wt 99.8 kg (220 lb)   SpO2 97%   BMI 29.84 kg/m    Body mass index is 29.84 kg/m .  Wt Readings from Last 3 Encounters:   03/22/23 99.8 kg (220 lb)   09/06/22 100.2 kg (221 lb)   07/11/22 100.2 kg (221 lb)       General Appearance:   Pleasant elderly male appears stated age. no acute distress, normal body habitus   ENT/Mouth: Oropharynx normal    EYES:  no scleral icterus, normal conjunctivae. No eyelid xanthelasma   Neck: No cervical lymphadenopathy  Thyroid not enlarged or nodular  No JVD   Respiratory:   lungs clear , no rales or wheezing, Normal chest wall expansion    Cardiovascular:   Regular rhythm, normal rate. Normal 1st and 2nd heart sounds.  2/6 systolic murmur, no rubs or gallops;   radial pulses are full and equal   Jugular venous pressure is not elevated   Abdomen/GI:  No tenderness, no organomegaly, no pulsatile masses. NBS.   Extremities: No cyanosis or clubbing.  Trace peripheral edema   Skin: No rash or lesions   Heme/lymph/ Immunology No lymphadenopathy, petechiae   Neurologic: Alert and oriented. No focal motor weakness, gait appears normal.       Psychiatric: Pleasant, calm, appropriate affect.          No known hepatic, renal, pulmonary, or CNS disorders. The remainder of his complete ROS is negative except as noted above.         Medical History  Surgical History Family History Social History   Past Medical History:   Diagnosis Date     Abdominal pain      Abdominal pain      Arthritis     hands     Cancer (H)     1997 prostate cancer     Cancer  (H)     prostate     Chronic pancreatitis (H)      GERD (gastroesophageal reflux disease)      Glaucoma     right eye     History of basal cell cancer     left arm     Hypertension      Hypertension      Melanoma (H)     removed from neck     Mixed hyperlipidemia 10/7/2013     Polyneuropathy     both ankles and feet     Recurrent pancreatitis      Seasonal allergies      Shingles     right leg     Past Surgical History:   Procedure Laterality Date     CARPAL TUNNEL RELEASE RT/LT      right     CATARACT EXTRACTION Bilateral      CATARACT IOL, RT/LT      left eye     ENDOSCOPIC RETROGRADE CHOLANGIOPANCREATOGRAM       ENDOSCOPIC RETROGRADE CHOLANGIOPANCREATOGRAM  1/30/2014    Procedure: ENDOSCOPIC RETROGRADE CHOLANGIOPANCREATOGRAM;  Endoscopic Retrograde Cholangopancreatogram with  biliary and pancreatic sphincterotomy, pancreatic duct dilation, and pancreatic and biliary stent placement;  Surgeon: Julius Agosto MD;  Location: [facility] OR     ENDOSCOPIC RETROGRADE CHOLANGIOPANCREATOGRAM  2/18/2014    Procedure: ENDOSCOPIC RETROGRADE CHOLANGIOPANCREATOGRAM;  endoscopic retrograde cholangiopancreatogram with minor sphincterotomy, stent placement;  Surgeon: Julius Aogsto MD;  Location: [facility] OR     ENDOSCOPIC RETROGRADE CHOLANGIOPANCREATOGRAM       ESOPHAGOSCOPY, GASTROSCOPY, DUODENOSCOPY (EGD), COMBINED  3/18/2014    Procedure: COMBINED ESOPHAGOSCOPY, GASTROSCOPY, DUODENOSCOPY (EGD), REMOVE FOREIGN BODY;;  Surgeon: Concepcion Keen MD;  Location:  GI     ESOPHAGOSCOPY, GASTROSCOPY, DUODENOSCOPY (EGD), COMBINED N/A 8/11/2017    Procedure: ENDOSCOPIC ULTRASOUND;  Surgeon: Eleazar Verma MD;  Location: [facility];  Service:       [facility] ENDOSCOPY W EUS  1/16/2014    Procedure: COMBINED ENDOSCOPIC ULTRASOUND, ESOPHAGOSCOPY, GASTROSCOPY, DUODENOSCOPY (EGD);  Surgeon: Michael Monahan MD;  Location:  GI     IMPLANT PROSTHESIS SPHINCTER URINARY      urethral sphincter     INCONTINENCE SURGERY        INJECT TRANSVERSUS ABDOMINIS PLANE (TAP) BLOCK BILATERAL Bilateral 2021    Procedure: BLOCK, TRANSVERSUS ABDOMINIS PLANE;  Surgeon: Chapin Lawson MD;  Location: [facility] OR     IR EXTREMITY ANGIOGRAM LEFT  10/7/2020     IR LOWER EXTREMITY ANGIOGRAM LEFT  10/7/2020     OTHER SURGICAL HISTORY      urinary sphincter transplantwith 2 subsequent revisions     OTHER SURGICAL HISTORY      removal of melanoma     PROSTATE SURGERY       PROSTATECTOMY       RELEASE CARPAL TUNNEL Right      UPPER EUS       Family History   Problem Relation Age of Onset     Prostate Cancer Brother      C.A.D. Father      Prostate Cancer Brother      Hypertension Mother      Coronary Artery Disease Father      Hypertension Father         Social History     Socioeconomic History     Marital status:      Spouse name: Not on file     Number of children: Not on file     Years of education: Not on file     Highest education level: Not on file   Occupational History     Not on file   Tobacco Use     Smoking status: Former     Types: Cigarettes     Quit date: 8/10/1986     Years since quittin.6     Smokeless tobacco: Never   Substance and Sexual Activity     Alcohol use: No     Comment: 4 drinks a week/socially; 2013 stopped drinking     Drug use: No     Sexual activity: Not on file   Other Topics Concern     Parent/sibling w/ CABG, MI or angioplasty before 65F 55M? Not Asked   Social History Narrative     Not on file     Social Determinants of Health     Financial Resource Strain: Not on file   Food Insecurity: Not on file   Transportation Needs: Not on file   Physical Activity: Not on file   Stress: Not on file   Social Connections: Not on file   Intimate Partner Violence: Not on file   Housing Stability: Not on file           Medications  Allergies   Current Outpatient Medications   Medication Sig Dispense Refill     aspirin 81 MG tablet Take 1 tablet by mouth daily       buprenorphine HCl-naloxone HCl (SUBOXONE) 2-0.5 MG  per film Place 1 Film under the tongue 3 times daily May fill/start 8/11/22 42 each 0     FISH OIL 1,400 mg 2 times daily       HYDROcodone-acetaminophen (NORCO) 5-325 MG tablet Take 1 tablet by mouth 2 times daily as needed for severe pain 60 tablet 0     ipratropium (ATROVENT) 0.03 % nasal spray Spray 1 spray into both nostrils as needed       latanoprost (XALATAN) 0.005 % ophthalmic solution Place 1 drop into both eyes daily       metoprolol succinate ER (TOPROL-XL) 100 MG 24 hr tablet TAKE ONE AND ONE-HALF TABLETS ONCE DAILY       Multiple Vitamin (DAILY MULTIVITAMIN PO) Take 1 tablet by mouth daily       pantoprazole (PROTONIX) 40 MG enteric coated tablet Take 1 tablet by mouth daily       pregabalin (LYRICA) 100 MG capsule pregabalin 100 mg capsule 2 times a day 60 capsule 3     rosuvastatin (CRESTOR) 10 MG tablet Take 1 tablet by mouth every 24 hours       senna-docusate (SENOKOT-S;PERICOLACE) 8.6-50 MG per tablet Take 1 tablet by mouth daily as needed for constipation 7 tablet 0     solifenacin (VESICARE) 5 MG tablet Take 1 tablet by mouth daily at 2 pm       triamcinolone (KENALOG) 0.1 % external cream triamcinolone acetonide 0.1 % topical cream   APPLY TO RASH TOPICALLY AS NEEDED       triamcinolone (KENALOG) 0.1 % ointment Apply topically as needed       triamterene-hydrochlorothiazide (MAXZIDE-25) 37.5-25 MG per tablet Take 1 tablet by mouth daily       amLODIPine (NORVASC) 5 MG tablet Take 1 tablet (5 mg) by mouth daily (Patient not taking: Reported on 3/22/2023) 30 tablet 0     Buprenorphine HCl (BELBUCA) 900 MCG FILM buccal film Belbuca 900 mcg buccal film   PLACE 1 FILM INSIDE CHEEK EVERY 12 HOURS       ergocalciferol (ERGOCALCIFEROL) 1.25 MG (93081 UT) capsule Take 1 capsule by mouth       fluticasone (FLONASE) 50 MCG/ACT nasal spray fluticasone propionate 50 mcg/actuation nasal spray,suspension   SHAKE LIQUID AND USE 1 TO 2 SPRAYS IN EACH NOSTRIL EVERY DAY       gentamicin (GARAMYCIN) 0.1 %  external ointment        ketorolac (TORADOL) 10 MG tablet        LATANOPROST OP Place 1 drop into the right eye At Bedtime       naloxone (NARCAN) 4 MG/0.1ML nasal spray Spray 1 spray (4 mg) into one nostril alternating nostrils once as needed for opioid reversal every 2-3 minutes until assistance arrives 0.2 mL 0     oxybutynin (DITROPAN) 5 MG tablet oxybutynin chloride 5 mg tablet   TAKE 1 TABLET BY MOUTH TWICE DAILY AS NEEDED         Allergies   Allergen Reactions     Atorvastatin      Other reaction(s): myalgias     Cat Dander [Animal Dander] Itching     Sneezing     Cilostazol Other (See Comments)     Gabapentin Other (See Comments)     Morphine Other (See Comments)     Causes agitation     Pollen Extracts [Pollen Extract] Itching     sneezing     Tizanidine Dizziness     At higher doses          Lab Results    Chemistry/lipid CBC Cardiac Enzymes/BNP/TSH/INR   Recent Labs   Lab Test 02/15/23  0938   CHOL 94   HDL 40   LDL 19   TRIG 174*     Recent Labs   Lab Test 02/15/23  0938 02/02/22  1139 01/22/21  0818   LDL 19 38 27     Recent Labs   Lab Test 02/15/23  0938      POTASSIUM 4.3   CHLORIDE 101   CO2 22   *   BUN 38.3*   CR 1.41*   GFRESTIMATED 50*   TAMRA 9.0     Recent Labs   Lab Test 02/15/23  0938 12/09/22  0939 08/22/22  1156   CR 1.41* 1.28* 1.22*     Recent Labs   Lab Test 09/27/22  1350 08/28/19  0947   A1C 6.5* 6.6*          Recent Labs   Lab Test 10/07/20  0706   HGB 12.6*        Recent Labs   Lab Test 10/07/20  0706 01/29/18  1031   HGB 12.6* 13.7*    No results for input(s): TROPONINI in the last 51285 hours.  No results for input(s): BNP, NTBNPI, NTBNP in the last 17096 hours.  Recent Labs   Lab Test 01/31/19  1153   TSH 1.06     Recent Labs   Lab Test 10/07/20  0706   INR 1.03        John Odell MD

## 2023-03-31 ENCOUNTER — HOSPITAL ENCOUNTER (OUTPATIENT)
Dept: NUCLEAR MEDICINE | Facility: CLINIC | Age: 83
Discharge: HOME | End: 2023-03-31
Attending: INTERNAL MEDICINE
Payer: COMMERCIAL

## 2023-03-31 ENCOUNTER — HOSPITAL ENCOUNTER (OUTPATIENT)
Dept: CARDIOLOGY | Facility: CLINIC | Age: 83
Discharge: HOME | End: 2023-03-31
Attending: INTERNAL MEDICINE
Payer: COMMERCIAL

## 2023-03-31 DIAGNOSIS — I25.10 CORONARY ARTERY DISEASE INVOLVING NATIVE CORONARY ARTERY OF NATIVE HEART WITHOUT ANGINA PECTORIS: ICD-10-CM

## 2023-03-31 LAB
CV STRESS CURRENT BP HE: NORMAL
CV STRESS CURRENT HR HE: 57
CV STRESS CURRENT HR HE: 59
CV STRESS CURRENT HR HE: 60
CV STRESS CURRENT HR HE: 60
CV STRESS CURRENT HR HE: 65
CV STRESS CURRENT HR HE: 65
CV STRESS CURRENT HR HE: 66
CV STRESS CURRENT HR HE: 66
CV STRESS CURRENT HR HE: 67
CV STRESS CURRENT HR HE: 69
CV STRESS CURRENT HR HE: 70
CV STRESS CURRENT HR HE: 70
CV STRESS CURRENT HR HE: 71
CV STRESS CURRENT HR HE: 71
CV STRESS CURRENT HR HE: 72
CV STRESS DEVIATION TIME HE: NORMAL
CV STRESS ECHO PERCENT HR HE: NORMAL
CV STRESS EXERCISE STAGE HE: NORMAL
CV STRESS FINAL RESTING BP HE: NORMAL
CV STRESS FINAL RESTING HR HE: 65
CV STRESS MAX HR HE: 74
CV STRESS MAX TREADMILL GRADE HE: 0
CV STRESS MAX TREADMILL SPEED HE: 0
CV STRESS PEAK DIA BP HE: NORMAL
CV STRESS PEAK SYS BP HE: NORMAL
CV STRESS PHASE HE: NORMAL
CV STRESS PROTOCOL HE: NORMAL
CV STRESS RESTING PT POSITION HE: NORMAL
CV STRESS ST DEVIATION AMOUNT HE: NORMAL
CV STRESS ST DEVIATION ELEVATION HE: NORMAL
CV STRESS ST EVELATION AMOUNT HE: NORMAL
CV STRESS TEST TYPE HE: NORMAL
CV STRESS TOTAL STAGE TIME MIN 1 HE: NORMAL
NUC STRESS EJECTION FRACTION: 68 %
RATE PRESSURE PRODUCT: NORMAL
STRESS ECHO BASELINE DIASTOLIC HE: 69
STRESS ECHO BASELINE HR: 61
STRESS ECHO BASELINE SYSTOLIC BP: 163
STRESS ECHO CALCULATED PERCENT HR: 53 %
STRESS ECHO LAST STRESS DIASTOLIC BP: 81
STRESS ECHO LAST STRESS HR: 72
STRESS ECHO LAST STRESS SYSTOLIC BP: 163
STRESS ECHO TARGET HR: 139

## 2023-03-31 PROCEDURE — A9500 TC99M SESTAMIBI: HCPCS | Performed by: INTERNAL MEDICINE

## 2023-03-31 PROCEDURE — 93016 CV STRESS TEST SUPVJ ONLY: CPT | Performed by: INTERNAL MEDICINE

## 2023-03-31 PROCEDURE — 78452 HT MUSCLE IMAGE SPECT MULT: CPT | Mod: 26 | Performed by: INTERNAL MEDICINE

## 2023-03-31 PROCEDURE — 93017 CV STRESS TEST TRACING ONLY: CPT

## 2023-03-31 PROCEDURE — 78452 HT MUSCLE IMAGE SPECT MULT: CPT

## 2023-03-31 PROCEDURE — 250N000011 HC RX IP 250 OP 636: Performed by: INTERNAL MEDICINE

## 2023-03-31 PROCEDURE — 93018 CV STRESS TEST I&R ONLY: CPT | Performed by: INTERNAL MEDICINE

## 2023-03-31 PROCEDURE — 343N000001 HC RX 343: Performed by: INTERNAL MEDICINE

## 2023-03-31 RX ORDER — REGADENOSON 0.08 MG/ML
0.4 INJECTION, SOLUTION INTRAVENOUS ONCE
Status: COMPLETED | OUTPATIENT
Start: 2023-03-31 | End: 2023-03-31

## 2023-03-31 RX ORDER — AMINOPHYLLINE 25 MG/ML
50 INJECTION, SOLUTION INTRAVENOUS
Status: DISCONTINUED | OUTPATIENT
Start: 2023-03-31 | End: 2023-03-31 | Stop reason: HOSPADM

## 2023-03-31 RX ADMIN — Medication 30 MILLICURIE: at 10:00

## 2023-03-31 RX ADMIN — REGADENOSON 0.4 MG: 0.08 INJECTION, SOLUTION INTRAVENOUS at 10:12

## 2023-03-31 RX ADMIN — Medication 8.57 MILLICURIE: at 08:45

## 2023-03-31 RX ADMIN — AMINOPHYLLINE 50 MG: 25 INJECTION, SOLUTION INTRAVENOUS at 10:17

## 2023-04-03 ENCOUNTER — HOSPITAL ENCOUNTER (OUTPATIENT)
Dept: MRI IMAGING | Facility: CLINIC | Age: 83
Discharge: HOME | End: 2023-04-03
Attending: INTERNAL MEDICINE | Admitting: INTERNAL MEDICINE
Payer: COMMERCIAL

## 2023-04-03 DIAGNOSIS — K86.2 PANCREAS CYST: ICD-10-CM

## 2023-04-03 PROCEDURE — 74183 MRI ABD W/O CNTR FLWD CNTR: CPT

## 2023-04-03 PROCEDURE — 255N000002 HC RX 255 OP 636: Performed by: INTERNAL MEDICINE

## 2023-04-03 PROCEDURE — A9585 GADOBUTROL INJECTION: HCPCS | Performed by: INTERNAL MEDICINE

## 2023-04-03 RX ORDER — GADOBUTROL 604.72 MG/ML
10 INJECTION INTRAVENOUS ONCE
Status: COMPLETED | OUTPATIENT
Start: 2023-04-03 | End: 2023-04-03

## 2023-04-03 RX ADMIN — GADOBUTROL 10 ML: 604.72 INJECTION INTRAVENOUS at 09:47

## 2023-04-05 DIAGNOSIS — I25.10 CORONARY ARTERY DISEASE INVOLVING NATIVE CORONARY ARTERY OF NATIVE HEART WITHOUT ANGINA PECTORIS: Primary | ICD-10-CM

## 2023-04-05 DIAGNOSIS — R94.39 ABNORMAL NUCLEAR STRESS TEST: ICD-10-CM

## 2023-04-06 ENCOUNTER — TELEPHONE (OUTPATIENT)
Dept: CARDIOLOGY | Facility: CLINIC | Age: 83
End: 2023-04-06
Payer: COMMERCIAL

## 2023-04-06 ENCOUNTER — PREP FOR PROCEDURE (OUTPATIENT)
Dept: CARDIOLOGY | Facility: CLINIC | Age: 83
End: 2023-04-06
Payer: COMMERCIAL

## 2023-04-06 DIAGNOSIS — I25.10 CORONARY ARTERY DISEASE INVOLVING NATIVE CORONARY ARTERY OF NATIVE HEART WITHOUT ANGINA PECTORIS: Primary | ICD-10-CM

## 2023-04-06 DIAGNOSIS — R94.39 ABNORMAL NUCLEAR STRESS TEST: ICD-10-CM

## 2023-04-06 RX ORDER — NICOTINE POLACRILEX 4 MG
15-30 LOZENGE BUCCAL
Status: CANCELLED | OUTPATIENT
Start: 2023-04-12

## 2023-04-06 RX ORDER — SODIUM CHLORIDE 9 MG/ML
INJECTION, SOLUTION INTRAVENOUS CONTINUOUS
Status: CANCELLED | OUTPATIENT
Start: 2023-04-12

## 2023-04-06 RX ORDER — ASPIRIN 81 MG/1
243 TABLET, CHEWABLE ORAL ONCE
Status: CANCELLED | OUTPATIENT
Start: 2023-04-12

## 2023-04-06 RX ORDER — DEXTROSE MONOHYDRATE 25 G/50ML
25-50 INJECTION, SOLUTION INTRAVENOUS
Status: CANCELLED | OUTPATIENT
Start: 2023-04-12

## 2023-04-06 RX ORDER — FENTANYL CITRATE 50 UG/ML
25 INJECTION, SOLUTION INTRAMUSCULAR; INTRAVENOUS
Status: CANCELLED | OUTPATIENT
Start: 2023-04-12

## 2023-04-06 RX ORDER — ASPIRIN 325 MG
325 TABLET ORAL ONCE
Status: CANCELLED | OUTPATIENT
Start: 2023-04-12 | End: 2023-04-06

## 2023-04-06 RX ORDER — LIDOCAINE 40 MG/G
CREAM TOPICAL
Status: CANCELLED | OUTPATIENT
Start: 2023-04-06

## 2023-04-06 NOTE — TELEPHONE ENCOUNTER
----- Message from Karen Storey sent at 4/5/2023 12:15 PM CDT -----  Regarding: RE: BELLA pt  Case type: CA, LV POSS PCI  Procedure Physician(s): CHELLY/LEXI  Procedure Date and Patient Arrival Time: Wednesday 4/12, with arrival time of 0630  H&P: Completed on 3/22 W/ CHELLY   Pre-Procedure Lab Appt: ON ADMISSION   Alerts/Important Info: DIET CONTROLLED DIABETIC     THANK YOU,  KAREN    ----- Message -----  From: Libia Banuelos, RN  Sent: 4/5/2023  10:48 AM CDT  To: [facility] Procedure  Pool - [facility]  Subject: KWCATALINA pt                                           Case Type: CA, LV, poss PCI  Ordering Provider: Dr. Odell  H&P: 3-22-23 visit note  Alerts: diet controlled diabetic  Additional Info/Urgency: next available  Orders for Pre-Procedure Labs? On admission

## 2023-04-06 NOTE — TELEPHONE ENCOUNTER
Vernon Lemus  [address] [facility]   [place] [address]  [phone] (home)     Procedure cardiologist:  Dr. Odell or Zaida  PCP:  Giovani Queen  H&P completed by:  Dr. Odell 3-22-23  Admit date Wed. 4-12-23  Arrival time:  0630  Anticoagulation:  NA  CPAP: No  Previous PCI: No  Bypass Grafts: No  Renal Issues: No  Diabetic?: Yes - Diet controlled  Device?: No  Type:  NA  Ambulation status: independent    Reason for Visit:  Telephone call to discuss pre-procedure education in preparation for: Coronary Angiogram with Possible PCI and LV    Procedure Prep:  Primary Cardiologist note dated: 3-22-23  EKG results obtained, dated: To be completed on day of cath lab procedure  Hemogram results obtained: To be completed on day of cath lab procedure  Basic Metabolic Panel results obtained: To be completed on day of cath lab procedure  Lipid Profile results obtained: 2-15-23  Pertinent cardiac test results: 3-31-23 Lexiscan nuc, 3-9-23 echo  Orders placed per cosign required protocol 4-6-23.    COVID-19 test: NA    Patient Education  1. Your arrival time is 0630.  Location is [facility] - [address] [place] [address] - Main Entrance of the Hospital  2. Please plan on being at the hospital all day.  3. At any time, emergencies and/or urgent cases may come up which could delay the start of your procedure.    COVID Testing Instructions  *Mandatory COVID Testing:   ALL Patients will need to complete a COVID test no sooner than 4 days prior to their procedure (regardless of vaccination status).      To schedule COVID testing Please call [phone]    If you want to complete this at an outside facility please call them to find out if they will have the results within the appropriate time frame and their fax number.  You will need to provide us with that information so we can send the order.    The facility completing the test will need to fax the results to [phone]    If  you are running into and issues please call us.     Pre-procedure instructions  Patient instructed to not Eat or Drink after midnight.  Patient instructed to shower the evening before or the morning of the procedure.  Patient instructed to arrange for transportation home following procedure from a responsible family member of friend (wife or son will be ).  No driving for at least 24 hours.  Patient instructed to have a responsible adult with them for 24 hours post-procedure.  Post-procedure follow up process.  Conscious sedation discussed.  Patient instructed on antiplatelet medication.  Received procedure education handouts via [employer].     Pre-procedure medication instructions.  Continue medications as scheduled, with a small amount of water on the day of the procedure unless indicated. (NO Diabetic Medications or Blood Thinners)  Pt instructed not to consume Alcohol, Tobacco, Caffeine 12 hours prior to procedure.  Patient instructed to take 4-81 mg of Aspirin, in addition to Suboxone, Metoprolol Succ, Protonix, Lyrica, Crestor morning of procedure - HOLD Maxide, OTC supp.     Patient states understanding of procedure and agrees to proceed.    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, subsequent encounter     Recurrent abdominal pain     Ulcer of left foot, limited to breakdown of skin (H)     Peripheral vascular disease (H)     Diabetes mellitus, type 2 (H)     Diabetic ulcer of left midfoot  associated with type 2 diabetes mellitus, limited to breakdown of skin (H)     Pes planovalgus     Charcot foot due to diabetes mellitus (H)       Current Outpatient Medications   Medication Sig Dispense Refill     amLODIPine (NORVASC) 5 MG tablet Take 1 tablet (5 mg) by mouth daily (Patient not taking: Reported on 3/22/2023) 30 tablet 0     aspirin 81 MG tablet Take 1 tablet by mouth daily       Buprenorphine HCl (BELBUCA) 900 MCG FILM buccal film Belbuca 900 mcg buccal film   PLACE 1 FILM INSIDE CHEEK EVERY 12 HOURS       buprenorphine HCl-naloxone HCl (SUBOXONE) 2-0.5 MG per film Place 1 Film under the tongue 3 times daily May fill/start 8/11/22 42 each 0     ergocalciferol (ERGOCALCIFEROL) 1.25 MG (38177 UT) capsule Take 1 capsule by mouth       FISH OIL 1,400 mg 2 times daily       fluticasone (FLONASE) 50 MCG/ACT nasal spray fluticasone propionate 50 mcg/actuation nasal spray,suspension   SHAKE LIQUID AND USE 1 TO 2 SPRAYS IN EACH NOSTRIL EVERY DAY       gentamicin (GARAMYCIN) 0.1 % external ointment        HYDROcodone-acetaminophen (NORCO) 5-325 MG tablet Take 1 tablet by mouth 2 times daily as needed for severe pain 60 tablet 0     ipratropium (ATROVENT) 0.03 % nasal spray Spray 1 spray into both nostrils as needed       ketorolac (TORADOL) 10 MG tablet        latanoprost (XALATAN) 0.005 % ophthalmic solution Place 1 drop into both eyes daily       LATANOPROST OP Place 1 drop into the right eye At Bedtime       metoprolol succinate ER (TOPROL-XL) 100 MG 24 hr tablet TAKE ONE AND ONE-HALF TABLETS ONCE DAILY       Multiple Vitamin (DAILY MULTIVITAMIN PO) Take 1 tablet by mouth daily       naloxone (NARCAN) 4 MG/0.1ML nasal spray Spray 1 spray (4 mg) into one nostril alternating nostrils once as needed for opioid reversal every 2-3 minutes until assistance arrives 0.2 mL 0     oxybutynin (DITROPAN) 5 MG tablet oxybutynin chloride 5 mg tablet   TAKE 1 TABLET BY MOUTH TWICE DAILY AS NEEDED       pantoprazole  (PROTONIX) 40 MG enteric coated tablet Take 1 tablet by mouth daily       pregabalin (LYRICA) 100 MG capsule pregabalin 100 mg capsule 2 times a day 60 capsule 3     rosuvastatin (CRESTOR) 10 MG tablet Take 1 tablet by mouth every 24 hours       senna-docusate (SENOKOT-S;PERICOLACE) 8.6-50 MG per tablet Take 1 tablet by mouth daily as needed for constipation 7 tablet 0     solifenacin (VESICARE) 5 MG tablet Take 1 tablet by mouth daily at 2 pm       triamcinolone (KENALOG) 0.1 % external cream triamcinolone acetonide 0.1 % topical cream   APPLY TO RASH TOPICALLY AS NEEDED       triamcinolone (KENALOG) 0.1 % ointment Apply topically as needed       triamterene-hydrochlorothiazide (MAXZIDE-25) 37.5-25 MG per tablet Take 1 tablet by mouth daily         Allergies   Allergen Reactions     Atorvastatin      Other reaction(s): myalgias     Cat Dander [Animal Dander] Itching     Sneezing     Cilostazol Other (See Comments)     Gabapentin Other (See Comments)     Morphine Other (See Comments)     Causes agitation     Pollen Extracts [Pollen Extract] Itching     sneezing     Tizanidine Dizziness     At higher doses       Libia Banuelos RN on 4/6/2023 at 9:31 AM

## 2023-04-12 ENCOUNTER — HOSPITAL ENCOUNTER (OUTPATIENT)
Facility: HOSPITAL | Age: 83
Discharge: HOME | End: 2023-04-12
Attending: INTERNAL MEDICINE | Admitting: INTERNAL MEDICINE
Payer: COMMERCIAL

## 2023-04-12 VITALS
HEIGHT: 72 IN | RESPIRATION RATE: 15 BRPM | TEMPERATURE: 98.3 F | DIASTOLIC BLOOD PRESSURE: 82 MMHG | OXYGEN SATURATION: 98 % | WEIGHT: 220 LBS | HEART RATE: 68 BPM | SYSTOLIC BLOOD PRESSURE: 162 MMHG | BODY MASS INDEX: 29.8 KG/M2

## 2023-04-12 DIAGNOSIS — I25.10 CORONARY ARTERY DISEASE INVOLVING NATIVE CORONARY ARTERY OF NATIVE HEART WITHOUT ANGINA PECTORIS: ICD-10-CM

## 2023-04-12 DIAGNOSIS — R94.39 ABNORMAL NUCLEAR STRESS TEST: ICD-10-CM

## 2023-04-12 LAB
ABO/RH(D): NORMAL
ANION GAP SERPL CALCULATED.3IONS-SCNC: 9 MMOL/L (ref 7–15)
ANTIBODY SCREEN: NEGATIVE
ATRIAL RATE - MUSE: 62 BPM
BUN SERPL-MCNC: 31.1 MG/DL (ref 8–23)
CALCIUM SERPL-MCNC: 9.2 MG/DL (ref 8.8–10.2)
CHLORIDE SERPL-SCNC: 99 MMOL/L (ref 98–107)
CREAT SERPL-MCNC: 1.37 MG/DL (ref 0.67–1.17)
DEPRECATED HCO3 PLAS-SCNC: 28 MMOL/L (ref 22–29)
DIASTOLIC BLOOD PRESSURE - MUSE: NORMAL MMHG
ERYTHROCYTE [DISTWIDTH] IN BLOOD BY AUTOMATED COUNT: 14 % (ref 10–15)
GFR SERPL CREATININE-BSD FRML MDRD: 52 ML/MIN/1.73M2
GLUCOSE SERPL-MCNC: 112 MG/DL (ref 70–99)
HCT VFR BLD AUTO: 39.3 % (ref 40–53)
HGB BLD-MCNC: 12.7 G/DL (ref 13.3–17.7)
INTERPRETATION ECG - MUSE: NORMAL
MCH RBC QN AUTO: 29.1 PG (ref 26.5–33)
MCHC RBC AUTO-ENTMCNC: 32.3 G/DL (ref 31.5–36.5)
MCV RBC AUTO: 90 FL (ref 78–100)
P AXIS - MUSE: 45 DEGREES
PLATELET # BLD AUTO: 199 10E3/UL (ref 150–450)
POTASSIUM SERPL-SCNC: 4.1 MMOL/L (ref 3.4–5.3)
PR INTERVAL - MUSE: 188 MS
QRS DURATION - MUSE: 90 MS
QT - MUSE: 414 MS
QTC - MUSE: 420 MS
R AXIS - MUSE: 0 DEGREES
RBC # BLD AUTO: 4.36 10E6/UL (ref 4.4–5.9)
SODIUM SERPL-SCNC: 136 MMOL/L (ref 136–145)
SPECIMEN EXPIRATION DATE: NORMAL
SYSTOLIC BLOOD PRESSURE - MUSE: NORMAL MMHG
T AXIS - MUSE: 22 DEGREES
VENTRICULAR RATE- MUSE: 62 BPM
WBC # BLD AUTO: 7.1 10E3/UL (ref 4–11)

## 2023-04-12 PROCEDURE — C1894 INTRO/SHEATH, NON-LASER: HCPCS | Performed by: INTERNAL MEDICINE

## 2023-04-12 PROCEDURE — 999N000054 HC STATISTIC EKG NON-CHARGEABLE

## 2023-04-12 PROCEDURE — 255N000002 HC RX 255 OP 636: Performed by: INTERNAL MEDICINE

## 2023-04-12 PROCEDURE — 258N000003 HC RX IP 258 OP 636: Performed by: INTERNAL MEDICINE

## 2023-04-12 PROCEDURE — 93458 L HRT ARTERY/VENTRICLE ANGIO: CPT | Mod: 26 | Performed by: INTERNAL MEDICINE

## 2023-04-12 PROCEDURE — 272N000001 HC OR GENERAL SUPPLY STERILE: Performed by: INTERNAL MEDICINE

## 2023-04-12 PROCEDURE — 85027 COMPLETE CBC AUTOMATED: CPT | Performed by: INTERNAL MEDICINE

## 2023-04-12 PROCEDURE — C1769 GUIDE WIRE: HCPCS | Performed by: INTERNAL MEDICINE

## 2023-04-12 PROCEDURE — 93458 L HRT ARTERY/VENTRICLE ANGIO: CPT | Performed by: INTERNAL MEDICINE

## 2023-04-12 PROCEDURE — 93005 ELECTROCARDIOGRAM TRACING: CPT

## 2023-04-12 PROCEDURE — 86850 RBC ANTIBODY SCREEN: CPT | Performed by: INTERNAL MEDICINE

## 2023-04-12 PROCEDURE — C1887 CATHETER, GUIDING: HCPCS | Performed by: INTERNAL MEDICINE

## 2023-04-12 PROCEDURE — 250N000009 HC RX 250: Performed by: INTERNAL MEDICINE

## 2023-04-12 PROCEDURE — 93010 ELECTROCARDIOGRAM REPORT: CPT | Performed by: INTERNAL MEDICINE

## 2023-04-12 PROCEDURE — 36415 COLL VENOUS BLD VENIPUNCTURE: CPT | Performed by: INTERNAL MEDICINE

## 2023-04-12 PROCEDURE — 250N000011 HC RX IP 250 OP 636: Performed by: INTERNAL MEDICINE

## 2023-04-12 PROCEDURE — 82310 ASSAY OF CALCIUM: CPT | Performed by: INTERNAL MEDICINE

## 2023-04-12 RX ORDER — NICOTINE POLACRILEX 4 MG
15-30 LOZENGE BUCCAL
Status: DISCONTINUED | OUTPATIENT
Start: 2023-04-12 | End: 2023-04-12 | Stop reason: HOSPADM

## 2023-04-12 RX ORDER — OXYCODONE HYDROCHLORIDE 5 MG/1
10 TABLET ORAL EVERY 4 HOURS PRN
Status: DISCONTINUED | OUTPATIENT
Start: 2023-04-12 | End: 2023-04-12 | Stop reason: HOSPADM

## 2023-04-12 RX ORDER — ATROPINE SULFATE 0.1 MG/ML
0.5 INJECTION INTRAVENOUS
Status: DISCONTINUED | OUTPATIENT
Start: 2023-04-12 | End: 2023-04-12 | Stop reason: HOSPADM

## 2023-04-12 RX ORDER — ASPIRIN 81 MG/1
243 TABLET, CHEWABLE ORAL ONCE
Status: DISCONTINUED | OUTPATIENT
Start: 2023-04-12 | End: 2023-04-12 | Stop reason: HOSPADM

## 2023-04-12 RX ORDER — NALOXONE HYDROCHLORIDE 0.4 MG/ML
0.2 INJECTION, SOLUTION INTRAMUSCULAR; INTRAVENOUS; SUBCUTANEOUS
Status: DISCONTINUED | OUTPATIENT
Start: 2023-04-12 | End: 2023-04-12 | Stop reason: HOSPADM

## 2023-04-12 RX ORDER — NALOXONE HYDROCHLORIDE 0.4 MG/ML
0.4 INJECTION, SOLUTION INTRAMUSCULAR; INTRAVENOUS; SUBCUTANEOUS
Status: DISCONTINUED | OUTPATIENT
Start: 2023-04-12 | End: 2023-04-12 | Stop reason: HOSPADM

## 2023-04-12 RX ORDER — ACETAMINOPHEN 325 MG/1
650 TABLET ORAL EVERY 4 HOURS PRN
Status: DISCONTINUED | OUTPATIENT
Start: 2023-04-12 | End: 2023-04-12 | Stop reason: HOSPADM

## 2023-04-12 RX ORDER — LIDOCAINE 40 MG/G
CREAM TOPICAL
Status: DISCONTINUED | OUTPATIENT
Start: 2023-04-12 | End: 2023-04-12 | Stop reason: HOSPADM

## 2023-04-12 RX ORDER — FLUMAZENIL 0.1 MG/ML
0.2 INJECTION, SOLUTION INTRAVENOUS
Status: DISCONTINUED | OUTPATIENT
Start: 2023-04-12 | End: 2023-04-12 | Stop reason: HOSPADM

## 2023-04-12 RX ORDER — FENTANYL CITRATE 50 UG/ML
25 INJECTION, SOLUTION INTRAMUSCULAR; INTRAVENOUS
Status: DISCONTINUED | OUTPATIENT
Start: 2023-04-12 | End: 2023-04-12 | Stop reason: HOSPADM

## 2023-04-12 RX ORDER — IODIXANOL 320 MG/ML
INJECTION, SOLUTION INTRAVASCULAR
Status: DISCONTINUED | OUTPATIENT
Start: 2023-04-12 | End: 2023-04-12 | Stop reason: HOSPADM

## 2023-04-12 RX ORDER — FENTANYL CITRATE 50 UG/ML
INJECTION, SOLUTION INTRAMUSCULAR; INTRAVENOUS
Status: DISCONTINUED | OUTPATIENT
Start: 2023-04-12 | End: 2023-04-12 | Stop reason: HOSPADM

## 2023-04-12 RX ORDER — DEXTROSE MONOHYDRATE 25 G/50ML
25-50 INJECTION, SOLUTION INTRAVENOUS
Status: DISCONTINUED | OUTPATIENT
Start: 2023-04-12 | End: 2023-04-12 | Stop reason: HOSPADM

## 2023-04-12 RX ORDER — SODIUM CHLORIDE 9 MG/ML
INJECTION, SOLUTION INTRAVENOUS CONTINUOUS
Status: DISCONTINUED | OUTPATIENT
Start: 2023-04-12 | End: 2023-04-12 | Stop reason: HOSPADM

## 2023-04-12 RX ORDER — OXYCODONE HYDROCHLORIDE 5 MG/1
5 TABLET ORAL EVERY 4 HOURS PRN
Status: DISCONTINUED | OUTPATIENT
Start: 2023-04-12 | End: 2023-04-12 | Stop reason: HOSPADM

## 2023-04-12 RX ORDER — ASPIRIN 325 MG
325 TABLET ORAL ONCE
Status: DISCONTINUED | OUTPATIENT
Start: 2023-04-12 | End: 2023-04-12 | Stop reason: HOSPADM

## 2023-04-12 RX ADMIN — SODIUM CHLORIDE: 9 INJECTION, SOLUTION INTRAVENOUS at 07:24

## 2023-04-12 ASSESSMENT — ACTIVITIES OF DAILY LIVING (ADL)
ADLS_ACUITY_SCORE: 35

## 2023-04-12 ASSESSMENT — EJECTION FRACTION: EF_VALUE: .06

## 2023-04-12 NOTE — DISCHARGE INSTRUCTIONS

## 2023-04-12 NOTE — PRE-PROCEDURE
GENERAL PRE-PROCEDURE:   Procedure:  Coronary angiogram with possible PCI  Date/Time:  4/12/2023 7:07 AM    Written consent obtained?: Yes    Risks and benefits: Risks, benefits and alternatives were discussed    Consent given by:  Patient  Patient states understanding of procedure being performed: Yes    Patient's understanding of procedure matches consent: Yes    Procedure consent matches procedure scheduled: Yes    Expected level of sedation:  Moderate  Appropriately NPO:  Yes  ASA Class:  3 (abnormal NM stress test, mild AS, mild-moderate MR, HTN, HLD, hx of prostate CA, CKD Stage III, PVD; s/p LLE bypass)  Mallampati  :  Grade 2- soft palate, base of uvula, tonsillar pillars, and portion of posterior pharyngeal wall visible  Lungs:  Lungs clear with good breath sounds bilaterally  Heart:  Systolic murmur  History & Physical reviewed:  History and physical reviewed and no updates needed  Statement of review:  I have reviewed the lab findings, diagnostic data, medications, and the plan for sedation

## 2023-04-12 NOTE — Clinical Note
The DP pulses are 1+ bilaterally. The PT pulses are 1+ bilaterally. The radial pulses are 2+ bilaterally.

## 2023-04-12 NOTE — PLAN OF CARE
Pt alert and oriented.Htn on monitor, denies cardiac s/s. Wife lisa in room with patient. Sr with pvc and 1st degree block on monitor. Pt took 325mg asa prior to arrival for angiogram this morning at home. Pt prepped and ready for cardiac procedure.

## 2023-04-12 NOTE — INTERVAL H&P NOTE
I have reviewed the surgical (or preoperative) H&P that is linked to this encounter, and examined the patient. There are no significant changes    Clinical Conditions Present on Arrival:  Clinically Significant Risk Factors Present on Admission                    # Overweight: Estimated body mass index is 29.84 kg/m  as calculated from the following:    Height as of this encounter: 1.829 m (6').    Weight as of this encounter: 99.8 kg (220 lb).

## 2023-04-28 NOTE — PATIENT INSTRUCTIONS
Kyrie Junior,    Thank you for entrusting your care with us today. After your visit today with MD Maria Elena Rodas this is the plan that was discussed at your appointment.    Ultrasounds are stable. We will retest in 1 year and have you see Anabella GABRIEL      I am including additional information on these things and our contact information if you have any questions or concerns.   Please do not hesitate to reach out to us if you felt we did not answer your questions or you are unsure of the treatment plan after your visit today. Our number is [phone].Thank you for trusting us with your care.         Again thank you for your time.     Ankle-Brachial Index (CELSA) or Physiologic Test    Description  An ankle-brachial index test is relatively pain free. Blood pressure cuffs of various sizes are placed on your thigh, calf, foot and toes.  Similar to having your blood pressure checked with an arm cuff, as the technician inflates the cuffs, they progressively tighten and are then quickly released.  You may feel some discomfort, but generally for less than 60 seconds for each measurement. You will be asked to remove your socks and shoes and possibly your pants or shorts. Gowns will be provided. It usually takes about 30-60 minutes.   Depending on the initial readings and patient symptoms, you may be asked to perform a light walk on a treadmill.  The technician will apply ultrasound gel, usually warmed for your comfort, to your ankles and wrists. Through the gel, the technician will use a small hand-held device that emits sound waves.  Risks  There are typically no side effects or complications associated with a physiologic study.  How to Prepare  Eat and take medications as usual.  There is no preparation required for an ankle-brachial index (CELSA) or physiologic exam.  What Can I Expect After the Test?  The technician will send the ultrasound images to your vascular surgeon for evaluation. Typically, a report is available in  2-3 days. If anything critical is found, it is standard practice to notify the vascular surgeon immediately.  Reference: https://vascular.org/patient-resources/vascular-tests

## 2023-04-30 ENCOUNTER — HEALTH MAINTENANCE LETTER (OUTPATIENT)
Age: 83
End: 2023-04-30

## 2023-05-01 ENCOUNTER — ANCILLARY PROCEDURE (OUTPATIENT)
Dept: VASCULAR ULTRASOUND | Facility: CLINIC | Age: 83
End: 2023-05-01
Attending: PHYSICIAN ASSISTANT
Payer: COMMERCIAL

## 2023-05-01 DIAGNOSIS — I73.9 PAD (PERIPHERAL ARTERY DISEASE) (H): ICD-10-CM

## 2023-05-01 PROCEDURE — 93925 LOWER EXTREMITY STUDY: CPT | Mod: 26 | Performed by: SURGERY

## 2023-05-01 PROCEDURE — 93923 UPR/LXTR ART STDY 3+ LVLS: CPT

## 2023-05-01 PROCEDURE — 93923 UPR/LXTR ART STDY 3+ LVLS: CPT | Mod: 26 | Performed by: SURGERY

## 2023-05-01 PROCEDURE — 93925 LOWER EXTREMITY STUDY: CPT

## 2023-05-02 ENCOUNTER — VIRTUAL VISIT (OUTPATIENT)
Dept: VASCULAR SURGERY | Facility: CLINIC | Age: 83
End: 2023-05-02
Attending: PHYSICIAN ASSISTANT
Payer: COMMERCIAL

## 2023-05-02 DIAGNOSIS — I73.9 PAD (PERIPHERAL ARTERY DISEASE) (H): Primary | ICD-10-CM

## 2023-05-02 PROCEDURE — 99213 OFFICE O/P EST LOW 20 MIN: CPT | Mod: VID | Performed by: SURGERY

## 2023-05-02 RX ORDER — NITROGLYCERIN 0.4 MG/1
0.4 TABLET SUBLINGUAL EVERY 5 MIN PRN
COMMUNITY
Start: 2023-04-19

## 2023-05-02 NOTE — NURSING NOTE
.  Patient confirms medications and allergies are accurate via patients echeck in completion, and or denies any changes since last reviewed/verified.     Is the patient currently in the state of [place]? YES    Visit mode:VIDEO    If the visit is dropped, the patient can be reconnected by: VIDEO VISIT: Text to cell phone: [phone]    Will anyone else be joining the visit? NO      How would you like to obtain your AVS? MyChart    Are changes needed to the allergy or medication list? NO    Reason for visit: Follow Up            Angy Beal, Virtual Facilitator

## 2023-05-02 NOTE — PROGRESS NOTES
[facility]        Patient is here for a virtual visit to discuss PAD 1 year    Pt is currently taking Aspirin.    There were no vitals taken for this visit.    The provider has been notified that the patient has no concerns.     Questions patient would like addressed today are: N/A.    Refills are needed: No    Has homecare services and agency name:  Cecilia Beal

## 2023-05-08 NOTE — PROGRESS NOTES
Virtual Visit Details:    Type of service: Video     Length of call: 20 minutes    Originating Location (Pt. Location): Home     Distant Location (Provider location):  [facility]     Platform used for visit:  Aaliyah     Received verbal consent for virtual visit.  Stable peripheral arterial disease.  Patient has a history of left extremity angiogram with SFA and popliteal artery intervention back in 2020.  ABIs are stable.  There are some recurrence of the disease within the left operative artery but without hemodynamic significance and most importantly without significant symptomatology.  Follow-up in 1 year with repeat studies.  Continue optimal medical management therapy

## 2023-05-31 ENCOUNTER — TRANSFERRED RECORDS (OUTPATIENT)
Dept: HEALTH INFORMATION MANAGEMENT | Facility: CLINIC | Age: 83
End: 2023-05-31

## 2023-07-11 ENCOUNTER — OFFICE VISIT (OUTPATIENT)
Dept: CARDIOLOGY | Facility: CLINIC | Age: 83
End: 2023-07-11
Payer: COMMERCIAL

## 2023-07-11 VITALS
HEART RATE: 60 BPM | RESPIRATION RATE: 14 BRPM | WEIGHT: 210 LBS | BODY MASS INDEX: 28.48 KG/M2 | SYSTOLIC BLOOD PRESSURE: 150 MMHG | DIASTOLIC BLOOD PRESSURE: 62 MMHG

## 2023-07-11 DIAGNOSIS — I25.10 CORONARY ARTERY DISEASE INVOLVING NATIVE CORONARY ARTERY OF NATIVE HEART WITHOUT ANGINA PECTORIS: Primary | ICD-10-CM

## 2023-07-11 PROCEDURE — 99214 OFFICE O/P EST MOD 30 MIN: CPT | Performed by: INTERNAL MEDICINE

## 2023-07-11 RX ORDER — ROSUVASTATIN CALCIUM 5 MG/1
5 TABLET, COATED ORAL DAILY
Qty: 90 TABLET | Refills: 3 | Status: SHIPPED | OUTPATIENT
Start: 2023-07-11

## 2023-07-11 RX ORDER — POLYETHYLENE GLYCOL 3350
1 POWDER (GRAM) MISCELLANEOUS PRN
COMMUNITY

## 2023-07-11 RX ORDER — VITAMIN B COMPLEX
1 TABLET ORAL DAILY
COMMUNITY

## 2023-07-11 NOTE — LETTER
7/11/2023    Giovani Queen MD, MD  [address]  [address] [place] [address]    RE: Vernon Lemus       Dear Colleague,     I had the pleasure of seeing Vernon Lemus in the [facility].    HEART CARE ENCOUNTER NOTE      [facility]  [phone]      Assessment/Recommendations   Assessment:   1.  Coronary artery disease, status post recent angiogram showing chronically occluded right coronary artery with collateral filling from the left side.  No significant left coronary disease.  No angina symptoms on current regimen.  2.  Mixed hyperlipidemia with very low LDL on Crestor therapy (19)  3.  Calcified aortic valve but no aortic stenosis demonstrated at time of catheterization.      Plan:   1.  Reduce Crestor from 10 mg to 5 mg/day   2.  Continue other medications unchanged  3.  Maintain activity level as much as possible  4.  Follow-up 1 year           History of Present Illness/Subjective    HPI: Vernon Lemus is a 83 year old male recently evaluated for findings of an abnormal stress test suggesting underlying cardiac ischemia.  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  His ischemia work-up was performed in the event this became necessary.  Fortunately he has not seen any growth of the pancreatic cyst and is scheduled to have a follow-up scan in September.  He  done well since his angiogram in March.  He has not had any anginal type symptoms but he is relatively limited in terms of activity because of his orthopedic issues as well.    Studies reviewed:  Coronary angiogram from April of this year, results noted above.  Recent Labs   Lab Test 02/15/23  0938   CHOL 94   HDL 40   LDL 19   TRIG 174*        Physical Examination  Review of Systems   BP (!) 150/62 (BP Location: Right arm, Patient Position: Sitting, Cuff Size: Adult Regular)   Pulse 60   Resp 14   Wt 95.3 kg (210 lb)    BMI 28.48 kg/m    Body mass index is 28.48 kg/m .  Wt Readings from Last 3 Encounters:   07/11/23 95.3 kg (210 lb)   04/12/23 99.8 kg (220 lb)   03/22/23 99.8 kg (220 lb)       General Appearance:   Pleasant elderly gentleman appears stated age. no acute distress, normal body habitus   ENT/Mouth: Oropharynx normal    EYES:  no scleral icterus, normal conjunctivae. No eyelid xanthelasma   Neck: No cervical lymphadenopathy  Thyroid not enlarged or nodular  No JVD   Respiratory:   lungs clear , no rales or wheezing, Normal chest wall expansion    Cardiovascular:    Regular rhythm, normal rate. Normal 1st and 2nd heart sounds.  Soft systolic flow murmurs, no rubs or gallops;   radial pulses are full and equal   Jugular venous pressure is nonelevated   Abdomen/GI:  No tenderness, no organomegaly, no pulsatile masses. NBS.   Extremities: No cyanosis or clubbing.  No peripheral edema   Skin: No rash or lesions   Heme/lymph/ Immunology No lymphadenopathy, petechiae   Neurologic: Alert and oriented. No focal motor weakness, gait appears normal.       Psychiatric: Pleasant, calm, appropriate affect.          No known hepatic, renal, pulmonary, or CNS disorders. The remainder of the complete ROS is negative except as noted above.         Medical History  Surgical History Family History Social History   Past Medical History:   Diagnosis Date    Abdominal pain     Abdominal pain     Arthritis     hands    Cancer (H)     1997 prostate cancer    Cancer (H)     prostate    Chronic pancreatitis (H)     GERD (gastroesophageal reflux disease)     Glaucoma     right eye    History of basal cell cancer     left arm    Hypertension     Hypertension     Melanoma (H)     removed from neck    Mixed hyperlipidemia 10/7/2013    Polyneuropathy     both ankles and feet    Recurrent pancreatitis     Seasonal allergies     Shingles     right leg     Past Surgical History:   Procedure Laterality Date    CARPAL TUNNEL RELEASE RT/LT      right     CATARACT EXTRACTION Bilateral     CATARACT IOL, RT/LT      left eye    CV CORONARY ANGIOGRAM N/A 4/12/2023    Procedure: Coronary Angiogram;  Surgeon: John Odell MD;  Location: [facility] CV    CV LEFT HEART CATH N/A 4/12/2023    Procedure: Left Heart Catheterization;  Surgeon: John Odell MD;  Location: [facility] CV    ENDOSCOPIC RETROGRADE CHOLANGIOPANCREATOGRAM      ENDOSCOPIC RETROGRADE CHOLANGIOPANCREATOGRAM  1/30/2014    Procedure: ENDOSCOPIC RETROGRADE CHOLANGIOPANCREATOGRAM;  Endoscopic Retrograde Cholangopancreatogram with  biliary and pancreatic sphincterotomy, pancreatic duct dilation, and pancreatic and biliary stent placement;  Surgeon: Julius Agosto MD;  Location: [facility] OR    ENDOSCOPIC RETROGRADE CHOLANGIOPANCREATOGRAM  2/18/2014    Procedure: ENDOSCOPIC RETROGRADE CHOLANGIOPANCREATOGRAM;  endoscopic retrograde cholangiopancreatogram with minor sphincterotomy, stent placement;  Surgeon: Julius Agosto MD;  Location: [facility] OR    ENDOSCOPIC RETROGRADE CHOLANGIOPANCREATOGRAM      ESOPHAGOSCOPY, GASTROSCOPY, DUODENOSCOPY (EGD), COMBINED  3/18/2014    Procedure: COMBINED ESOPHAGOSCOPY, GASTROSCOPY, DUODENOSCOPY (EGD), REMOVE FOREIGN BODY;;  Surgeon: Concepcion Keen MD;  Location: [facility] GI    ESOPHAGOSCOPY, GASTROSCOPY, DUODENOSCOPY (EGD), COMBINED N/A 8/11/2017    Procedure: ENDOSCOPIC ULTRASOUND;  Surgeon: Eleazar Verma MD;  Location: [facility];  Service:      [facility] ENDOSCOPY W EUS  1/16/2014    Procedure: COMBINED ENDOSCOPIC ULTRASOUND, ESOPHAGOSCOPY, GASTROSCOPY, DUODENOSCOPY (EGD);  Surgeon: Michael Monahan MD;  Location: [facility] GI    IMPLANT PROSTHESIS SPHINCTER URINARY      urethral sphincter    INCONTINENCE SURGERY      INJECT TRANSVERSUS ABDOMINIS PLANE (TAP) BLOCK BILATERAL Bilateral 12/14/2021    Procedure: BLOCK, TRANSVERSUS ABDOMINIS PLANE;  Surgeon: Chapin Lawson MD;  Location: [facility] OR    IR EXTREMITY ANGIOGRAM LEFT  10/7/2020    IR  LOWER EXTREMITY ANGIOGRAM LEFT  10/7/2020    OTHER SURGICAL HISTORY      urinary sphincter transplantwith 2 subsequent revisions    OTHER SURGICAL HISTORY      removal of melanoma    PROSTATE SURGERY      PROSTATECTOMY      RELEASE CARPAL TUNNEL Right     UPPER EUS       Family History   Problem Relation Age of Onset    Prostate Cancer Brother     C.A.D. Father     Prostate Cancer Brother     Hypertension Mother     Coronary Artery Disease Father     Hypertension Father         Social History     Socioeconomic History    Marital status:      Spouse name: Not on file    Number of children: Not on file    Years of education: Not on file    Highest education level: Not on file   Occupational History    Not on file   Tobacco Use    Smoking status: Former     Types: Cigarettes     Quit date: 8/10/1986     Years since quittin.9    Smokeless tobacco: Never   Substance and Sexual Activity    Alcohol use: No     Comment: 4 drinks a week/socially; 2013 stopped drinking    Drug use: No    Sexual activity: Not on file   Other Topics Concern    Parent/sibling w/ CABG, MI or angioplasty before 65F 55M? Not Asked   Social History Narrative    Not on file     Social Determinants of Health     Financial Resource Strain: Not on file   Food Insecurity: Not on file   Transportation Needs: Not on file   Physical Activity: Not on file   Stress: Not on file   Social Connections: Not on file   Intimate Partner Violence: Not on file   Housing Stability: Not on file           Medications  Allergies   Current Outpatient Medications   Medication Sig Dispense Refill    aspirin 81 MG tablet Take 1 tablet by mouth daily      buprenorphine HCl-naloxone HCl (SUBOXONE) 2-0.5 MG per film Place 1 Film under the tongue 3 times daily May fill/start 22 42 each 0    FISH OIL 1,400 mg 2 times daily      HYDROcodone-acetaminophen (NORCO) 5-325 MG tablet Take 1 tablet by mouth 2 times daily as needed for severe pain 60 tablet 0     ipratropium (ATROVENT) 0.03 % nasal spray Spray 1 spray into both nostrils as needed      latanoprost (XALATAN) 0.005 % ophthalmic solution Place 1 drop into both eyes daily      metoprolol succinate ER (TOPROL-XL) 100 MG 24 hr tablet TAKE ONE AND ONE-HALF TABLETS ONCE DAILY      Multiple Vitamin (DAILY MULTIVITAMIN PO) Take 1 tablet by mouth daily      naloxone (NARCAN) 4 MG/0.1ML nasal spray Spray 1 spray (4 mg) into one nostril alternating nostrils once as needed for opioid reversal every 2-3 minutes until assistance arrives 0.2 mL 0    nitroGLYcerin (NITROSTAT) 0.4 MG sublingual tablet 0.4 mg every 5 minutes as needed      pantoprazole (PROTONIX) 40 MG enteric coated tablet Take 1 tablet by mouth daily      polyethylene glycol 3350 POWD Take 1 Scoop by mouth as needed      pregabalin (LYRICA) 100 MG capsule pregabalin 100 mg capsule 2 times a day 60 capsule 3    rosuvastatin (CRESTOR) 5 MG tablet Take 1 tablet (5 mg) by mouth daily 90 tablet 3    senna-docusate (SENOKOT-S;PERICOLACE) 8.6-50 MG per tablet Take 1 tablet by mouth daily as needed for constipation 7 tablet 0    solifenacin (VESICARE) 5 MG tablet Take 1 tablet by mouth daily      triamcinolone (KENALOG) 0.1 % external cream triamcinolone acetonide 0.1 % topical cream   APPLY TO RASH TOPICALLY AS NEEDED      triamterene-hydrochlorothiazide (MAXZIDE-25) 37.5-25 MG per tablet Take 1 tablet by mouth daily      Vitamin D3 (CHOLECALCIFEROL) 25 mcg (1000 units) tablet Take 1 tablet by mouth daily         Allergies   Allergen Reactions    Atorvastatin      Other reaction(s): myalgias    Cat Dander [Animal Dander] Itching     Sneezing    Cilostazol Other (See Comments)    Morphine Other (See Comments)     Causes agitation    Pollen Extracts [Pollen Extract] Itching     sneezing    Tizanidine Dizziness     At higher doses          Lab Results    Chemistry/lipid CBC Cardiac Enzymes/BNP/TSH/INR   Recent Labs   Lab Test 02/15/23  0938   CHOL 94   HDL 40   LDL 19    TRIG 174*     Recent Labs   Lab Test 02/15/23  0938 02/02/22  1139 01/22/21  0818   LDL 19 38 27     Recent Labs   Lab Test 04/12/23  0714      POTASSIUM 4.1   CHLORIDE 99   CO2 28   *   BUN 31.1*   CR 1.37*   GFRESTIMATED 52*   TAMRA 9.2     Recent Labs   Lab Test 04/12/23  0714 02/15/23  0938 12/09/22  0939   CR 1.37* 1.41* 1.28*     Recent Labs   Lab Test 09/27/22  1350 08/28/19  0947   A1C 6.5* 6.6*          Recent Labs   Lab Test 04/12/23  0714   WBC 7.1   HGB 12.7*   HCT 39.3*   MCV 90        Recent Labs   Lab Test 04/12/23  0714 10/07/20  0706 01/29/18  1031   HGB 12.7* 12.6* 13.7*    No results for input(s): TROPONINI in the last 09333 hours.  No results for input(s): BNP, NTBNPI, NTBNP in the last 14744 hours.  Recent Labs   Lab Test 01/31/19  1153   TSH 1.06     Recent Labs   Lab Test 10/07/20  0706   INR 1.03        John Odell MD           Thank you for allowing me to participate in the care of your patient.      Sincerely,     John Odell MD     [facility]  cc:   No referring provider defined for this encounter.

## 2023-07-11 NOTE — PROGRESS NOTES
HEART CARE ENCOUNTER NOTE      [facility]  [phone]      Assessment/Recommendations   Assessment:   1.  Coronary artery disease, status post recent angiogram showing chronically occluded right coronary artery with collateral filling from the left side.  No significant left coronary disease.  No angina symptoms on current regimen.  2.  Mixed hyperlipidemia with very low LDL on Crestor therapy (19)  3.  Calcified aortic valve but no aortic stenosis demonstrated at time of catheterization.      Plan:   1.  Reduce Crestor from 10 mg to 5 mg/day   2.  Continue other medications unchanged  3.  Maintain activity level as much as possible  4.  Follow-up 1 year           History of Present Illness/Subjective    HPI: Vernon Lemus is a 83 year old male recently evaluated for findings of an abnormal stress test suggesting underlying cardiac ischemia.  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  His ischemia work-up was performed in the event this became necessary.  Fortunately he has not seen any growth of the pancreatic cyst and is scheduled to have a follow-up scan in September.  He  done well since his angiogram in March.  He has not had any anginal type symptoms but he is relatively limited in terms of activity because of his orthopedic issues as well.    Studies reviewed:  Coronary angiogram from April of this year, results noted above.  Recent Labs   Lab Test 02/15/23  0938   CHOL 94   HDL 40   LDL 19   TRIG 174*        Physical Examination  Review of Systems   BP (!) 150/62 (BP Location: Right arm, Patient Position: Sitting, Cuff Size: Adult Regular)   Pulse 60   Resp 14   Wt 95.3 kg (210 lb)   BMI 28.48 kg/m    Body mass index is 28.48 kg/m .  Wt Readings from Last 3 Encounters:   07/11/23 95.3 kg (210 lb)   04/12/23 99.8 kg (220 lb)   03/22/23 99.8 kg (220 lb)       General Appearance:   Pleasant elderly gentleman  appears stated age. no acute distress, normal body habitus   ENT/Mouth: Oropharynx normal    EYES:  no scleral icterus, normal conjunctivae. No eyelid xanthelasma   Neck: No cervical lymphadenopathy  Thyroid not enlarged or nodular  No JVD   Respiratory:   lungs clear , no rales or wheezing, Normal chest wall expansion    Cardiovascular:    Regular rhythm, normal rate. Normal 1st and 2nd heart sounds.  Soft systolic flow murmurs, no rubs or gallops;   radial pulses are full and equal   Jugular venous pressure is nonelevated   Abdomen/GI:  No tenderness, no organomegaly, no pulsatile masses. NBS.   Extremities: No cyanosis or clubbing.  No peripheral edema   Skin: No rash or lesions   Heme/lymph/ Immunology No lymphadenopathy, petechiae   Neurologic: Alert and oriented. No focal motor weakness, gait appears normal.       Psychiatric: Pleasant, calm, appropriate affect.          No known hepatic, renal, pulmonary, or CNS disorders. The remainder of the complete ROS is negative except as noted above.         Medical History  Surgical History Family History Social History   Past Medical History:   Diagnosis Date     Abdominal pain      Abdominal pain      Arthritis     hands     Cancer (H)     1997 prostate cancer     Cancer (H)     prostate     Chronic pancreatitis (H)      GERD (gastroesophageal reflux disease)      Glaucoma     right eye     History of basal cell cancer     left arm     Hypertension      Hypertension      Melanoma (H)     removed from neck     Mixed hyperlipidemia 10/7/2013     Polyneuropathy     both ankles and feet     Recurrent pancreatitis      Seasonal allergies      Shingles     right leg     Past Surgical History:   Procedure Laterality Date     CARPAL TUNNEL RELEASE RT/LT      right     CATARACT EXTRACTION Bilateral      CATARACT IOL, RT/LT      left eye     CV CORONARY ANGIOGRAM N/A 4/12/2023    Procedure: Coronary Angiogram;  Surgeon: John Odell MD;  Location: [facility]      CV LEFT HEART CATH N/A 4/12/2023    Procedure: Left Heart Catheterization;  Surgeon: John Odell MD;  Location: [facility] LAB CV     ENDOSCOPIC RETROGRADE CHOLANGIOPANCREATOGRAM       ENDOSCOPIC RETROGRADE CHOLANGIOPANCREATOGRAM  1/30/2014    Procedure: ENDOSCOPIC RETROGRADE CHOLANGIOPANCREATOGRAM;  Endoscopic Retrograde Cholangopancreatogram with  biliary and pancreatic sphincterotomy, pancreatic duct dilation, and pancreatic and biliary stent placement;  Surgeon: Julius Agosto MD;  Location: [facility] OR     ENDOSCOPIC RETROGRADE CHOLANGIOPANCREATOGRAM  2/18/2014    Procedure: ENDOSCOPIC RETROGRADE CHOLANGIOPANCREATOGRAM;  endoscopic retrograde cholangiopancreatogram with minor sphincterotomy, stent placement;  Surgeon: Julius Agosto MD;  Location: [facility] OR     ENDOSCOPIC RETROGRADE CHOLANGIOPANCREATOGRAM       ESOPHAGOSCOPY, GASTROSCOPY, DUODENOSCOPY (EGD), COMBINED  3/18/2014    Procedure: COMBINED ESOPHAGOSCOPY, GASTROSCOPY, DUODENOSCOPY (EGD), REMOVE FOREIGN BODY;;  Surgeon: Concepcion Keen MD;  [facility]     ESOPHAGOSCOPY, GASTROSCOPY, DUODENOSCOPY (EGD), COMBINED N/A 8/11/2017    Procedure: ENDOSCOPIC ULTRASOUND;  Surgeon: Eleazar Verma MD;  [place] [facility] [place]  Service:       [facility] ENDOSCOPY W EUS  1/16/2014    Procedure: COMBINED ENDOSCOPIC ULTRASOUND, ESOPHAGOSCOPY, GASTROSCOPY, DUODENOSCOPY (EGD);  Surgeon: Michael Monahan MD;  Location:  GI     IMPLANT PROSTHESIS SPHINCTER URINARY      urethral sphincter     INCONTINENCE SURGERY       INJECT TRANSVERSUS ABDOMINIS PLANE (TAP) BLOCK BILATERAL Bilateral 12/14/2021    Procedure: BLOCK, TRANSVERSUS ABDOMINIS PLANE;  Surgeon: Chapin Lawson MD;  [place] [facility] [place]     IR EXTREMITY ANGIOGRAM LEFT  10/7/2020     IR LOWER EXTREMITY ANGIOGRAM LEFT  10/7/2020     OTHER SURGICAL HISTORY      urinary sphincter transplantwith 2 subsequent revisions     OTHER SURGICAL HISTORY      removal of melanoma     PROSTATE  SURGERY       PROSTATECTOMY       RELEASE CARPAL TUNNEL Right      UPPER EUS       Family History   Problem Relation Age of Onset     Prostate Cancer Brother      C.A.D. Father      Prostate Cancer Brother      Hypertension Mother      Coronary Artery Disease Father      Hypertension Father         Social History     Socioeconomic History     Marital status:      Spouse name: Not on file     Number of children: Not on file     Years of education: Not on file     Highest education level: Not on file   Occupational History     Not on file   Tobacco Use     Smoking status: Former     Types: Cigarettes     Quit date: 8/10/1986     Years since quittin.9     Smokeless tobacco: Never   Substance and Sexual Activity     Alcohol use: No     Comment: 4 drinks a week/socially; 2013 stopped drinking     Drug use: No     Sexual activity: Not on file   Other Topics Concern     Parent/sibling w/ CABG, MI or angioplasty before 65F 55M? Not Asked   Social History Narrative     Not on file     Social Determinants of Health     Financial Resource Strain: Not on file   Food Insecurity: Not on file   Transportation Needs: Not on file   Physical Activity: Not on file   Stress: Not on file   Social Connections: Not on file   Intimate Partner Violence: Not on file   Housing Stability: Not on file           Medications  Allergies   Current Outpatient Medications   Medication Sig Dispense Refill     aspirin 81 MG tablet Take 1 tablet by mouth daily       buprenorphine HCl-naloxone HCl (SUBOXONE) 2-0.5 MG per film Place 1 Film under the tongue 3 times daily May fill/start 22 42 each 0     FISH OIL 1,400 mg 2 times daily       HYDROcodone-acetaminophen (NORCO) 5-325 MG tablet Take 1 tablet by mouth 2 times daily as needed for severe pain 60 tablet 0     ipratropium (ATROVENT) 0.03 % nasal spray Spray 1 spray into both nostrils as needed       latanoprost (XALATAN) 0.005 % ophthalmic solution Place 1 drop into both eyes  daily       metoprolol succinate ER (TOPROL-XL) 100 MG 24 hr tablet TAKE ONE AND ONE-HALF TABLETS ONCE DAILY       Multiple Vitamin (DAILY MULTIVITAMIN PO) Take 1 tablet by mouth daily       naloxone (NARCAN) 4 MG/0.1ML nasal spray Spray 1 spray (4 mg) into one nostril alternating nostrils once as needed for opioid reversal every 2-3 minutes until assistance arrives 0.2 mL 0     nitroGLYcerin (NITROSTAT) 0.4 MG sublingual tablet 0.4 mg every 5 minutes as needed       pantoprazole (PROTONIX) 40 MG enteric coated tablet Take 1 tablet by mouth daily       polyethylene glycol 3350 POWD Take 1 Scoop by mouth as needed       pregabalin (LYRICA) 100 MG capsule pregabalin 100 mg capsule 2 times a day 60 capsule 3     rosuvastatin (CRESTOR) 5 MG tablet Take 1 tablet (5 mg) by mouth daily 90 tablet 3     senna-docusate (SENOKOT-S;PERICOLACE) 8.6-50 MG per tablet Take 1 tablet by mouth daily as needed for constipation 7 tablet 0     solifenacin (VESICARE) 5 MG tablet Take 1 tablet by mouth daily       triamcinolone (KENALOG) 0.1 % external cream triamcinolone acetonide 0.1 % topical cream   APPLY TO RASH TOPICALLY AS NEEDED       triamterene-hydrochlorothiazide (MAXZIDE-25) 37.5-25 MG per tablet Take 1 tablet by mouth daily       Vitamin D3 (CHOLECALCIFEROL) 25 mcg (1000 units) tablet Take 1 tablet by mouth daily         Allergies   Allergen Reactions     Atorvastatin      Other reaction(s): myalgias     Cat Dander [Animal Dander] Itching     Sneezing     Cilostazol Other (See Comments)     Morphine Other (See Comments)     Causes agitation     Pollen Extracts [Pollen Extract] Itching     sneezing     Tizanidine Dizziness     At higher doses          Lab Results    Chemistry/lipid CBC Cardiac Enzymes/BNP/TSH/INR   Recent Labs   Lab Test 02/15/23  0938   CHOL 94   HDL 40   LDL 19   TRIG 174*     Recent Labs   Lab Test 02/15/23  0938 02/02/22  1139 01/22/21  0818   LDL 19 38 27     Recent Labs   Lab Test 04/12/23  0714       POTASSIUM 4.1   CHLORIDE 99   CO2 28   *   BUN 31.1*   CR 1.37*   GFRESTIMATED 52*   TAMRA 9.2     Recent Labs   Lab Test 04/12/23  0714 02/15/23  0938 12/09/22  0939   CR 1.37* 1.41* 1.28*     Recent Labs   Lab Test 09/27/22  1350 08/28/19  0947   A1C 6.5* 6.6*          Recent Labs   Lab Test 04/12/23  0714   WBC 7.1   HGB 12.7*   HCT 39.3*   MCV 90        Recent Labs   Lab Test 04/12/23  0714 10/07/20  0706 01/29/18  1031   HGB 12.7* 12.6* 13.7*    No results for input(s): TROPONINI in the last 72026 hours.  No results for input(s): BNP, NTBNPI, NTBNP in the last 06068 hours.  Recent Labs   Lab Test 01/31/19  1153   TSH 1.06     Recent Labs   Lab Test 10/07/20  0706   INR 1.03        John Odell MD

## 2023-07-24 ENCOUNTER — HOSPITAL ENCOUNTER (OUTPATIENT)
Dept: NUCLEAR MEDICINE | Facility: CLINIC | Age: 83
Discharge: HOME | End: 2023-07-24
Attending: UROLOGY
Payer: COMMERCIAL

## 2023-07-24 DIAGNOSIS — C61 MALIGNANT NEOPLASM OF PROSTATE (H): ICD-10-CM

## 2023-07-24 PROCEDURE — A9503 TC99M MEDRONATE: HCPCS | Performed by: UROLOGY

## 2023-07-24 PROCEDURE — 343N000001 HC RX 343: Performed by: UROLOGY

## 2023-07-24 PROCEDURE — 78306 BONE IMAGING WHOLE BODY: CPT

## 2023-07-24 RX ORDER — TC 99M MEDRONATE 20 MG/10ML
20-30 INJECTION, POWDER, LYOPHILIZED, FOR SOLUTION INTRAVENOUS ONCE
Status: COMPLETED | OUTPATIENT
Start: 2023-07-24 | End: 2023-07-24

## 2023-07-24 RX ADMIN — TC 99M MEDRONATE 26.2 MILLICURIE: 20 INJECTION, POWDER, LYOPHILIZED, FOR SOLUTION INTRAVENOUS at 10:00

## 2023-10-01 ENCOUNTER — HEALTH MAINTENANCE LETTER (OUTPATIENT)
Age: 83
End: 2023-10-01

## 2023-10-06 ENCOUNTER — HOSPITAL ENCOUNTER (OUTPATIENT)
Dept: MRI IMAGING | Facility: CLINIC | Age: 83
Discharge: HOME | End: 2023-10-06
Attending: INTERNAL MEDICINE | Admitting: INTERNAL MEDICINE
Payer: COMMERCIAL

## 2023-10-06 DIAGNOSIS — K86.89 DILATED PANCREATIC DUCT: ICD-10-CM

## 2023-10-06 DIAGNOSIS — R10.11 RUQ PAIN: ICD-10-CM

## 2023-10-06 DIAGNOSIS — K86.2 PANCREATIC CYST: ICD-10-CM

## 2023-10-06 PROCEDURE — 74183 MRI ABD W/O CNTR FLWD CNTR: CPT

## 2023-10-06 PROCEDURE — A9585 GADOBUTROL INJECTION: HCPCS | Mod: JZ | Performed by: INTERNAL MEDICINE

## 2023-10-06 PROCEDURE — 255N000002 HC RX 255 OP 636: Mod: JZ | Performed by: INTERNAL MEDICINE

## 2023-10-06 RX ORDER — GADOBUTROL 604.72 MG/ML
10 INJECTION INTRAVENOUS ONCE
Status: COMPLETED | OUTPATIENT
Start: 2023-10-06 | End: 2023-10-06

## 2023-10-06 RX ADMIN — GADOBUTROL 10 ML: 604.72 INJECTION INTRAVENOUS at 12:11

## 2023-11-21 ENCOUNTER — LAB REQUISITION (OUTPATIENT)
Dept: LAB | Facility: CLINIC | Age: 83
End: 2023-11-21

## 2023-11-21 DIAGNOSIS — I12.9 HYPERTENSIVE CHRONIC KIDNEY DISEASE WITH STAGE 1 THROUGH STAGE 4 CHRONIC KIDNEY DISEASE, OR UNSPECIFIED CHRONIC KIDNEY DISEASE: ICD-10-CM

## 2023-11-21 LAB
ALBUMIN SERPL BCG-MCNC: 4.1 G/DL (ref 3.5–5.2)
ALP SERPL-CCNC: 72 U/L (ref 40–150)
ALT SERPL W P-5'-P-CCNC: 20 U/L (ref 0–70)
ANION GAP SERPL CALCULATED.3IONS-SCNC: 11 MMOL/L (ref 7–15)
AST SERPL W P-5'-P-CCNC: 22 U/L (ref 0–45)
BILIRUB SERPL-MCNC: 0.4 MG/DL
BUN SERPL-MCNC: 27.4 MG/DL (ref 8–23)
CALCIUM SERPL-MCNC: 9.6 MG/DL (ref 8.8–10.2)
CHLORIDE SERPL-SCNC: 99 MMOL/L (ref 98–107)
CREAT SERPL-MCNC: 1.14 MG/DL (ref 0.67–1.17)
DEPRECATED HCO3 PLAS-SCNC: 26 MMOL/L (ref 22–29)
EGFRCR SERPLBLD CKD-EPI 2021: 64 ML/MIN/1.73M2
GLUCOSE SERPL-MCNC: 112 MG/DL (ref 70–99)
POTASSIUM SERPL-SCNC: 4.4 MMOL/L (ref 3.4–5.3)
PROT SERPL-MCNC: 7 G/DL (ref 6.4–8.3)
SODIUM SERPL-SCNC: 136 MMOL/L (ref 135–145)

## 2023-11-21 PROCEDURE — 80053 COMPREHEN METABOLIC PANEL: CPT | Performed by: FAMILY MEDICINE

## 2024-01-10 ENCOUNTER — LAB REQUISITION (OUTPATIENT)
Dept: LAB | Facility: CLINIC | Age: 84
End: 2024-01-10

## 2024-01-10 DIAGNOSIS — I10 ESSENTIAL (PRIMARY) HYPERTENSION: ICD-10-CM

## 2024-01-10 LAB
ANION GAP SERPL CALCULATED.3IONS-SCNC: 11 MMOL/L (ref 7–15)
BUN SERPL-MCNC: 26.9 MG/DL (ref 8–23)
CALCIUM SERPL-MCNC: 9.5 MG/DL (ref 8.8–10.2)
CHLORIDE SERPL-SCNC: 99 MMOL/L (ref 98–107)
CREAT SERPL-MCNC: 1.35 MG/DL (ref 0.67–1.17)
DEPRECATED HCO3 PLAS-SCNC: 28 MMOL/L (ref 22–29)
EGFRCR SERPLBLD CKD-EPI 2021: 52 ML/MIN/1.73M2
GLUCOSE SERPL-MCNC: 131 MG/DL (ref 70–99)
POTASSIUM SERPL-SCNC: 4.7 MMOL/L (ref 3.4–5.3)
SODIUM SERPL-SCNC: 138 MMOL/L (ref 135–145)

## 2024-01-10 PROCEDURE — 80048 BASIC METABOLIC PNL TOTAL CA: CPT | Performed by: STUDENT IN AN ORGANIZED HEALTH CARE EDUCATION/TRAINING PROGRAM

## 2024-01-15 NOTE — DISCHARGE INSTRUCTIONS
"Home Care Instructions after a (TAP) Transversus Abdominal Plane Block    The transversus abdominis plane, more commonly referred to as the TAP block,   places local anesthetic in the outer abdominal wall in a plane between the internal oblique and the transversus abdominis muscles. This blocks many of the abdominal nerves, providing comfort or relief of abdominal pain caused by illness or surgery. Steroids are sometimes used with the anesthetic to increase the effectiveness and length of relief. Your physician will use ultrasound to help guide and administer this medication.    Activity  -Resume normal activity  -DO NOT shower for 24 hours  -DO NOT remove bandaid for 24 hours    Pain  -You may experience soreness at the injection site for one or two days  -You may use an ice pack for 20 minutes every 2 hours for the first 24 hours  -You may use Tylenol (acetaminophen) every 4 hours or other pain medicines as directed by your physician    You may experience numbness in your abdominal wall near and around the injection site as the the anaesthetic spreads throughout the abdominal wall. Some people experience a weak core for a short time after the injection. It is common for feel a fluid pocket or some puffiness in the abdominal area after the injection.    DID YOU RECEIVE STEROIDS TODAY?  {YES / NO:434450::\"Yes\"}    Common side effects of steroids:  Not everyone will experience corticosteroid side effects. If side effects are experienced, they will gradually subside in the 7-10 day period following an injection. Most common side effects include:  -Flushed face and/or chest  -Feeling of warmth, particularly in the face but could be an overall feeling of warmth  -Increased blood sugar in diabetic patients  -Menstrual irregularities my occur. If taking hormone-based birth control an alternate method of birth control is recommended  -Sleep disturbances and/or mood swings are possible   -Leg cramps    Please contact us if " Per Nina: \"I saw this pt last week and [facility] His daugher Cinda is who helps translate, pt speaks a little english. Can we call and see if he got labs done? He needs to start MMI ASAP\".    RN phoned pt using [employer];  needed as pt speaks limited English. Per SOHAIL, okay to leave a v.m. on pt's cell phone. Pt didn't answer cell;  left detailed message with Nina's above message on v.m.  also tried home phone; pt didn't answer. No message left on home phone, staff not authorized to, per SOHAIL.  Pt asked to call Endo office back or send a MCM (if uses [employer]) to let office know if had labs done.      Awaiting pt's call/message back.     you have:  - Circumoral and/or tongue numbness  - Metallic taste   - Lightheadedness  - Dizziness  - Visual and auditory disturbances particularly difficulty focusing and/or ringing in the ears  - Disorientation  - Drowsiness  -Severe pain  -Fever more than 101.5 degrees Fahrenheit  -Signs of infection at the injection site (redness, swelling, or drainage)    If you have questions, please contact our office at [phone] between the hours of 7:00 am and 3:00 pm Monday through Friday. After office hours you can contact the on call provider by dialing [phone]. If you need immediate attention, we recommend that you go to a hospital emergency room or dial [phone].

## 2024-02-02 ENCOUNTER — TELEPHONE (OUTPATIENT)
Dept: VASCULAR SURGERY | Facility: CLINIC | Age: 84
End: 2024-02-02
Payer: COMMERCIAL

## 2024-02-02 NOTE — TELEPHONE ENCOUNTER
LMTCB schedule 1 year follow up due in May with RW and CELSA in [place]      Message  Received: 9 months ago  Fran Perez, RN  [facility] Scheduling Registration Pool  Needs 1 year follow up with Anabella with ABIs please.

## 2024-02-06 NOTE — TELEPHONE ENCOUNTER
LVMTCB #2 to schedule 1 year with ultrasounds and to see Anabella Medina.  [phone]     (Pt will likely want to be seen in [place],  NOT [place] as the previous message states since he lives in [place])

## 2024-02-13 NOTE — TELEPHONE ENCOUNTER
LVMTCB #3 to schedule 1 year follow up with Anabella and ultrasounds due in May.  Send letter if no response. [phone]

## 2024-02-18 ENCOUNTER — HEALTH MAINTENANCE LETTER (OUTPATIENT)
Age: 84
End: 2024-02-18

## 2024-05-15 ENCOUNTER — LAB REQUISITION (OUTPATIENT)
Dept: LAB | Facility: CLINIC | Age: 84
End: 2024-05-15

## 2024-05-15 DIAGNOSIS — E78.2 MIXED HYPERLIPIDEMIA: ICD-10-CM

## 2024-05-15 DIAGNOSIS — N18.31 CHRONIC KIDNEY DISEASE, STAGE 3A (H): ICD-10-CM

## 2024-05-15 PROCEDURE — 80048 BASIC METABOLIC PNL TOTAL CA: CPT | Performed by: FAMILY MEDICINE

## 2024-05-15 PROCEDURE — 80061 LIPID PANEL: CPT | Performed by: FAMILY MEDICINE

## 2024-05-16 LAB
ANION GAP SERPL CALCULATED.3IONS-SCNC: 13 MMOL/L (ref 7–15)
BUN SERPL-MCNC: 34.8 MG/DL (ref 8–23)
CALCIUM SERPL-MCNC: 9.4 MG/DL (ref 8.8–10.2)
CHLORIDE SERPL-SCNC: 97 MMOL/L (ref 98–107)
CHOLEST SERPL-MCNC: 86 MG/DL
CREAT SERPL-MCNC: 1.46 MG/DL (ref 0.67–1.17)
DEPRECATED HCO3 PLAS-SCNC: 24 MMOL/L (ref 22–29)
EGFRCR SERPLBLD CKD-EPI 2021: 48 ML/MIN/1.73M2
FASTING STATUS PATIENT QL REPORTED: ABNORMAL
FASTING STATUS PATIENT QL REPORTED: NORMAL
GLUCOSE SERPL-MCNC: 107 MG/DL (ref 70–99)
HDLC SERPL-MCNC: 40 MG/DL
LDLC SERPL CALC-MCNC: 16 MG/DL
NONHDLC SERPL-MCNC: 46 MG/DL
POTASSIUM SERPL-SCNC: 4.4 MMOL/L (ref 3.4–5.3)
SODIUM SERPL-SCNC: 134 MMOL/L (ref 135–145)
TRIGL SERPL-MCNC: 149 MG/DL

## 2024-05-30 ENCOUNTER — OFFICE VISIT (OUTPATIENT)
Dept: VASCULAR SURGERY | Facility: CLINIC | Age: 84
End: 2024-05-30
Attending: SURGERY
Payer: COMMERCIAL

## 2024-05-30 ENCOUNTER — ANCILLARY PROCEDURE (OUTPATIENT)
Dept: VASCULAR ULTRASOUND | Facility: CLINIC | Age: 84
End: 2024-05-30
Attending: SURGERY
Payer: COMMERCIAL

## 2024-05-30 VITALS
TEMPERATURE: 98.2 F | SYSTOLIC BLOOD PRESSURE: 169 MMHG | HEART RATE: 58 BPM | RESPIRATION RATE: 18 BRPM | DIASTOLIC BLOOD PRESSURE: 75 MMHG

## 2024-05-30 DIAGNOSIS — I73.9 PAD (PERIPHERAL ARTERY DISEASE) (H): Primary | ICD-10-CM

## 2024-05-30 DIAGNOSIS — I73.9 PAD (PERIPHERAL ARTERY DISEASE) (H): ICD-10-CM

## 2024-05-30 PROCEDURE — 93923 UPR/LXTR ART STDY 3+ LVLS: CPT | Mod: 26 | Performed by: SURGERY

## 2024-05-30 PROCEDURE — 99213 OFFICE O/P EST LOW 20 MIN: CPT | Performed by: PHYSICIAN ASSISTANT

## 2024-05-30 PROCEDURE — 93923 UPR/LXTR ART STDY 3+ LVLS: CPT

## 2024-05-30 PROCEDURE — G0463 HOSPITAL OUTPT CLINIC VISIT: HCPCS | Mod: 25 | Performed by: PHYSICIAN ASSISTANT

## 2024-05-30 NOTE — PATIENT INSTRUCTIONS
Kyrie Junior,    Thank you for entrusting your care with us today. After your visit today with HARVEY Medina this is the plan that was discussed at your appointment.    Follow up in 1 year for repeat ultrasound and clinic visit.    We will call you closer to the date to get you scheduled.       I am including additional information on these things and our contact information if you have any questions or concerns.   Please do not hesitate to reach out to us if you felt we did not answer your questions or you are unsure of the treatment plan after your visit today. Our number is [phone].Thank you for trusting us with your care.         Again thank you for your time.     Ankle-Brachial Index (CELSA) or Physiologic Test    Description  An ankle-brachial index test is relatively pain free. Blood pressure cuffs of various sizes are placed on your thigh, calf, foot and toes.  Similar to having your blood pressure checked with an arm cuff, as the technician inflates the cuffs, they progressively tighten and are then quickly released.  You may feel some discomfort, but generally for less than 60 seconds for each measurement. You will be asked to remove your socks and shoes and possibly your pants or shorts. Gowns will be provided. It usually takes about 30-60 minutes.   Depending on the initial readings and patient symptoms, you may be asked to perform a light walk on a treadmill.  The technician will apply ultrasound gel, usually warmed for your comfort, to your ankles and wrists. Through the gel, the technician will use a small hand-held device that emits sound waves.  Risks  There are typically no side effects or complications associated with a physiologic study.  How to Prepare  Eat and take medications as usual.  There is no preparation required for an ankle-brachial index (CELSA) or physiologic exam.  What Can I Expect After the Test?  The technician will send the ultrasound images to your vascular surgeon for  evaluation. Typically, a report is available in 2-3 days. If anything critical is found, it is standard practice to notify the vascular surgeon immediately.  Reference: https://vascular.org/patient-resources/vascular-tests

## 2024-05-30 NOTE — PROGRESS NOTES
[facility]        Patient is here for a 1 year follow up  to discuss PAD/claudication. Has had some new back and knee problem. Cannot walk as far as he use to but he does not know if it is just his age and back/knee problems.    Pt is currently taking Aspirin and Statin.    BP (!) 169/75   Pulse 58   Temp 98.2  F (36.8  C)   Resp 18 . Checks at home 130/70's    The provider has been notified that the patient has no concerns.     Questions patient would like addressed today are: N/A.    Refills are needed: No    Has homecare services and agency name:  No

## 2024-05-30 NOTE — PROGRESS NOTES
VASCULAR SURGERY PROGRESS NOTE    LOCATION:  [facility]     Vernon Lemus  Medical Record #: [id number]  YOB: 1940  Age: 84 year old     Date of Service: 5/30/2024    PRIMARY CARE PROVIDER: Giovani Queen    Reason for visit: Surveillance of PAD     IMPRESSION: 84-year-old presenting for follow-up of peripheral arterial disease with claudication s/p left lower extremity angiogram with balloon angioplasty of the SFA and popliteal in October 2020.  ABIs remain stable bilaterally at 0.98 with toe pressures adequate for wound healing.  Patient is experiencing back pain and knee pain with ambulation but denies any classic vascular claudication, rest pain, weakness.  Medically optimized.    RECOMMENDATION/RISKS: Denies medical management with aspirin and statin therapy.  Encouraged regular activity/ambulation.  Follow-up in 1 year with repeat ABIs.    HPI:  Vernon Lemus is an 84 year old past medical history significant for hypertension, hyperlipidemia, history of prostate cancer, history of melanoma and peripheral arterial disease status post left lower extremity angiogram with intervention due to lifestyle and claudication in 2020.  Patient was last seen in the vascular clinic 1 year ago and was noted to be doing well both no lower extremity symptoms.    Today, Mr. Lemus presents for follow-up.  Patient is doing denies any classic vascular claudication, rest pain, or nonhealing lower extremity wounds.  He does experience significant knee pain as well as low back pain when he walks.  He has been following with an orthopedic team for this.  He did receive a knee injection which provided some relief but is starting to wear off.  He plans to get back in with their team for further evaluation.  Patient is compliant with his aspirin and statin medications.    Ultrasound results were discussed and all questions answered.  No other concerns.    REVIEW OF SYSTEMS:    A 12  point ROS was reviewed and is negative except for what is listed above in HPI.    PHH:    Past Medical History:   Diagnosis Date    Abdominal pain     Abdominal pain     Arthritis     hands    Cancer (H)     1997 prostate cancer    Cancer (H)     prostate    Chronic pancreatitis (H)     GERD (gastroesophageal reflux disease)     Glaucoma     right eye    History of basal cell cancer     left arm    Hypertension     Hypertension     Melanoma (H)     removed from neck    Mixed hyperlipidemia 10/7/2013    Polyneuropathy     both ankles and feet    Recurrent pancreatitis     Seasonal allergies     Shingles     right leg      Past Surgical History:   Procedure Laterality Date    CARPAL TUNNEL RELEASE RT/LT      right    CATARACT EXTRACTION Bilateral     CATARACT IOL, RT/LT      left eye    CV CORONARY ANGIOGRAM N/A 4/12/2023    Procedure: Coronary Angiogram;  Surgeon: John Odell MD;  Location: [facility] CATH LAB CV    CV LEFT HEART CATH N/A 4/12/2023    Procedure: Left Heart Catheterization;  Surgeon: John Odell MD;  Location: [facility] CATH LAB CV    ENDOSCOPIC RETROGRADE CHOLANGIOPANCREATOGRAM      ENDOSCOPIC RETROGRADE CHOLANGIOPANCREATOGRAM  1/30/2014    Procedure: ENDOSCOPIC RETROGRADE CHOLANGIOPANCREATOGRAM;  Endoscopic Retrograde Cholangopancreatogram with  biliary and pancreatic sphincterotomy, pancreatic duct dilation, and pancreatic and biliary stent placement;  Surgeon: Julius Agosto MD;  Location: [facility] OR    ENDOSCOPIC RETROGRADE CHOLANGIOPANCREATOGRAM  2/18/2014    Procedure: ENDOSCOPIC RETROGRADE CHOLANGIOPANCREATOGRAM;  endoscopic retrograde cholangiopancreatogram with minor sphincterotomy, stent placement;  Surgeon: Julius Agosto MD;  Location: [facility] OR    ENDOSCOPIC RETROGRADE CHOLANGIOPANCREATOGRAM      ESOPHAGOSCOPY, GASTROSCOPY, DUODENOSCOPY (EGD), COMBINED  3/18/2014    Procedure: COMBINED ESOPHAGOSCOPY, GASTROSCOPY, DUODENOSCOPY (EGD), REMOVE FOREIGN BODY;;  Surgeon: Leonila  Concepcion Valadez MD;  Location: [facility] GI    ESOPHAGOSCOPY, GASTROSCOPY, DUODENOSCOPY (EGD), COMBINED N/A 8/11/2017    Procedure: ENDOSCOPIC ULTRASOUND;  Surgeon: Eleazar Verma MD;  Location: [facility] [place];  Service:     HC UGI ENDOSCOPY W EUS  1/16/2014    Procedure: COMBINED ENDOSCOPIC ULTRASOUND, ESOPHAGOSCOPY, GASTROSCOPY, DUODENOSCOPY (EGD);  Surgeon: Michael Monahan MD;  Location:  GI    IMPLANT PROSTHESIS SPHINCTER URINARY      urethral sphincter    INCONTINENCE SURGERY      INJECT TRANSVERSUS ABDOMINIS PLANE (TAP) BLOCK BILATERAL Bilateral 12/14/2021    Procedure: BLOCK, TRANSVERSUS ABDOMINIS PLANE;  Surgeon: Chapin Lawson MD;  Location: [facility] OR    IR EXTREMITY ANGIOGRAM LEFT  10/7/2020    IR LOWER EXTREMITY ANGIOGRAM LEFT  10/7/2020    OTHER SURGICAL HISTORY      urinary sphincter transplantwith 2 subsequent revisions    OTHER SURGICAL HISTORY      removal of melanoma    PROSTATE SURGERY      PROSTATECTOMY      RELEASE CARPAL TUNNEL Right     UPPER EUS       ALLERGIES:  Atorvastatin, Cat dander [animal dander], Cilostazol, Morphine, Pollen extracts [pollen extract], and Tizanidine    MEDS:    Current Outpatient Medications:     aspirin 81 MG tablet, Take 1 tablet by mouth daily, Disp: , Rfl:     buprenorphine HCl-naloxone HCl (SUBOXONE) 2-0.5 MG per film, Place 1 Film under the tongue 3 times daily May fill/start 8/11/22, Disp: 42 each, Rfl: 0    FISH OIL, 1,400 mg 2 times daily, Disp: , Rfl:     HYDROcodone-acetaminophen (NORCO) 5-325 MG tablet, Take 1 tablet by mouth 2 times daily as needed for severe pain, Disp: 60 tablet, Rfl: 0    ipratropium (ATROVENT) 0.03 % nasal spray, Spray 1 spray into both nostrils as needed, Disp: , Rfl:     latanoprost (XALATAN) 0.005 % ophthalmic solution, Place 1 drop into both eyes daily, Disp: , Rfl:     metoprolol succinate ER (TOPROL-XL) 100 MG 24 hr tablet, TAKE ONE AND ONE-HALF TABLETS ONCE DAILY, Disp: , Rfl:     Multiple Vitamin (DAILY MULTIVITAMIN PO),  Take 1 tablet by mouth daily, Disp: , Rfl:     naloxone (NARCAN) 4 MG/0.1ML nasal spray, Spray 1 spray (4 mg) into one nostril alternating nostrils once as needed for opioid reversal every 2-3 minutes until assistance arrives, Disp: 0.2 mL, Rfl: 0    nitroGLYcerin (NITROSTAT) 0.4 MG sublingual tablet, 0.4 mg every 5 minutes as needed, Disp: , Rfl:     pantoprazole (PROTONIX) 40 MG enteric coated tablet, Take 1 tablet by mouth daily, Disp: , Rfl:     polyethylene glycol 3350 POWD, Take 1 Scoop by mouth as needed, Disp: , Rfl:     pregabalin (LYRICA) 100 MG capsule, pregabalin 100 mg capsule 2 times a day, Disp: 60 capsule, Rfl: 3    rosuvastatin (CRESTOR) 5 MG tablet, Take 1 tablet (5 mg) by mouth daily, Disp: 90 tablet, Rfl: 3    senna-docusate (SENOKOT-S;PERICOLACE) 8.6-50 MG per tablet, Take 1 tablet by mouth daily as needed for constipation, Disp: 7 tablet, Rfl: 0    solifenacin (VESICARE) 5 MG tablet, Take 1 tablet by mouth daily, Disp: , Rfl:     triamcinolone (KENALOG) 0.1 % external cream, triamcinolone acetonide 0.1 % topical cream  APPLY TO RASH TOPICALLY AS NEEDED, Disp: , Rfl:     triamterene-hydrochlorothiazide (MAXZIDE-25) 37.5-25 MG per tablet, Take 1 tablet by mouth daily, Disp: , Rfl:     Vitamin D3 (CHOLECALCIFEROL) 25 mcg (1000 units) tablet, Take 1 tablet by mouth daily, Disp: , Rfl:     SOCIAL HABITS:    History   Smoking Status    Former    Types: Cigarettes   Smokeless Tobacco    Never     Social History    Substance and Sexual Activity      Alcohol use: No        Comment: 4 drinks a week/socially; 12/30/2013 stopped drinking      History   Drug Use No     FAMILY HISTORY:    Family History   Problem Relation Age of Onset    Prostate Cancer Brother     C.A.D. Father     Prostate Cancer Brother     Hypertension Mother     Coronary Artery Disease Father     Hypertension Father      PE:  BP (!) 169/75   Pulse 58   Temp 98.2  F (36.8  C)   Resp 18   Wt Readings from Last 1 Encounters:   07/11/23  95.3 kg (210 lb)     There is no height or weight on file to calculate BMI.    EXAM:  GENERAL: well-developed 84 year old male who appears his stated age  CARDIAC: normal   CHEST/LUNG: normal respiratory effort   MUSCULOSKELETAL: grossly normal and both lower extremities are intact, no lower extremity edema  NEUROLOGIC: focally intact, alert and oriented x 3  PSYCH: appropriate affect    DIAGNOSTIC STUDIES:     Images:    US Low Ext Arterial Doppler  wo Ex    Result Date: 5/30/2024  Indication: Surveillance of right POP. A. stenosis/left SFA distal stenosis      Impression:    1. RIGHT LOWER EXTREMITY: CELSA is Normal with multiphasic waveforms with an CELSA of 0.98. Toe Pressures are Normal and adequate for wound healing with toe pressures of 99 mmHg. the toe-brachial index is 0.60   2. LEFT LOWER EXTREMITY: CELSA is Normal with multiphasic waveforms with an CELSA of 0.98. Toe Pressures are Normal and adequate for wound healing with toe pressures of 126 mmHg. the toe-brachial index is 0.76     LABS:      Sodium   Date Value Ref Range Status   05/15/2024 134 (L) 135 - 145 mmol/L Final     Comment:     Reference intervals for this test were updated on 09/26/2023 to more accurately reflect our healthy population. There may be differences in the flagging of prior results with similar values performed with this method. Interpretation of those prior results can be made in the context of the updated reference intervals.    01/10/2024 138 135 - 145 mmol/L Final     Comment:     Reference intervals for this test were updated on 09/26/2023 to more accurately reflect our healthy population. There may be differences in the flagging of prior results with similar values performed with this method. Interpretation of those prior results can be made in the context of the updated reference intervals.    11/21/2023 136 135 - 145 mmol/L Final     Comment:     Reference intervals for this test were updated on 09/26/2023 to more accurately  reflect our healthy population. There may be differences in the flagging of prior results with similar values performed with this method. Interpretation of those prior results can be made in the context of the updated reference intervals.    02/18/2014 138 133 - 144 mmol/L Final   02/05/2014 132 (L) 133 - 144 mmol/L Final   02/04/2014 128 (L) 133 - 144 mmol/L Final     Urea Nitrogen   Date Value Ref Range Status   05/15/2024 34.8 (H) 8.0 - 23.0 mg/dL Final   01/10/2024 26.9 (H) 8.0 - 23.0 mg/dL Final   11/21/2023 27.4 (H) 8.0 - 23.0 mg/dL Final   02/02/2022 25 8 - 28 mg/dL Final   06/09/2021 27 8 - 28 mg/dL Final   01/22/2021 27 8 - 28 mg/dL Final   02/18/2014 19 7 - 30 mg/dL Final   02/05/2014 16 7 - 30 mg/dL Final   02/04/2014 15 7 - 30 mg/dL Final     Hemoglobin   Date Value Ref Range Status   04/12/2023 12.7 (L) 13.3 - 17.7 g/dL Final   10/07/2020 12.6 (L) 14.0 - 18.0 g/dL Final   01/29/2018 13.7 (L) 14.0 - 18.0 g/dL Final   02/18/2014 14.5 13.3 - 17.7 g/dL Final   02/05/2014 11.8 (L) 13.3 - 17.7 g/dL Final   02/04/2014 11.7 (L) 13.3 - 17.7 g/dL Final     Platelet Count   Date Value Ref Range Status   04/12/2023 199 150 - 450 10e3/uL Final   10/07/2020 268 140 - 440 thou/uL Final   02/18/2014 330 150 - 450 10e9/L Final   02/05/2014 222 150 - 450 10e9/L Final   02/04/2014 212 150 - 450 10e9/L Final     INR   Date Value Ref Range Status   10/07/2020 1.03 0.90 - 1.10 Final   02/18/2014 1.07 0.86 - 1.14 Final   01/30/2014 1.05 0.86 - 1.14 Final   01/16/2014 1.11 0.86 - 1.14 Final     25 minutes spent on the day of encounter doing chart review, history and exam, documentation, and further activities as noted.     Anabella Medina PA-C  VASCULAR SURGERY

## 2024-07-07 ENCOUNTER — HEALTH MAINTENANCE LETTER (OUTPATIENT)
Age: 84
End: 2024-07-07

## 2024-10-15 ENCOUNTER — LAB REQUISITION (OUTPATIENT)
Dept: LAB | Facility: CLINIC | Age: 84
End: 2024-10-15

## 2024-10-15 DIAGNOSIS — N18.31 CHRONIC KIDNEY DISEASE, STAGE 3A (H): ICD-10-CM

## 2024-10-15 PROCEDURE — 83970 ASSAY OF PARATHORMONE: CPT | Performed by: FAMILY MEDICINE

## 2024-10-15 PROCEDURE — 80048 BASIC METABOLIC PNL TOTAL CA: CPT | Performed by: FAMILY MEDICINE

## 2024-10-16 LAB
ANION GAP SERPL CALCULATED.3IONS-SCNC: 11 MMOL/L (ref 7–15)
BUN SERPL-MCNC: 26.8 MG/DL (ref 8–23)
CALCIUM SERPL-MCNC: 9.9 MG/DL (ref 8.8–10.4)
CHLORIDE SERPL-SCNC: 97 MMOL/L (ref 98–107)
CREAT SERPL-MCNC: 1.13 MG/DL (ref 0.67–1.17)
EGFRCR SERPLBLD CKD-EPI 2021: 64 ML/MIN/1.73M2
GLUCOSE SERPL-MCNC: 114 MG/DL (ref 70–99)
HCO3 SERPL-SCNC: 27 MMOL/L (ref 22–29)
POTASSIUM SERPL-SCNC: 4.6 MMOL/L (ref 3.4–5.3)
PTH-INTACT SERPL-MCNC: 42 PG/ML (ref 15–65)
SODIUM SERPL-SCNC: 135 MMOL/L (ref 135–145)

## 2024-11-24 ENCOUNTER — HEALTH MAINTENANCE LETTER (OUTPATIENT)
Age: 84
End: 2024-11-24

## 2025-01-14 ENCOUNTER — LAB REQUISITION (OUTPATIENT)
Dept: LAB | Facility: CLINIC | Age: 85
End: 2025-01-14

## 2025-01-14 DIAGNOSIS — C61 MALIGNANT NEOPLASM OF PROSTATE (H): ICD-10-CM

## 2025-01-14 LAB — PSA SERPL DL<=0.01 NG/ML-MCNC: 0.67 NG/ML

## 2025-01-14 PROCEDURE — 84153 ASSAY OF PSA TOTAL: CPT | Performed by: FAMILY MEDICINE

## 2025-03-09 ENCOUNTER — HEALTH MAINTENANCE LETTER (OUTPATIENT)
Age: 85
End: 2025-03-09

## 2025-04-15 NOTE — PATIENT INSTRUCTIONS - HE
Patient Instructions by Shakeel López PT at 6/11/2020  8:00 AM     Author: Shakeel López PT Service: -- Author Type: Physical Therapist    Filed: 6/11/2020  8:42 AM Encounter Date: 6/11/2020 Status: Signed    : Shakeel López PT (Physical Therapist)            THORACIC VERTICAL FOAM ROLLER (can use 1-2 rolled up bath towels instead).    Place a foam roller under your spine vertically while on the floor.  Make sure your head is also supported by the foam roller.  Place arms out to side for a chest stretch.    Relax open x1-2 minutes.  1-2x/day.      SCAPULAR RETRACTIONS    Draw your shoulder blades back and down.    Hold 3 seconds. 10x.  2x/day.      Prone Scapular Retraction - I's    Lying face-down with your arms straight near your sides, bring your arms up by squeezing your shoulder blades together. Do not let your shoulders ride up to your ears, by engaging the muscles between your shoulder blades.    Hold 3 seconds.  5-10 reps as tolerated.  Every other day (3-4x/week).    *Do this with your forehead supported on the floor or bed. Can also put a pillow under your stomach for comfort.                     22

## 2025-04-22 ENCOUNTER — LAB REQUISITION (OUTPATIENT)
Dept: LAB | Facility: CLINIC | Age: 85
End: 2025-04-22

## 2025-04-22 DIAGNOSIS — I25.10 ATHEROSCLEROTIC HEART DISEASE OF NATIVE CORONARY ARTERY WITHOUT ANGINA PECTORIS: ICD-10-CM

## 2025-04-22 DIAGNOSIS — R35.0 FREQUENCY OF MICTURITION: ICD-10-CM

## 2025-04-22 PROCEDURE — 87086 URINE CULTURE/COLONY COUNT: CPT | Performed by: FAMILY MEDICINE

## 2025-04-22 PROCEDURE — 80048 BASIC METABOLIC PNL TOTAL CA: CPT | Performed by: FAMILY MEDICINE

## 2025-04-22 PROCEDURE — 82310 ASSAY OF CALCIUM: CPT | Performed by: FAMILY MEDICINE

## 2025-04-23 LAB
ANION GAP SERPL CALCULATED.3IONS-SCNC: 13 MMOL/L (ref 7–15)
BUN SERPL-MCNC: 31.2 MG/DL (ref 8–23)
CALCIUM SERPL-MCNC: 10 MG/DL (ref 8.8–10.4)
CHLORIDE SERPL-SCNC: 98 MMOL/L (ref 98–107)
CREAT SERPL-MCNC: 1.25 MG/DL (ref 0.67–1.17)
EGFRCR SERPLBLD CKD-EPI 2021: 57 ML/MIN/1.73M2
GLUCOSE SERPL-MCNC: 104 MG/DL (ref 70–99)
HCO3 SERPL-SCNC: 26 MMOL/L (ref 22–29)
POTASSIUM SERPL-SCNC: 4.3 MMOL/L (ref 3.4–5.3)
SODIUM SERPL-SCNC: 137 MMOL/L (ref 135–145)

## 2025-04-24 LAB — BACTERIA UR CULT: NORMAL

## 2025-06-15 ENCOUNTER — MYC MEDICAL ADVICE (OUTPATIENT)
Dept: VASCULAR SURGERY | Facility: CLINIC | Age: 85
End: 2025-06-15
Payer: COMMERCIAL

## 2025-06-22 ENCOUNTER — HEALTH MAINTENANCE LETTER (OUTPATIENT)
Age: 85
End: 2025-06-22

## 2025-07-23 ENCOUNTER — LAB REQUISITION (OUTPATIENT)
Dept: LAB | Facility: CLINIC | Age: 85
End: 2025-07-23

## 2025-07-23 DIAGNOSIS — N18.31 CHRONIC KIDNEY DISEASE, STAGE 3A (H): ICD-10-CM

## 2025-07-23 DIAGNOSIS — E78.2 MIXED HYPERLIPIDEMIA: ICD-10-CM

## 2025-07-23 LAB
ANION GAP SERPL CALCULATED.3IONS-SCNC: 12 MMOL/L (ref 7–15)
BUN SERPL-MCNC: 23 MG/DL (ref 8–23)
CALCIUM SERPL-MCNC: 8.9 MG/DL (ref 8.8–10.4)
CHLORIDE SERPL-SCNC: 94 MMOL/L (ref 98–107)
CHOLEST SERPL-MCNC: 120 MG/DL
CREAT SERPL-MCNC: 1.05 MG/DL (ref 0.67–1.17)
EGFRCR SERPLBLD CKD-EPI 2021: 70 ML/MIN/1.73M2
FASTING STATUS PATIENT QL REPORTED: ABNORMAL
FASTING STATUS PATIENT QL REPORTED: ABNORMAL
GLUCOSE SERPL-MCNC: 139 MG/DL (ref 70–99)
HCO3 SERPL-SCNC: 27 MMOL/L (ref 22–29)
HDLC SERPL-MCNC: 57 MG/DL
LDLC SERPL CALC-MCNC: 24 MG/DL
NONHDLC SERPL-MCNC: 63 MG/DL
POTASSIUM SERPL-SCNC: 3.8 MMOL/L (ref 3.4–5.3)
SODIUM SERPL-SCNC: 133 MMOL/L (ref 135–145)
TRIGL SERPL-MCNC: 195 MG/DL

## 2025-07-23 PROCEDURE — 80061 LIPID PANEL: CPT | Performed by: FAMILY MEDICINE

## 2025-07-23 PROCEDURE — 80048 BASIC METABOLIC PNL TOTAL CA: CPT | Performed by: FAMILY MEDICINE

## (undated) DEVICE — CATH ANGIO INFINITI AL1 4FRX100CM 538445

## (undated) DEVICE — PREP CHLORAPREP W/ORANGE TINT 10.5ML 260715

## (undated) DEVICE — GLOVE PROTEXIS POWDER FREE SMT 8.0  2D72PT80X

## (undated) DEVICE — CATH DIAGNOSTIC RADIAL 5FR TIG 4.0

## (undated) DEVICE — ELECTRODE ADULT PACING MULTI P-211-M1

## (undated) DEVICE — CATH DIAG 4FR ANG PIG 538453S

## (undated) DEVICE — Device

## (undated) DEVICE — CUSTOM PACK CORONARY SAN5BCRHEA

## (undated) DEVICE — CATH ANGIO INFINITI PIGTAIL 155 6 SH 6FRX110CM 534654S

## (undated) DEVICE — SHTH INTRO 0.021IN ID 6FR DIA

## (undated) DEVICE — TRAY PAIN INJECTION 97A 640

## (undated) DEVICE — KIT HAND CONTROL ACIST 014644 AR-P54

## (undated) DEVICE — SYR ANGIOGRAPHY MULTIUSE KIT ACIST 014612

## (undated) DEVICE — MANIFOLD KIT ANGIO AUTOMATED 014613

## (undated) DEVICE — EXCHANGE WIRE .035 260 STAR/JFC/035/260/ M001491681

## (undated) DEVICE — SYR 20ML LL W/O NDL 302830

## (undated) DEVICE — CATH DIAG RADIAL 5FR TIG 4.5 100CM

## (undated) DEVICE — SLEEVE TR BAND RADIAL COMPRESSION DEVICE 24CM TRB24-REG

## (undated) DEVICE — COVER ULTRASOUND PROBE W/GEL FLEXI-FEEL 6"X58" LF  25-FF658

## (undated) DEVICE — GUIDEWIRE STRAIGHT .035 M001491601

## (undated) DEVICE — CATH ANGIO SUPERTORQUE MPA1 4FRX100CM 532430

## (undated) RX ORDER — WATER 10 ML/10ML
INJECTION INTRAMUSCULAR; INTRAVENOUS; SUBCUTANEOUS
Status: DISPENSED
Start: 2021-11-23

## (undated) RX ORDER — FENTANYL CITRATE 50 UG/ML
INJECTION, SOLUTION INTRAMUSCULAR; INTRAVENOUS
Status: DISPENSED
Start: 2023-04-12

## (undated) RX ORDER — SINCALIDE 5 UG/5ML
INJECTION, POWDER, LYOPHILIZED, FOR SOLUTION INTRAVENOUS
Status: DISPENSED
Start: 2021-11-23